# Patient Record
Sex: FEMALE | Race: WHITE | NOT HISPANIC OR LATINO | Employment: UNEMPLOYED | URBAN - METROPOLITAN AREA
[De-identification: names, ages, dates, MRNs, and addresses within clinical notes are randomized per-mention and may not be internally consistent; named-entity substitution may affect disease eponyms.]

---

## 2017-02-01 ENCOUNTER — OFFICE VISIT (OUTPATIENT)
Dept: INTERNAL MEDICINE | Facility: CLINIC | Age: 47
End: 2017-02-01

## 2017-02-01 VITALS
WEIGHT: 293 LBS | RESPIRATION RATE: 18 BRPM | DIASTOLIC BLOOD PRESSURE: 82 MMHG | TEMPERATURE: 97.8 F | BODY MASS INDEX: 51 KG/M2 | SYSTOLIC BLOOD PRESSURE: 118 MMHG | HEART RATE: 80 BPM

## 2017-02-01 DIAGNOSIS — K52.9 GASTROENTERITIS: Primary | ICD-10-CM

## 2017-02-01 PROCEDURE — 99214 OFFICE O/P EST MOD 30 MIN: CPT | Performed by: INTERNAL MEDICINE

## 2017-02-01 RX ORDER — ONDANSETRON 4 MG/1
4 TABLET, FILM COATED ORAL EVERY 8 HOURS PRN
Qty: 20 TABLET | Refills: 0 | Status: SHIPPED | OUTPATIENT
Start: 2017-02-01 | End: 2017-06-28

## 2017-02-01 NOTE — PROGRESS NOTES
Subjective   Loreta Robertson is a 46 y.o. female.   Pt is here with the acute complaint of gastroenteritis.             History of Present Illness   Pt is here with the acute complaint of gastroenteritis.   She states her symptoms of nausea and vomiting started 24 hours ago.   She is now able to hold down liquids and feels like she is improving.           The following portions of the patient's history were reviewed and updated as appropriate: allergies, current medications, past family history, past medical history, past social history, past surgical history and problem list.             Review of Systems   Constitutional: Negative.    HENT: Negative.    Eyes: Negative.    Respiratory: Negative.    Cardiovascular: Negative.    Gastrointestinal:        See HPI.    Endocrine: Negative.    Genitourinary: Negative.    Musculoskeletal: Negative.    Skin: Negative.    Allergic/Immunologic: Negative.    Neurological: Negative.    Hematological: Negative.    Psychiatric/Behavioral: Negative.                 Objective   Physical Exam   Constitutional: She is oriented to person, place, and time. She appears well-developed and well-nourished.   HENT:   Head: Normocephalic and atraumatic.   Eyes: Conjunctivae and EOM are normal. Pupils are equal, round, and reactive to light.   Neck: Normal range of motion. Neck supple.   Cardiovascular: Normal rate, regular rhythm and normal heart sounds.    Pulmonary/Chest: Effort normal and breath sounds normal.   Abdominal: Soft. Bowel sounds are normal. She exhibits no distension. There is no tenderness.   Neurological: She is alert and oriented to person, place, and time. She has normal reflexes.   Skin: Skin is warm and dry.   Psychiatric: She has a normal mood and affect.   Nursing note and vitals reviewed.               Assessment/Plan    Loreta was seen today for vomiting and diarrhea.    Diagnoses and all orders for this visit:    Gastroenteritis        1.) Zofran 4 mg PO Q8 prn  for nausea  2.) Liquid diet and advance as tolerated, avoid foods high in acid content and dairy products for       several days.   3.) Keep scheduled follow up   4.) 15 minutes spent in exam, 10 minutes spent discussing how to dose and possible s/e of new meds as well as how to advance diet.     * Total time spent in discussion and exam 25 minutes.

## 2017-02-27 ENCOUNTER — OFFICE VISIT (OUTPATIENT)
Dept: INTERNAL MEDICINE | Facility: CLINIC | Age: 47
End: 2017-02-27

## 2017-02-27 ENCOUNTER — TELEPHONE (OUTPATIENT)
Dept: INTERNAL MEDICINE | Facility: CLINIC | Age: 47
End: 2017-02-27

## 2017-02-27 VITALS
WEIGHT: 293 LBS | HEART RATE: 88 BPM | RESPIRATION RATE: 18 BRPM | SYSTOLIC BLOOD PRESSURE: 120 MMHG | TEMPERATURE: 98.8 F | OXYGEN SATURATION: 95 % | DIASTOLIC BLOOD PRESSURE: 80 MMHG | BODY MASS INDEX: 51.65 KG/M2

## 2017-02-27 DIAGNOSIS — F31.11 BIPOLAR 1 DISORDER, MANIC, MILD (HCC): Primary | ICD-10-CM

## 2017-02-27 DIAGNOSIS — Z12.31 ENCOUNTER FOR SCREENING MAMMOGRAM FOR BREAST CANCER: ICD-10-CM

## 2017-02-27 DIAGNOSIS — Z72.0 TOBACCO USE: ICD-10-CM

## 2017-02-27 PROBLEM — K59.09 CHRONIC CONSTIPATION: Status: ACTIVE | Noted: 2017-02-27

## 2017-02-27 PROBLEM — K52.9 GASTROENTERITIS: Status: RESOLVED | Noted: 2017-02-01 | Resolved: 2017-02-27

## 2017-02-27 PROCEDURE — 99406 BEHAV CHNG SMOKING 3-10 MIN: CPT | Performed by: PHYSICIAN ASSISTANT

## 2017-02-27 PROCEDURE — 99213 OFFICE O/P EST LOW 20 MIN: CPT | Performed by: PHYSICIAN ASSISTANT

## 2017-02-27 RX ORDER — SELEGILINE 6 MG/24H
1 PATCH TRANSDERMAL DAILY
COMMUNITY
Start: 2017-02-01 | End: 2018-02-26

## 2017-02-27 NOTE — TELEPHONE ENCOUNTER
----- Message from ROBERTO Bejarano sent at 2/27/2017 10:23 AM EST -----  pls get last colonscopy report from [facility] in [place]

## 2017-02-27 NOTE — TELEPHONE ENCOUNTER
[phone]. [facility] for medical records to return my call. Office # given.

## 2017-02-27 NOTE — PROGRESS NOTES
Nevin Robertson is a 46 y.o. female.   Chief Complaint   Patient presents with   • Manic Behavior   • Depression     referral     History of Present Illness   PT here to establish care.      SHe did see Dr Collado on 2/1 for stomach virus.    Sees Dr Murillo at [facility]. He does all her psych med for the last 2 years. She has recently started MAO inhibitor. PT describes herself as a loner.    Smokes 1ppd.  She cannot quit right now but could maybe cut back to 1/2 ppd.    Needs mammogram, hx of monitoring a specific area.  Colonoscopy was done 4years ago.  DOesn't know when f/u required.        The following portions of the patient's history were reviewed and updated as appropriate: allergies, current medications and problem list.    Review of Systems   Constitutional: Positive for unexpected weight change (says since starting depakote.).   HENT: Negative.    Respiratory: Positive for shortness of breath and wheezing.    Cardiovascular: Negative for chest pain.   Gastrointestinal: Positive for constipation. Negative for blood in stool and diarrhea.   Genitourinary: Negative for dysuria.   Musculoskeletal: Negative for back pain and gait problem.   Allergic/Immunologic: Negative for environmental allergies.   Neurological: Negative for headaches.   Psychiatric/Behavioral: Positive for dysphoric mood and sleep disturbance. Negative for suicidal ideas. The patient is nervous/anxious.        Objective   Physical Exam   Constitutional: She appears well-developed and well-nourished.   Obese     HENT:   Head: Normocephalic.   Right Ear: Hearing, tympanic membrane and external ear normal.   Left Ear: Hearing, tympanic membrane and external ear normal.   Nose: Nose normal.   Mouth/Throat: Oropharynx is clear and moist.   Eyes: Conjunctivae and EOM are normal. Pupils are equal, round, and reactive to light. No scleral icterus.   Neck: Normal carotid pulses present. Carotid bruit is not present. No tracheal  deviation present. No thyromegaly present.   Cardiovascular: Normal rate, regular rhythm, normal heart sounds and intact distal pulses.    No murmur heard.  No pitting edema of the LE.   Pulmonary/Chest: Effort normal. No respiratory distress. She has no decreased breath sounds. She has no wheezes. She has no rhonchi. She has no rales. She exhibits no tenderness.   Abdominal: Soft. Bowel sounds are normal. She exhibits no distension and no mass. There is no tenderness.   Musculoskeletal: She exhibits no edema or tenderness.   Lymphadenopathy:     She has no cervical adenopathy.   Neurological: She has normal strength. She displays normal reflexes. No cranial nerve deficit.   Reflex Scores:       Patellar reflexes are 2+ on the right side and 2+ on the left side.  Skin: No rash noted. No erythema.   Psychiatric: She has a normal mood and affect. Her behavior is normal. Judgment and thought content normal.   Vitals reviewed.      Assessment/Plan   Loreta was seen today for manic behavior and depression.    Diagnoses and all orders for this visit:    Bipolar 1 disorder, manic, mild  -     Ambulatory Referral to Psychiatry    Encounter for screening mammogram for breast cancer  -     Mammo Screening Digital Tomosynthesis Bilateral With CAD    Tobacco use    Discussed smoking cess x 3 min.  PT not ready to quit.  Will retrieve colonoscopy report from [facility].  SHows procedure done in 10/1/13 with inadequate clean out.  1 year was recommended.

## 2017-02-27 NOTE — TELEPHONE ENCOUNTER
----- Message from ROBERTO Bejarano sent at 2/27/2017 10:23 AM EST -----  pls get last colonscopy report from [facility] in [place]

## 2017-02-27 NOTE — TELEPHONE ENCOUNTER
----- Message from ROBERTO Bejarano sent at 2/27/2017 10:23 AM EST -----  pls get last colonscopy report from [facility] in [place]

## 2017-02-27 NOTE — PATIENT INSTRUCTIONS
pls consider cutting back to 10 cig per day.   Pls bring in copy of recent blood work.    pls call [facility] ovarian cancer screening program at:    [phone] or [phone]

## 2017-03-27 ENCOUNTER — HOSPITAL ENCOUNTER (OUTPATIENT)
Dept: MAMMOGRAPHY | Facility: HOSPITAL | Age: 47
Discharge: HOME | End: 2017-03-27
Admitting: PHYSICIAN ASSISTANT

## 2017-03-27 PROCEDURE — 77063 BREAST TOMOSYNTHESIS BI: CPT | Performed by: RADIOLOGY

## 2017-03-27 PROCEDURE — G0202 SCR MAMMO BI INCL CAD: HCPCS | Performed by: RADIOLOGY

## 2017-03-27 PROCEDURE — 77063 BREAST TOMOSYNTHESIS BI: CPT

## 2017-03-27 PROCEDURE — G0202 SCR MAMMO BI INCL CAD: HCPCS

## 2017-06-28 ENCOUNTER — HOSPITAL ENCOUNTER (OUTPATIENT)
Dept: GENERAL RADIOLOGY | Facility: HOSPITAL | Age: 47
Discharge: HOME | End: 2017-06-28
Attending: INTERNAL MEDICINE | Admitting: INTERNAL MEDICINE

## 2017-06-28 ENCOUNTER — TELEPHONE (OUTPATIENT)
Dept: INTERNAL MEDICINE | Facility: CLINIC | Age: 47
End: 2017-06-28

## 2017-06-28 ENCOUNTER — OFFICE VISIT (OUTPATIENT)
Dept: INTERNAL MEDICINE | Facility: CLINIC | Age: 47
End: 2017-06-28

## 2017-06-28 VITALS
SYSTOLIC BLOOD PRESSURE: 120 MMHG | BODY MASS INDEX: 51.97 KG/M2 | DIASTOLIC BLOOD PRESSURE: 84 MMHG | TEMPERATURE: 97.3 F | WEIGHT: 293 LBS

## 2017-06-28 DIAGNOSIS — F31.11 BIPOLAR 1 DISORDER, MANIC, MILD (HCC): ICD-10-CM

## 2017-06-28 DIAGNOSIS — R05.9 COUGH: ICD-10-CM

## 2017-06-28 DIAGNOSIS — R79.89 OTHER SPECIFIED ABNORMAL FINDINGS OF BLOOD CHEMISTRY: ICD-10-CM

## 2017-06-28 DIAGNOSIS — R79.9 ABNORMAL FINDING OF BLOOD CHEMISTRY: ICD-10-CM

## 2017-06-28 DIAGNOSIS — K59.09 CHRONIC CONSTIPATION: Primary | ICD-10-CM

## 2017-06-28 LAB
ALBUMIN SERPL-MCNC: 3.8 G/DL (ref 3.2–4.8)
ALBUMIN/GLOB SERPL: 1.4 G/DL (ref 1.5–2.5)
ALP SERPL-CCNC: 70 U/L (ref 25–100)
ALT SERPL-CCNC: 36 U/L (ref 7–40)
AST SERPL-CCNC: 34 U/L (ref 0–33)
BILIRUB SERPL-MCNC: 0.4 MG/DL (ref 0.3–1.2)
BUN SERPL-MCNC: 14 MG/DL (ref 9–23)
BUN/CREAT SERPL: 20 (ref 7–25)
CALCIUM SERPL-MCNC: 9.7 MG/DL (ref 8.7–10.4)
CHLORIDE SERPL-SCNC: 103 MMOL/L (ref 99–109)
CHOLEST SERPL-MCNC: 186 MG/DL (ref 0–200)
CO2 SERPL-SCNC: 33 MMOL/L (ref 20–31)
CREAT SERPL-MCNC: 0.7 MG/DL (ref 0.6–1.3)
ERYTHROCYTE [DISTWIDTH] IN BLOOD BY AUTOMATED COUNT: 14.8 % (ref 11.3–14.5)
EXPIRATION DATE: NORMAL
GLOBULIN SER CALC-MCNC: 2.8 GM/DL
GLUCOSE SERPL-MCNC: 84 MG/DL (ref 70–100)
HBA1C MFR BLD: 5.4 %
HCT VFR BLD AUTO: 43.9 % (ref 34.5–44)
HDLC SERPL-MCNC: 52 MG/DL (ref 40–60)
HGB BLD-MCNC: 13.8 G/DL (ref 11.5–15.5)
LDLC SERPL CALC-MCNC: 111 MG/DL (ref 0–100)
Lab: NORMAL
MCH RBC QN AUTO: 30 PG (ref 27–31)
MCHC RBC AUTO-ENTMCNC: 31.4 G/DL (ref 32–36)
MCV RBC AUTO: 95.4 FL (ref 80–99)
PLATELET # BLD AUTO: 201 10*3/MM3 (ref 150–450)
POTASSIUM SERPL-SCNC: 4.5 MMOL/L (ref 3.5–5.5)
PROT SERPL-MCNC: 6.6 G/DL (ref 5.7–8.2)
RBC # BLD AUTO: 4.6 10*6/MM3 (ref 3.89–5.14)
SODIUM SERPL-SCNC: 141 MMOL/L (ref 132–146)
TRIGL SERPL-MCNC: 116 MG/DL (ref 0–150)
VALPROATE SERPL-MCNC: 87 MCG/ML (ref 50–150)
VLDLC SERPL CALC-MCNC: 23.2 MG/DL
WBC # BLD AUTO: 4.8 10*3/MM3 (ref 3.5–10.8)

## 2017-06-28 PROCEDURE — 99214 OFFICE O/P EST MOD 30 MIN: CPT | Performed by: INTERNAL MEDICINE

## 2017-06-28 PROCEDURE — 36415 COLL VENOUS BLD VENIPUNCTURE: CPT | Performed by: INTERNAL MEDICINE

## 2017-06-28 PROCEDURE — 83036 HEMOGLOBIN GLYCOSYLATED A1C: CPT | Performed by: INTERNAL MEDICINE

## 2017-06-28 PROCEDURE — 71020 HC CHEST PA AND LATERAL: CPT

## 2017-06-28 RX ORDER — PRAZOSIN HYDROCHLORIDE 5 MG/1
5 CAPSULE ORAL NIGHTLY
Refills: 1 | COMMUNITY
Start: 2017-06-02

## 2017-06-28 RX ORDER — QUETIAPINE FUMARATE 300 MG/1
TABLET, FILM COATED ORAL
Refills: 1 | COMMUNITY
Start: 2017-06-02 | End: 2018-04-02 | Stop reason: SDUPTHER

## 2017-06-28 RX ORDER — ALBUTEROL SULFATE 90 UG/1
2 AEROSOL, METERED RESPIRATORY (INHALATION) EVERY 6 HOURS PRN
Qty: 6.7 G | Refills: 5 | Status: SHIPPED | OUTPATIENT
Start: 2017-06-28 | End: 2018-02-26

## 2017-06-28 NOTE — PROGRESS NOTES
"Nevin GIBBS States is a 47 y.o. female.   Pt is here to follow up on chronic constipation and Bipolar Disorder.             History of Present Illness   Pt is here to follow up on chronic constipation and Bipolar Disorder. She states both issues are controlled. However, she does need lab work drawn for her psychiatrist due to the medication she is on.   She states she continues to have a chronic cough. She states sometimes she also will get out of breath she doesn't know wether it is the smoking,allergies or both. She is concerned that she may have \"something wrong with her lungs\" and would like a chest x ray today as well. She has not tried an inhaler for prn use.               The following portions of the patient's history were reviewed and updated as appropriate: allergies, current medications, past family history, past medical history, past social history, past surgical history and problem list.               Review of Systems   Constitutional: Negative.    HENT: Negative.    Eyes: Negative.    Respiratory:        See HPI.    Cardiovascular: Negative.    Gastrointestinal:        See HPI.    Endocrine: Negative.    Genitourinary: Negative.    Musculoskeletal: Negative.    Skin: Negative.    Allergic/Immunologic: Negative.    Neurological: Negative.    Hematological: Negative.    Psychiatric/Behavioral:        See HPI.                 Objective   Physical Exam   Constitutional: She is oriented to person, place, and time. She appears well-developed and well-nourished.   HENT:   Head: Normocephalic and atraumatic.   Right Ear: External ear normal.   Left Ear: External ear normal.   Nose: Nose normal.   Mouth/Throat: Oropharynx is clear and moist.   Eyes: Conjunctivae and EOM are normal. Pupils are equal, round, and reactive to light.   Neck: Normal range of motion. Neck supple. No tracheal deviation present. No thyromegaly present.   Cardiovascular: Normal rate, regular rhythm, normal heart sounds and " intact distal pulses.    Pulmonary/Chest: Effort normal and breath sounds normal.   Abdominal: Soft. Bowel sounds are normal. She exhibits no distension. There is no tenderness.   Neurological: She is alert and oriented to person, place, and time. She has normal reflexes.   Skin: Skin is warm and dry.   Psychiatric: She has a normal mood and affect.   Nursing note and vitals reviewed.               Assessment/Plan    Chronic constipation  -     Comprehensive Metabolic Panel    Bipolar 1 disorder, manic, mild  -     CBC (No Diff)  -     Lipid Panel  -     POC Glycosylated Hemoglobin (Hb A1C)  -     Valproic Acid Level, Total    Cough  -     XR Chest PA & Lateral  -     albuterol (PROVENTIL HFA;VENTOLIN HFA) 108 (90 BASE) MCG/ACT inhaler; Inhale 2 puffs Every 6 (Six) Hours As Needed for Wheezing.    Other specified abnormal findings of blood chemistry   -     Lipid Panel    Abnormal finding of blood chemistry   -     POC Glycosylated Hemoglobin (Hb A1C)        1.) A1C,CBC,CMP,lipid panel and Depakote level   2.) Send in nicotine inhalers.   3.) Ventolin inhaler 1-2 puffs every 6 hours prn   4.) CXR today   5.) Follow up in 3 months   6.) Call in Nicotine inhalers.

## 2017-06-28 NOTE — PROGRESS NOTES
Nevin GIBBS States is a 47 y.o. female.   Chief Complaint   Patient presents with   • Manic Behavior     f/u   • gastroenteritis     f/u     History of Present Illness     {Common H&P Review Areas:52824}    Review of Systems    Objective   Physical Exam    Assessment/Plan   {Assess/PlanSmartLinks:78126}

## 2017-07-18 ENCOUNTER — OFFICE VISIT (OUTPATIENT)
Dept: INTERNAL MEDICINE | Facility: CLINIC | Age: 47
End: 2017-07-18

## 2017-07-18 VITALS
SYSTOLIC BLOOD PRESSURE: 124 MMHG | HEIGHT: 66 IN | DIASTOLIC BLOOD PRESSURE: 84 MMHG | WEIGHT: 293 LBS | OXYGEN SATURATION: 96 % | RESPIRATION RATE: 16 BRPM | BODY MASS INDEX: 47.09 KG/M2 | HEART RATE: 89 BPM

## 2017-07-18 DIAGNOSIS — R60.0 BILATERAL EDEMA OF LOWER EXTREMITY: Primary | ICD-10-CM

## 2017-07-18 DIAGNOSIS — R60.0 BILATERAL EDEMA OF LOWER EXTREMITY: ICD-10-CM

## 2017-07-18 PROCEDURE — 99213 OFFICE O/P EST LOW 20 MIN: CPT | Performed by: INTERNAL MEDICINE

## 2017-07-18 RX ORDER — FUROSEMIDE 20 MG/1
TABLET ORAL
Qty: 90 TABLET | Refills: 0 | OUTPATIENT
Start: 2017-07-18

## 2017-07-18 RX ORDER — FUROSEMIDE 20 MG/1
20 TABLET ORAL DAILY PRN
Qty: 10 TABLET | Refills: 0 | Status: SHIPPED | OUTPATIENT
Start: 2017-07-18 | End: 2018-02-26

## 2018-02-26 ENCOUNTER — OFFICE VISIT (OUTPATIENT)
Dept: INTERNAL MEDICINE | Facility: CLINIC | Age: 48
End: 2018-02-26

## 2018-02-26 VITALS
BODY MASS INDEX: 47.09 KG/M2 | HEIGHT: 66 IN | HEART RATE: 88 BPM | TEMPERATURE: 98.3 F | DIASTOLIC BLOOD PRESSURE: 84 MMHG | RESPIRATION RATE: 22 BRPM | SYSTOLIC BLOOD PRESSURE: 118 MMHG | WEIGHT: 293 LBS

## 2018-02-26 DIAGNOSIS — M25.561 ACUTE PAIN OF RIGHT KNEE: Primary | ICD-10-CM

## 2018-02-26 PROCEDURE — 99214 OFFICE O/P EST MOD 30 MIN: CPT | Performed by: NURSE PRACTITIONER

## 2018-02-26 PROCEDURE — 96372 THER/PROPH/DIAG INJ SC/IM: CPT | Performed by: NURSE PRACTITIONER

## 2018-02-26 RX ORDER — IBUPROFEN 800 MG/1
TABLET ORAL
COMMUNITY
Start: 2018-02-26 | End: 2019-04-18

## 2018-02-26 RX ORDER — KETOROLAC TROMETHAMINE 30 MG/ML
60 INJECTION, SOLUTION INTRAMUSCULAR; INTRAVENOUS ONCE
Status: COMPLETED | OUTPATIENT
Start: 2018-02-26 | End: 2018-02-26

## 2018-02-26 RX ADMIN — KETOROLAC TROMETHAMINE 60 MG: 30 INJECTION, SOLUTION INTRAMUSCULAR; INTRAVENOUS at 14:55

## 2018-02-26 NOTE — PROGRESS NOTES
Nevin Robertson is a 47 y.o. female.     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 Injury denied. Minimal relief with resting/sitting and over the counter ibuprofen. Right knee pain became severe last evening and she went to ER at [facility] in [place]  . Pain today described as throbbing at front of knee 7/0-10 scale. Patient reports x ray suggested mri and orthopedist referral. Patient unable to reapply ace bandage that was applied at ER.    The following portions of the patient's history were reviewed and updated as appropriate: allergies, current medications, past family history, past medical history, past social history, past surgical history and problem list.    Review of Systems   Constitutional: Positive for activity change. Negative for appetite change, chills, diaphoresis, fatigue, fever and unexpected weight change.        Sitting more at work due to knee pain   HENT: Negative.    Eyes: Negative.    Respiratory: Negative.    Cardiovascular: Negative.    Gastrointestinal: Negative.    Endocrine: Negative.    Genitourinary: Negative.    Musculoskeletal: Positive for arthralgias, gait problem, joint swelling and myalgias. Negative for back pain.   Skin: Negative.    Allergic/Immunologic: Negative.    Hematological: Negative.    Psychiatric/Behavioral:        Bipolar, depression and anxiety-sees Dr Murillo at [facility]       Objective   Physical Exam   Constitutional: She is oriented to person, place, and time. She appears well-developed and well-nourished.   Patient morbidly obese   Cardiovascular: Normal rate, regular rhythm, normal heart sounds and intact distal pulses.  Exam reveals no gallop and no friction rub.    Pulmonary/Chest: Effort normal and breath sounds normal. No respiratory distress.   Abdominal: Soft. Bowel sounds are normal. She exhibits no mass. There  is no hepatosplenomegaly. There is no CVA tenderness.   Musculoskeletal: She exhibits edema and tenderness.        Right knee: She exhibits decreased range of motion and swelling. Tenderness found. Medial joint line tenderness noted.   Right knee edematous/tender with majority of edema/tenderness at medial border, decreased ROM with right knee/RLE   Lymphadenopathy:        Right: No inguinal adenopathy present.        Left: No inguinal adenopathy present.   Neurological: She is alert and oriented to person, place, and time.   Skin: Skin is warm and dry. No erythema.   Psychiatric: She has a normal mood and affect.   Nursing note and vitals reviewed.      Assessment/Plan   Loreta was seen today for knee pain.    Diagnoses and all orders for this visit:    Acute pain of right knee  -     ketorolac (TORADOL) injection 60 mg; Inject 60 mg into the shoulder, thigh, or buttocks 1 (One) Time.  -     Ambulatory Referral to Orthopedic Surgery  -     MRI knee right  wo contrast; Future

## 2018-03-01 ENCOUNTER — TELEPHONE (OUTPATIENT)
Dept: INTERNAL MEDICINE | Facility: CLINIC | Age: 48
End: 2018-03-01

## 2018-03-01 ENCOUNTER — HOSPITAL ENCOUNTER (OUTPATIENT)
Dept: MRI IMAGING | Facility: HOSPITAL | Age: 48
Discharge: HOME | End: 2018-03-01
Admitting: NURSE PRACTITIONER

## 2018-03-01 DIAGNOSIS — M25.561 ACUTE PAIN OF RIGHT KNEE: ICD-10-CM

## 2018-03-01 PROCEDURE — 73721 MRI JNT OF LWR EXTRE W/O DYE: CPT

## 2018-03-05 ENCOUNTER — OFFICE VISIT (OUTPATIENT)
Dept: INTERNAL MEDICINE | Facility: CLINIC | Age: 48
End: 2018-03-05

## 2018-03-05 ENCOUNTER — OFFICE VISIT (OUTPATIENT)
Dept: ORTHOPEDIC SURGERY | Facility: CLINIC | Age: 48
End: 2018-03-05

## 2018-03-05 VITALS
DIASTOLIC BLOOD PRESSURE: 81 MMHG | BODY MASS INDEX: 47.09 KG/M2 | WEIGHT: 293 LBS | HEIGHT: 66 IN | SYSTOLIC BLOOD PRESSURE: 137 MMHG | HEART RATE: 166 BPM

## 2018-03-05 VITALS
RESPIRATION RATE: 20 BRPM | WEIGHT: 293 LBS | HEIGHT: 66 IN | TEMPERATURE: 98.3 F | DIASTOLIC BLOOD PRESSURE: 86 MMHG | BODY MASS INDEX: 47.09 KG/M2 | SYSTOLIC BLOOD PRESSURE: 134 MMHG | HEART RATE: 88 BPM

## 2018-03-05 DIAGNOSIS — R73.9 HYPERGLYCEMIA: ICD-10-CM

## 2018-03-05 DIAGNOSIS — Z72.0 TOBACCO USE: ICD-10-CM

## 2018-03-05 DIAGNOSIS — M94.20 CHONDROMALACIA: ICD-10-CM

## 2018-03-05 DIAGNOSIS — Z12.31 SCREENING MAMMOGRAM, ENCOUNTER FOR: ICD-10-CM

## 2018-03-05 DIAGNOSIS — IMO0001 CLASS 3 OBESITY DUE TO EXCESS CALORIES WITHOUT SERIOUS COMORBIDITY WITH BODY MASS INDEX (BMI) OF 50.0 TO 59.9 IN ADULT: ICD-10-CM

## 2018-03-05 DIAGNOSIS — M25.561 RIGHT KNEE PAIN, UNSPECIFIED CHRONICITY: Primary | ICD-10-CM

## 2018-03-05 DIAGNOSIS — Z13.29 SCREENING FOR ENDOCRINE DISORDER: ICD-10-CM

## 2018-03-05 DIAGNOSIS — F31.11 BIPOLAR 1 DISORDER, MANIC, MILD (HCC): ICD-10-CM

## 2018-03-05 DIAGNOSIS — Z12.4 PAPANICOLAOU SMEAR: ICD-10-CM

## 2018-03-05 DIAGNOSIS — Z00.00 INITIAL MEDICARE ANNUAL WELLNESS VISIT: Primary | ICD-10-CM

## 2018-03-05 DIAGNOSIS — Z13.220 SCREENING FOR LIPOID DISORDERS: ICD-10-CM

## 2018-03-05 PROCEDURE — 36415 COLL VENOUS BLD VENIPUNCTURE: CPT | Performed by: NURSE PRACTITIONER

## 2018-03-05 PROCEDURE — 96160 PT-FOCUSED HLTH RISK ASSMT: CPT | Performed by: NURSE PRACTITIONER

## 2018-03-05 PROCEDURE — 20610 DRAIN/INJ JOINT/BURSA W/O US: CPT | Performed by: ORTHOPAEDIC SURGERY

## 2018-03-05 PROCEDURE — 99204 OFFICE O/P NEW MOD 45 MIN: CPT | Performed by: ORTHOPAEDIC SURGERY

## 2018-03-05 PROCEDURE — 99396 PREV VISIT EST AGE 40-64: CPT | Performed by: NURSE PRACTITIONER

## 2018-03-05 PROCEDURE — G0438 PPPS, INITIAL VISIT: HCPCS | Performed by: NURSE PRACTITIONER

## 2018-03-05 RX ORDER — LIDOCAINE HYDROCHLORIDE 10 MG/ML
4 INJECTION, SOLUTION INFILTRATION; PERINEURAL
Status: COMPLETED | OUTPATIENT
Start: 2018-03-05 | End: 2018-03-05

## 2018-03-05 RX ORDER — BETAMETHASONE SODIUM PHOSPHATE AND BETAMETHASONE ACETATE 3; 3 MG/ML; MG/ML
6 INJECTION, SUSPENSION INTRA-ARTICULAR; INTRALESIONAL; INTRAMUSCULAR; SOFT TISSUE
Status: COMPLETED | OUTPATIENT
Start: 2018-03-05 | End: 2018-03-05

## 2018-03-05 RX ADMIN — BETAMETHASONE SODIUM PHOSPHATE AND BETAMETHASONE ACETATE 6 MG: 3; 3 INJECTION, SUSPENSION INTRA-ARTICULAR; INTRALESIONAL; INTRAMUSCULAR; SOFT TISSUE at 10:18

## 2018-03-05 RX ADMIN — LIDOCAINE HYDROCHLORIDE 4 ML: 10 INJECTION, SOLUTION INFILTRATION; PERINEURAL at 10:18

## 2018-03-05 NOTE — PROGRESS NOTES
Procedure   Large Joint Arthrocentesis  Date/Time: 3/5/2018 10:18 AM  Consent given by: patient  Site marked: site marked  Timeout: Immediately prior to procedure a time out was called to verify the correct patient, procedure, equipment, support staff and site/side marked as required   Supporting Documentation  Indications: pain   Procedure Details  Location: knee - R knee  Preparation: Patient was prepped and draped in the usual sterile fashion  Needle size: 22 G  Approach: anterolateral  Medications administered: 4 mL lidocaine 1 %; 6 mg betamethasone acetate-betamethasone sodium phosphate 6 (3-3) MG/ML (4cc 0.25%Bupivacaine 2.5mg/ml [id number] exp [id number])  Patient tolerance: patient tolerated the procedure well with no immediate complications

## 2018-03-05 NOTE — PROGRESS NOTES
[facility] Orthopaedic Surgery Clinic Note    Subjective     Chief Complaint   Patient presents with   • Right Knee - Pain        HPI      Loreta Robertson is a 47 y.o. female.  She has a one-month history of right knee pain and swelling.  Her still walks and standing.  Feels better with rest.  Pain is 8 out of 10.  The pain is aching throbbing and shooting.  She had a recent MRI.  She works standing at [employer]        Past Medical History:   Diagnosis Date   • Anxiety    • Bipolar 1 disorder    • Bipolar 1 disorder    • Chronic bronchitis    • Chronic constipation    • Depression       Past Surgical History:   Procedure Laterality Date   • ABDOMINOPLASTY     • BREAST BIOPSY Right     US BIOPSY   •  SECTION     • CHOLECYSTECTOMY     • HYSTERECTOMY      AGE 33   • LIPOSUCTION ABDOMINAL  1998      Family History   Problem Relation Age of Onset   • Breast cancer Mother 50   • Diabetes Mother    • Cancer Mother    • Diabetes Father    • Hypertension Father    • Heart attack Father    • Breast cancer Maternal Grandmother 40   • Ovarian cancer Maternal Grandmother 40   • Cervical cancer Maternal Grandmother      Social History     Social History   • Marital status:      Spouse name: N/A   • Number of children: N/A   • Years of education: N/A     Occupational History   • Not on file.     Social History Main Topics   • Smoking status: Current Every Day Smoker     Packs/day: 1.00     Years: 25.00     Start date:    • Smokeless tobacco: Never Used   • Alcohol use No   • Drug use: No   • Sexual activity: Defer     Other Topics Concern   • Not on file     Social History Narrative      Current Outpatient Prescriptions on File Prior to Visit   Medication Sig Dispense Refill   • divalproex (DEPAKOTE) 500 MG 24 hr tablet Take 1,000 mg by mouth Daily.     • QUEtiapine (SEROquel) 300 MG tablet TK 1 T PO HS  1   • Brexpiprazole (REXULTI) 4 MG tablet Take  by mouth.     • ibuprofen (ADVIL,MOTRIN) 800 MG tablet     "  • prazosin (MINIPRESS) 2 MG capsule TK 1 C PO HS  1     No current facility-administered medications on file prior to visit.       No Known Allergies     The following portions of the patient's history were reviewed and updated as appropriate: allergies, current medications, past family history, past medical history, past social history, past surgical history and problem list.    Review of Systems   Constitutional: Positive for fatigue.   HENT: Negative.    Eyes: Negative.    Respiratory: Negative.    Cardiovascular: Negative.    Gastrointestinal: Negative.    Endocrine: Negative.    Genitourinary: Negative.    Musculoskeletal: Positive for arthralgias.   Skin: Negative.    Allergic/Immunologic: Negative.    Neurological: Negative.    Hematological: Negative.    Psychiatric/Behavioral: Negative.         Objective      Physical Exam  /81  Pulse (!) 166  Ht 167.6 cm (66\")  Wt (!) 148 kg (326 lb 4.5 oz)  BMI 52.66 kg/m2    Body mass index is 52.66 kg/(m^2).        GENERAL APPEARANCE: awake, alert & oriented x 3, in no acute distress and well developed, well nourished  PSYCH: normal mood and affect  LUNGS:  breathing nonlabored, no wheezing  EYES: sclera anicteric, pupils equal  CARDIOVASCULAR: palpable pulses dorsalis pedis, palpable posterior tibial bilaterally. Capillary refill less than 2 seconds  INTEGUMENTARY: skin intact, no clubbing, cyanosis  NEUROLOGIC:  Normal gait and balance            Ortho Exam  Peripheral Vascular:    Upper Extremity:   Inspection:  Left--no cyanotic nail beds Right--no cyanotic nail beds   Bilateral:  Pink nail beds with brisk capillary refill   Palpation:  Bilateral radial pulse normal    Musculoskeletal:  Global Assessment:  Overall assessment of Lower Extremity Muscle Strength and Tone:    Right quadriceps--5/5  Right hamstrings--5/5  Right tibialis anterior--5/5  Right gastroc soleus--5/5  Right EHL--5/5    Lower Extremity:  Knee/Patella:  No digital clubbing or " cyanosis.    Examination of right knee reveals:  Normal deep tendon reflexes, coordination, strength, tone, sensation.  No known fractures or deformities.    Inspection and Palpation:      Right knee:  Tenderness:  none  Effusion:  none  Crepitus:  none  Pulses:  2+  Ecchymosis:  None  Warmth:  None     ROM:  Right:  Extension:0    Flexion:135  Left:  Extension:0     Flexion:135    Instability:      Right:  Lachman Test:  Negative, Varus stress test negative, Valgus stress test negative   Anterior Drawer Test:  Negative, Posterior Drawer Test:  Negative    Deformities/Malalignments/Discrepancies:    Left:  none  Right:  none    Functional Testing:  Right:  Owen's test:  Negative  Patella grind test: Positive  Q-angle:  Normal  Apprehension Sign:  Negative        Imaging/Studies  Imaging Results (last 7 days)     Procedure Component Value Units Date/Time    XR Knee 4+ View Right [[id number]] Updated:  03/05/18 0941        I reviewed her knee x-ray and MRI which shows patella chondral malacia without a tear  Assessment/Plan      Loreta was seen today for pain.    Diagnoses and all orders for this visit:    Right knee pain, unspecified chronicity  -     XR Knee 4+ View Right    Chondromalacia  -     Ambulatory Referral to Physical Therapy    Other orders  -     Large Joint Arthrocentesis     we will try a patella stabilizing sleeve.  She will physical therapy.  I gave her right knee injection of steroid today.  She tolerated the injection well without complication.    Medical Decision Making  Management Options : over-the-counter medicine, prescription/IM medicine and physical/occupational therapy  Data/Risk: radiology tests and independent visualization of imaging, lab tests, or EMG/NCV    Zackery Landry MD  03/05/18  10:27 AM

## 2018-03-05 NOTE — PROGRESS NOTES
QUICK REFERENCE INFORMATION:  The ABCs of the Annual Wellness Visit    Initial Medicare Wellness Visit    HEALTH RISK ASSESSMENT    1970    Recent Hospitalizations:  No hospitalization(s) within the last year..        Current Medical Providers:  Patient Care Team:  ROSALES Contreras as PCP - General (Nurse Practitioner)  Joel Murillo Jr., MD (Psychiatry)  Joel Murillo Jr., MD (Psychiatry)  Nelson Russell OD (Optometry)        Smoking Status:  History   Smoking Status   • Current Every Day Smoker   • Packs/day: 1.00   • Years: 25.00   • Start date: 1980   Smokeless Tobacco   • Never Used       Alcohol Consumption:  History   Alcohol Use No       Depression Screen:   PHQ-2/PHQ-9 Depression Screening 3/5/2018   Little interest or pleasure in doing things 3   Feeling down, depressed, or hopeless 3   Trouble falling or staying asleep, or sleeping too much 3   Feeling tired or having little energy 3   Poor appetite or overeating 3   Feeling bad about yourself - or that you are a failure or have let yourself or your family down 3   Trouble concentrating on things, such as reading the newspaper or watching television 3   Moving or speaking so slowly that other people could have noticed. Or the opposite - being so fidgety or restless that you have been moving around a lot more than usual 3   Thoughts that you would be better off dead, or of hurting yourself in some way 0   Total Score 24   If you checked off any problems, how difficult have these problems made it for you to do your work, take care of things at home, or get along with other people? Extremely dIfficult       Health Habits and Functional and Cognitive Screening:  Functional & Cognitive Status 3/5/2018   Do you have difficulty preparing food and eating? No   Do you have difficulty bathing yourself, getting dressed or grooming yourself? No   Do you have difficulty using the toilet? No   Do you have difficulty moving around from place to place? No   Do  you have trouble with steps or getting out of a bed or a chair? No   In the past year have you fallen or experienced a near fall? No   Current Diet Unhealthy Diet   Dental Exam Not up to date   Eye Exam Up to date   Exercise (times per week) 0 times per week   Current Exercise Activities Include None   Do you need help using the phone?  No   Are you deaf or do you have serious difficulty hearing?  No   Do you need help with transportation? No   Do you need help shopping? No   Do you need help preparing meals?  No   Do you need help with housework?  No   Do you need help with laundry? No   Do you need help taking your medications? No   Do you need help managing money? No   Have you felt unusual stress, anger or loneliness in the last month? Yes   Who do you live with? Alone   If you need help, do you have trouble finding someone available to you? No   Have you been bothered in the last four weeks by sexual problems? No   Do you have difficulty concentrating, remembering or making decisions? Yes           Does the patient have evidence of cognitive impairment? No    Asiprin use counseling: Does not need ASA (and currently is not on it)      Recent Lab Results:    Visual Acuity:  No exam data present    Age-appropriate Screening Schedule:  Refer to the list below for future screening recommendations based on patient's age, sex and/or medical conditions. Orders for these recommended tests are listed in the plan section. The patient has been provided with a written plan.    Health Maintenance   Topic Date Due   • PNEUMOCOCCAL VACCINE (19-64 MEDIUM RISK) (1 of 1 - PPSV23) 04/28/1987   • TDAP/TD VACCINES (1 - Tdap) 04/28/1987   • PAP SMEAR  02/01/2017   • INFLUENZA VACCINE  Addressed        Subjective   History of Present Illness    Loreta Robertson is a 47 y.o. female who presents for an Annual Wellness Visit. Also here for annual physical.    The following portions of the patient's history were reviewed and updated as  appropriate: allergies, current medications, past family history, past medical history, past social history, past surgical history and problem list.    Outpatient Medications Prior to Visit   Medication Sig Dispense Refill   • Brexpiprazole (REXULTI) 4 MG tablet Take  by mouth.     • divalproex (DEPAKOTE) 500 MG 24 hr tablet Take 1,000 mg by mouth Daily.     • ibuprofen (ADVIL,MOTRIN) 800 MG tablet      • prazosin (MINIPRESS) 2 MG capsule TK 1 C PO HS  1   • QUEtiapine (SEROquel) 300 MG tablet TK 1 T PO HS  1     No facility-administered medications prior to visit.        Patient Active Problem List   Diagnosis   • Chronic constipation   • Tobacco use   • Bipolar 1 disorder, manic, mild       Advance Care Planning:  has NO advance directive - not interested in additional information    Identification of Risk Factors:  Risk factors include: weight , unhealthy diet, cardiovascular risk, inactivity, tobacco use, increased fall risk and depression.    Review of Systems   Constitutional: Negative for chills, fatigue, fever and unexpected weight change.        No weight gain or weight loss.  No night sweats.  No generalized pain.   HENT: Negative for congestion, ear pain, hearing loss, nosebleeds, postnasal drip, rhinorrhea, sinus pressure, sneezing, sore throat, tinnitus and voice change.         Denies snoring.   Eyes: Negative for photophobia, pain, discharge, redness, itching and visual disturbance.   Respiratory: Negative for cough, chest tightness, shortness of breath, wheezing and stridor.         No orthopnea, dyspnea on exertion, or PND.  No chest congestion.   No  hemoptysis.   Cardiovascular: Negative for chest pain, palpitations and leg swelling.        No claudication or syncope.   Gastrointestinal: Negative for abdominal pain, blood in stool, constipation, diarrhea, nausea, rectal pain and vomiting.        No hematemesis.  No heartburn, dysphagia or odynophagia.  No belching or bloating.  No melena.    Endocrine: Negative for cold intolerance, heat intolerance, polydipsia, polyphagia and polyuria.        No hair loss or dry skin.  No generalized weakness.  No hot flashes.   Genitourinary: Negative for difficulty urinating, dyspareunia, dysuria, flank pain, frequency, hematuria, menstrual problem, pelvic pain, urgency, vaginal bleeding and vaginal discharge.        No nocturia, incomplete emptying, or incontinence.   Musculoskeletal: Positive for joint swelling. Negative for arthralgias, back pain, gait problem, myalgias, neck pain and neck stiffness.        Recent onset right knee pain-currently seeing ortho/physical therapy   Skin: Negative for rash.        No new skin lesions or changes in skin lesions. No breast pain or masses.  No nipple discharge or nipple inversion.   Neurological: Negative for dizziness, tremors, syncope, speech difficulty, weakness, light-headedness, numbness and headaches.        No tingling.  No memory loss.  No decreased concentration.   Hematological: Negative for adenopathy. Does not bruise/bleed easily.   Psychiatric/Behavioral: Negative for confusion, sleep disturbance and suicidal ideas. The patient is not nervous/anxious.         Bipolar with depression-sees psychiatrist        Compared to one year ago, the patient feels her physical health is worse.  Compared to one year ago, the patient feels her mental health is the same.    Objective     Physical Exam   Constitutional: She is oriented to person, place, and time. She appears well-developed and well-nourished. She is cooperative. She is easily aroused.  Non-toxic appearance. She does not have a sickly appearance. She does not appear ill. No distress.   obese   HENT:   Head: Normocephalic and atraumatic. Head is without abrasion. Hair is normal.   Right Ear: Hearing, tympanic membrane, external ear and ear canal normal. No foreign bodies. Tympanic membrane is not perforated and not erythematous.   Left Ear: Hearing, tympanic  membrane, external ear and ear canal normal. No foreign bodies. Tympanic membrane is not perforated and not erythematous.   Nose: Nose normal. No mucosal edema, rhinorrhea or septal deviation. No epistaxis.  No foreign bodies.   Mouth/Throat: Oropharynx is clear and moist. No oral lesions. Normal dentition.   Eyes: Conjunctivae and lids are normal. Pupils are equal, round, and reactive to light. Right eye exhibits no discharge. Left eye exhibits no discharge. Right conjunctiva is not injected. Left conjunctiva is not injected. No scleral icterus.   Neck: Normal range of motion and full passive range of motion without pain. Neck supple. No edema and normal range of motion present. No thyroid mass and no thyromegaly present.   Cardiovascular: Normal rate, regular rhythm and normal heart sounds.  Exam reveals no gallop and no friction rub.    No murmur heard.  Pulmonary/Chest: Effort normal and breath sounds normal. No accessory muscle usage. She has no rhonchi. She has no rales. She exhibits no tenderness.   Abdominal: Soft. Bowel sounds are normal. She exhibits no distension. There is no hepatosplenomegaly or hepatomegaly. There is no tenderness. There is no CVA tenderness. Hernia confirmed negative in the right inguinal area and confirmed negative in the left inguinal area.   Genitourinary: Pelvic exam was performed with patient prone. No labial fusion. There is no rash, tenderness, lesion or injury on the right labia. There is no rash, tenderness, lesion or injury on the left labia.   Genitourinary Comments: Pap smear collected and sent for pathology   Musculoskeletal: She exhibits no edema or deformity.        Right knee: She exhibits decreased range of motion and swelling. Tenderness found. Medial joint line tenderness noted.   Lymphadenopathy:     She has no cervical adenopathy.        Right: No inguinal adenopathy present.        Left: No inguinal adenopathy present.   Neurological: She is alert, oriented to  "person, place, and time and easily aroused. She has normal reflexes. No cranial nerve deficit. Coordination normal.   Muscle strength 5/5 and equal throughout.   Skin: Skin is warm, dry and intact. No abrasion and no rash noted. She is not diaphoretic. No cyanosis or erythema. Nails show no clubbing.   Psychiatric: She has a normal mood and affect. Her speech is normal and behavior is normal.   Nursing note and vitals reviewed.      Vitals:    03/05/18 1414   BP: 134/86   BP Location: Left arm   Patient Position: Sitting   Cuff Size: Large Adult   Pulse: 88   Resp: 20   Temp: 98.3 °F (36.8 °C)   TempSrc: Temporal Artery    Weight: (!) 150 kg (331 lb 3.2 oz)   Height: 166.4 cm (65.5\")   PainSc:   8   PainLoc: Knee       Body mass index is 54.28 kg/(m^2).  Discussed the patient's BMI with her. BMI is above normal parameters. Follow-up plan includes:  referral to a nutritionist.      Finger Rub Hearing{Test (right ear):passed  Finger Rub Hearing{Test (left ear):passed      Assessment/Plan   Patient Self-Management and Personalized Health Advice  The patient has been provided with information about: diet, exercise, weight management, prevention of cardiac or vascular disease, tobacco cessation and fall prevention and preventive services including:   · Advance directive, Fall Risk plan of care done.  Patient declines pneumonia or influenza vaccines.  Visit Diagnoses:    ICD-10-CM ICD-9-CM   1. Tobacco use Z72.0 305.1   2. Bipolar 1 disorder, manic, mild F31.11 296.41   3. Class 3 obesity due to excess calories without serious comorbidity with body mass index (BMI) of 50.0 to 59.9 in adult E66.09 278.00    Z68.43 V85.43   4. Screening for endocrine disorder Z13.29 V77.99   5. Screening for lipoid disorders Z13.220 V77.91   6. Screening mammogram, encounter for Z12.31 V76.12   7. Papanicolaou smear Z12.4 V76.2       Orders Placed This Encounter   Procedures   • Mammo Screening Bilateral With CAD     Standing Status:   " Future     Standing Expiration Date:   3/6/2019     Order Specific Question:   Reason for Exam:     Answer:   screen     Order Specific Question:   Patient Pregnant     Answer:   No   • Comprehensive Metabolic Panel   • Lipid Panel   • TSH   • Ambulatory Referral to Nutrition Services     Referral Priority:   Routine     Referral Type:   Health Education     Referral Reason:   Specialty Services Required     Requested Specialty:   Nutrition     Number of Visits Requested:   1   • CBC & Differential     Order Specific Question:   Manual Differential     Answer:   No       Outpatient Encounter Prescriptions as of 3/5/2018   Medication Sig Dispense Refill   • Brexpiprazole (REXULTI) 4 MG tablet Take  by mouth.     • divalproex (DEPAKOTE) 500 MG 24 hr tablet Take 1,000 mg by mouth Daily.     • ibuprofen (ADVIL,MOTRIN) 800 MG tablet      • prazosin (MINIPRESS) 2 MG capsule TK 1 C PO HS  1   • QUEtiapine (SEROquel) 300 MG tablet TK 1 T PO HS  1   • diclofenac (VOLTAREN) 50 MG EC tablet Take 1 tablet by mouth 2 (Two) Times a Day As Needed (right knee pain). 60 tablet 3     Facility-Administered Encounter Medications as of 3/5/2018   Medication Dose Route Frequency Provider Last Rate Last Dose   • [COMPLETED] betamethasone acetate-betamethasone sodium phosphate (CELESTONE SOLUSPAN) injection 6 mg  6 mg  One-Time Injection Zackery Landry MD   6 mg at 03/05/18 1018   • [COMPLETED] lidocaine (XYLOCAINE) 1 % injection 4 mL  4 mL  One-Time Injection Zackery Landry MD   4 mL at 03/05/18 1018       Reviewed use of high risk medication in the elderly: not applicable  Reviewed for potential of harmful drug interactions in the elderly: not applicable    Follow Up:  Return in about 3 months (around 6/5/2018).     An After Visit Summary and PPPS with all of these plans were given to the patient.

## 2018-03-06 LAB
ALBUMIN SERPL-MCNC: 4.3 G/DL (ref 3.2–4.8)
ALBUMIN/GLOB SERPL: 1.4 G/DL (ref 1.5–2.5)
ALP SERPL-CCNC: 89 U/L (ref 25–100)
ALT SERPL-CCNC: 60 U/L (ref 7–40)
AST SERPL-CCNC: 42 U/L (ref 0–33)
BASOPHILS # BLD AUTO: 0.02 10*3/MM3 (ref 0–0.2)
BASOPHILS NFR BLD AUTO: 0.2 % (ref 0–1)
BILIRUB SERPL-MCNC: 0.3 MG/DL (ref 0.3–1.2)
BUN SERPL-MCNC: 15 MG/DL (ref 9–23)
BUN/CREAT SERPL: 21.4 (ref 7–25)
CALCIUM SERPL-MCNC: 9.4 MG/DL (ref 8.7–10.4)
CHLORIDE SERPL-SCNC: 105 MMOL/L (ref 99–109)
CHOLEST SERPL-MCNC: 220 MG/DL (ref 0–200)
CO2 SERPL-SCNC: 26 MMOL/L (ref 20–31)
CREAT SERPL-MCNC: 0.7 MG/DL (ref 0.6–1.3)
EOSINOPHIL # BLD AUTO: 0.01 10*3/MM3 (ref 0–0.3)
EOSINOPHIL NFR BLD AUTO: 0.1 % (ref 0–3)
ERYTHROCYTE [DISTWIDTH] IN BLOOD BY AUTOMATED COUNT: 14.8 % (ref 11.3–14.5)
GFR SERPLBLD CREATININE-BSD FMLA CKD-EPI: 108 ML/MIN/1.73
GFR SERPLBLD CREATININE-BSD FMLA CKD-EPI: 89 ML/MIN/1.73
GLOBULIN SER CALC-MCNC: 3 GM/DL
GLUCOSE SERPL-MCNC: 112 MG/DL (ref 70–100)
HCT VFR BLD AUTO: 44.2 % (ref 34.5–44)
HDLC SERPL-MCNC: 66 MG/DL (ref 40–60)
HGB BLD-MCNC: 14 G/DL (ref 11.5–15.5)
IMM GRANULOCYTES # BLD: 0.04 10*3/MM3 (ref 0–0.03)
IMM GRANULOCYTES NFR BLD: 0.4 % (ref 0–0.6)
LDLC SERPL CALC-MCNC: 133 MG/DL (ref 0–100)
LYMPHOCYTES # BLD AUTO: 1.11 10*3/MM3 (ref 0.6–4.8)
LYMPHOCYTES NFR BLD AUTO: 11.9 % (ref 24–44)
MCH RBC QN AUTO: 30.6 PG (ref 27–31)
MCHC RBC AUTO-ENTMCNC: 31.7 G/DL (ref 32–36)
MCV RBC AUTO: 96.7 FL (ref 80–99)
MONOCYTES # BLD AUTO: 0.2 10*3/MM3 (ref 0–1)
MONOCYTES NFR BLD AUTO: 2.1 % (ref 0–12)
NEUTROPHILS # BLD AUTO: 7.97 10*3/MM3 (ref 1.5–8.3)
NEUTROPHILS NFR BLD AUTO: 85.3 % (ref 41–71)
PLATELET # BLD AUTO: 239 10*3/MM3 (ref 150–450)
POTASSIUM SERPL-SCNC: 4.8 MMOL/L (ref 3.5–5.5)
PROT SERPL-MCNC: 7.3 G/DL (ref 5.7–8.2)
RBC # BLD AUTO: 4.57 10*6/MM3 (ref 3.89–5.14)
SODIUM SERPL-SCNC: 141 MMOL/L (ref 132–146)
TRIGL SERPL-MCNC: 107 MG/DL (ref 0–150)
TSH SERPL DL<=0.005 MIU/L-ACNC: 1.59 MIU/ML (ref 0.35–5.35)
VLDLC SERPL CALC-MCNC: 21.4 MG/DL
WBC # BLD AUTO: 9.35 10*3/MM3 (ref 3.5–10.8)

## 2018-03-08 ENCOUNTER — APPOINTMENT (OUTPATIENT)
Dept: PHYSICAL THERAPY | Facility: HOSPITAL | Age: 48
End: 2018-03-08
Attending: ORTHOPAEDIC SURGERY

## 2018-03-19 ENCOUNTER — APPOINTMENT (OUTPATIENT)
Dept: PHYSICAL THERAPY | Facility: HOSPITAL | Age: 48
End: 2018-03-19
Attending: ORTHOPAEDIC SURGERY

## 2018-03-26 ENCOUNTER — TELEPHONE (OUTPATIENT)
Dept: INTERNAL MEDICINE | Facility: CLINIC | Age: 48
End: 2018-03-26

## 2018-03-26 DIAGNOSIS — B96.89 VAGINITIS DUE TO GARDNERELLA VAGINALIS: Primary | ICD-10-CM

## 2018-03-26 DIAGNOSIS — N76.0 VAGINITIS DUE TO GARDNERELLA VAGINALIS: Primary | ICD-10-CM

## 2018-03-26 RX ORDER — METRONIDAZOLE 500 MG/1
500 TABLET ORAL 2 TIMES DAILY
Qty: 14 TABLET | Refills: 0 | Status: SHIPPED | OUTPATIENT
Start: 2018-03-26 | End: 2018-04-02

## 2018-03-26 NOTE — TELEPHONE ENCOUNTER
Please inform patient that pap smear showed positive for vaginosis and medication has been sent to pharmacy.

## 2018-04-02 ENCOUNTER — OFFICE VISIT (OUTPATIENT)
Dept: ORTHOPEDIC SURGERY | Facility: CLINIC | Age: 48
End: 2018-04-02

## 2018-04-02 VITALS
SYSTOLIC BLOOD PRESSURE: 165 MMHG | DIASTOLIC BLOOD PRESSURE: 80 MMHG | WEIGHT: 293 LBS | HEIGHT: 66 IN | BODY MASS INDEX: 47.09 KG/M2 | HEART RATE: 88 BPM

## 2018-04-02 DIAGNOSIS — E66.01 MORBID OBESITY WITH BODY MASS INDEX OF 50.0-59.9 IN ADULT (HCC): ICD-10-CM

## 2018-04-02 DIAGNOSIS — M94.20 CHONDROMALACIA: Primary | ICD-10-CM

## 2018-04-02 DIAGNOSIS — M17.11 PRIMARY LOCALIZED OSTEOARTHROSIS OF RIGHT LOWER LEG: ICD-10-CM

## 2018-04-02 PROCEDURE — 99213 OFFICE O/P EST LOW 20 MIN: CPT | Performed by: ORTHOPAEDIC SURGERY

## 2018-04-02 RX ORDER — QUETIAPINE FUMARATE 400 MG/1
400 TABLET, FILM COATED ORAL NIGHTLY
Refills: 2 | COMMUNITY
Start: 2018-03-06 | End: 2021-05-07

## 2018-04-02 NOTE — PROGRESS NOTES
[facility]    Subjective     Chief Complaint   Patient presents with   • Right Knee - Follow-up     1 month        HPI      Loreta Robertson is a 47 y.o. female. She is follow-up right knee chondral malacia.  She received 2 weeks of relief with the right knee steroid injection.  Her pain is 8 out of 10.  It is aching throbbing and shooting.  She feels the same.  She is seeing a nutritionist to discuss weight loss and possible bariatric surgery        Past Medical History:   Diagnosis Date   • Anxiety    • Bipolar 1 disorder    • Bipolar 1 disorder    • Chronic bronchitis    • Chronic constipation    • Depression       Past Surgical History:   Procedure Laterality Date   • ABDOMINOPLASTY     • BREAST BIOPSY Right     US BIOPSY   •  SECTION     • CHOLECYSTECTOMY     • HYSTERECTOMY      AGE 33   • LIPOSUCTION ABDOMINAL  1998      Family History   Problem Relation Age of Onset   • Breast cancer Mother 50   • Diabetes Mother    • Cancer Mother    • Diabetes Father    • Hypertension Father    • Heart attack Father    • Obesity Father    • Breast cancer Maternal Grandmother 40   • Ovarian cancer Maternal Grandmother 40   • Cervical cancer Maternal Grandmother      Social History     Social History   • Marital status:      Spouse name: N/A   • Number of children: N/A   • Years of education: N/A     Occupational History   • Not on file.     Social History Main Topics   • Smoking status: Current Every Day Smoker     Packs/day: 1.00     Years: 25.00     Start date:    • Smokeless tobacco: Never Used   • Alcohol use No   • Drug use: No   • Sexual activity: Defer     Other Topics Concern   • Not on file     Social History Narrative   • No narrative on file      Current Outpatient Prescriptions on File Prior to Visit   Medication Sig Dispense Refill   • diclofenac (VOLTAREN) 50 MG EC tablet Take 1 tablet by mouth 2 (Two) Times a Day As Needed (right knee pain). 60 tablet 3   •  "divalproex (DEPAKOTE) 500 MG 24 hr tablet Take 1,000 mg by mouth Daily.     • ibuprofen (ADVIL,MOTRIN) 800 MG tablet      • metroNIDAZOLE (FLAGYL) 500 MG tablet Take 1 tablet by mouth 2 (Two) Times a Day for 7 days. 14 tablet 0   • prazosin (MINIPRESS) 2 MG capsule TK 1 C PO HS  1   • [DISCONTINUED] Brexpiprazole (REXULTI) 4 MG tablet Take  by mouth.     • [DISCONTINUED] QUEtiapine (SEROquel) 300 MG tablet TK 1 T PO HS  1     No current facility-administered medications on file prior to visit.       Allergies   Allergen Reactions   • Morphine And Related GI Intolerance        The following portions of the patient's history were reviewed and updated as appropriate: allergies, current medications, past family history, past medical history, past social history, past surgical history and problem list.    Review of Systems   Constitutional: Negative.    HENT: Negative.    Eyes: Negative.    Respiratory: Negative.    Cardiovascular: Negative.    Gastrointestinal: Negative.    Endocrine: Negative.    Genitourinary: Negative.    Musculoskeletal: Positive for arthralgias.   Skin: Negative.    Allergic/Immunologic: Negative.    Neurological: Negative.    Hematological: Negative.    Psychiatric/Behavioral: Negative.         Objective      Physical Exam  /80   Pulse 88   Ht 166.4 cm (65.51\")   Wt (!) 153 kg (337 lb 8.4 oz)   BMI 55.29 kg/m²     Body mass index is 55.29 kg/m².        GENERAL APPEARANCE: awake, alert & oriented x 3, in no acute distress and well developed, well nourished  PSYCH: normal mood and affect  Peripheral Vascular:    Upper Extremity:   Inspection:  Left--no cyanotic nail beds Right--no cyanotic nail beds   Bilateral:  Pink nail beds with brisk capillary refill   Palpation:  Bilateral radial pulse normal    Musculoskeletal:  Global Assessment:  Overall assessment of Lower Extremity Muscle Strength and Tone:  Right quadriceps--5/5   Right hamstrings--5/5       Right tibialis anterior--5/5  Right " gastroc-soleus--5/5  Right EHL --5/5    Lower Extremity:  Knee/Patella:  No digital clubbing or cyanosis.    Examination of right knee reveals:  Normal deep tendon reflexes, coordination, strength, tone, sensation.  No known fractures or deformities.    Inspection and Palpation:  Right knee:She is morbidly obese  Tenderness:  Over the medial joint line and moderate severity  Effusion:  none  Crepitus:  Positive  Pulses:  2+  Ecchymosis:  None  Warmth:  None     ROM:  Right:  Extension:120    Flexion: 5  Left:  Extension:120     Flexion:5    Instability:    Right:  Lachman Test:  Negative, Varus stress test negative, Valgus stress test negative    Deformities/Malalignments/Discrepancies:    Left:  No deformities   Right:  Genu Varum    Functional Testing:  Owen's test:  Negative  Patella grind test:  Positive  Q-angle:  normal          Assessment/Plan        ICD-10-CM ICD-9-CM   1. Chondromalacia M94.20 733.92   2. Primary localized osteoarthrosis of right lower leg M17.11 715.16   3. Morbid obesity with body mass index of 50.0-59.9 in adult E66.01 278.01    Z68.43 V85.43       She is seeing a nutritionist to work on weight loss.  She will continue anti-inflammatories.  We will try to get Orthovisc injections approved and follow-up for that.    Medical Decision Making  Management Options : over-the-counter medicine and prescription/IM medicine      Zackery Landry MD  04/02/18  10:03 AM

## 2018-04-05 ENCOUNTER — HOSPITAL ENCOUNTER (OUTPATIENT)
Dept: MAMMOGRAPHY | Facility: HOSPITAL | Age: 48
Discharge: HOME | End: 2018-04-05
Admitting: NURSE PRACTITIONER

## 2018-04-05 DIAGNOSIS — Z12.31 SCREENING MAMMOGRAM, ENCOUNTER FOR: ICD-10-CM

## 2018-04-05 PROCEDURE — 77067 SCR MAMMO BI INCL CAD: CPT

## 2018-04-05 PROCEDURE — 77063 BREAST TOMOSYNTHESIS BI: CPT

## 2018-04-05 PROCEDURE — 77063 BREAST TOMOSYNTHESIS BI: CPT | Performed by: RADIOLOGY

## 2018-04-05 PROCEDURE — 77067 SCR MAMMO BI INCL CAD: CPT | Performed by: RADIOLOGY

## 2018-04-11 ENCOUNTER — APPOINTMENT (OUTPATIENT)
Dept: NUTRITION | Facility: HOSPITAL | Age: 48
End: 2018-04-11

## 2018-04-19 ENCOUNTER — APPOINTMENT (OUTPATIENT)
Dept: NUTRITION | Facility: HOSPITAL | Age: 48
End: 2018-04-19

## 2018-05-12 NOTE — PROGRESS NOTES
Subjective   Loreta Robertson is a 47 y.o. female.   Pt presents today with bilateral extremity swelling.         History of Present Illness   Pt presents today with bilateral extremity swelling. She states this has been going on for several weeks intermittently. She denies increasing her sodium intake (but does state she eats a hollis sandwich for lunch daily). She admits to drinking adequate amount of water daily. She denies SOA.     The following portions of the patient's history were reviewed and updated as appropriate: allergies, current medications, past family history, past medical history, past social history, past surgical history and problem list.             Review of Systems   Constitutional: Negative.    HENT: Negative.    Eyes: Negative.    Respiratory: Negative.    Cardiovascular: Negative.    Gastrointestinal: Negative.    Endocrine: Negative.    Genitourinary: Negative.    Musculoskeletal:        See HPI.    Skin: Negative.    Allergic/Immunologic: Negative.    Neurological: Negative.    Hematological: Negative.    Psychiatric/Behavioral: Negative.                Objective   Physical Exam   Constitutional: She is oriented to person, place, and time. She appears well-developed and well-nourished.   HENT:   Head: Normocephalic and atraumatic.   Cardiovascular: Normal rate, regular rhythm and normal heart sounds.    Plus 1 edema to both lower extremities ( not feet) mildly pitting on left lower extremity with a small erythematous area just medially on her left calf.    Pulmonary/Chest: Effort normal and breath sounds normal.   Musculoskeletal:   Homans sign negative.    Neurological: She is alert and oriented to person, place, and time. She has normal reflexes.   Skin: Skin is warm and dry.   Psychiatric: She has a normal mood and affect.   Nursing note and vitals reviewed.               Assessment/Plan      Bilateral edema of lower extremity  -     furosemide (LASIX) 20 MG tablet; Take 1 tablet by  mouth Daily As Needed (lower extremity swelling).        1,) Lasix 20 mg po Daily prn   2.) elevated legs when at rest   3.) Hydrocortisone cream OTC as directed. (gave Eucrisa sample instructed to apply BID prn to area of dermatosis on left leg)   4.) Call me tomorrow and let me know if swelling has improved or not.          12-May-2018

## 2018-06-01 ENCOUNTER — OFFICE VISIT (OUTPATIENT)
Dept: INTERNAL MEDICINE | Facility: CLINIC | Age: 48
End: 2018-06-01

## 2018-06-01 ENCOUNTER — TELEPHONE (OUTPATIENT)
Dept: INTERNAL MEDICINE | Facility: CLINIC | Age: 48
End: 2018-06-01

## 2018-06-01 VITALS
HEIGHT: 66 IN | OXYGEN SATURATION: 98 % | HEART RATE: 86 BPM | BODY MASS INDEX: 47.09 KG/M2 | SYSTOLIC BLOOD PRESSURE: 148 MMHG | WEIGHT: 293 LBS | TEMPERATURE: 96.5 F | RESPIRATION RATE: 18 BRPM | DIASTOLIC BLOOD PRESSURE: 86 MMHG

## 2018-06-01 DIAGNOSIS — Z02.89 PAIN MEDICATION AGREEMENT COMPLETED: ICD-10-CM

## 2018-06-01 DIAGNOSIS — R73.9 HYPERGLYCEMIA: ICD-10-CM

## 2018-06-01 DIAGNOSIS — Z79.899 HIGH RISK MEDICATION USE: ICD-10-CM

## 2018-06-01 DIAGNOSIS — E66.01 MORBID OBESITY WITH BMI OF 50.0-59.9, ADULT (HCC): ICD-10-CM

## 2018-06-01 DIAGNOSIS — M25.561 ACUTE PAIN OF RIGHT KNEE: Primary | ICD-10-CM

## 2018-06-01 DIAGNOSIS — F31.9 BIPOLAR DISEASE, CHRONIC (HCC): ICD-10-CM

## 2018-06-01 DIAGNOSIS — Z23 NEED FOR VACCINATION: ICD-10-CM

## 2018-06-01 DIAGNOSIS — E78.5 HYPERLIPIDEMIA, UNSPECIFIED HYPERLIPIDEMIA TYPE: ICD-10-CM

## 2018-06-01 LAB
EXPIRATION DATE: NORMAL
HBA1C MFR BLD: 5.6 %
Lab: NORMAL

## 2018-06-01 PROCEDURE — 99214 OFFICE O/P EST MOD 30 MIN: CPT | Performed by: NURSE PRACTITIONER

## 2018-06-01 PROCEDURE — 90732 PPSV23 VACC 2 YRS+ SUBQ/IM: CPT | Performed by: NURSE PRACTITIONER

## 2018-06-01 PROCEDURE — 83036 HEMOGLOBIN GLYCOSYLATED A1C: CPT | Performed by: NURSE PRACTITIONER

## 2018-06-01 PROCEDURE — G0009 ADMIN PNEUMOCOCCAL VACCINE: HCPCS | Performed by: NURSE PRACTITIONER

## 2018-06-01 RX ORDER — PRAVASTATIN SODIUM 20 MG
20 TABLET ORAL DAILY
Qty: 30 TABLET | Refills: 3 | Status: SHIPPED | OUTPATIENT
Start: 2018-06-01 | End: 2019-04-19 | Stop reason: DRUGHIGH

## 2018-06-01 RX ORDER — TRAMADOL HYDROCHLORIDE 50 MG/1
50 TABLET ORAL EVERY 6 HOURS PRN
Qty: 90 TABLET | Refills: 0 | Status: SHIPPED | OUTPATIENT
Start: 2018-06-01 | End: 2019-04-18

## 2018-06-01 NOTE — TELEPHONE ENCOUNTER
Called Tramadol Rx into [facility] at [facility] in [place]. He verbalized good understanding, thanked our office and we ended the call.

## 2018-06-07 DIAGNOSIS — Z79.899 HIGH RISK MEDICATION USE: ICD-10-CM

## 2018-06-07 DIAGNOSIS — R82.5 POSITIVE URINE DRUG SCREEN: Primary | ICD-10-CM

## 2018-06-13 ENCOUNTER — HOSPITAL ENCOUNTER (OUTPATIENT)
Dept: MRI IMAGING | Facility: HOSPITAL | Age: 48
Discharge: HOME | End: 2018-06-13
Admitting: NURSE PRACTITIONER

## 2018-06-13 DIAGNOSIS — M25.561 ACUTE PAIN OF RIGHT KNEE: ICD-10-CM

## 2018-06-13 PROCEDURE — 73721 MRI JNT OF LWR EXTRE W/O DYE: CPT

## 2018-08-26 ENCOUNTER — TELEPHONE (OUTPATIENT)
Dept: INTERNAL MEDICINE | Facility: CLINIC | Age: 48
End: 2018-08-26

## 2018-08-27 NOTE — TELEPHONE ENCOUNTER
----- Message from Catina Low sent at 8/23/2018  8:34 AM EDT -----  Concerning bariatric surgery referral, pt never sent back packet of info and did not respond to my voicemail to see if she still was interested in this. How do you want me to proceed with this?

## 2018-10-04 ENCOUNTER — TELEPHONE (OUTPATIENT)
Dept: INTERNAL MEDICINE | Facility: CLINIC | Age: 48
End: 2018-10-04

## 2018-10-04 NOTE — TELEPHONE ENCOUNTER
I will not prescribe pain medication due to abnormal UDS on 6/1/2018 which showed Lyrica and it was not prescribed from me. I have ordered another UDS if she wants to repeat that and it is clear then we can discuss at a future date.

## 2018-10-04 NOTE — TELEPHONE ENCOUNTER
Spoke with patient, verb good understanding. States that she will come into the lab for a UDS but does not know when she would be able to come.

## 2018-10-04 NOTE — TELEPHONE ENCOUNTER
----- Message from Nicole Smalls sent at 10/3/2018  4:20 PM EDT -----  Needs to speak to Helen about her knee.  States she has seen her for this before.  Call her at [phone].

## 2018-10-04 NOTE — TELEPHONE ENCOUNTER
Patient stated that she has finished one round of injections on her knee, and it did not help. She has started another round of injections on her knee and it is not phasing it, patient has talked to her aunt and thinks Lyrica would be the best option for her. Patient requesting Lyrica or other pain medication.

## 2018-12-24 ENCOUNTER — OFFICE VISIT (OUTPATIENT)
Dept: INTERNAL MEDICINE | Facility: CLINIC | Age: 48
End: 2018-12-24

## 2018-12-24 VITALS
DIASTOLIC BLOOD PRESSURE: 78 MMHG | SYSTOLIC BLOOD PRESSURE: 134 MMHG | OXYGEN SATURATION: 94 % | RESPIRATION RATE: 18 BRPM | WEIGHT: 293 LBS | BODY MASS INDEX: 47.09 KG/M2 | TEMPERATURE: 96.8 F | HEART RATE: 90 BPM | HEIGHT: 66 IN

## 2018-12-24 DIAGNOSIS — J20.9 ACUTE BRONCHITIS, UNSPECIFIED ORGANISM: Primary | ICD-10-CM

## 2018-12-24 PROCEDURE — 99213 OFFICE O/P EST LOW 20 MIN: CPT | Performed by: INTERNAL MEDICINE

## 2018-12-24 RX ORDER — AZITHROMYCIN 250 MG/1
TABLET, FILM COATED ORAL
Qty: 6 TABLET | Refills: 0 | Status: SHIPPED | OUTPATIENT
Start: 2018-12-24 | End: 2019-03-20

## 2018-12-24 RX ORDER — GUAIFENESIN 600 MG/1
1200 TABLET, EXTENDED RELEASE ORAL 2 TIMES DAILY PRN
Qty: 30 TABLET | Refills: 0 | Status: SHIPPED | OUTPATIENT
Start: 2018-12-24 | End: 2019-04-18

## 2018-12-24 RX ORDER — ALBUTEROL SULFATE 90 UG/1
2 AEROSOL, METERED RESPIRATORY (INHALATION) EVERY 4 HOURS PRN
Qty: 18 G | Refills: 0 | Status: SHIPPED | OUTPATIENT
Start: 2018-12-24 | End: 2019-03-20

## 2018-12-24 NOTE — PROGRESS NOTES
"OFFICE PROGRESS NOTE    Chief Complaint   Patient presents with   • Cough     x2 days       HPI: 48 y.o. female pt of Helen López ROSALES with hx morbid obesity, bipolar 1 disorder here for eval of:    \"A few days\" of worsening runny nose/nasal congestion, mild ST, cough (occasionally productive of green sputum). Has subjective fevers (no temp taken) and feels tired. Denies HA, sinus pain, ear pain/pressure, abd pain, N/V/D. Multiple sick contacts at work. Has tried Nyquil and Tylenol, only mild relief. Reports use of albuterol inhaler in the past which helped with cough but doesn't have one currently.    Review of Systems   Constitutional: Positive for fever (subjective). Negative for activity change and appetite change.   HENT: Positive for congestion, rhinorrhea, sneezing and sore throat.    Eyes: Negative for discharge and visual disturbance.   Respiratory: Positive for cough. Negative for shortness of breath.    Cardiovascular: Negative for chest pain and palpitations.   Gastrointestinal: Negative for abdominal distention, abdominal pain, blood in stool, constipation, nausea and vomiting.   Endocrine: Negative for cold intolerance and heat intolerance.   Genitourinary: Negative for dysuria.   Musculoskeletal: Negative for neck stiffness.   Skin: Negative for rash.   Allergic/Immunologic: Negative for environmental allergies and food allergies.   Neurological: Negative for headache.   Hematological: Negative for adenopathy.   Psychiatric/Behavioral: Negative for depressed mood.       The following portions of the patient's history were reviewed and updated as appropriate: allergies, current medications, past family history, past medical history, past social history, past surgical history and problem list.      Physical Exam:  Vitals:    12/24/18 0908   BP: 134/78   BP Location: Right arm   Patient Position: Sitting   Cuff Size: Adult   Pulse: 90   Resp: 18   Temp: 96.8 °F (36 °C)   TempSrc: Temporal   SpO2: 94% " "  Weight: (!) 156 kg (344 lb 12.8 oz)   Height: 166.4 cm (65.51\")       Physical Exam   Constitutional: She is oriented to person, place, and time. She appears well-developed and well-nourished. No distress.   Speaks in full sentences. Occasional congested cough during exam.   HENT:   Head: Normocephalic and atraumatic.   Right Ear: External ear normal.   Left Ear: External ear normal.   Nose: Rhinorrhea present.   Mouth/Throat: Posterior oropharyngeal erythema (mild with cobblestoning) present. No oropharyngeal exudate.   Eyes: Right eye exhibits no discharge. Left eye exhibits no discharge. No scleral icterus.   Neck: Normal range of motion. Neck supple. No tracheal deviation present. No thyromegaly present.   Cardiovascular: Normal rate, regular rhythm and normal heart sounds. Exam reveals no gallop and no friction rub.   No murmur heard.  Pulmonary/Chest: Effort normal. No stridor. No respiratory distress. She has wheezes (occasional faint scattered expiratory wheezes primarily in bilateral lower lung fields). She has no rales.   +transmitted upper airway sounds   Lymphadenopathy:     She has no cervical adenopathy.   Neurological: She is alert and oriented to person, place, and time. She exhibits normal muscle tone.   Skin: Skin is warm and dry. Capillary refill takes less than 2 seconds. She is not diaphoretic.   Psychiatric: She has a normal mood and affect.   Vitals reviewed.         Assesment and Plan: 48 y.o. female with PMHx as above, here with:  Acute bronchitis  No focal signs on lung exam or documented fevers/hypoxia to suggest CAP. Rx Azithromycin x 5d as well as albuterol PRN (given occasional faint expiratory wheeze at lung bases) and Mucinex PRN for cough. Drink plenty of fluids. Tylenol or Motrin PRN ok for fever/pain. Call if not improving after 48-72h, needing frequent albuterol, not able to lie flat, blood in sputum or other concerns.       Return for As needed if no improvement or new " symptoms, Next scheduled follow up.    Kathi Gutierrez MD  12/24/2018

## 2019-03-20 ENCOUNTER — OFFICE VISIT (OUTPATIENT)
Dept: INTERNAL MEDICINE | Facility: CLINIC | Age: 49
End: 2019-03-20

## 2019-03-20 VITALS
BODY MASS INDEX: 47.09 KG/M2 | SYSTOLIC BLOOD PRESSURE: 132 MMHG | RESPIRATION RATE: 17 BRPM | HEIGHT: 66 IN | TEMPERATURE: 98.2 F | OXYGEN SATURATION: 94 % | WEIGHT: 293 LBS | HEART RATE: 82 BPM | DIASTOLIC BLOOD PRESSURE: 74 MMHG

## 2019-03-20 DIAGNOSIS — A08.4 VIRAL GASTROENTERITIS: Primary | ICD-10-CM

## 2019-03-20 PROCEDURE — 99213 OFFICE O/P EST LOW 20 MIN: CPT | Performed by: NURSE PRACTITIONER

## 2019-03-20 RX ORDER — ONDANSETRON 8 MG/1
8 TABLET, ORALLY DISINTEGRATING ORAL EVERY 8 HOURS PRN
Qty: 20 TABLET | Refills: 0 | Status: SHIPPED | OUTPATIENT
Start: 2019-03-20 | End: 2019-04-18

## 2019-03-20 NOTE — PROGRESS NOTES
Chief Complaint   Patient presents with   • Vomiting     x3 days, headache, loss of appetite, not able to eat w/o vomiting, no fever, chills        Subjective     History of Present Illness     Drinking sprite with 3d pmh of nausea vomiting and HA.   No fever diarrhea. No aching.  She does have pmh diarrhea after meals.     she has not tried any treatment for her symptoms.       The following portions of the patient's history were reviewed and updated as appropriate: allergies, current medications, past family history, past medical history, past social history, past surgical history and problem list.    Review of Systems   Constitutional: Negative for activity change, appetite change, chills, fatigue and fever.   HENT: Negative for congestion, postnasal drip and sore throat.    Eyes: Negative for visual disturbance.   Respiratory: Negative for cough and shortness of breath.    Cardiovascular: Negative for chest pain, palpitations and leg swelling.   Gastrointestinal: Positive for nausea and vomiting. Negative for abdominal pain, constipation, diarrhea and GERD.   Musculoskeletal: Negative for arthralgias and myalgias.   Skin: Negative for rash.   Allergic/Immunologic: Negative for environmental allergies.   Neurological: Positive for headache. Negative for dizziness.   Psychiatric/Behavioral: Negative for sleep disturbance.   All other systems reviewed and are negative.      Objective   Physical Exam   Constitutional: She is oriented to person, place, and time. She appears well-developed and well-nourished.   HENT:   Head: Normocephalic and atraumatic.   Right Ear: External ear normal.   Left Ear: External ear normal.   Nose: Nose normal.   Mouth/Throat: Oropharynx is clear and moist.   Neck: No thyromegaly present.   Cardiovascular: Normal rate, regular rhythm and intact distal pulses.   Pulmonary/Chest: Effort normal and breath sounds normal.   Abdominal: Soft. Bowel sounds are normal. She exhibits no distension.  There is tenderness.   Tenderness is generalized over the stomach   Musculoskeletal: She exhibits no edema.   Lymphadenopathy:     She has no cervical adenopathy.   Neurological: She is alert and oriented to person, place, and time.   Skin: Capillary refill takes 2 to 3 seconds.   Psychiatric: She has a normal mood and affect. Her behavior is normal.   Nursing note and vitals reviewed.    Assessment/Plan   oLreta was seen today for vomiting.    Diagnoses and all orders for this visit:    Viral gastroenteritis  -     ondansetron ODT (ZOFRAN ODT) 8 MG disintegrating tablet; Take 1 tablet by mouth Every 8 (Eight) Hours As Needed for Nausea or Vomiting.    [place] diet no dairy acidic greasy spicy foods no carbonated drinks plus fluids but drinking sips of liquid frequently if symptoms worsen she will does know or go to the ER      Return if symptoms worsen or fail to improve.  RTC/call  If symptoms worsen  Meds MOA and SE's reviewed and pt v/u

## 2019-04-03 ENCOUNTER — OFFICE VISIT (OUTPATIENT)
Dept: INTERNAL MEDICINE | Facility: CLINIC | Age: 49
End: 2019-04-03

## 2019-04-03 ENCOUNTER — TELEPHONE (OUTPATIENT)
Dept: INTERNAL MEDICINE | Facility: CLINIC | Age: 49
End: 2019-04-03

## 2019-04-03 ENCOUNTER — LAB REQUISITION (OUTPATIENT)
Dept: LAB | Facility: HOSPITAL | Age: 49
End: 2019-04-03

## 2019-04-03 VITALS
HEIGHT: 66 IN | BODY MASS INDEX: 47.09 KG/M2 | SYSTOLIC BLOOD PRESSURE: 132 MMHG | RESPIRATION RATE: 20 BRPM | DIASTOLIC BLOOD PRESSURE: 80 MMHG | HEART RATE: 98 BPM | WEIGHT: 293 LBS | OXYGEN SATURATION: 95 % | TEMPERATURE: 98.7 F

## 2019-04-03 DIAGNOSIS — E87.6 HYPOKALEMIA: ICD-10-CM

## 2019-04-03 DIAGNOSIS — E87.1 HYPONATREMIA: ICD-10-CM

## 2019-04-03 DIAGNOSIS — K92.1: ICD-10-CM

## 2019-04-03 DIAGNOSIS — M79.605 LEG PAIN, POSTERIOR, LEFT: ICD-10-CM

## 2019-04-03 DIAGNOSIS — Z00.00 ROUTINE GENERAL MEDICAL EXAMINATION AT A HEALTH CARE FACILITY: ICD-10-CM

## 2019-04-03 DIAGNOSIS — K52.9 COLITIS: Primary | ICD-10-CM

## 2019-04-03 LAB
ANION GAP SERPL CALCULATED.3IONS-SCNC: 10 MMOL/L (ref 3–11)
BUN BLD-MCNC: 12 MG/DL (ref 9–23)
BUN/CREAT SERPL: 15.2 (ref 7–25)
CALCIUM SPEC-SCNC: 9.1 MG/DL (ref 8.7–10.4)
CHLORIDE SERPL-SCNC: 104 MMOL/L (ref 99–109)
CO2 SERPL-SCNC: 27 MMOL/L (ref 20–31)
CREAT BLD-MCNC: 0.79 MG/DL (ref 0.6–1.3)
GFR SERPL CREATININE-BSD FRML MDRD: 77 ML/MIN/1.73
GLUCOSE BLD-MCNC: 66 MG/DL (ref 70–100)
POTASSIUM BLD-SCNC: 4.5 MMOL/L (ref 3.5–5.5)
SODIUM BLD-SCNC: 141 MMOL/L (ref 132–146)

## 2019-04-03 PROCEDURE — 36415 COLL VENOUS BLD VENIPUNCTURE: CPT | Performed by: PHYSICIAN ASSISTANT

## 2019-04-03 PROCEDURE — 80048 BASIC METABOLIC PNL TOTAL CA: CPT | Performed by: PHYSICIAN ASSISTANT

## 2019-04-03 PROCEDURE — 99213 OFFICE O/P EST LOW 20 MIN: CPT | Performed by: PHYSICIAN ASSISTANT

## 2019-04-03 RX ORDER — POTASSIUM CHLORIDE 750 MG/1
10 TABLET, EXTENDED RELEASE ORAL 2 TIMES DAILY
Refills: 0 | COMMUNITY
Start: 2019-03-29 | End: 2019-04-18

## 2019-04-03 RX ORDER — METRONIDAZOLE 500 MG/1
TABLET ORAL
Refills: 0 | COMMUNITY
Start: 2019-03-29 | End: 2019-04-18

## 2019-04-03 RX ORDER — PROMETHAZINE HYDROCHLORIDE 25 MG/1
TABLET ORAL
Refills: 0 | COMMUNITY
Start: 2019-03-29 | End: 2019-04-18

## 2019-04-03 RX ORDER — LEVOFLOXACIN 750 MG/1
TABLET ORAL
Refills: 0 | COMMUNITY
Start: 2019-03-29 | End: 2019-04-18

## 2019-04-03 NOTE — PROGRESS NOTES
"Chief Complaint   Patient presents with   • Follow-up     ER  Maldonado Paula 03/28/2019, Dx: colitis, hypokalemia, loss of appetite   • Leg Pain     x1 day, Leg leg sorness behind knee, no shortness of breathe       Subjective       History of Present Illness     Loreta Robertson is a 48 y.o. female. She presents for follow up of colitis with lower GI bleed, hypokalemia, and hyponatremia. Pt was seen at [facility] ED on 3/28/2019 for blood in her stool x1 day after several days of constipation. She states that she had a CT abdomen which showed colitis, but no other concerning findings, per pt. Unavailable for review at time of office visit. She brings labs from [facility] for review, with noted hypokalemia at 3.4, and hyponatremia at 121. Pt states she was given Abx for colitis which she has been taking as directed, flagyl and levaquin. She was also given K+ supplements and completed last dose last night. She was advised to increase salt in diet for hyponatremia, and was not admitted to hospital. Since discharge from ED, pt states she is still feeling poorly. She has dec appetite, feels bloated and feels very full after a few bites of food. She is having nausea after eating, present after almost every meal. Taking phenergan which does help some, but makes her very drowsy so she is taking this sparingly. She has noted some dark blood in stool, which is reducing in quantity daily, and no BRBPR. She is having normal BMs about 1/day with no straining, diarrhea, or constipation. Normal urination. She was advised to f/u with PCP for labs and to schedule colonoscopy.     Pt also reports 1 day history of aching at posterior L calf. This began yesterday morning. She describes this as \"the feeling you have after a charley horse,\" with a deep aching pain. She denies any sharp or shooting pain. She denies warmth to touch, redness, or swelling of L leg. No hx DVTs or PE.     The following portions of the patient's history were reviewed and " updated as appropriate: allergies, current medications, past medical history, past social history, past surgical history and problem list.    Allergies   Allergen Reactions   • Morphine And Related GI Intolerance     Social History     Tobacco Use   • Smoking status: Current Every Day Smoker     Packs/day: 1.00     Years: 25.00     Pack years: 25.00     Start date: 1980   • Smokeless tobacco: Never Used   Substance Use Topics   • Alcohol use: No         Current Outpatient Medications:   •  Brexpiprazole (REXULTI) 4 MG tablet, Take  by mouth., Disp: , Rfl:   •  diclofenac (VOLTAREN) 50 MG EC tablet, TAKE 1 TABLET BY MOUTH TWICE DAILY AS NEEDED FOR RIGHT KNEE PAIN, Disp: 60 tablet, Rfl: 0  •  divalproex (DEPAKOTE) 500 MG 24 hr tablet, Take 1,000 mg by mouth Daily., Disp: , Rfl:   •  guaiFENesin (MUCINEX) 600 MG 12 hr tablet, Take 2 tablets by mouth 2 (Two) Times a Day As Needed for Cough or Congestion., Disp: 30 tablet, Rfl: 0  •  ibuprofen (ADVIL,MOTRIN) 800 MG tablet, , Disp: , Rfl:   •  levoFLOXacin (LEVAQUIN) 750 MG tablet, TK 1 T PO Q 24 H FOR 10 DAYS, Disp: , Rfl: 0  •  metroNIDAZOLE (FLAGYL) 500 MG tablet, TK 1 T PO Q 6 H FOR 10 DAYS, Disp: , Rfl: 0  •  ondansetron ODT (ZOFRAN ODT) 8 MG disintegrating tablet, Take 1 tablet by mouth Every 8 (Eight) Hours As Needed for Nausea or Vomiting., Disp: 20 tablet, Rfl: 0  •  potassium chloride (K-DUR,KLOR-CON) 10 MEQ CR tablet, Take 10 mEq by mouth 2 (Two) Times a Day., Disp: , Rfl: 0  •  pravastatin (PRAVACHOL) 20 MG tablet, Take 1 tablet by mouth Daily., Disp: 30 tablet, Rfl: 3  •  prazosin (MINIPRESS) 2 MG capsule, TK 1 C PO HS, Disp: , Rfl: 1  •  promethazine (PHENERGAN) 25 MG tablet, TK 1 T PO Q 6 H PRF NAUSEA AND VOMITING, Disp: , Rfl: 0  •  QUEtiapine (SEROquel) 400 MG tablet, TK 1 T PO HS, Disp: , Rfl: 2  •  traMADol (ULTRAM) 50 MG tablet, Take 1 tablet by mouth Every 6 (Six) Hours As Needed for Moderate Pain ., Disp: 90 tablet, Rfl: 0    Review of Systems    Constitutional: Positive for appetite change (dec) and fatigue. Negative for chills and fever.   HENT: Negative for congestion and sore throat.    Eyes: Negative for pain.   Respiratory: Negative for cough, shortness of breath and wheezing.    Cardiovascular: Negative for chest pain and leg swelling.   Gastrointestinal: Positive for abdominal pain, blood in stool and nausea. Negative for constipation, diarrhea and vomiting.        +bloating   Genitourinary: Negative for dysuria and hematuria.   Musculoskeletal: Positive for myalgias.   Skin: Negative for color change, rash and bruise.   Neurological: Negative for dizziness and headache.       Objective   Vitals:    04/03/19 1308   BP: 132/80   Pulse: 98   Resp: 20   Temp: 98.7 °F (37.1 °C)   SpO2: 95%     Physical Exam   Constitutional: She appears well-developed and well-nourished.   HENT:   Head: Normocephalic and atraumatic.   Right Ear: Tympanic membrane, external ear and ear canal normal.   Left Ear: Tympanic membrane, external ear and ear canal normal.   Mouth/Throat: Oropharynx is clear and moist and mucous membranes are normal.   Eyes: Conjunctivae are normal.   Neck: Normal range of motion. Neck supple.   Cardiovascular: Normal rate, regular rhythm and intact distal pulses.   No murmur heard.  Pulmonary/Chest: Effort normal and breath sounds normal. She has no wheezes. She has no rales.   Abdominal: Soft. There is no hepatosplenomegaly. There is tenderness in the left lower quadrant. There is no CVA tenderness.   Musculoskeletal:        Left lower leg: She exhibits tenderness. She exhibits no bony tenderness, no swelling, no edema and no deformity.   Left leg: no erythema, bruising, edema noted. No edema of lower leg. No warmth noted. +mild TTP to mid-calf with deep palpation only.    Lymphadenopathy:     She has no cervical adenopathy.   Psychiatric: She has a normal mood and affect. Her behavior is normal.         Assessment/Plan   oLreta was seen  today for follow-up and leg pain.    Diagnoses and all orders for this visit:    Colitis  -     Ambulatory Referral For Screening Colonoscopy  -     Basic Metabolic Panel    Hypokalemia  -     Basic Metabolic Panel    Hyponatremia  -     Basic Metabolic Panel    Blood in stool, kwesi  -     Ambulatory Referral For Screening Colonoscopy      After 5/1/19- for colonoscopy. Continue Abx through completion.   We did discuss s/sx of DVT as precaution, and pt advised to RTC immediately or go to ED if she develops warmth, redness, or swelling of L leg.  Further plans after review of labs.            Return in about 2 weeks (around 4/17/2019) for Recheck- hyponatremia, hypokalemia.

## 2019-04-03 NOTE — TELEPHONE ENCOUNTER
Critical access hospital medical records [phone]   [facility]  Medical records for CT of abdomen and pelvis for 03/28/2019 has been requested. Wendy confirmed she will fax results and office notes to our office at [phone]. Good verbal understanding.

## 2019-04-03 NOTE — TELEPHONE ENCOUNTER
----- Message from Loreta Jarrell PA-C sent at 4/3/2019  1:51 PM EDT -----  Please request CT abdomen results from 3/28/2019- [facility] ED. Thanks.

## 2019-04-10 ENCOUNTER — PREP FOR SURGERY (OUTPATIENT)
Dept: OTHER | Facility: HOSPITAL | Age: 49
End: 2019-04-10

## 2019-04-10 DIAGNOSIS — K52.9 COLITIS: Primary | ICD-10-CM

## 2019-04-10 DIAGNOSIS — K92.1 BLOOD IN STOOL: ICD-10-CM

## 2019-04-18 ENCOUNTER — OFFICE VISIT (OUTPATIENT)
Dept: INTERNAL MEDICINE | Facility: CLINIC | Age: 49
End: 2019-04-18

## 2019-04-18 VITALS
BODY MASS INDEX: 47.09 KG/M2 | SYSTOLIC BLOOD PRESSURE: 130 MMHG | HEIGHT: 66 IN | HEART RATE: 86 BPM | WEIGHT: 293 LBS | TEMPERATURE: 98.7 F | RESPIRATION RATE: 18 BRPM | DIASTOLIC BLOOD PRESSURE: 85 MMHG

## 2019-04-18 DIAGNOSIS — M25.561 CHRONIC PAIN OF RIGHT KNEE: ICD-10-CM

## 2019-04-18 DIAGNOSIS — E87.1 HYPONATREMIA: ICD-10-CM

## 2019-04-18 DIAGNOSIS — J02.9 SORE THROAT: ICD-10-CM

## 2019-04-18 DIAGNOSIS — G89.29 CHRONIC PAIN OF RIGHT KNEE: ICD-10-CM

## 2019-04-18 DIAGNOSIS — E78.49 OTHER HYPERLIPIDEMIA: ICD-10-CM

## 2019-04-18 DIAGNOSIS — E66.01 MORBID OBESITY WITH BMI OF 50.0-59.9, ADULT (HCC): Primary | ICD-10-CM

## 2019-04-18 DIAGNOSIS — F31.11 BIPOLAR 1 DISORDER, MANIC, MILD (HCC): ICD-10-CM

## 2019-04-18 DIAGNOSIS — E87.6 HYPOKALEMIA: ICD-10-CM

## 2019-04-18 PROBLEM — J20.9 ACUTE BRONCHITIS: Status: RESOLVED | Noted: 2018-12-24 | Resolved: 2019-04-18

## 2019-04-18 PROBLEM — K59.09 CHRONIC CONSTIPATION: Status: RESOLVED | Noted: 2017-02-27 | Resolved: 2019-04-18

## 2019-04-18 LAB
EXPIRATION DATE: NORMAL
INTERNAL CONTROL: NORMAL
Lab: NORMAL
S PYO AG THROAT QL: NEGATIVE

## 2019-04-18 PROCEDURE — 36415 COLL VENOUS BLD VENIPUNCTURE: CPT | Performed by: NURSE PRACTITIONER

## 2019-04-18 PROCEDURE — 99214 OFFICE O/P EST MOD 30 MIN: CPT | Performed by: NURSE PRACTITIONER

## 2019-04-18 PROCEDURE — 87880 STREP A ASSAY W/OPTIC: CPT | Performed by: NURSE PRACTITIONER

## 2019-04-18 NOTE — PROGRESS NOTES
"Subjective:    Loreta GIBBS States is a 48 y.o. female.     Chief Complaint   Patient presents with   • Follow-up     lab recheck    • Knee Pain     right knee pain started another set of gel shots at [facility]        History of Present Illness     Patient complains of continued chronic right knee pain. She states she is going to [facility] [phone] [address] [place] [address] [address] [place] [phone] for orthovisc injections. She states she had 6 months of relief. Current pain 8/0-10 scale. Worsened when changing positions and walking. Worsened with morbid obesity. Patient states nothing touches (relieves) her pain and she needs \"a pain medicine\". Patient states she can explain why Lyrica was on previous urine drug screen. She states she smokes marijuana occasionally and she read that this can show up as Lyrica on drug screen.    She continues to follow psychiatry for Bipolar disorder and goes to [facility] for counseling. Patient reports disorder associated with severe depression and anxiety with episodes of abdias. Managed with Depakote, Rexulti, Prazosin and Seroquel.    She reports recent colitis with lower GI bleed, hypokalemia, and hyponatremia. Pt was seen at [facility] ED on 3/28/2019 for blood in her stool x1 day after several days of constipation. She states that she had a CT abdomen which showed colitis, but no other concerning findings, per pt. Per records hypokalemia at 3.4, and hyponatremia at 121. Pt states she has completed Levaquin and Flagyl  for colitis. She was also given K+ supplements and completed that also. No further blood in stool. Patient states after she eats- in less than an hour she has loose stool. Loose stool x 1 week. Patient states colonoscopy is scheduled. Labs were rechecked here on 4/3/2019 and sodium and potassium were normal. Patient requests labs be rechecked today.    Patient complains of sore throat x 1 week. No fever. Pain on left side of " throat and neck. Relief with Cepacol lozenges.    Patient complains of morbid obesity for years of duration. Worsened with limited ability to exercise and psychiatric medicines.    Patient states she has atorvastatin for chronic hyperlipidemia, but takes this inconsistently. Worsened by diet, smoking and morbid obesity.    Current Outpatient Medications:   •  Brexpiprazole (REXULTI) 4 MG tablet, Take  by mouth., Disp: , Rfl:   •  divalproex (DEPAKOTE) 500 MG 24 hr tablet, Take 1,000 mg by mouth Daily., Disp: , Rfl:   •  pravastatin (PRAVACHOL) 20 MG tablet, Take 1 tablet by mouth Daily., Disp: 30 tablet, Rfl: 3  •  prazosin (MINIPRESS) 2 MG capsule, TK 1 C PO HS, Disp: , Rfl: 1  •  QUEtiapine (SEROquel) 400 MG tablet, TK 1 T PO HS, Disp: , Rfl: 2     The following portions of the patient's history were reviewed and updated as appropriate: allergies, current medications, past family history, past medical history, past social history, past surgical history and problem list.    Review of Systems   Constitutional: Negative.    HENT: Positive for sore throat. Negative for congestion, dental problem, drooling, ear discharge, ear pain, facial swelling, hearing loss, mouth sores, nosebleeds, postnasal drip, rhinorrhea, sinus pressure, sinus pain, sneezing, tinnitus, trouble swallowing and voice change.    Eyes: Negative.    Respiratory: Negative.    Cardiovascular: Negative for chest pain, palpitations and leg swelling.        Hyperlipidemia   Gastrointestinal: Negative for abdominal distention, abdominal pain, anal bleeding, blood in stool, constipation, diarrhea, nausea, rectal pain and vomiting.        Loose stool. Recent colitis and rectal bleed-not currently   Endocrine: Negative.    Genitourinary: Negative.    Musculoskeletal: Positive for joint swelling.        Chronic right knee pain   Skin: Negative.    Neurological: Negative.    Hematological: Negative.    Psychiatric/Behavioral: Negative for self-injury, sleep  "disturbance and suicidal ideas.        Bipolar disorder       Objective:    /85   Pulse 86   Temp 98.7 °F (37.1 °C)   Resp 18   Ht 166.4 cm (65.51\")   Wt (!) 153 kg (336 lb 12.8 oz)   BMI 55.18 kg/m²     Physical Exam   Constitutional: She is oriented to person, place, and time. She appears well-developed and well-nourished.  Non-toxic appearance. No distress.   HENT:   Head: Normocephalic and atraumatic.   Right Ear: External ear normal.   Left Ear: External ear normal.   Nose: Nose normal.   Mouth/Throat: Uvula is midline, oropharynx is clear and moist and mucous membranes are normal.   Neck: Normal range of motion. Neck supple. Carotid bruit is not present. No thyromegaly present.   Cardiovascular: Normal rate, regular rhythm and normal heart sounds. Exam reveals no gallop and no friction rub.   No murmur heard.  Pulmonary/Chest: Effort normal and breath sounds normal.   Abdominal: Soft. Bowel sounds are normal. She exhibits no distension, no abdominal bruit and no mass. There is no hepatosplenomegaly. There is no tenderness.   Musculoskeletal:        Right knee: She exhibits decreased range of motion and swelling. Tenderness found.   Lymphadenopathy:     She has no cervical adenopathy.   Neurological: She is alert and oriented to person, place, and time.   Skin: Skin is warm, dry and intact. No rash noted.   Psychiatric: She has a normal mood and affect. Her speech is normal.   Nursing note and vitals reviewed.      Assessment/Plan:    Loreta was seen today for follow-up and knee pain.    Diagnoses and all orders for this visit:    Morbid obesity with BMI of 50.0-59.9, adult (CMS/HCC)  Counseling of diet in regards to appropriate amounts of fats, carbs, sodium and well -balanced diet.  Avoid skipping meals, appropriate amounts of h20 intake, sleep, and regular cardio activity 30 mintues 5d weekly.  The pt v/u.  Hypokalemia  -     Basic Metabolic Panel    Hyponatremia  -     Basic Metabolic " Panel    Other hyperlipidemia  -     Lipid Panel  Continue atorvastatin  Sore throat  -     POC Rapid Strep A  Continue Cepacol, Tylenol and Motrin as needed.  Chronic pain of right knee  -     Urine Drug Screen - Urine, Clean Catch; Future  Continue arthritis center follow/management  Bipolar 1 disorder, manic, mild (CMS/HCC)  Continue Rexulti, Depakote, Seroquel, Prazosin and psychiatry follow    Return in about 4 months (around 8/18/2019), or if symptoms worsen or fail to improve, schedule wellness.

## 2019-04-19 ENCOUNTER — TELEPHONE (OUTPATIENT)
Dept: INTERNAL MEDICINE | Facility: CLINIC | Age: 49
End: 2019-04-19

## 2019-04-19 DIAGNOSIS — E78.2 MIXED HYPERLIPIDEMIA: Primary | ICD-10-CM

## 2019-04-19 LAB
BUN SERPL-MCNC: 8 MG/DL (ref 6–20)
BUN/CREAT SERPL: 12.1 (ref 7–25)
CALCIUM SERPL-MCNC: 9.3 MG/DL (ref 8.6–10.5)
CHLORIDE SERPL-SCNC: 96 MMOL/L (ref 98–107)
CHOLEST SERPL-MCNC: 227 MG/DL (ref 0–200)
CO2 SERPL-SCNC: 28.3 MMOL/L (ref 22–29)
CREAT SERPL-MCNC: 0.66 MG/DL (ref 0.57–1)
GLUCOSE SERPL-MCNC: 100 MG/DL (ref 65–99)
HDLC SERPL-MCNC: 42 MG/DL (ref 40–60)
LDLC SERPL CALC-MCNC: 142 MG/DL (ref 0–100)
POTASSIUM SERPL-SCNC: 4 MMOL/L (ref 3.5–5.2)
SODIUM SERPL-SCNC: 136 MMOL/L (ref 136–145)
TRIGL SERPL-MCNC: 215 MG/DL (ref 0–150)
VLDLC SERPL CALC-MCNC: 43 MG/DL (ref 5–40)

## 2019-04-19 RX ORDER — ATORVASTATIN CALCIUM 80 MG/1
80 TABLET, FILM COATED ORAL DAILY
Qty: 30 TABLET | Refills: 6 | Status: SHIPPED | OUTPATIENT
Start: 2019-04-19 | End: 2020-07-23

## 2019-04-19 NOTE — TELEPHONE ENCOUNTER
----- Message from ROSALES Contreras sent at 4/19/2019 10:24 AM EDT -----  Please inform patient glucose elevated at 100 and needs to be monitored with further labs. Total cholesterol, triglycerides and LDL cholesterol elevated. I have increased atorvastatin. I advise to eat low fat, high fiber diet.

## 2019-04-23 PROBLEM — K52.9 COLITIS: Status: ACTIVE | Noted: 2019-04-23

## 2019-04-23 PROBLEM — K92.1 BLOOD IN STOOL: Status: ACTIVE | Noted: 2019-04-23

## 2019-04-25 DIAGNOSIS — Z12.11 SCREENING FOR COLON CANCER: Primary | ICD-10-CM

## 2019-04-25 RX ORDER — TRAMADOL HYDROCHLORIDE 50 MG/1
50 TABLET ORAL 2 TIMES DAILY PRN
Qty: 60 TABLET | Refills: 0 | Status: SHIPPED | OUTPATIENT
Start: 2019-04-25 | End: 2020-03-10

## 2019-05-01 ENCOUNTER — APPOINTMENT (OUTPATIENT)
Dept: PREADMISSION TESTING | Facility: HOSPITAL | Age: 49
End: 2019-05-01

## 2019-05-01 VITALS — WEIGHT: 293 LBS | BODY MASS INDEX: 47.09 KG/M2 | HEIGHT: 66 IN

## 2019-05-01 LAB
ANION GAP SERPL CALCULATED.3IONS-SCNC: 12 MMOL/L
BUN BLD-MCNC: 11 MG/DL (ref 6–20)
BUN/CREAT SERPL: 17.7 (ref 7–25)
CALCIUM SPEC-SCNC: 9.3 MG/DL (ref 8.6–10.5)
CHLORIDE SERPL-SCNC: 100 MMOL/L (ref 98–107)
CO2 SERPL-SCNC: 28 MMOL/L (ref 22–29)
CREAT BLD-MCNC: 0.62 MG/DL (ref 0.57–1)
DEPRECATED RDW RBC AUTO: 54.5 FL (ref 37–54)
ERYTHROCYTE [DISTWIDTH] IN BLOOD BY AUTOMATED COUNT: 14.8 % (ref 12.3–15.4)
GFR SERPL CREATININE-BSD FRML MDRD: 101 ML/MIN/1.73
GLUCOSE BLD-MCNC: 108 MG/DL (ref 65–99)
HCT VFR BLD AUTO: 43.8 % (ref 34–46.6)
HGB BLD-MCNC: 13.8 G/DL (ref 12–15.9)
MCH RBC QN AUTO: 31.7 PG (ref 26.6–33)
MCHC RBC AUTO-ENTMCNC: 31.5 G/DL (ref 31.5–35.7)
MCV RBC AUTO: 100.5 FL (ref 79–97)
PLATELET # BLD AUTO: 150 10*3/MM3 (ref 140–450)
PMV BLD AUTO: 11.9 FL (ref 6–12)
POTASSIUM BLD-SCNC: 4 MMOL/L (ref 3.5–5.2)
RBC # BLD AUTO: 4.36 10*6/MM3 (ref 3.77–5.28)
SODIUM BLD-SCNC: 140 MMOL/L (ref 136–145)
WBC NRBC COR # BLD: 4.79 10*3/MM3 (ref 3.4–10.8)

## 2019-05-01 PROCEDURE — 85027 COMPLETE CBC AUTOMATED: CPT | Performed by: ANESTHESIOLOGY

## 2019-05-01 PROCEDURE — 93005 ELECTROCARDIOGRAM TRACING: CPT

## 2019-05-01 PROCEDURE — 80048 BASIC METABOLIC PNL TOTAL CA: CPT | Performed by: ANESTHESIOLOGY

## 2019-05-01 PROCEDURE — 36415 COLL VENOUS BLD VENIPUNCTURE: CPT

## 2019-05-01 RX ORDER — DIVALPROEX SODIUM 500 MG/1
500 TABLET, DELAYED RELEASE ORAL DAILY
COMMUNITY
End: 2020-07-23

## 2019-05-01 NOTE — DISCHARGE INSTRUCTIONS
The following information and instructions were given:    Do not eat, drink, smoke or chew gum after midnight the night before surgery. This also includes no mints.  Take all routine, prescribed medications including heart and blood pressure medicines with a sip of water unless otherwise instructed by your physician.   Do NOT take diabetic medication unless instructed by your physician.    If you were instructed to drink 20 ounces (or until full) of Gatorade or G2 (if diabetic) the morning of surgery, the Gatorade or G2 must be completed 1 hour before arrival time on the day of surgery .  No RED Gatorade or G2.      DO NOT shave for two days before your procedure.  Do not wear makeup.      DO NOT wear fingernail polish (gel/regular) and/or acrylic/artificial nails on the day of surgery.   If you have had a recent manicure and would rather not remove polish or artificial nails, then the minimum requirement is that the polish/artificial nails must be removed from the middle finger on each hand.      If you are having surgery/procedure on an upper extremity, then fingernail polish/artificial fingernails must be removed for surgery.  NO EXCEPTIONS.      If you are having surgery/procedure on a lower extremity, then toenail polish on both extremities must be removed for surgery.  NO EXCEPTIONS.    Remove all jewelry (advise to go to jeweler if unable to remove).  Jewelry, especially rings, can no longer be taped for surgery.    Leave anything you consider valuable at home.    Leave your suitcase in the car until after your surgery.    Bring the following with you the day of your procedure (when applicable)       -picture ID and insurance cards       -Co-pay/deductible required by insurance       -Medications in the original bottles (not a list) including all over-the-counter medications if not brought to PAT       -Copy of advance directive, living will or power of  documents if not brought to PAT       -CPAP or  BIPAP mask and tubing (do not bring machine)       -Skin prep instruction(s) sheet       -PAT Pass    Education booklet, brochure, handout or binder related to procedure given to patient.    When applicable, an ERAS booklet was given to patient.    Pain Control After Surgery handout given to patient.    Respirex use (handout given to patient) and pneumonia prevention education provided.    Signs and Symptoms of infection discussed.    DVT Prevention education given.  Stressing the importance of ambulation.

## 2019-05-07 ENCOUNTER — HOSPITAL ENCOUNTER (OUTPATIENT)
Facility: HOSPITAL | Age: 49
Setting detail: HOSPITAL OUTPATIENT SURGERY
Discharge: HOME | End: 2019-05-07
Attending: INTERNAL MEDICINE | Admitting: INTERNAL MEDICINE

## 2019-05-07 ENCOUNTER — ANESTHESIA (OUTPATIENT)
Dept: GASTROENTEROLOGY | Facility: HOSPITAL | Age: 49
End: 2019-05-07

## 2019-05-07 ENCOUNTER — ANESTHESIA EVENT (OUTPATIENT)
Dept: GASTROENTEROLOGY | Facility: HOSPITAL | Age: 49
End: 2019-05-07

## 2019-05-07 VITALS
SYSTOLIC BLOOD PRESSURE: 124 MMHG | HEART RATE: 76 BPM | TEMPERATURE: 97.2 F | DIASTOLIC BLOOD PRESSURE: 66 MMHG | RESPIRATION RATE: 18 BRPM | OXYGEN SATURATION: 98 %

## 2019-05-07 DIAGNOSIS — K52.9 COLITIS: ICD-10-CM

## 2019-05-07 DIAGNOSIS — K92.1 BLOOD IN STOOL: ICD-10-CM

## 2019-05-07 LAB
B-HCG UR QL: NEGATIVE
INTERNAL NEGATIVE CONTROL: NEGATIVE
INTERNAL POSITIVE CONTROL: POSITIVE
Lab: NORMAL
POTASSIUM BLD-SCNC: 3.5 MMOL/L (ref 3.5–5.2)

## 2019-05-07 PROCEDURE — S0260 H&P FOR SURGERY: HCPCS | Performed by: INTERNAL MEDICINE

## 2019-05-07 PROCEDURE — 88305 TISSUE EXAM BY PATHOLOGIST: CPT | Performed by: INTERNAL MEDICINE

## 2019-05-07 PROCEDURE — 25010000002 PROPOFOL 10 MG/ML EMULSION: Performed by: NURSE ANESTHETIST, CERTIFIED REGISTERED

## 2019-05-07 PROCEDURE — 84132 ASSAY OF SERUM POTASSIUM: CPT | Performed by: ANESTHESIOLOGY

## 2019-05-07 PROCEDURE — 81025 URINE PREGNANCY TEST: CPT | Performed by: ANESTHESIOLOGY

## 2019-05-07 RX ORDER — LIDOCAINE HYDROCHLORIDE 10 MG/ML
INJECTION, SOLUTION INFILTRATION; PERINEURAL AS NEEDED
Status: DISCONTINUED | OUTPATIENT
Start: 2019-05-07 | End: 2019-05-07 | Stop reason: SURG

## 2019-05-07 RX ORDER — IPRATROPIUM BROMIDE AND ALBUTEROL SULFATE 2.5; .5 MG/3ML; MG/3ML
3 SOLUTION RESPIRATORY (INHALATION) ONCE AS NEEDED
Status: DISCONTINUED | OUTPATIENT
Start: 2019-05-07 | End: 2019-05-07 | Stop reason: HOSPADM

## 2019-05-07 RX ORDER — ONDANSETRON 2 MG/ML
4 INJECTION INTRAMUSCULAR; INTRAVENOUS ONCE AS NEEDED
Status: DISCONTINUED | OUTPATIENT
Start: 2019-05-07 | End: 2019-05-07 | Stop reason: HOSPADM

## 2019-05-07 RX ORDER — PROMETHAZINE HYDROCHLORIDE 25 MG/ML
6.25 INJECTION, SOLUTION INTRAMUSCULAR; INTRAVENOUS ONCE AS NEEDED
Status: DISCONTINUED | OUTPATIENT
Start: 2019-05-07 | End: 2019-05-07 | Stop reason: HOSPADM

## 2019-05-07 RX ORDER — FAMOTIDINE 10 MG/ML
20 INJECTION, SOLUTION INTRAVENOUS ONCE
Status: COMPLETED | OUTPATIENT
Start: 2019-05-07 | End: 2019-05-07

## 2019-05-07 RX ORDER — PROMETHAZINE HYDROCHLORIDE 25 MG/1
25 TABLET ORAL ONCE AS NEEDED
Status: DISCONTINUED | OUTPATIENT
Start: 2019-05-07 | End: 2019-05-07 | Stop reason: HOSPADM

## 2019-05-07 RX ORDER — FAMOTIDINE 20 MG/1
20 TABLET, FILM COATED ORAL ONCE
Status: DISCONTINUED | OUTPATIENT
Start: 2019-05-07 | End: 2019-05-07 | Stop reason: HOSPADM

## 2019-05-07 RX ORDER — PROPOFOL 10 MG/ML
VIAL (ML) INTRAVENOUS AS NEEDED
Status: DISCONTINUED | OUTPATIENT
Start: 2019-05-07 | End: 2019-05-07 | Stop reason: SURG

## 2019-05-07 RX ORDER — HYDRALAZINE HYDROCHLORIDE 20 MG/ML
5 INJECTION INTRAMUSCULAR; INTRAVENOUS
Status: DISCONTINUED | OUTPATIENT
Start: 2019-05-07 | End: 2019-05-07 | Stop reason: HOSPADM

## 2019-05-07 RX ORDER — PROPOFOL 10 MG/ML
VIAL (ML) INTRAVENOUS CONTINUOUS PRN
Status: DISCONTINUED | OUTPATIENT
Start: 2019-05-07 | End: 2019-05-07 | Stop reason: SURG

## 2019-05-07 RX ORDER — LABETALOL HYDROCHLORIDE 5 MG/ML
5 INJECTION, SOLUTION INTRAVENOUS
Status: DISCONTINUED | OUTPATIENT
Start: 2019-05-07 | End: 2019-05-07 | Stop reason: HOSPADM

## 2019-05-07 RX ORDER — PROMETHAZINE HYDROCHLORIDE 25 MG/1
25 SUPPOSITORY RECTAL ONCE AS NEEDED
Status: DISCONTINUED | OUTPATIENT
Start: 2019-05-07 | End: 2019-05-07 | Stop reason: HOSPADM

## 2019-05-07 RX ORDER — SODIUM CHLORIDE 0.9 % (FLUSH) 0.9 %
3-10 SYRINGE (ML) INJECTION AS NEEDED
Status: DISCONTINUED | OUTPATIENT
Start: 2019-05-07 | End: 2019-05-07 | Stop reason: HOSPADM

## 2019-05-07 RX ORDER — LIDOCAINE HYDROCHLORIDE 10 MG/ML
0.5 INJECTION, SOLUTION EPIDURAL; INFILTRATION; INTRACAUDAL; PERINEURAL ONCE AS NEEDED
Status: DISCONTINUED | OUTPATIENT
Start: 2019-05-07 | End: 2019-05-07 | Stop reason: HOSPADM

## 2019-05-07 RX ORDER — SODIUM CHLORIDE 0.9 % (FLUSH) 0.9 %
3 SYRINGE (ML) INJECTION EVERY 12 HOURS SCHEDULED
Status: DISCONTINUED | OUTPATIENT
Start: 2019-05-07 | End: 2019-05-07 | Stop reason: HOSPADM

## 2019-05-07 RX ORDER — SODIUM CHLORIDE, SODIUM LACTATE, POTASSIUM CHLORIDE, CALCIUM CHLORIDE 600; 310; 30; 20 MG/100ML; MG/100ML; MG/100ML; MG/100ML
INJECTION, SOLUTION INTRAVENOUS CONTINUOUS PRN
Status: DISCONTINUED | OUTPATIENT
Start: 2019-05-07 | End: 2019-05-07 | Stop reason: SURG

## 2019-05-07 RX ORDER — SODIUM CHLORIDE, SODIUM LACTATE, POTASSIUM CHLORIDE, CALCIUM CHLORIDE 600; 310; 30; 20 MG/100ML; MG/100ML; MG/100ML; MG/100ML
9 INJECTION, SOLUTION INTRAVENOUS CONTINUOUS
Status: DISCONTINUED | OUTPATIENT
Start: 2019-05-07 | End: 2019-05-07 | Stop reason: HOSPADM

## 2019-05-07 RX ADMIN — PROPOFOL 150 MG: 10 INJECTION, EMULSION INTRAVENOUS at 10:17

## 2019-05-07 RX ADMIN — SODIUM CHLORIDE, POTASSIUM CHLORIDE, SODIUM LACTATE AND CALCIUM CHLORIDE 9 ML/HR: 600; 310; 30; 20 INJECTION, SOLUTION INTRAVENOUS at 09:11

## 2019-05-07 RX ADMIN — PROPOFOL 125 MCG/KG/MIN: 10 INJECTION, EMULSION INTRAVENOUS at 10:19

## 2019-05-07 RX ADMIN — FAMOTIDINE 20 MG: 10 INJECTION, SOLUTION INTRAVENOUS at 09:11

## 2019-05-07 RX ADMIN — LIDOCAINE HYDROCHLORIDE 50 MG: 10 INJECTION, SOLUTION INFILTRATION; PERINEURAL at 10:17

## 2019-05-07 RX ADMIN — SODIUM CHLORIDE, POTASSIUM CHLORIDE, SODIUM LACTATE AND CALCIUM CHLORIDE: 600; 310; 30; 20 INJECTION, SOLUTION INTRAVENOUS at 10:14

## 2019-05-07 NOTE — ANESTHESIA PREPROCEDURE EVALUATION
Anesthesia Evaluation     Patient summary reviewed and Nursing notes reviewed   no history of anesthetic complications:  NPO Solid Status: > 8 hours  NPO Liquid Status: > 2 hours           Airway   Mallampati: I  TM distance: >3 FB  Neck ROM: full  Possible difficult intubation  Dental - normal exam     Pulmonary    (+) a smoker Current Abstained day of surgery, COPD moderate, sleep apnea, decreased breath sounds,   Cardiovascular   Exercise tolerance: poor (<4 METS)    Rhythm: regular  Rate: normal    (+) hyperlipidemia,       Neuro/Psych  (+) psychiatric history Bipolar,     GI/Hepatic/Renal/Endo    (+) morbid obesity, GI bleeding,   (-)  obesity    Musculoskeletal     Abdominal   (+) obese,     Abdomen: soft.   Substance History      OB/GYN          Other   (+) arthritis                     Anesthesia Plan    ASA 3     general     intravenous induction   Anesthetic plan, all risks, benefits, and alternatives have been provided, discussed and informed consent has been obtained with: patient.    Plan discussed with CRNA.

## 2019-05-07 NOTE — ANESTHESIA POSTPROCEDURE EVALUATION
Patient: Loreta Robertson    Procedure Summary     Date:  05/07/19 Room / Location:   [facility]    Anesthesia Start:  1014 Anesthesia Stop:      Procedure:  COLONOSCOPY (N/A ) Diagnosis:       Colitis      Blood in stool      (Colitis [K52.9])      (Blood in stool [K92.1])    Surgeon:  Jonathan Pablo MD Provider:  Josiah Segura MD    Anesthesia Type:  general ASA Status:  3          Anesthesia Type: general  Last vitals  BP   136/69   Temp   97   Pulse   97   Resp   14     SpO2   96%     Post Anesthesia Care and Evaluation    Patient location during evaluation: PACU  Patient participation: complete - patient participated  Level of consciousness: awake and alert  Pain score: 0  Pain management: adequate  Airway patency: patent  Anesthetic complications: No anesthetic complications  PONV Status: none  Cardiovascular status: hemodynamically stable and acceptable  Respiratory status: nonlabored ventilation, acceptable and face mask  Hydration status: acceptable

## 2019-05-07 NOTE — CONSULTS
"[facility]    Referring Provider: Jonathan Pablo MD    Reason for Consultation: screening colonoscopy    Chief complaint screening    History of present illness:  Loreta Robertson is a 49 y.o. female who is in in patient endoscopy for screening colonoscopy. She had a bout of \"colitis\" > 6 weeks ago. No current symptoms. Has struggled with constipation and diarrhea, more constipation.     Allergies:  Morphine and related    Scheduled Meds:    famotidine 20 mg Oral Once   sodium chloride 3 mL Intravenous Q12H        Infusions:    lactated ringers 9 mL/hr Last Rate: 9 mL/hr (05/07/19 0911)       PRN Meds:  lidocaine PF 1%  •  sodium chloride    Home Meds:  Medications Prior to Admission   Medication Sig Dispense Refill Last Dose   • atorvastatin (LIPITOR) 80 MG tablet Take 1 tablet by mouth Daily. 30 tablet 6 5/6/2019 at 0900   • divalproex (DEPAKOTE) 500 MG DR tablet Take 500 mg by mouth Daily.   5/6/2019 at 1100   • prazosin (MINIPRESS) 2 MG capsule TK 1 C PO HS  1 5/5/2019 at Unknown time   • Sod Picosulfate-Mag Ox-Cit Acd 10-3.5-12 MG-GM -GM/160ML solution Take 1 kit by mouth Take As Directed. Follow instructions that were mailed to your home. If you didn't receive these call [phone]. 3 bottle 0 5/7/2019 at 0630   • divalproex (DEPAKOTE) 500 MG 24 hr tablet Take 1,000 mg by mouth Every Night.   5/5/2019 at 1800   • QUEtiapine (SEROquel) 400 MG tablet TK 1 T PO HS  2 5/5/2019 at 1800   • traMADol (ULTRAM) 50 MG tablet Take 1 tablet by mouth 2 (Two) Times a Day As Needed for Moderate Pain . (Patient taking differently: Take 50 mg by mouth 2 (Two) Times a Day As Needed for Moderate Pain  (TO START AFTER COLONOSCOPY).) 60 tablet 0        ROS: Review of Systems  All other systems reviewed and are negative.    PAST MED HX: Pt  has a past medical history of Anxiety, Anxiety, Bipolar 1 disorder (CMS/HCC), Bipolar 1 disorder (CMS/HCC), Chronic bronchitis (CMS/HCC), Chronic constipation, " Depression, Hyperlipidemia, Primary generalized (osteo)arthritis, PTSD (post-traumatic stress disorder), and RLS (restless legs syndrome).  PAST SURG HX: Pt  has a past surgical history that includes  section; Cholecystectomy; Abdominoplasty; Liposuction Abdominal (); Hysterectomy; Breast biopsy (Right); Colonoscopy; Breast biopsy (Right); and Anterior and posterior vaginal repair.  FAM HX: family history includes Breast cancer (age of onset: 40) in her maternal grandmother; Breast cancer (age of onset: 50) in her mother; Cancer in her mother; Cervical cancer in her maternal grandmother; Diabetes in her father and mother; Heart attack in her father; Hypertension in her father; Obesity in her father; Ovarian cancer (age of onset: 40) in her maternal grandmother.  SOC HX: Pt  reports that she has been smoking.  She started smoking about 39 years ago. She has a 39.00 pack-year smoking history. She has never used smokeless tobacco. She reports that she does not drink alcohol or use drugs.    /91 (BP Location: Left arm, Patient Position: Lying)   Pulse 82   Temp 97 °F (36.1 °C) (Temporal)   Resp 16   SpO2 97%     Physical Exam  Wt Readings from Last 3 Encounters:   19 (!) 151 kg (333 lb 8.9 oz)   19 (!) 153 kg (336 lb 12.8 oz)   19 (!) 154 kg (340 lb 9.6 oz)   ,body mass index is unknown because there is no height or weight on file.    General Well developed; well nourished; no acute distress.   obese  ENT Good dentition.  Oral mucosa pink & moist without thrush or lesions.    Neck Neck supple; trachea midline. No thyromegaly  Resp CTA; no rhonchi, rales, or wheezes.  Respiration effort normal  CV RRR; ; no M/R/G. No lower extremity edema  GI Abd soft, NT, ND, normal active bowel sounds.  No HSM.  No abd hernia  Skin No rash; no lesions; no bruises.  Skin turgor normal  Musc No clubbing; no cyanosis.  Gait steady  Psych Oriented to time, place, and person.  Appropriate  affect        Results Review:   I reviewed the patient's new clinical results.    Lab Results   Component Value Date    WBC 4.79 05/01/2019    HGB 13.8 05/01/2019    HCT 43.8 05/01/2019    .5 (H) 05/01/2019     05/01/2019       Lab Results   Component Value Date    GLUCOSE 108 (H) 05/01/2019    BUN 11 05/01/2019    CREATININE 0.62 05/01/2019    EGFRIFNONA 101 05/01/2019    EGFRIFAFRI 115 04/18/2019    BCR 17.7 05/01/2019    CO2 28.0 05/01/2019    CALCIUM 9.3 05/01/2019    PROTENTOTREF 7.3 03/05/2018    ALBUMIN 4.30 03/05/2018    LABIL2 1.4 (L) 03/05/2018    AST 42 (H) 03/05/2018    ALT 60 (H) 03/05/2018       ASSESSMENTS/PLANS  1.) History of colitis  2.) HCM  To screening colonoscopy  The risk of endoscopy was discussed including the risk of anesthesia, bowel perforation, bleeding, missed lesion, infection, and death should a severe complication occur.  The patient determined that the diagnostic benefit outweighed the risk.             I discussed the patients findings and my recommendations with the patient    Jonathan Pablo MD  05/07/19  9:21 AM

## 2019-05-08 LAB
CYTO UR: NORMAL
LAB AP CASE REPORT: NORMAL
LAB AP CLINICAL INFORMATION: NORMAL
PATH REPORT.FINAL DX SPEC: NORMAL
PATH REPORT.GROSS SPEC: NORMAL

## 2019-05-21 ENCOUNTER — OFFICE VISIT (OUTPATIENT)
Dept: INTERNAL MEDICINE | Facility: CLINIC | Age: 49
End: 2019-05-21

## 2019-05-21 DIAGNOSIS — F31.11 BIPOLAR 1 DISORDER, MANIC, MILD (HCC): ICD-10-CM

## 2019-05-21 DIAGNOSIS — G89.29 CHRONIC PAIN OF RIGHT KNEE: ICD-10-CM

## 2019-05-21 DIAGNOSIS — R73.9 HYPERGLYCEMIA: ICD-10-CM

## 2019-05-21 DIAGNOSIS — E78.2 MIXED HYPERLIPIDEMIA: ICD-10-CM

## 2019-05-21 DIAGNOSIS — Z72.0 TOBACCO USE: ICD-10-CM

## 2019-05-21 DIAGNOSIS — M25.561 CHRONIC PAIN OF RIGHT KNEE: ICD-10-CM

## 2019-05-21 DIAGNOSIS — E66.01 MORBID OBESITY WITH BMI OF 50.0-59.9, ADULT (HCC): ICD-10-CM

## 2019-05-21 DIAGNOSIS — Z00.00 MEDICARE ANNUAL WELLNESS VISIT, SUBSEQUENT: Primary | ICD-10-CM

## 2019-05-21 PROBLEM — K92.1 BLOOD IN STOOL: Status: RESOLVED | Noted: 2019-04-23 | Resolved: 2019-05-21

## 2019-05-21 PROBLEM — K52.9 COLITIS: Status: RESOLVED | Noted: 2019-04-23 | Resolved: 2019-05-21

## 2019-05-21 LAB
EXPIRATION DATE: NORMAL
HBA1C MFR BLD: 5.9 %
Lab: NORMAL

## 2019-05-21 PROCEDURE — 96160 PT-FOCUSED HLTH RISK ASSMT: CPT | Performed by: NURSE PRACTITIONER

## 2019-05-21 PROCEDURE — 83036 HEMOGLOBIN GLYCOSYLATED A1C: CPT | Performed by: NURSE PRACTITIONER

## 2019-05-21 PROCEDURE — G0439 PPPS, SUBSEQ VISIT: HCPCS | Performed by: NURSE PRACTITIONER

## 2019-05-21 PROCEDURE — 36415 COLL VENOUS BLD VENIPUNCTURE: CPT | Performed by: NURSE PRACTITIONER

## 2019-05-21 RX ORDER — LURASIDONE HYDROCHLORIDE 40 MG/1
40 TABLET, FILM COATED ORAL DAILY
COMMUNITY
End: 2022-05-06

## 2019-05-21 NOTE — PATIENT INSTRUCTIONS
BMI for Adults  Body mass index (BMI) is a number that is calculated from a person's weight and height. In most adults, the number is used to find how much of an adult's weight is made up of fat. BMI is not as accurate as a direct measure of body fat.  How is BMI calculated?  BMI is calculated by dividing weight in kilograms by height in meters squared. It can also be calculated by dividing weight in pounds by height in inches squared, then multiplying the resulting number by 703. Charts are available to help you find your BMI quickly and easily without doing this calculation.  How is BMI interpreted?  Health care professionals use BMI charts to identify whether an adult is underweight, at a normal weight, or overweight based on the following guidelines:  · Underweight: BMI less than 18.5.  · Normal weight: BMI between 18.5 and 24.9.  · Overweight: BMI between 25 and 29.9.  · Obese: BMI of 30 and above.    BMI is usually interpreted the same for males and females.  Weight includes both fat and muscle, so someone with a muscular build, such as an athlete, may have a BMI that is higher than 24.9. In cases like these, BMI may not accurately depict body fat. To determine if excess body fat is the cause of a BMI of 25 or higher, further assessments may need to be done by a health care provider.  Why is BMI a useful tool?  BMI is used to identify a possible weight problem that may be related to a medical problem or may increase the risk for medical problems. BMI can also be used to promote changes to reach a healthy weight.  This information is not intended to replace advice given to you by your health care provider. Make sure you discuss any questions you have with your health care provider.  Document Released: 08/29/2005 Document Revised: 04/27/2017 Document Reviewed: 05/15/2015  [employer] Interactive Patient Education © 2018 [employer]  Heart-Healthy Eating Plan  Many factors influence your heart health, including  "eating and exercise habits. Heart (coronary) risk increases with abnormal blood fat (lipid) levels. Heart-healthy meal planning includes limiting unhealthy fats, increasing healthy fats, and making other small dietary changes. This includes maintaining a healthy body weight to help keep lipid levels within a normal range.  What is my plan?  Your health care provider recommends that you:  · Get no more than _________% of the total calories in your daily diet from fat.  · Limit your intake of saturated fat to less than _________% of your total calories each day.  · Limit the amount of cholesterol in your diet to less than _________ mg per day.    What types of fat should I choose?  · Choose healthy fats more often. Choose monounsaturated and polyunsaturated fats, such as olive oil and canola oil, flaxseeds, walnuts, almonds, and seeds.  · Eat more omega-3 fats. Good choices include salmon, mackerel, sardines, tuna, flaxseed oil, and ground flaxseeds. Aim to eat fish at least two times each week.  · Limit saturated fats. Saturated fats are primarily found in animal products, such as meats, butter, and cream. Plant sources of saturated fats include palm oil, palm kernel oil, and coconut oil.  · Avoid foods with partially hydrogenated oils in them. These contain trans fats. Examples of foods that contain trans fats are stick margarine, some tub margarines, cookies, crackers, and other baked goods.  What general guidelines do I need to follow?  · Check food labels carefully to identify foods with trans fats or high amounts of saturated fat.  · Fill one half of your plate with vegetables and green salads. Eat 4-5 servings of vegetables per day. A serving of vegetables equals 1 cup of raw leafy vegetables, ½ cup of raw or cooked cut-up vegetables, or ½ cup of vegetable juice.  · Fill one fourth of your plate with whole grains. Look for the word \"whole\" as the first word in the ingredient list.  · Fill one fourth of your " plate with lean protein foods.  · Eat 4-5 servings of fruit per day. A serving of fruit equals one medium whole fruit, ¼ cup of dried fruit, ½ cup of fresh, frozen, or canned fruit, or ½ cup of 100% fruit juice.  · Eat more foods that contain soluble fiber. Examples of foods that contain this type of fiber are apples, broccoli, carrots, beans, peas, and barley. Aim to get 20-30 g of fiber per day.  · Eat more home-cooked food and less restaurant, buffet, and fast food.  · Limit or avoid alcohol.  · Limit foods that are high in starch and sugar.  · Avoid fried foods.  · Cook foods by using methods other than frying. Baking, boiling, grilling, and broiling are all great options. Other fat-reducing suggestions include:  ? Removing the skin from poultry.  ? Removing all visible fats from meats.  ? Skimming the fat off of stews, soups, and gravies before serving them.  ? Steaming vegetables in water or broth.  · Lose weight if you are overweight. Losing just 5-10% of your initial body weight can help your overall health and prevent diseases such as diabetes and heart disease.  · Increase your consumption of nuts, legumes, and seeds to 4-5 servings per week. One serving of dried beans or legumes equals ½ cup after being cooked, one serving of nuts equals 1½ ounces, and one serving of seeds equals ½ ounce or 1 tablespoon.  · You may need to monitor your salt (sodium) intake, especially if you have high blood pressure. Talk with your health care provider or dietitian to get more information about reducing sodium.  What foods can I eat?  Grains    Breads, including Uzbek, white, alf, wheat, raisin, rye, oatmeal, and Italian. Tortillas that are neither fried nor made with lard or trans fat. Low-fat rolls, including hotdog and hamburger buns and English muffins. Biscuits. Muffins. Waffles. Pancakes. Light popcorn. Whole-grain cereals. Flatbread. [place] toast. Pretzels. Breadsticks. Rusks. Low-fat snacks and crackers,  including oyster, saltine, matzo, heide, animal, and rye. Rice and pasta, including brown rice and those that are made with whole wheat.  Vegetables  All vegetables.  Fruits  All fruits, but limit coconut.  Meats and Other Protein Sources  Lean, well-trimmed beef, veal, pork, and lamb. Chicken and turkey without skin. All fish and shellfish. Wild duck, rabbit, pheasant, and venison. Egg whites or low-cholesterol egg substitutes. Dried beans, peas, lentils, and tofu. Seeds and most nuts.  Dairy  Low-fat or nonfat cheeses, including ricotta, string, and mozzarella. Skim or 1% milk that is liquid, powdered, or evaporated. Buttermilk that is made with low-fat milk. Nonfat or low-fat yogurt.  Beverages  Mineral water. Diet carbonated beverages.  Sweets and Desserts  Sherbets and fruit ices. Honey, jam, marmalade, jelly, and syrups. Meringues and gelatins. Pure sugar candy, such as hard candy, jelly beans, gumdrops, mints, marshmallows, and small amounts of dark chocolate. Papi food cake.  Eat all sweets and desserts in moderation.  Fats and Oils  Nonhydrogenated (trans-free) margarines. Vegetable oils, including soybean, sesame, sunflower, olive, peanut, safflower, corn, canola, and cottonseed. Salad dressings or mayonnaise that are made with a vegetable oil. Limit added fats and oils that you use for cooking, baking, salads, and as spreads.  Other  Cocoa powder. Coffee and tea. All seasonings and condiments.  The items listed above may not be a complete list of recommended foods or beverages. Contact your dietitian for more options.  What foods are not recommended?  Grains  Breads that are made with saturated or trans fats, oils, or whole milk. Croissants. Butter rolls. Cheese breads. Sweet rolls. Donuts. Buttered popcorn. Chow mein noodles. High-fat crackers, such as cheese or butter crackers.  Meats and Other Protein Sources  Fatty meats, such as hotdogs, short ribs, sausage, spareribs, hollis, ribeye roast or steak,  and mutton. High-fat deli meats, such as salami and bologna. Caviar. Domestic duck and goose. Organ meats, such as kidney, liver, sweetbreads, brains, gizzard, chitterlings, and heart.  Dairy  Cream, sour cream, cream cheese, and creamed cottage cheese. Whole milk cheeses, including blue (jabier), [place] Mars, Brie, Qasim, American, Havarti, Swiss, cheddar, Camembert, and Monon. Whole or 2% milk that is liquid, evaporated, or condensed. Whole buttermilk. Cream sauce or high-fat cheese sauce. Yogurt that is made from whole milk.  Beverages  Regular sodas and drinks with added sugar.  Sweets and Desserts  Frosting. Pudding. Cookies. Cakes other than elina food cake. Candy that has milk chocolate or white chocolate, hydrogenated fat, butter, coconut, or unknown ingredients. Buttered syrups. Full-fat ice cream or ice cream drinks.  Fats and Oils  Gravy that has suet, meat fat, or shortening. Cocoa butter, hydrogenated oils, palm oil, coconut oil, palm kernel oil. These can often be found in baked products, candy, fried foods, nondairy creamers, and whipped toppings. Solid fats and shortenings, including hollis fat, salt pork, lard, and butter. Nondairy cream substitutes, such as coffee creamers and sour cream substitutes. Salad dressings that are made of unknown oils, cheese, or sour cream.  The items listed above may not be a complete list of foods and beverages to avoid. Contact your dietitian for more information.  This information is not intended to replace advice given to you by your health care provider. Make sure you discuss any questions you have with your health care provider.  Document Released: 09/26/2009 Document Revised: 07/07/2017 Document Reviewed: 06/11/2015  [employer] Interactive Patient Education © 2019 [employer]  Fall Prevention in the Home, Adult  Falls can cause injuries. They can happen to people of all ages. There are many things you can do to make your home safe and to help prevent falls.  Ask for help when making these changes, if needed.  What actions can I take to prevent falls?  General Instructions  · Use good lighting in all rooms. Replace any light bulbs that burn out.  · Turn on the lights when you go into a dark area. Use night-lights.  · Keep items that you use often in easy-to-reach places. Lower the shelves around your home if necessary.  · Set up your furniture so you have a clear path. Avoid moving your furniture around.  · Do not have throw rugs and other things on the floor that can make you trip.  · Avoid walking on wet floors.  · If any of your floors are uneven, fix them.  · Add color or contrast paint or tape to clearly shane and help you see:  ? Any grab bars or handrails.  ? First and last steps of stairways.  ? Where the edge of each step is.  · If you use a stepladder:  ? Make sure that it is fully opened. Do not climb a closed stepladder.  ? Make sure that both sides of the stepladder are locked into place.  ? Ask someone to hold the stepladder for you while you use it.  · If there are any pets around you, be aware of where they are.  What can I do in the bathroom?  · Keep the floor dry. Clean up any water that spills onto the floor as soon as it happens.  · Remove soap buildup in the tub or shower regularly.  · Use non-skid mats or decals on the floor of the tub or shower.  · Attach bath mats securely with double-sided, non-slip rug tape.  · If you need to sit down in the shower, use a plastic, non-slip stool.  · Install grab bars by the toilet and in the tub and shower. Do not use towel bars as grab bars.  What can I do in the bedroom?  · Make sure that you have a light by your bed that is easy to reach.  · Do not use any sheets or blankets that are too big for your bed. They should not hang down onto the floor.  · Have a firm chair that has side arms. You can use this for support while you get dressed.  What can I do in the kitchen?  · Clean up any spills right away.  · If  you need to reach something above you, use a strong step stool that has a grab bar.  · Keep electrical cords out of the way.  · Do not use floor polish or wax that makes floors slippery. If you must use wax, use non-skid floor wax.  What can I do with my stairs?  · Do not leave any items on the stairs.  · Make sure that you have a light switch at the top of the stairs and the bottom of the stairs. If you do not have them, ask someone to add them for you.  · Make sure that there are handrails on both sides of the stairs, and use them. Fix handrails that are broken or loose. Make sure that handrails are as long as the stairways.  · Install non-slip stair treads on all stairs in your home.  · Avoid having throw rugs at the top or bottom of the stairs. If you do have throw rugs, attach them to the floor with carpet tape.  · Choose a carpet that does not hide the edge of the steps on the stairway.  · Check any carpeting to make sure that it is firmly attached to the stairs. Fix any carpet that is loose or worn.  What can I do on the outside of my home?  · Use bright outdoor lighting.  · Regularly fix the edges of walkways and driveways and fix any cracks.  · Remove anything that might make you trip as you walk through a door, such as a raised step or threshold.  · Trim any bushes or trees on the path to your home.  · Regularly check to see if handrails are loose or broken. Make sure that both sides of any steps have handrails.  · Install guardrails along the edges of any raised decks and porches.  · Clear walking paths of anything that might make someone trip, such as tools or rocks.  · Have any leaves, snow, or ice cleared regularly.  · Use sand or salt on walking paths during winter.  · Clean up any spills in your garage right away. This includes grease or oil spills.  What other actions can I take?  · Wear shoes that:  ? Have a low heel. Do not wear high heels.  ? Have rubber bottoms.  ? Are comfortable and fit you  well.  ? Are closed at the toe. Do not wear open-toe sandals.  · Use tools that help you move around (mobility aids) if they are needed. These include:  ? Canes.  ? Walkers.  ? Scooters.  ? Crutches.  · Review your medicines with your doctor. Some medicines can make you feel dizzy. This can increase your chance of falling.  Ask your doctor what other things you can do to help prevent falls.  Where to find more information  · Centers for Disease Control and Prevention, [place]: https://cdc.gov  · National [place] on Aging: https://vv9icsp.cricket.nih.gov  Contact a doctor if:  · You are afraid of falling at home.  · You feel weak, drowsy, or dizzy at home.  · You fall at home.  Summary  · There are many simple things that you can do to make your home safe and to help prevent falls.  · Ways to make your home safe include removing tripping hazards and installing grab bars in the bathroom.  · Ask for help when making these changes in your home.  This information is not intended to replace advice given to you by your health care provider. Make sure you discuss any questions you have with your health care provider.  Document Released: 10/14/2010 Document Revised: 08/02/2018 Document Reviewed: 08/02/2018  [employer] Interactive Patient Education © 2019 [employer]

## 2019-05-21 NOTE — PROGRESS NOTES
QUICK REFERENCE INFORMATION:  The ABCs of the Annual Wellness Visit    Subsequent Medicare Wellness Visit     HEALTH RISK ASSESSMENT    : 1970    Recent Hospitalizations:  No hospitalization(s) within the last year..  ccc    Arhtritis knee and pain for chronic synvisc injections     Current Medical Providers:  Patient Care Team:  Helen López APRN as PCP - General (Nurse Practitioner)  Joel Murillo Jr., MD (Psychiatry)        Smoking Status:  Social History     Tobacco Use   Smoking Status Current Every Day Smoker   • Packs/day: 1.00   • Years: 39.00   • Pack years: 39.00   • Start date:    Smokeless Tobacco Never Used       Alcohol Consumption:  Social History     Substance and Sexual Activity   Alcohol Use No       Depression Screen:   PHQ-2/PHQ-9 Depression Screening 2019   Little interest or pleasure in doing things 1   Feeling down, depressed, or hopeless 1   Trouble falling or staying asleep, or sleeping too much 1   Feeling tired or having little energy 1   Poor appetite or overeating 1   Feeling bad about yourself - or that you are a failure or have let yourself or your family down 1   Trouble concentrating on things, such as reading the newspaper or watching television 1   Moving or speaking so slowly that other people could have noticed. Or the opposite - being so fidgety or restless that you have been moving around a lot more than usual 1   Thoughts that you would be better off dead, or of hurting yourself in some way 1   Total Score 9   If you checked off any problems, how difficult have these problems made it for you to do your work, take care of things at home, or get along with other people? -       Health Habits and Functional and Cognitive Screening:  Functional & Cognitive Status 2019   Do you have difficulty preparing food and eating? Yes   Do you have difficulty bathing yourself, getting dressed or grooming yourself? Yes   Do you have difficulty using the toilet? Yes    Do you have difficulty moving around from place to place? Yes   Do you have trouble with steps or getting out of a bed or a chair? Yes   In the past year have you fallen or experienced a near fall? Yes   Current Diet Limited Junk Food   Dental Exam Up to date   Eye Exam Up to date   Exercise (times per week) 0 times per week   Current Exercise Activities Include None   Do you need help using the phone?  No   Are you deaf or do you have serious difficulty hearing?  No   Do you need help with transportation? Yes   Do you need help shopping? Yes   Do you need help preparing meals?  Yes   Do you need help with housework?  Yes   Do you need help with laundry? Yes   Do you need help taking your medications? Yes   Do you need help managing money? Yes   Do you ever drive or ride in a car without wearing a seat belt? No   Have you felt unusual stress, anger or loneliness in the last month? Yes   Who do you live with? Alone   If you need help, do you have trouble finding someone available to you? No   Have you been bothered in the last four weeks by sexual problems? No   Do you have difficulty concentrating, remembering or making decisions? Yes           Does the patient have evidence of cognitive impairment? No    Asiprin use counseling: Does not need ASA (and currently is not on it)      Recent Lab Results:    Lab Results   Component Value Date     (H) 04/18/2019     Lab Results   Component Value Date    HGBA1C 5.9 05/21/2019     Lab Results   Component Value Date    TRIG 215 (H) 04/18/2019    HDL 42 04/18/2019    VLDL 43 (H) 04/18/2019           Age-appropriate Screening Schedule:  Refer to the list below for future screening recommendations based on patient's age, sex and/or medical conditions. Orders for these recommended tests are listed in the plan section. The patient has been provided with a written plan.    Health Maintenance   Topic Date Due   • TDAP/TD VACCINES (1 - Tdap) 05/21/2019 (Originally 4/28/1987)    • ZOSTER VACCINE (1 of 2) 05/29/2020 (Originally 4/28/2018)   • INFLUENZA VACCINE  08/01/2019   • MAMMOGRAM  04/05/2020   • LIPID PANEL  04/18/2020   • PAP SMEAR  03/05/2021   • COLONOSCOPY  05/07/2029   • PNEUMOCOCCAL VACCINE (19-64 MEDIUM RISK)  Completed        Subjective   History of Present Illness    Loreta Robertson is a 49 y.o. female who presents for an Annual Wellness Visit.    Pt  reports that she has been smoking for years of duration. She started smoking about 39 years ago. She has a 39.00 pack-year smoking history. She has never used smokeless tobacco. She is not interested in cessation at this time.     Patient takes atorvastatin for chronic hyperlipidemia, but takes this inconsistently. Hyperlipidemia worsened by diet, smoking and morbid obesity.    Patient complains of morbid obesity for years of duration. Worsened with limited ability to exercise and psychiatric medicines.    Patient continues to follow psychiatry for Bipolar disorder and goes to [facility] for counseling. Patient reports disorder associated with severe depression and anxiety with episodes of abdias. Managed with Depakote, Latuda, Prazosin and Seroquel.    Patient complains of continued chronic right knee pain (at least since 1/2018). She states she is going to [facility] [phone] [address] [place] [address] [address] [place] [address] for orthovisc injections. She states she has intermittent relief. Current pain 8/0-10 scale. Worsened when changing positions, walking and bending leg backward. Worsened with morbid obesity. Patient states Tramadol does not give very much relief, but she plans on following with arthritis center. Patient states she cannot afford to be off work for any surgeries.    The following portions of the patient's history were reviewed and updated as appropriate: allergies, current medications, past family history, past medical history, past social history, past surgical history and  problem list.    Outpatient Medications Prior to Visit   Medication Sig Dispense Refill   • atorvastatin (LIPITOR) 80 MG tablet Take 1 tablet by mouth Daily. 30 tablet 6   • divalproex (DEPAKOTE) 500 MG 24 hr tablet Take 1,000 mg by mouth Every Night.     • divalproex (DEPAKOTE) 500 MG DR tablet Take 500 mg by mouth Daily.     • lurasidone (LATUDA) 40 MG tablet tablet Take 40 mg by mouth Daily.     • prazosin (MINIPRESS) 2 MG capsule TK 1 C PO HS  1   • QUEtiapine (SEROquel) 400 MG tablet TK 1 T PO HS  2   • traMADol (ULTRAM) 50 MG tablet Take 1 tablet by mouth 2 (Two) Times a Day As Needed for Moderate Pain . (Patient taking differently: Take 50 mg by mouth 2 (Two) Times a Day As Needed for Moderate Pain  (TO START AFTER COLONOSCOPY).) 60 tablet 0   • Sod Picosulfate-Mag Ox-Cit Acd 10-3.5-12 MG-GM -GM/160ML solution Take 1 kit by mouth Take As Directed. Follow instructions that were mailed to your home. If you didn't receive these call [phone]. 2 bottle 0     No facility-administered medications prior to visit.        Patient Active Problem List   Diagnosis   • Tobacco use   • Bipolar 1 disorder, manic, mild (CMS/HCC)   • Morbid obesity with BMI of 50.0-59.9, adult (CMS/HCC)   • Other hyperlipidemia   • Chronic pain of right knee       Advance Care Planning:  Patient does not have an advance directive - information provided to the patient today    Identification of Risk Factors:  Risk factors include: weight , unhealthy diet, cardiovascular risk, inactivity, tobacco use, increased fall risk, chronic pain, inadequate social support, financial stress and polypharmacy.    Review of Systems   Constitutional: Negative for fatigue and unexpected weight change.   HENT: Negative.    Eyes: Negative for visual disturbance.   Respiratory: Negative for cough, shortness of breath and wheezing.    Cardiovascular: Negative for chest pain, palpitations and leg swelling.        No HINTON, orthopnea, or claudication.    Gastrointestinal: Negative for abdominal pain, blood in stool, constipation, diarrhea, nausea and vomiting.        Denies melena.   Endocrine: Negative for polydipsia and polyuria.   Genitourinary: Negative.    Musculoskeletal: Negative for arthralgias and myalgias.        Right knee pain   Skin: Negative.    Neurological: Negative for dizziness, syncope, light-headedness and headaches.        No memory issues.   Psychiatric/Behavioral: Negative for decreased concentration, self-injury and suicidal ideas.       Compared to one year ago, the patient feels her physical health is the same.  Compared to one year ago, the patient feels her mental health is worse-she is seeing psychiatrist. Previous provider retired. She continues to go to [facility].    Objective     Physical Exam   Constitutional: She is oriented to person, place, and time. She appears well-developed and well-nourished. She is cooperative. She is easily aroused.  Non-toxic appearance. She does not have a sickly appearance. She does not appear ill. No distress.   HENT:   Head: Normocephalic and atraumatic. Head is without abrasion. Hair is normal.   Right Ear: Hearing, tympanic membrane, external ear and ear canal normal. No foreign bodies. Tympanic membrane is not perforated and not erythematous.   Left Ear: Hearing, tympanic membrane, external ear and ear canal normal. No foreign bodies. Tympanic membrane is not perforated and not erythematous.   Nose: Nose normal. No mucosal edema, rhinorrhea or septal deviation. No epistaxis.  No foreign bodies.   Mouth/Throat: Uvula is midline, oropharynx is clear and moist and mucous membranes are normal. No oral lesions. Normal dentition.   Eyes: Conjunctivae and lids are normal. Pupils are equal, round, and reactive to light. Right eye exhibits no discharge. Left eye exhibits no discharge. Right conjunctiva is not injected. Left conjunctiva is not injected. No scleral icterus.   Neck: Normal range of motion  "and full passive range of motion without pain. Neck supple. No edema and normal range of motion present. No thyroid mass and no thyromegaly present.   Cardiovascular: Normal rate, regular rhythm and normal heart sounds. Exam reveals no gallop and no friction rub.   No murmur heard.  Pulmonary/Chest: Effort normal and breath sounds normal. No accessory muscle usage. She has no rhonchi. She has no rales. She exhibits no tenderness.   Abdominal: Soft. Bowel sounds are normal. She exhibits no distension. There is no hepatosplenomegaly or hepatomegaly. There is no tenderness. There is no CVA tenderness.   Protuberant, but not distended   Musculoskeletal: She exhibits no edema, tenderness or deformity.        Right knee: She exhibits decreased range of motion and swelling. No tenderness found.   Lymphadenopathy:     She has no cervical adenopathy.   Neurological: She is alert, oriented to person, place, and time and easily aroused. No cranial nerve deficit. Coordination normal.   Skin: Skin is warm, dry and intact. No abrasion and no rash noted. She is not diaphoretic. No cyanosis or erythema. Nails show no clubbing.   Psychiatric: Her affect is blunt.   Nursing note and vitals reviewed.      Vitals:    05/21/19 1124   BP: 120/70   Pulse: 70   Resp: 18   Temp: 98.8 °F (37.1 °C)   Weight: (!) 152 kg (336 lb)   Height: 166.4 cm (65.51\")       Patient's Body mass index is 55.05 kg/m². BMI is above normal parameters. Recommendations include: educational material and exercise counseling.      Assessment/Plan   Patient Self-Management and Personalized Health Advice  The patient has been provided with information about: diet, exercise, weight management, prevention of cardiac or vascular disease, the relationship between weight and GERD, fall prevention and designing advance directives and preventive services including:   · Advance directive, Exercise counseling provided, Fall Risk assessment done.    Visit Diagnoses:    " ICD-10-CM ICD-9-CM   1. Medicare annual wellness visit, subsequent Z00.00 V70.0   2. Mixed hyperlipidemia E78.2 272.2   3. Hyperglycemia R73.9 790.29   4. Bipolar 1 disorder, manic, mild (CMS/[facility]) F31.11 296.41   5. Tobacco use Z72.0 305.1   6. Chronic pain of right knee M25.561 719.46    G89.29 338.29   7. Morbid obesity with BMI of 50.0-59.9, adult (CMS/[facility]) E66.01 278.01    Z68.43 V85.43   2. Continue atorvastatin, lab monitoring and attempt weight loss and smoking cessation  3. Continue lab monitoring of A1C and attempt weight loss  4. Continue psychiatric follow, Depakote, Latuda, prazosin and Seroquel  5. Consider smoking cessation in the future  6. Continue to follow with arthritis center, Orthovisc injections and Tramadol as needed  7. Counseling of diet in regards to appropriate amounts of fats, carbs, sodium and well -balanced diet.  Avoid skipping meals, appropriate amounts of h20 intake, sleep, and regular cardio activity 30 mintues 5d weekly.  The pt v/u.    Orders Placed This Encounter   Procedures   • Lipid Panel   • POC Glycosylated Hemoglobin (Hb A1C)       Outpatient Encounter Medications as of 5/21/2019   Medication Sig Dispense Refill   • atorvastatin (LIPITOR) 80 MG tablet Take 1 tablet by mouth Daily. 30 tablet 6   • divalproex (DEPAKOTE) 500 MG 24 hr tablet Take 1,000 mg by mouth Every Night.     • divalproex (DEPAKOTE) 500 MG DR tablet Take 500 mg by mouth Daily.     • lurasidone (LATUDA) 40 MG tablet tablet Take 40 mg by mouth Daily.     • prazosin (MINIPRESS) 2 MG capsule TK 1 C PO HS  1   • QUEtiapine (SEROquel) 400 MG tablet TK 1 T PO HS  2   • traMADol (ULTRAM) 50 MG tablet Take 1 tablet by mouth 2 (Two) Times a Day As Needed for Moderate Pain . (Patient taking differently: Take 50 mg by mouth 2 (Two) Times a Day As Needed for Moderate Pain  (TO START AFTER COLONOSCOPY).) 60 tablet 0   • [DISCONTINUED] Sod Picosulfate-Mag Ox-Cit Acd 10-3.5-12 MG-GM -GM/160ML solution Take 1 kit by mouth  Take As Directed. Follow instructions that were mailed to your home. If you didn't receive these call [phone]. 2 bottle 0     No facility-administered encounter medications on file as of 5/21/2019.        Reviewed use of high risk medication in the elderly: yes  Reviewed for potential of harmful drug interactions in the elderly: yes    Follow Up:  Return if symptoms worsen or fail to improve, f/u 4 months, for F/U for Subsequent Wellness in one year & a day.     An After Visit Summary and PPPS with all of these plans were given to the patient.

## 2019-05-22 LAB
CHOLEST SERPL-MCNC: 111 MG/DL (ref 0–200)
HDLC SERPL-MCNC: 41 MG/DL (ref 40–60)
LDLC SERPL CALC-MCNC: 42 MG/DL (ref 0–100)
TRIGL SERPL-MCNC: 139 MG/DL (ref 0–150)
VLDLC SERPL CALC-MCNC: 27.8 MG/DL (ref 5–40)

## 2019-05-29 ENCOUNTER — TELEPHONE (OUTPATIENT)
Dept: INTERNAL MEDICINE | Facility: CLINIC | Age: 49
End: 2019-05-29

## 2019-05-29 NOTE — TELEPHONE ENCOUNTER
"Looks like April already spoke to her last week, but please relay info from 5/21 labs per Helen note-- \"Please inform patient A1C slightly increased to 5.9 from 5.6 and cholesterol normal-continue atorvastatin.\"  "

## 2019-05-29 NOTE — TELEPHONE ENCOUNTER
----- Message from Mariaelena Tan sent at 5/29/2019  1:28 PM EDT -----  Contact: PT CALLED.  PATIENT IS CALLING ABOUT HER LAB RESULTS.    PT CALLBACK #: [phone]

## 2019-05-30 VITALS
SYSTOLIC BLOOD PRESSURE: 120 MMHG | HEIGHT: 66 IN | TEMPERATURE: 98.8 F | RESPIRATION RATE: 18 BRPM | WEIGHT: 293 LBS | BODY MASS INDEX: 47.09 KG/M2 | HEART RATE: 70 BPM | DIASTOLIC BLOOD PRESSURE: 70 MMHG

## 2019-08-08 ENCOUNTER — HOSPITAL ENCOUNTER (OUTPATIENT)
Dept: GENERAL RADIOLOGY | Facility: HOSPITAL | Age: 49
Discharge: HOME | End: 2019-08-08
Admitting: NURSE PRACTITIONER

## 2019-08-08 ENCOUNTER — TELEPHONE (OUTPATIENT)
Dept: INTERNAL MEDICINE | Facility: CLINIC | Age: 49
End: 2019-08-08

## 2019-08-08 ENCOUNTER — OFFICE VISIT (OUTPATIENT)
Dept: INTERNAL MEDICINE | Facility: CLINIC | Age: 49
End: 2019-08-08

## 2019-08-08 VITALS
HEART RATE: 74 BPM | SYSTOLIC BLOOD PRESSURE: 126 MMHG | RESPIRATION RATE: 20 BRPM | TEMPERATURE: 97.1 F | WEIGHT: 293 LBS | BODY MASS INDEX: 57.36 KG/M2 | OXYGEN SATURATION: 94 % | DIASTOLIC BLOOD PRESSURE: 80 MMHG

## 2019-08-08 DIAGNOSIS — J40 BRONCHITIS: Primary | ICD-10-CM

## 2019-08-08 DIAGNOSIS — R06.02 SHORTNESS OF BREATH: ICD-10-CM

## 2019-08-08 DIAGNOSIS — R05.9 COUGH: ICD-10-CM

## 2019-08-08 PROCEDURE — 71046 X-RAY EXAM CHEST 2 VIEWS: CPT

## 2019-08-08 PROCEDURE — 99213 OFFICE O/P EST LOW 20 MIN: CPT | Performed by: NURSE PRACTITIONER

## 2019-08-08 RX ORDER — ALBUTEROL SULFATE 90 UG/1
2 AEROSOL, METERED RESPIRATORY (INHALATION) EVERY 4 HOURS PRN
Qty: 1 INHALER | Refills: 1 | Status: SHIPPED | OUTPATIENT
Start: 2019-08-08 | End: 2019-08-08 | Stop reason: CLARIF

## 2019-08-08 RX ORDER — ALBUTEROL SULFATE 90 UG/1
2 AEROSOL, METERED RESPIRATORY (INHALATION) EVERY 4 HOURS PRN
Qty: 1 INHALER | Refills: 2 | Status: SHIPPED | OUTPATIENT
Start: 2019-08-08 | End: 2021-01-26 | Stop reason: SDDI

## 2019-08-08 RX ORDER — BENZONATATE 100 MG/1
100 CAPSULE ORAL 3 TIMES DAILY PRN
Qty: 21 CAPSULE | Refills: 0 | Status: SHIPPED | OUTPATIENT
Start: 2019-08-08 | End: 2020-02-26

## 2019-08-08 RX ORDER — BUPROPION HYDROCHLORIDE 150 MG/1
150 TABLET ORAL EVERY MORNING
Refills: 2 | COMMUNITY
Start: 2019-07-24 | End: 2022-05-06

## 2019-08-08 RX ORDER — AZITHROMYCIN 250 MG/1
TABLET, FILM COATED ORAL
Qty: 6 TABLET | Refills: 0 | Status: SHIPPED | OUTPATIENT
Start: 2019-08-08 | End: 2019-08-13

## 2019-08-08 NOTE — TELEPHONE ENCOUNTER
I started a PA for albuterol and it stated that insurance would cover Ventolin HFA. Can you send in the new prescription for that?

## 2019-08-08 NOTE — PROGRESS NOTES
Subjective:    Loreta Robertson is a 49 y.o. female.     Chief Complaint   Patient presents with   • Shortness of Breath     x3 days   • Nasal Congestion     Coughing up phlem   • Dizziness       History of Present Illness   Patient complains of new issue-cough with associated nasal congestion, shortness of breath, intermittent wheezing and dizziness. She reports she has had cough for weeks and cough is productive with green phlegm. Shortness of air worsened with smoking, exertion and obesity. She states she is using automated carts when shopping. She is smoking 1 PPD and is currently taking Wellbutrin and hopes this will assist with cessation. She states she does not want to try Chantix with existing mental health issues. She denies fever, runny nose, chest pain, ear pain, nausea, vomiting or change with elimination. She is drinking adequate fluids. She denies history of COPD or pneumonia. Reviewed 6/2017 chest x ray does not show abnormality.     Current Outpatient Medications:   •  atorvastatin (LIPITOR) 80 MG tablet, Take 1 tablet by mouth Daily., Disp: 30 tablet, Rfl: 6  •  buPROPion XL (WELLBUTRIN XL) 150 MG 24 hr tablet, TK 1 T PO  QAM, Disp: , Rfl: 2  •  divalproex (DEPAKOTE) 500 MG 24 hr tablet, Take 1,000 mg by mouth Every Night., Disp: , Rfl:   •  divalproex (DEPAKOTE) 500 MG DR tablet, Take 500 mg by mouth Daily., Disp: , Rfl:   •  lurasidone (LATUDA) 40 MG tablet tablet, Take 40 mg by mouth Daily., Disp: , Rfl:   •  prazosin (MINIPRESS) 5 MG capsule, TK 1 C PO HS, Disp: , Rfl: 1  •  QUEtiapine (SEROquel) 400 MG tablet, TK 1 T PO HS, Disp: , Rfl: 2  •  traMADol (ULTRAM) 50 MG tablet, Take 1 tablet by mouth 2 (Two) Times a Day As Needed for Moderate Pain . (Patient taking differently: Take 50 mg by mouth 2 (Two) Times a Day As Needed for Moderate Pain  (TO START AFTER COLONOSCOPY).), Disp: 60 tablet, Rfl: 0  •  albuterol sulfate  (90 Base) MCG/ACT inhaler, Inhale 2 puffs Every 4 (Four) Hours As  Needed for Wheezing., Disp: 1 inhaler, Rfl: 1  •  azithromycin (ZITHROMAX Z-ZENOBIA) 250 MG tablet, Take 2 tablets the first day, then 1 tablet daily for 4 days., Disp: 6 tablet, Rfl: 0  •  benzonatate (TESSALON PERLES) 100 MG capsule, Take 1 capsule by mouth 3 (Three) Times a Day As Needed for Cough., Disp: 21 capsule, Rfl: 0     The following portions of the patient's history were reviewed and updated as appropriate: allergies, current medications, past family history, past medical history, past social history, past surgical history and problem list.    Review of Systems   Constitutional: Negative for chills, fatigue and fever.   HENT: Positive for congestion. Negative for ear pain, postnasal drip, rhinorrhea, sinus pressure, sneezing and sore throat.    Eyes: Negative for pain, discharge, redness and itching.   Respiratory: Positive for cough, shortness of breath and wheezing. Negative for chest tightness.    Cardiovascular: Negative for chest pain.   Gastrointestinal: Negative for abdominal pain, diarrhea, nausea and vomiting.   Musculoskeletal: Negative for arthralgias and myalgias.   Skin: Negative for rash.   Neurological: Positive for dizziness. Negative for headaches.   Hematological: Negative for adenopathy.       Objective:    /80 (BP Location: Right arm, Patient Position: Sitting)   Pulse 74   Temp 97.1 °F (36.2 °C) (Temporal)   Resp 20   Wt (!) 159 kg (350 lb 2 oz)   SpO2 94%   BMI 57.36 kg/m²     Physical Exam   Constitutional: She is oriented to person, place, and time. She appears well-developed and well-nourished. She is active and cooperative.  Non-toxic appearance. She does not have a sickly appearance. She appears ill. No distress.   HENT:   Head: Normocephalic and atraumatic.   Right Ear: Tympanic membrane, external ear and ear canal normal.   Left Ear: Tympanic membrane, external ear and ear canal normal.   Nose: Nose normal.   Mouth/Throat: Uvula is midline, oropharynx is clear and  moist and mucous membranes are normal. No oral lesions.   Eyes: Conjunctivae and lids are normal.   Neck: Normal range of motion. Neck supple.   Cardiovascular: Normal rate and regular rhythm.   No murmur heard.  Pulmonary/Chest: Effort normal. She has no decreased breath sounds. She has no wheezes. She has rhonchi. She has no rales.   Scattered rhonchi throughout   Lymphadenopathy:     She has no cervical adenopathy.   Neurological: She is alert and oriented to person, place, and time.   Skin: Skin is warm, dry and intact. No rash noted.   Psychiatric: She has a normal mood and affect. Her speech is normal.   Nursing note and vitals reviewed.      Assessment/Plan:    Loreta was seen today for shortness of breath, nasal congestion and dizziness.    Diagnoses and all orders for this visit:    Bronchitis  -     azithromycin (ZITHROMAX Z-ZENOBIA) 250 MG tablet; Take 2 tablets the first day, then 1 tablet daily for 4 days.  -     albuterol sulfate  (90 Base) MCG/ACT inhaler; Inhale 2 puffs Every 4 (Four) Hours As Needed for Wheezing.  -     benzonatate (TESSALON PERLES) 100 MG capsule; Take 1 capsule by mouth 3 (Three) Times a Day As Needed for Cough.    Cough  -     XR Chest 2 View    Shortness of breath  -     XR Chest 2 View    Discussed possible pulmonology referral pending CXR results    Return if symptoms worsen or fail to improve.

## 2019-08-09 ENCOUNTER — TELEPHONE (OUTPATIENT)
Dept: INTERNAL MEDICINE | Facility: CLINIC | Age: 49
End: 2019-08-09

## 2019-08-14 LAB
BUN SERPL-MCNC: 12 MG/DL (ref 6–20)
BUN/CREAT SERPL: 15.2 (ref 7–25)
CALCIUM SERPL-MCNC: 9.1 MG/DL (ref 8.6–10.5)
CHLORIDE SERPL-SCNC: 104 MMOL/L (ref 98–107)
CO2 SERPL-SCNC: 27 MMOL/L (ref 22–29)
CREAT SERPL-MCNC: 0.79 MG/DL (ref 0.57–1)
GLUCOSE SERPL-MCNC: 66 MG/DL (ref 65–99)
POTASSIUM SERPL-SCNC: 4.5 MMOL/L (ref 3.5–5.2)
SODIUM SERPL-SCNC: 141 MMOL/L (ref 136–145)

## 2020-01-27 ENCOUNTER — OFFICE VISIT (OUTPATIENT)
Dept: INTERNAL MEDICINE | Facility: CLINIC | Age: 50
End: 2020-01-27

## 2020-01-27 ENCOUNTER — HOSPITAL ENCOUNTER (OUTPATIENT)
Dept: GENERAL RADIOLOGY | Facility: HOSPITAL | Age: 50
Discharge: HOME | End: 2020-01-27
Admitting: PHYSICIAN ASSISTANT

## 2020-01-27 VITALS
HEIGHT: 66 IN | OXYGEN SATURATION: 96 % | DIASTOLIC BLOOD PRESSURE: 70 MMHG | WEIGHT: 293 LBS | RESPIRATION RATE: 18 BRPM | SYSTOLIC BLOOD PRESSURE: 122 MMHG | BODY MASS INDEX: 47.09 KG/M2 | HEART RATE: 89 BPM | TEMPERATURE: 97.9 F

## 2020-01-27 DIAGNOSIS — M25.461 PAIN AND SWELLING OF RIGHT KNEE: ICD-10-CM

## 2020-01-27 DIAGNOSIS — M25.562 ACUTE BILATERAL KNEE PAIN: Primary | ICD-10-CM

## 2020-01-27 DIAGNOSIS — M25.462 PAIN AND SWELLING OF LEFT KNEE: ICD-10-CM

## 2020-01-27 DIAGNOSIS — M17.11 PRIMARY OSTEOARTHRITIS OF RIGHT KNEE: ICD-10-CM

## 2020-01-27 DIAGNOSIS — M25.561 PAIN AND SWELLING OF RIGHT KNEE: ICD-10-CM

## 2020-01-27 DIAGNOSIS — M25.561 ACUTE BILATERAL KNEE PAIN: Primary | ICD-10-CM

## 2020-01-27 DIAGNOSIS — Z91.81 HISTORY OF RECENT FALL: ICD-10-CM

## 2020-01-27 DIAGNOSIS — M25.562 PAIN AND SWELLING OF LEFT KNEE: ICD-10-CM

## 2020-01-27 DIAGNOSIS — M25.562 ACUTE BILATERAL KNEE PAIN: ICD-10-CM

## 2020-01-27 DIAGNOSIS — M25.561 ACUTE BILATERAL KNEE PAIN: ICD-10-CM

## 2020-01-27 PROCEDURE — 73560 X-RAY EXAM OF KNEE 1 OR 2: CPT

## 2020-01-27 PROCEDURE — 99213 OFFICE O/P EST LOW 20 MIN: CPT | Performed by: PHYSICIAN ASSISTANT

## 2020-01-27 RX ORDER — IBUPROFEN 800 MG/1
800 TABLET ORAL EVERY 8 HOURS PRN
Qty: 40 TABLET | Refills: 0 | Status: SHIPPED | OUTPATIENT
Start: 2020-01-27 | End: 2020-07-23

## 2020-01-27 NOTE — PROGRESS NOTES
"Chief Complaint   Patient presents with   • Knee Pain     x2 days, bilateral, worse at night, fell out of the car on Saturday, tripped on her shoes, and landed on knees, and hands       Subjective       History of Present Illness     Loreta Robertson is a 49 y.o. female. She presents with bilateral knee pain after a recent fall. Two days ago 1/25/2020, pt states she fell while getting out of her car. She tripped over the siding and fell on her outstretched hands and both knees. Her hands/ wrists were a little sore and some soreness at anterior shoulders but improving, and no significant pain and no swelling. However, she has had \"excruciating\" pain at both knees since the fall. She has DJD and bones spurs of R knee at baseline with intermittent swelling, and has had injections in R knee in the past. Pt states her R knee has been more swollen than normal, and experiencing anterior knee pain. Her L knee has minimal swelling but has pain at posterior knee joint. She is having difficulty ambulating due to pain. She is walking without assistance, but reports 8-9/10 pain the last two days. She denies numbness or tingling of lower extremities. She has tried ibuprofen 800mg without relief. She has also tried Gabapentin 1.5 tablets (unsure of dose) from a friend without relief.     The following portions of the patient's history were reviewed and updated as appropriate: allergies, current medications, past medical history, past surgical history and problem list.    Allergies   Allergen Reactions   • Morphine And Related GI Intolerance     Social History     Tobacco Use   • Smoking status: Current Every Day Smoker     Packs/day: 1.00     Years: 39.00     Pack years: 39.00     Start date: 1980   • Smokeless tobacco: Never Used   Substance Use Topics   • Alcohol use: No         Current Outpatient Medications:   •  albuterol sulfate  (90 Base) MCG/ACT inhaler, Inhale 2 puffs Every 4 (Four) Hours As Needed for " Wheezing., Disp: 1 inhaler, Rfl: 2  •  atorvastatin (LIPITOR) 80 MG tablet, Take 1 tablet by mouth Daily., Disp: 30 tablet, Rfl: 6  •  benzonatate (TESSALON PERLES) 100 MG capsule, Take 1 capsule by mouth 3 (Three) Times a Day As Needed for Cough., Disp: 21 capsule, Rfl: 0  •  buPROPion XL (WELLBUTRIN XL) 150 MG 24 hr tablet, TK 1 T PO  QAM, Disp: , Rfl: 2  •  divalproex (DEPAKOTE) 500 MG 24 hr tablet, Take 1,000 mg by mouth Every Night., Disp: , Rfl:   •  divalproex (DEPAKOTE) 500 MG DR tablet, Take 500 mg by mouth Daily., Disp: , Rfl:   •  lurasidone (LATUDA) 40 MG tablet tablet, Take 40 mg by mouth Daily., Disp: , Rfl:   •  prazosin (MINIPRESS) 5 MG capsule, TK 1 C PO HS, Disp: , Rfl: 1  •  QUEtiapine (SEROquel) 400 MG tablet, TK 1 T PO HS, Disp: , Rfl: 2  •  traMADol (ULTRAM) 50 MG tablet, Take 1 tablet by mouth 2 (Two) Times a Day As Needed for Moderate Pain . (Patient taking differently: Take 50 mg by mouth 2 (Two) Times a Day As Needed for Moderate Pain  (TO START AFTER COLONOSCOPY).), Disp: 60 tablet, Rfl: 0  •  ibuprofen (ADVIL,MOTRIN) 800 MG tablet, Take 1 tablet by mouth Every 8 (Eight) Hours As Needed for Moderate Pain ., Disp: 40 tablet, Rfl: 0    Review of Systems   Constitutional: Negative for chills and fever.   HENT: Negative for sore throat.    Eyes: Negative for pain.   Respiratory: Negative for cough and shortness of breath.    Cardiovascular: Negative for chest pain.   Gastrointestinal: Negative for abdominal pain, nausea and vomiting.   Musculoskeletal: Positive for arthralgias and joint swelling. Negative for back pain and neck pain.   Skin: Negative for rash and bruise.   Neurological: Negative for dizziness, numbness and headache.       Objective   Vitals:    01/27/20 1535   BP: 122/70   Pulse: 89   Resp: 18   Temp: 97.9 °F (36.6 °C)   SpO2: 96%     Physical Exam   HENT:   Mouth/Throat: Oropharynx is clear and moist.   Cardiovascular: Normal rate and regular rhythm.   Pulmonary/Chest: Effort  normal and breath sounds normal. She has no wheezes. She has no rales.   Musculoskeletal:        Right wrist: Normal. She exhibits normal range of motion, no tenderness, no bony tenderness, no swelling and no deformity.        Left wrist: She exhibits normal range of motion, no tenderness, no bony tenderness, no swelling and no deformity.        Right knee: She exhibits decreased range of motion and swelling. She exhibits no effusion, no erythema, no LCL laxity and no MCL laxity. Tenderness found. Medial joint line and patellar tendon tenderness noted. No lateral joint line, no MCL and no LCL tenderness noted.        Left knee: She exhibits decreased range of motion. She exhibits no swelling, no effusion, no erythema, no LCL laxity and no MCL laxity. Tenderness found.   R knee: +diffuse swelling at anterior knee joint, medial > lateral and minimally just superior to patella.   No effusion, warmth, or bruising noted. +TTP at medial joint line and superior to patella, minimally at patellar tendon.  Negative anterior/ posterior drawer test. No laxity with valgus/ varus testing, although this is limited by pt pain and inability to fully relax knee for testing. Full passive ROM with flexion and extension, + reports pain with flexion.   L knee: no swelling, effusion, warmth, or bruising noted. +TTP posterior knee.  Negative anterior/ posterior drawer test. No laxity with valgus/ varus testing, although this is limited by pt pain and inability to fully relax knee for testing. Full passive ROM with flexion and extension.           Assessment/Plan   Loreta was seen today for knee pain.    Diagnoses and all orders for this visit:    Acute bilateral knee pain  -     XR Knee 1 or 2 View Right; Future  -     XR Knee 1 or 2 View Left; Future  -     ibuprofen (ADVIL,MOTRIN) 800 MG tablet; Take 1 tablet by mouth Every 8 (Eight) Hours As Needed for Moderate Pain .    History of recent fall  -     XR Knee 1 or 2 View Right;  Future  -     XR Knee 1 or 2 View Left; Future  -     ibuprofen (ADVIL,MOTRIN) 800 MG tablet; Take 1 tablet by mouth Every 8 (Eight) Hours As Needed for Moderate Pain .    Primary osteoarthritis of right knee  -     XR Knee 1 or 2 View Right; Future  -     ibuprofen (ADVIL,MOTRIN) 800 MG tablet; Take 1 tablet by mouth Every 8 (Eight) Hours As Needed for Moderate Pain .    Pain and swelling of right knee  -     XR Knee 1 or 2 View Right; Future  -     ibuprofen (ADVIL,MOTRIN) 800 MG tablet; Take 1 tablet by mouth Every 8 (Eight) Hours As Needed for Moderate Pain .    Pain and swelling of left knee  -     XR Knee 1 or 2 View Left; Future  -     ibuprofen (ADVIL,MOTRIN) 800 MG tablet; Take 1 tablet by mouth Every 8 (Eight) Hours As Needed for Moderate Pain .      Advised RICE. Take ibuprofen with food. May take tylenol PRN BID between ibuprofen doses for further relief.   Pt has chronic R knee pain, exacerbated by recent fall in addition to new acute L knee pain. May need MRI R/ L knee, and/ or referral to orthopedics.   Further plans after review of imaging.            Return if symptoms worsen or fail to improve.

## 2020-01-28 ENCOUNTER — TELEPHONE (OUTPATIENT)
Dept: INTERNAL MEDICINE | Facility: CLINIC | Age: 50
End: 2020-01-28

## 2020-01-28 NOTE — TELEPHONE ENCOUNTER
Pt called asking for results of her xrays from yesterday; I advised we didn't have them as of yet

## 2020-01-29 NOTE — TELEPHONE ENCOUNTER
Please call pt- R knee XR shows severe generative changes and bone-on-bone, which is stable compared to MRI in June 2018 per radiology read. No acute findings.    L knee XR with minimal degenerative changes and no acute findings.     At this time, would advise to continue on current plan as her pain and swelling may take 1-2 weeks to resolve. If she is still experiencing pain, would refer back to orthopedics for further investigation.

## 2020-01-29 NOTE — TELEPHONE ENCOUNTER
[phone]  Spoke to pt, advised of clinical results. Pt is in agreement with plan. Good verbal understanding.

## 2020-02-26 ENCOUNTER — OFFICE VISIT (OUTPATIENT)
Dept: INTERNAL MEDICINE | Facility: CLINIC | Age: 50
End: 2020-02-26

## 2020-02-26 VITALS
SYSTOLIC BLOOD PRESSURE: 128 MMHG | BODY MASS INDEX: 47.09 KG/M2 | HEIGHT: 66 IN | OXYGEN SATURATION: 90 % | TEMPERATURE: 97.5 F | DIASTOLIC BLOOD PRESSURE: 62 MMHG | HEART RATE: 84 BPM | RESPIRATION RATE: 18 BRPM | WEIGHT: 293 LBS

## 2020-02-26 DIAGNOSIS — R06.83 SNORING: Primary | ICD-10-CM

## 2020-02-26 DIAGNOSIS — R53.83 FATIGUE, UNSPECIFIED TYPE: ICD-10-CM

## 2020-02-26 DIAGNOSIS — R06.02 SHORTNESS OF BREATH: ICD-10-CM

## 2020-02-26 DIAGNOSIS — E66.01 MORBID OBESITY WITH BMI OF 50.0-59.9, ADULT (HCC): ICD-10-CM

## 2020-02-26 DIAGNOSIS — G25.81 RESTLESS LEGS: ICD-10-CM

## 2020-02-26 DIAGNOSIS — Z72.0 TOBACCO USE: ICD-10-CM

## 2020-02-26 DIAGNOSIS — M25.561 CHRONIC PAIN OF RIGHT KNEE: ICD-10-CM

## 2020-02-26 DIAGNOSIS — G89.29 CHRONIC PAIN OF RIGHT KNEE: ICD-10-CM

## 2020-02-26 PROCEDURE — 99214 OFFICE O/P EST MOD 30 MIN: CPT | Performed by: PHYSICIAN ASSISTANT

## 2020-03-05 ENCOUNTER — OFFICE VISIT (OUTPATIENT)
Dept: INTERNAL MEDICINE | Facility: CLINIC | Age: 50
End: 2020-03-05

## 2020-03-05 ENCOUNTER — APPOINTMENT (OUTPATIENT)
Dept: LAB | Facility: HOSPITAL | Age: 50
End: 2020-03-05

## 2020-03-05 ENCOUNTER — HOSPITAL ENCOUNTER (OUTPATIENT)
Dept: CT IMAGING | Facility: HOSPITAL | Age: 50
Discharge: HOME | End: 2020-03-05
Admitting: PHYSICIAN ASSISTANT

## 2020-03-05 ENCOUNTER — TRANSCRIBE ORDERS (OUTPATIENT)
Dept: LAB | Facility: HOSPITAL | Age: 50
End: 2020-03-05

## 2020-03-05 VITALS
SYSTOLIC BLOOD PRESSURE: 124 MMHG | HEART RATE: 74 BPM | RESPIRATION RATE: 16 BRPM | WEIGHT: 293 LBS | BODY MASS INDEX: 47.09 KG/M2 | TEMPERATURE: 97.6 F | OXYGEN SATURATION: 93 % | HEIGHT: 66 IN | DIASTOLIC BLOOD PRESSURE: 72 MMHG

## 2020-03-05 DIAGNOSIS — F31.9 DEPRESSED BIPOLAR I DISORDER (HCC): ICD-10-CM

## 2020-03-05 DIAGNOSIS — R50.9 FEVER, UNSPECIFIED FEVER CAUSE: ICD-10-CM

## 2020-03-05 DIAGNOSIS — F31.9 DEPRESSED BIPOLAR I DISORDER (HCC): Primary | ICD-10-CM

## 2020-03-05 DIAGNOSIS — Z79.899 ENCOUNTER FOR LONG-TERM (CURRENT) USE OF OTHER MEDICATIONS: ICD-10-CM

## 2020-03-05 DIAGNOSIS — Z51.81 ENCOUNTER FOR THERAPEUTIC DRUG MONITORING: ICD-10-CM

## 2020-03-05 DIAGNOSIS — R06.02 SHORTNESS OF BREATH: ICD-10-CM

## 2020-03-05 DIAGNOSIS — Z72.0 TOBACCO USE: ICD-10-CM

## 2020-03-05 DIAGNOSIS — J06.9 ACUTE URI: Primary | ICD-10-CM

## 2020-03-05 LAB
25(OH)D3 SERPL-MCNC: 5.1 NG/ML (ref 30–100)
ALBUMIN SERPL-MCNC: 4 G/DL (ref 3.5–5.2)
ALBUMIN/GLOB SERPL: 1.3 G/DL
ALP SERPL-CCNC: 85 U/L (ref 39–117)
ALT SERPL W P-5'-P-CCNC: 23 U/L (ref 1–33)
ANION GAP SERPL CALCULATED.3IONS-SCNC: 12.9 MMOL/L (ref 5–15)
AST SERPL-CCNC: 22 U/L (ref 1–32)
BILIRUB SERPL-MCNC: 0.4 MG/DL (ref 0.2–1.2)
BUN BLD-MCNC: 21 MG/DL (ref 6–20)
BUN/CREAT SERPL: 29.2 (ref 7–25)
CALCIUM SPEC-SCNC: 9.4 MG/DL (ref 8.6–10.5)
CHLORIDE SERPL-SCNC: 101 MMOL/L (ref 98–107)
CHOLEST SERPL-MCNC: 200 MG/DL (ref 0–200)
CO2 SERPL-SCNC: 27.1 MMOL/L (ref 22–29)
CREAT BLD-MCNC: 0.72 MG/DL (ref 0.57–1)
DEPRECATED RDW RBC AUTO: 50.1 FL (ref 37–54)
ERYTHROCYTE [DISTWIDTH] IN BLOOD BY AUTOMATED COUNT: 14.2 % (ref 12.3–15.4)
EXPIRATION DATE: NORMAL
FLUAV AG NPH QL: NEGATIVE
FLUBV AG NPH QL: NEGATIVE
FOLATE SERPL-MCNC: 7.31 NG/ML (ref 4.78–24.2)
GFR SERPL CREATININE-BSD FRML MDRD: 85 ML/MIN/1.73
GLOBULIN UR ELPH-MCNC: 3.2 GM/DL
GLUCOSE BLD-MCNC: 117 MG/DL (ref 65–99)
HBA1C MFR BLD: 6.4 % (ref 4.8–5.6)
HCT VFR BLD AUTO: 39.7 % (ref 34–46.6)
HDLC SERPL-MCNC: 47 MG/DL (ref 40–60)
HGB BLD-MCNC: 13.3 G/DL (ref 12–15.9)
INTERNAL CONTROL: NORMAL
LDLC SERPL CALC-MCNC: 128 MG/DL (ref 0–100)
LDLC/HDLC SERPL: 2.72 {RATIO}
Lab: NORMAL
MCH RBC QN AUTO: 32 PG (ref 26.6–33)
MCHC RBC AUTO-ENTMCNC: 33.5 G/DL (ref 31.5–35.7)
MCV RBC AUTO: 95.4 FL (ref 79–97)
PLATELET # BLD AUTO: 201 10*3/MM3 (ref 140–450)
PMV BLD AUTO: 11.5 FL (ref 6–12)
POTASSIUM BLD-SCNC: 4.5 MMOL/L (ref 3.5–5.2)
PROT SERPL-MCNC: 7.2 G/DL (ref 6–8.5)
RBC # BLD AUTO: 4.16 10*6/MM3 (ref 3.77–5.28)
SODIUM BLD-SCNC: 141 MMOL/L (ref 136–145)
TRIGL SERPL-MCNC: 125 MG/DL (ref 0–150)
TSH SERPL DL<=0.05 MIU/L-ACNC: 6.37 UIU/ML (ref 0.27–4.2)
VIT B12 BLD-MCNC: 300 PG/ML (ref 211–946)
VLDLC SERPL-MCNC: 25 MG/DL (ref 5–40)
WBC NRBC COR # BLD: 5.92 10*3/MM3 (ref 3.4–10.8)

## 2020-03-05 PROCEDURE — 71250 CT THORAX DX C-: CPT

## 2020-03-05 PROCEDURE — 82746 ASSAY OF FOLIC ACID SERUM: CPT | Performed by: PSYCHIATRY & NEUROLOGY

## 2020-03-05 PROCEDURE — 87804 INFLUENZA ASSAY W/OPTIC: CPT | Performed by: PHYSICIAN ASSISTANT

## 2020-03-05 PROCEDURE — 83036 HEMOGLOBIN GLYCOSYLATED A1C: CPT | Performed by: PSYCHIATRY & NEUROLOGY

## 2020-03-05 PROCEDURE — 80061 LIPID PANEL: CPT | Performed by: PSYCHIATRY & NEUROLOGY

## 2020-03-05 PROCEDURE — 85027 COMPLETE CBC AUTOMATED: CPT | Performed by: PSYCHIATRY & NEUROLOGY

## 2020-03-05 PROCEDURE — 80053 COMPREHEN METABOLIC PANEL: CPT | Performed by: PSYCHIATRY & NEUROLOGY

## 2020-03-05 PROCEDURE — 82306 VITAMIN D 25 HYDROXY: CPT | Performed by: PSYCHIATRY & NEUROLOGY

## 2020-03-05 PROCEDURE — 99213 OFFICE O/P EST LOW 20 MIN: CPT | Performed by: PHYSICIAN ASSISTANT

## 2020-03-05 PROCEDURE — 82607 VITAMIN B-12: CPT | Performed by: PSYCHIATRY & NEUROLOGY

## 2020-03-05 PROCEDURE — 84443 ASSAY THYROID STIM HORMONE: CPT | Performed by: PSYCHIATRY & NEUROLOGY

## 2020-03-05 NOTE — PROGRESS NOTES
Chief Complaint   Patient presents with   • Fever     x2 days, sore throat, nausea, shaking, 101 last night, chills, coughing, sneezing, headahces       Subjective       History of Present Illness     Loreta Robertson is a 49 y.o. female. She presents with 2 day history of fever, cough, congestion, HA, sneezing, sore throat, fatigue and nausea. Her sore throat has actually resolved today. She continues with a productive cough with yellow mucous, which is worsened from her normal chronic cough secondary to chronic tobacco use. She has chronic SOA at baseline, also not recently worsened. She had a fever of 101.1F on Tuesday night, and low grade fever of  off and on yesterday. She has nausea but no vomiting. Denies abdominal pain or diarrhea. She has been taking tylenol which has improved her fever and sore throat.     The following portions of the patient's history were reviewed and updated as appropriate: allergies, current medications, past medical history and problem list.    Allergies   Allergen Reactions   • Morphine And Related GI Intolerance     Social History     Tobacco Use   • Smoking status: Current Every Day Smoker     Packs/day: 1.00     Years: 39.00     Pack years: 39.00     Start date: 1980   • Smokeless tobacco: Never Used   Substance Use Topics   • Alcohol use: No         Current Outpatient Medications:   •  albuterol sulfate  (90 Base) MCG/ACT inhaler, Inhale 2 puffs Every 4 (Four) Hours As Needed for Wheezing., Disp: 1 inhaler, Rfl: 2  •  atorvastatin (LIPITOR) 80 MG tablet, Take 1 tablet by mouth Daily., Disp: 30 tablet, Rfl: 6  •  buPROPion XL (WELLBUTRIN XL) 150 MG 24 hr tablet, TK 1 T PO  QAM, Disp: , Rfl: 2  •  divalproex (DEPAKOTE) 500 MG 24 hr tablet, Take 1,000 mg by mouth Every Night., Disp: , Rfl:   •  divalproex (DEPAKOTE) 500 MG DR tablet, Take 500 mg by mouth Daily., Disp: , Rfl:   •  ibuprofen (ADVIL,MOTRIN) 800 MG tablet, Take 1 tablet by mouth Every 8 (Eight) Hours As  Needed for Moderate Pain ., Disp: 40 tablet, Rfl: 0  •  lurasidone (LATUDA) 40 MG tablet tablet, Take 40 mg by mouth Daily., Disp: , Rfl:   •  prazosin (MINIPRESS) 5 MG capsule, TK 1 C PO HS, Disp: , Rfl: 1  •  QUEtiapine (SEROquel) 400 MG tablet, TK 1 T PO HS, Disp: , Rfl: 2  •  traMADol (ULTRAM) 50 MG tablet, Take 1 tablet by mouth 2 (Two) Times a Day As Needed for Moderate Pain . (Patient taking differently: Take 50 mg by mouth 2 (Two) Times a Day As Needed for Moderate Pain  (TO START AFTER COLONOSCOPY).), Disp: 60 tablet, Rfl: 0    Review of Systems   Constitutional: Positive for appetite change, fatigue and fever. Negative for chills.   HENT: Positive for congestion, postnasal drip, sneezing and sore throat. Negative for ear pain and trouble swallowing.    Respiratory: Positive for cough and shortness of breath (chronic). Negative for wheezing.    Cardiovascular: Negative for chest pain.   Gastrointestinal: Positive for nausea. Negative for abdominal pain, diarrhea and vomiting.   Genitourinary: Negative for dysuria.   Skin: Negative for rash.   Neurological: Positive for headache. Negative for dizziness and weakness.       Objective   Vitals:    03/05/20 0936   BP: 124/72   Pulse: 74   Resp: 16   Temp: 97.6 °F (36.4 °C)   SpO2: 93%     Physical Exam   Constitutional: She appears well-developed and well-nourished.   HENT:   Head: Normocephalic and atraumatic.   Right Ear: Tympanic membrane, external ear and ear canal normal.   Left Ear: Tympanic membrane, external ear and ear canal normal.   Mouth/Throat: Oropharynx is clear and moist and mucous membranes are normal.   Eyes: Conjunctivae are normal.   Neck: Neck supple.   Cardiovascular: Normal rate and regular rhythm.   No murmur heard.  Pulmonary/Chest: Effort normal and breath sounds normal. She has no wheezes. She has no rales.   Abdominal: Soft. Bowel sounds are normal. She exhibits no mass. There is no tenderness.   Lymphadenopathy:     She has no  cervical adenopathy.   Psychiatric: Her behavior is normal.     Results for orders placed or performed in visit on 03/05/20   POC Influenza A / B   Result Value Ref Range    Rapid Influenza A Ag Negative Negative    Rapid Influenza B Ag Negative Negative    Internal Control Passed Passed    Lot Number [id number]     Expiration Date 6-30-21          Assessment/Plan   Loreta was seen today for fever.    Diagnoses and all orders for this visit:    Acute URI    Fever, unspecified fever cause  -     POC Influenza A / B      Negative flu A/B. Advised to continue tylenol PRN.   Continue supportive care.          Return in about 6 months (around 9/5/2020).

## 2020-04-16 ENCOUNTER — TELEPHONE (OUTPATIENT)
Dept: INTERNAL MEDICINE | Facility: CLINIC | Age: 50
End: 2020-04-16

## 2020-04-16 NOTE — TELEPHONE ENCOUNTER
Loreta States [phone]  Pt states she's unable to keep her oxygen level up. Pt feels that she has the virus. Pt states she has been suffering with insomnia, and fatigue. The fatigue has been going on for a couple of weeks. No fever, SOA, no loss of taste or smell.   Please advise?

## 2020-04-17 ENCOUNTER — NURSE TRIAGE (OUTPATIENT)
Dept: CALL CENTER | Facility: HOSPITAL | Age: 50
End: 2020-04-17

## 2020-04-17 NOTE — TELEPHONE ENCOUNTER
If she has no fever, increased SOA from her personal baseline, or cough, she does not meet guidelines for testing. I would recommend self-quarantine for 14 days from onset of symptoms if she does truly have a concern that she has Covid-19.

## 2020-04-17 NOTE — TELEPHONE ENCOUNTER
[facility] [phone]  Spoke to pt, advised of clinical message. Pt is in agreement with plan. Good verbal understanding.

## 2020-04-17 NOTE — TELEPHONE ENCOUNTER
Pt contacted [facility] to discuss her s/s. Pt doesn't understand why she can't be tested. RN explained protocol for testing.   Explained covid s/s and importance of quarantine. Pt v/u. Pt states she is worried about her low saturation levels. Pt states she doesn't know her normal sat levels.states she does smoke. States she feels more fatigued that normal. Does have headache. Pt with normal speech, not slurred or soa. Pt states she has to go to [facility] for groceries. RN cautioned her against leaving - stressed importance of self-quarantine. Pt states she lives alone, has to take son to work and care for elderly dad, but brother will care for him while she is quarantined, denies anyone can get her groceries and states she can't order for . Pt  refused to stay home. Pt does have mask. RN educated on donning/doffing technique if she HAS to leave. Pt v/u. Discussed importance of handwashing, self-quarantine, worsening s/s, when to call PCP, 24 hr nurse line and when to utilize the C or ER. Pt v/u.     Reason for Disposition  • 1] COVID-19 infection diagnosed or suspected AND [2] mild symptoms (fever, cough) AND [2] no trouble breathing or other complications    Additional Information  • Negative: SEVERE difficulty breathing (e.g., struggling for each breath, speaks in single words)  • Negative: Difficult to awaken or acting confused (e.g., disoriented, slurred speech)  • Negative: Bluish (or gray) lips or face now  • Negative: Shock suspected (e.g., cold/pale/clammy skin, too weak to stand, low BP, rapid pulse)  • Negative: Sounds like a life-threatening emergency to the triager  • Negative: [1] COVID-19 suspected (e.g., cough, fever, shortness of breath) AND [2] public health department recommends testing  • Negative: [1] COVID-19 exposure AND [2] no symptoms  • Negative: COVID-19 and Breastfeeding, questions about  • Negative: SEVERE or constant chest pain (Exception: mild central chest pain, present only when  "coughing)  • Negative: MODERATE difficulty breathing (e.g., speaks in phrases, SOB even at rest, pulse 100-120)  • Negative: Patient sounds very sick or weak to the triager  • Negative: MILD difficulty breathing (e.g., minimal/no SOB at rest, SOB with walking, pulse <100)  • Negative: Chest pain  • Negative: Fever > 103 F (39.4 C)  • Negative: [1] Fever > 101 F (38.3 C) AND [2] age > 60  • Negative: [1] Fever > 100.0 F (37.8 C) AND [2] bedridden (e.g., nursing home patient, CVA, chronic illness, recovering from surgery)  • Negative: HIGH RISK patient (e.g., age > 64 years, diabetes, heart or lung disease, weak immune system)  • Negative: Fever present > 3 days (72 hours)  • Negative: [1] Fever returns after gone for over 24 hours AND [2] symptoms worse or not improved  • Negative: [1] Continuous (nonstop) coughing interferes with work or school AND [2] no improvement using cough treatment per protocol  • Negative: Cough present > 3 weeks    Answer Assessment - Initial Assessment Questions  1. COVID-19 DIAGNOSIS: \"Who made your Coronavirus (COVID-19) diagnosis?\" \"Was it confirmed by a positive lab test?\" If not diagnosed by a HCP, ask \"Are there lots of cases (community spread) where you live?\" (See public health department website, if unsure)    * MAJOR community spread: high number of cases; numbers of cases are increasing; many people hospitalized.    * MINOR community spread: low number of cases; not increasing; few or no people hospitalized      Out in community, no known exposure  2. ONSET: \"When did the COVID-19 symptoms start?\"       yesterday  3. WORST SYMPTOM: \"What is your worst symptom?\" (e.g., cough, fever, shortness of breath, muscle aches)      Low oxygen level at night - states her friend has a pulse ox that she checked her [id number] last night  4. COUGH: \"How bad is the cough?\"        States normal smokers cough  5. FEVER: \"Do you have a fever?\" If so, ask: \"What is your temperature, how was it " "measured, and when did it start?\"      Feels like she has a fever, chills at night. No thermometer  6. RESPIRATORY STATUS: \"Describe your breathing?\" (e.g., shortness of breath, wheezing, unable to speak)       Denies SOA or wheezing    7. BETTER-SAME-WORSE: \"Are you getting better, staying the same or getting worse compared to yesterday?\"  If getting worse, ask, \"In what way?\"      Same as yesterday  8. HIGH RISK DISEASE: \"Do you have any chronic medical problems?\" (e.g., asthma, heart or lung disease, weak immune system, etc.)      No hx of pneumonia  9. PREGNANCY: \"Is there any chance you are pregnant?\" \"When was your last menstrual period?\"      n/a  10. OTHER SYMPTOMS: \"Do you have any other symptoms?\"  (e.g., runny nose, headache, sore throat, loss of smell)        Headache, no loss of smell or taste. No sore throat    Protocols used: CORONAVIRUS (COVID-19) DIAGNOSED OR SUSPECTED-ADULT-AH      "

## 2020-07-22 ENCOUNTER — TELEPHONE (OUTPATIENT)
Dept: INTERNAL MEDICINE | Facility: CLINIC | Age: 50
End: 2020-07-22

## 2020-07-22 NOTE — TELEPHONE ENCOUNTER
PT IS SUPPOSED TO HAVE A COVD TEST NEXT WEEK, HOWEVER SHE HAS A COUGH, SHORTNESS OF BREATH, FEVER, DIAHRREA PAST FEW DAYS    ANNALISE: [phone]

## 2020-07-22 NOTE — TELEPHONE ENCOUNTER
I would recommend she get tested ASAP for Covid, rather than waiting until next week. She can schedule an appt for drive-thru testing, go to [facility], or be seen by video visit and we can place order for [facility] testing.

## 2020-07-23 ENCOUNTER — HOSPITAL ENCOUNTER (INPATIENT)
Facility: HOSPITAL | Age: 50
LOS: 5 days | Discharge: HOME HEALTH | End: 2020-07-28
Attending: EMERGENCY MEDICINE | Admitting: FAMILY MEDICINE

## 2020-07-23 ENCOUNTER — APPOINTMENT (OUTPATIENT)
Dept: CT IMAGING | Facility: HOSPITAL | Age: 50
End: 2020-07-23

## 2020-07-23 DIAGNOSIS — R09.02 HYPOXIA: Primary | ICD-10-CM

## 2020-07-23 DIAGNOSIS — Z72.0 TOBACCO ABUSE: ICD-10-CM

## 2020-07-23 DIAGNOSIS — R06.00 DYSPNEA, UNSPECIFIED TYPE: ICD-10-CM

## 2020-07-23 DIAGNOSIS — F31.11 BIPOLAR 1 DISORDER, MANIC, MILD (HCC): ICD-10-CM

## 2020-07-23 DIAGNOSIS — R06.02 SHORTNESS OF BREATH: ICD-10-CM

## 2020-07-23 DIAGNOSIS — I10 ESSENTIAL HYPERTENSION: ICD-10-CM

## 2020-07-23 DIAGNOSIS — J44.1 COPD EXACERBATION (HCC): ICD-10-CM

## 2020-07-23 PROBLEM — E11.9 TYPE 2 DIABETES MELLITUS: Status: ACTIVE | Noted: 2020-07-23

## 2020-07-23 LAB
ALBUMIN SERPL-MCNC: 3.9 G/DL (ref 3.5–5.2)
ALBUMIN/GLOB SERPL: 1.1 G/DL
ALP SERPL-CCNC: 78 U/L (ref 39–117)
ALT SERPL W P-5'-P-CCNC: 29 U/L (ref 1–33)
ANION GAP SERPL CALCULATED.3IONS-SCNC: 9 MMOL/L (ref 5–15)
AST SERPL-CCNC: 38 U/L (ref 1–32)
BASOPHILS # BLD AUTO: 0.03 10*3/MM3 (ref 0–0.2)
BASOPHILS NFR BLD AUTO: 0.5 % (ref 0–1.5)
BILIRUB SERPL-MCNC: 0.5 MG/DL (ref 0–1.2)
BUN SERPL-MCNC: 10 MG/DL (ref 6–20)
BUN/CREAT SERPL: 14.9 (ref 7–25)
CALCIUM SPEC-SCNC: 9 MG/DL (ref 8.6–10.5)
CHLORIDE SERPL-SCNC: 99 MMOL/L (ref 98–107)
CLUMPED PLATELETS: PRESENT
CO2 SERPL-SCNC: 33 MMOL/L (ref 22–29)
CREAT BLDA-MCNC: 0.8 MG/DL (ref 0.6–1.3)
CREAT SERPL-MCNC: 0.67 MG/DL (ref 0.57–1)
DEPRECATED RDW RBC AUTO: 60 FL (ref 37–54)
EOSINOPHIL # BLD AUTO: 0.24 10*3/MM3 (ref 0–0.4)
EOSINOPHIL NFR BLD AUTO: 3.7 % (ref 0.3–6.2)
ERYTHROCYTE [DISTWIDTH] IN BLOOD BY AUTOMATED COUNT: 16.2 % (ref 12.3–15.4)
FERRITIN SERPL-MCNC: 126.3 NG/ML (ref 13–150)
GFR SERPL CREATININE-BSD FRML MDRD: 92 ML/MIN/1.73
GLOBULIN UR ELPH-MCNC: 3.5 GM/DL
GLUCOSE SERPL-MCNC: 117 MG/DL (ref 65–99)
HCT VFR BLD AUTO: 47.3 % (ref 34–46.6)
HGB BLD-MCNC: 14.9 G/DL (ref 12–15.9)
HOLD SPECIMEN: NORMAL
HOLD SPECIMEN: NORMAL
IMM GRANULOCYTES # BLD AUTO: 0.43 10*3/MM3 (ref 0–0.05)
IMM GRANULOCYTES NFR BLD AUTO: 6.6 % (ref 0–0.5)
LDH SERPL-CCNC: 189 U/L (ref 135–214)
LYMPHOCYTES # BLD AUTO: 1.81 10*3/MM3 (ref 0.7–3.1)
LYMPHOCYTES NFR BLD AUTO: 27.7 % (ref 19.6–45.3)
MACROCYTES BLD QL SMEAR: NORMAL
MCH RBC QN AUTO: 31.6 PG (ref 26.6–33)
MCHC RBC AUTO-ENTMCNC: 31.5 G/DL (ref 31.5–35.7)
MCV RBC AUTO: 100.2 FL (ref 79–97)
MONOCYTES # BLD AUTO: 0.62 10*3/MM3 (ref 0.1–0.9)
MONOCYTES NFR BLD AUTO: 9.5 % (ref 5–12)
NEUTROPHILS NFR BLD AUTO: 3.4 10*3/MM3 (ref 1.7–7)
NEUTROPHILS NFR BLD AUTO: 52 % (ref 42.7–76)
NRBC BLD AUTO-RTO: 0 /100 WBC (ref 0–0.2)
NT-PROBNP SERPL-MCNC: 41.9 PG/ML (ref 0–900)
PLATELET # BLD AUTO: 191 10*3/MM3 (ref 140–450)
PMV BLD AUTO: 10.6 FL (ref 6–12)
POTASSIUM SERPL-SCNC: 3.4 MMOL/L (ref 3.5–5.2)
PROT SERPL-MCNC: 7.4 G/DL (ref 6–8.5)
RBC # BLD AUTO: 4.72 10*6/MM3 (ref 3.77–5.28)
SMALL PLATELETS BLD QL SMEAR: ADEQUATE
SODIUM SERPL-SCNC: 141 MMOL/L (ref 136–145)
TROPONIN T SERPL-MCNC: <0.01 NG/ML (ref 0–0.03)
WBC # BLD AUTO: 6.53 10*3/MM3 (ref 3.4–10.8)
WBC MORPH BLD: NORMAL
WHOLE BLOOD HOLD SPECIMEN: NORMAL
WHOLE BLOOD HOLD SPECIMEN: NORMAL

## 2020-07-23 PROCEDURE — 25010000002 METHYLPREDNISOLONE PER 125 MG: Performed by: EMERGENCY MEDICINE

## 2020-07-23 PROCEDURE — 99223 1ST HOSP IP/OBS HIGH 75: CPT | Performed by: INTERNAL MEDICINE

## 2020-07-23 PROCEDURE — 82728 ASSAY OF FERRITIN: CPT | Performed by: PHYSICIAN ASSISTANT

## 2020-07-23 PROCEDURE — 0 IOPAMIDOL PER 1 ML: Performed by: EMERGENCY MEDICINE

## 2020-07-23 PROCEDURE — 94799 UNLISTED PULMONARY SVC/PX: CPT

## 2020-07-23 PROCEDURE — 82565 ASSAY OF CREATININE: CPT

## 2020-07-23 PROCEDURE — 0202U NFCT DS 22 TRGT SARS-COV-2: CPT | Performed by: INTERNAL MEDICINE

## 2020-07-23 PROCEDURE — 85007 BL SMEAR W/DIFF WBC COUNT: CPT | Performed by: EMERGENCY MEDICINE

## 2020-07-23 PROCEDURE — 84484 ASSAY OF TROPONIN QUANT: CPT | Performed by: EMERGENCY MEDICINE

## 2020-07-23 PROCEDURE — 83615 LACTATE (LD) (LDH) ENZYME: CPT | Performed by: PHYSICIAN ASSISTANT

## 2020-07-23 PROCEDURE — 93005 ELECTROCARDIOGRAM TRACING: CPT | Performed by: EMERGENCY MEDICINE

## 2020-07-23 PROCEDURE — 80053 COMPREHEN METABOLIC PANEL: CPT | Performed by: EMERGENCY MEDICINE

## 2020-07-23 PROCEDURE — 94640 AIRWAY INHALATION TREATMENT: CPT

## 2020-07-23 PROCEDURE — 83880 ASSAY OF NATRIURETIC PEPTIDE: CPT | Performed by: EMERGENCY MEDICINE

## 2020-07-23 PROCEDURE — 71275 CT ANGIOGRAPHY CHEST: CPT

## 2020-07-23 PROCEDURE — 99284 EMERGENCY DEPT VISIT MOD MDM: CPT

## 2020-07-23 PROCEDURE — 85025 COMPLETE CBC W/AUTO DIFF WBC: CPT | Performed by: EMERGENCY MEDICINE

## 2020-07-23 RX ORDER — ALBUTEROL SULFATE 90 UG/1
2 AEROSOL, METERED RESPIRATORY (INHALATION)
Status: DISCONTINUED | OUTPATIENT
Start: 2020-07-23 | End: 2020-07-25

## 2020-07-23 RX ORDER — NICOTINE 21 MG/24HR
1 PATCH, TRANSDERMAL 24 HOURS TRANSDERMAL
Status: DISCONTINUED | OUTPATIENT
Start: 2020-07-23 | End: 2020-07-28 | Stop reason: HOSPADM

## 2020-07-23 RX ORDER — SODIUM CHLORIDE 0.9 % (FLUSH) 0.9 %
10 SYRINGE (ML) INJECTION AS NEEDED
Status: DISCONTINUED | OUTPATIENT
Start: 2020-07-23 | End: 2020-07-28 | Stop reason: HOSPADM

## 2020-07-23 RX ORDER — METHYLPREDNISOLONE SODIUM SUCCINATE 125 MG/2ML
125 INJECTION, POWDER, LYOPHILIZED, FOR SOLUTION INTRAMUSCULAR; INTRAVENOUS ONCE
Status: COMPLETED | OUTPATIENT
Start: 2020-07-23 | End: 2020-07-23

## 2020-07-23 RX ORDER — ALBUTEROL SULFATE 90 UG/1
2 AEROSOL, METERED RESPIRATORY (INHALATION) ONCE
Status: COMPLETED | OUTPATIENT
Start: 2020-07-23 | End: 2020-07-23

## 2020-07-23 RX ADMIN — ALBUTEROL SULFATE 2 PUFF: 108 AEROSOL, METERED RESPIRATORY (INHALATION) at 16:50

## 2020-07-23 RX ADMIN — ALBUTEROL SULFATE 2 PUFF: 108 AEROSOL, METERED RESPIRATORY (INHALATION) at 21:36

## 2020-07-23 RX ADMIN — NICOTINE 1 PATCH: 21 PATCH TRANSDERMAL at 20:02

## 2020-07-23 RX ADMIN — IOPAMIDOL 99 ML: 755 INJECTION, SOLUTION INTRAVENOUS at 18:01

## 2020-07-23 RX ADMIN — METHYLPREDNISOLONE SODIUM SUCCINATE 125 MG: 125 INJECTION, POWDER, FOR SOLUTION INTRAMUSCULAR; INTRAVENOUS at 20:02

## 2020-07-23 NOTE — TELEPHONE ENCOUNTER
Loreta Robertson [phone]  Spoke to pt, advised of clinical message. Pt is in agreement with plan. Pt states she will head [facility] the the [facility] for testing. Good verbal understanding.

## 2020-07-24 ENCOUNTER — APPOINTMENT (OUTPATIENT)
Dept: CARDIOLOGY | Facility: HOSPITAL | Age: 50
End: 2020-07-24

## 2020-07-24 PROBLEM — J44.1 COPD EXACERBATION: Status: ACTIVE | Noted: 2020-07-24

## 2020-07-24 LAB
ALBUMIN SERPL-MCNC: 3.5 G/DL (ref 3.5–5.2)
ALBUMIN/GLOB SERPL: 1.1 G/DL
ALP SERPL-CCNC: 72 U/L (ref 39–117)
ALT SERPL W P-5'-P-CCNC: 29 U/L (ref 1–33)
ANION GAP SERPL CALCULATED.3IONS-SCNC: 7 MMOL/L (ref 5–15)
ASCENDING AORTA: 3 CM
AST SERPL-CCNC: 23 U/L (ref 1–32)
B PARAPERT DNA SPEC QL NAA+PROBE: NOT DETECTED
B PERT DNA SPEC QL NAA+PROBE: NOT DETECTED
BASOPHILS # BLD MANUAL: 0 10*3/MM3 (ref 0–0.2)
BASOPHILS NFR BLD AUTO: 0 % (ref 0–1.5)
BH CV ECHO MEAS - AO MAX PG (FULL): 1.1 MMHG
BH CV ECHO MEAS - AO MAX PG: 5 MMHG
BH CV ECHO MEAS - AO MEAN PG (FULL): 1 MMHG
BH CV ECHO MEAS - AO MEAN PG: 3 MMHG
BH CV ECHO MEAS - AO ROOT AREA (BSA CORRECTED): 1.3
BH CV ECHO MEAS - AO ROOT AREA: 8.6 CM^2
BH CV ECHO MEAS - AO ROOT DIAM: 3.3 CM
BH CV ECHO MEAS - AO V2 MAX: 111 CM/SEC
BH CV ECHO MEAS - AO V2 MEAN: 75.2 CM/SEC
BH CV ECHO MEAS - AO V2 VTI: 26.7 CM
BH CV ECHO MEAS - ASC AORTA: 3 CM
BH CV ECHO MEAS - AVA(I,A): 2.7 CM^2
BH CV ECHO MEAS - AVA(I,D): 2.7 CM^2
BH CV ECHO MEAS - AVA(V,A): 3.1 CM^2
BH CV ECHO MEAS - AVA(V,D): 3.1 CM^2
BH CV ECHO MEAS - BSA(HAYCOCK): 2.9 M^2
BH CV ECHO MEAS - BSA: 2.6 M^2
BH CV ECHO MEAS - BZI_BMI: 59.4 KILOGRAMS/M^2
BH CV ECHO MEAS - BZI_METRIC_HEIGHT: 167.6 CM
BH CV ECHO MEAS - BZI_METRIC_WEIGHT: 166.9 KG
BH CV ECHO MEAS - EDV(CUBED): 117.6 ML
BH CV ECHO MEAS - EDV(MOD-SP2): 82 ML
BH CV ECHO MEAS - EDV(MOD-SP4): 108 ML
BH CV ECHO MEAS - EDV(TEICH): 112.8 ML
BH CV ECHO MEAS - EF(CUBED): 60.3 %
BH CV ECHO MEAS - EF(MOD-BP): 52.4 %
BH CV ECHO MEAS - EF(MOD-SP2): 46.6 %
BH CV ECHO MEAS - EF(MOD-SP4): 56.2 %
BH CV ECHO MEAS - EF(TEICH): 51.8 %
BH CV ECHO MEAS - ESV(CUBED): 46.7 ML
BH CV ECHO MEAS - ESV(MOD-SP2): 43.8 ML
BH CV ECHO MEAS - ESV(MOD-SP4): 47.3 ML
BH CV ECHO MEAS - ESV(TEICH): 54.4 ML
BH CV ECHO MEAS - FS: 26.5 %
BH CV ECHO MEAS - IVS/LVPW: 1
BH CV ECHO MEAS - IVSD: 0.7 CM
BH CV ECHO MEAS - LA DIMENSION: 2.5 CM
BH CV ECHO MEAS - LA/AO: 0.76
BH CV ECHO MEAS - LAD MAJOR: 4.3 CM
BH CV ECHO MEAS - LAT PEAK E' VEL: 6.7 CM/SEC
BH CV ECHO MEAS - LATERAL E/E' RATIO: 13.1
BH CV ECHO MEAS - LV DIASTOLIC VOL/BSA (35-75): 41.7 ML/M^2
BH CV ECHO MEAS - LV IVRT: 0.35 SEC
BH CV ECHO MEAS - LV MASS(C)D: 110.8 GRAMS
BH CV ECHO MEAS - LV MASS(C)DI: 42.7 GRAMS/M^2
BH CV ECHO MEAS - LV MAX PG: 3.9 MMHG
BH CV ECHO MEAS - LV MEAN PG: 2 MMHG
BH CV ECHO MEAS - LV SYSTOLIC VOL/BSA (12-30): 18.2 ML/M^2
BH CV ECHO MEAS - LV V1 MAX: 98.3 CM/SEC
BH CV ECHO MEAS - LV V1 MEAN: 65.5 CM/SEC
BH CV ECHO MEAS - LV V1 VTI: 21 CM
BH CV ECHO MEAS - LVIDD: 4.9 CM
BH CV ECHO MEAS - LVIDS: 3.6 CM
BH CV ECHO MEAS - LVLD AP2: 8 CM
BH CV ECHO MEAS - LVLD AP4: 7.4 CM
BH CV ECHO MEAS - LVLS AP2: 7 CM
BH CV ECHO MEAS - LVLS AP4: 6.5 CM
BH CV ECHO MEAS - LVOT AREA (M): 3.5 CM^2
BH CV ECHO MEAS - LVOT AREA: 3.5 CM^2
BH CV ECHO MEAS - LVOT DIAM: 2.1 CM
BH CV ECHO MEAS - LVPWD: 0.7 CM
BH CV ECHO MEAS - MED PEAK E' VEL: 8.9 CM/SEC
BH CV ECHO MEAS - MEDIAL E/E' RATIO: 9.9
BH CV ECHO MEAS - MV A MAX VEL: 81 CM/SEC
BH CV ECHO MEAS - MV DEC SLOPE: 374 CM/SEC^2
BH CV ECHO MEAS - MV DEC TIME: 0.25 SEC
BH CV ECHO MEAS - MV E MAX VEL: 88.3 CM/SEC
BH CV ECHO MEAS - MV E/A: 1.1
BH CV ECHO MEAS - MV P1/2T MAX VEL: 97.2 CM/SEC
BH CV ECHO MEAS - MV P1/2T: 76.1 MSEC
BH CV ECHO MEAS - MVA P1/2T LCG: 2.3 CM^2
BH CV ECHO MEAS - MVA(P1/2T): 2.9 CM^2
BH CV ECHO MEAS - PA ACC TIME: 0.22 SEC
BH CV ECHO MEAS - PA MAX PG: 4.1 MMHG
BH CV ECHO MEAS - PA PR(ACCEL): -20.7 MMHG
BH CV ECHO MEAS - PA V2 MAX: 101.7 CM/SEC
BH CV ECHO MEAS - SI(AO): 88.1 ML/M^2
BH CV ECHO MEAS - SI(CUBED): 27.4 ML/M^2
BH CV ECHO MEAS - SI(LVOT): 28.1 ML/M^2
BH CV ECHO MEAS - SI(MOD-SP2): 14.7 ML/M^2
BH CV ECHO MEAS - SI(MOD-SP4): 23.4 ML/M^2
BH CV ECHO MEAS - SI(TEICH): 22.5 ML/M^2
BH CV ECHO MEAS - SV(AO): 228.4 ML
BH CV ECHO MEAS - SV(CUBED): 71 ML
BH CV ECHO MEAS - SV(LVOT): 72.7 ML
BH CV ECHO MEAS - SV(MOD-SP2): 38.2 ML
BH CV ECHO MEAS - SV(MOD-SP4): 60.7 ML
BH CV ECHO MEAS - SV(TEICH): 58.4 ML
BH CV ECHO MEAS - TAPSE (>1.6): 2.03 CM2
BH CV ECHO MEASUREMENTS AVERAGE E/E' RATIO: 11.32
BH CV VAS BP LEFT ARM: NORMAL MMHG
BH CV XLRA - RV BASE: 3.7 CM
BH CV XLRA - RV LENGTH: 7.5 CM
BH CV XLRA - RV MID: 2.7 CM
BH CV XLRA - TDI S': 10.7 CM/SEC
BILIRUB SERPL-MCNC: 0.4 MG/DL (ref 0–1.2)
BUN SERPL-MCNC: 9 MG/DL (ref 6–20)
BUN/CREAT SERPL: 13.2 (ref 7–25)
C PNEUM DNA NPH QL NAA+NON-PROBE: NOT DETECTED
CALCIUM SPEC-SCNC: 9.3 MG/DL (ref 8.6–10.5)
CHLORIDE SERPL-SCNC: 101 MMOL/L (ref 98–107)
CK SERPL-CCNC: 55 U/L (ref 20–180)
CO2 SERPL-SCNC: 32 MMOL/L (ref 22–29)
CREAT SERPL-MCNC: 0.68 MG/DL (ref 0.57–1)
CRP SERPL-MCNC: 2.06 MG/DL (ref 0–0.5)
D DIMER PPP FEU-MCNC: 0.81 MCGFEU/ML (ref 0–0.56)
DEPRECATED RDW RBC AUTO: 59.3 FL (ref 37–54)
EOSINOPHIL # BLD MANUAL: 0 10*3/MM3 (ref 0–0.4)
EOSINOPHIL NFR BLD MANUAL: 0 % (ref 0.3–6.2)
ERYTHROCYTE [DISTWIDTH] IN BLOOD BY AUTOMATED COUNT: 15.9 % (ref 12.3–15.4)
FLUAV H1 2009 PAND RNA NPH QL NAA+PROBE: NOT DETECTED
FLUAV H1 HA GENE NPH QL NAA+PROBE: NOT DETECTED
FLUAV H3 RNA NPH QL NAA+PROBE: NOT DETECTED
FLUAV SUBTYP SPEC NAA+PROBE: NOT DETECTED
FLUBV RNA ISLT QL NAA+PROBE: NOT DETECTED
GFR SERPL CREATININE-BSD FRML MDRD: 91 ML/MIN/1.73
GLOBULIN UR ELPH-MCNC: 3.3 GM/DL
GLUCOSE BLDC GLUCOMTR-MCNC: 167 MG/DL (ref 70–130)
GLUCOSE BLDC GLUCOMTR-MCNC: 188 MG/DL (ref 70–130)
GLUCOSE BLDC GLUCOMTR-MCNC: 194 MG/DL (ref 70–130)
GLUCOSE BLDC GLUCOMTR-MCNC: 202 MG/DL (ref 70–130)
GLUCOSE SERPL-MCNC: 201 MG/DL (ref 65–99)
HADV DNA SPEC NAA+PROBE: NOT DETECTED
HCOV 229E RNA SPEC QL NAA+PROBE: NOT DETECTED
HCOV HKU1 RNA SPEC QL NAA+PROBE: NOT DETECTED
HCOV NL63 RNA SPEC QL NAA+PROBE: NOT DETECTED
HCOV OC43 RNA SPEC QL NAA+PROBE: NOT DETECTED
HCT VFR BLD AUTO: 46.7 % (ref 34–46.6)
HGB BLD-MCNC: 14.4 G/DL (ref 12–15.9)
HMPV RNA NPH QL NAA+NON-PROBE: NOT DETECTED
HPIV1 RNA SPEC QL NAA+PROBE: NOT DETECTED
HPIV2 RNA SPEC QL NAA+PROBE: NOT DETECTED
HPIV3 RNA NPH QL NAA+PROBE: NOT DETECTED
HPIV4 P GENE NPH QL NAA+PROBE: NOT DETECTED
LEFT ATRIUM VOLUME INDEX: 12.3 ML/M2
LYMPHOCYTES # BLD MANUAL: 1.41 10*3/MM3 (ref 0.7–3.1)
LYMPHOCYTES NFR BLD MANUAL: 20 % (ref 19.6–45.3)
LYMPHOCYTES NFR BLD MANUAL: 3 % (ref 5–12)
M PNEUMO IGG SER IA-ACNC: NOT DETECTED
MAGNESIUM SERPL-MCNC: 2.2 MG/DL (ref 1.6–2.6)
MCH RBC QN AUTO: 31.2 PG (ref 26.6–33)
MCHC RBC AUTO-ENTMCNC: 30.8 G/DL (ref 31.5–35.7)
MCV RBC AUTO: 101.3 FL (ref 79–97)
METAMYELOCYTES NFR BLD MANUAL: 4 % (ref 0–0)
MONOCYTES # BLD AUTO: 0.21 10*3/MM3 (ref 0.1–0.9)
MYELOCYTES NFR BLD MANUAL: 4 % (ref 0–0)
NEUTROPHILS # BLD AUTO: 4.52 10*3/MM3 (ref 1.7–7)
NEUTROPHILS NFR BLD MANUAL: 60 % (ref 42.7–76)
NEUTS BAND NFR BLD MANUAL: 4 % (ref 0–5)
PHOSPHATE SERPL-MCNC: 2.9 MG/DL (ref 2.5–4.5)
PLAT MORPH BLD: NORMAL
PLATELET # BLD AUTO: 197 10*3/MM3 (ref 140–450)
PMV BLD AUTO: 10.9 FL (ref 6–12)
POTASSIUM SERPL-SCNC: 3.6 MMOL/L (ref 3.5–5.2)
POTASSIUM SERPL-SCNC: 4.2 MMOL/L (ref 3.5–5.2)
PROT SERPL-MCNC: 6.8 G/DL (ref 6–8.5)
RBC # BLD AUTO: 4.61 10*6/MM3 (ref 3.77–5.28)
RBC MORPH BLD: NORMAL
RHINOVIRUS RNA SPEC NAA+PROBE: NOT DETECTED
RSV RNA NPH QL NAA+NON-PROBE: NOT DETECTED
SARS-COV-2 RNA NPH QL NAA+NON-PROBE: NOT DETECTED
SODIUM SERPL-SCNC: 140 MMOL/L (ref 136–145)
VARIANT LYMPHS NFR BLD MANUAL: 5 % (ref 0–5)
WBC # BLD AUTO: 7.07 10*3/MM3 (ref 3.4–10.8)
WBC MORPH BLD: NORMAL

## 2020-07-24 PROCEDURE — 25010000002 METHYLPREDNISOLONE PER 125 MG: Performed by: PHYSICIAN ASSISTANT

## 2020-07-24 PROCEDURE — 85007 BL SMEAR W/DIFF WBC COUNT: CPT | Performed by: PHYSICIAN ASSISTANT

## 2020-07-24 PROCEDURE — 84100 ASSAY OF PHOSPHORUS: CPT | Performed by: PHYSICIAN ASSISTANT

## 2020-07-24 PROCEDURE — 25010000002 ENOXAPARIN PER 10 MG: Performed by: FAMILY MEDICINE

## 2020-07-24 PROCEDURE — 83735 ASSAY OF MAGNESIUM: CPT | Performed by: PHYSICIAN ASSISTANT

## 2020-07-24 PROCEDURE — 82550 ASSAY OF CK (CPK): CPT | Performed by: PHYSICIAN ASSISTANT

## 2020-07-24 PROCEDURE — 85379 FIBRIN DEGRADATION QUANT: CPT | Performed by: PHYSICIAN ASSISTANT

## 2020-07-24 PROCEDURE — 80053 COMPREHEN METABOLIC PANEL: CPT | Performed by: PHYSICIAN ASSISTANT

## 2020-07-24 PROCEDURE — 99232 SBSQ HOSP IP/OBS MODERATE 35: CPT | Performed by: FAMILY MEDICINE

## 2020-07-24 PROCEDURE — 94640 AIRWAY INHALATION TREATMENT: CPT

## 2020-07-24 PROCEDURE — 85025 COMPLETE CBC W/AUTO DIFF WBC: CPT | Performed by: PHYSICIAN ASSISTANT

## 2020-07-24 PROCEDURE — 94799 UNLISTED PULMONARY SVC/PX: CPT

## 2020-07-24 PROCEDURE — 84132 ASSAY OF SERUM POTASSIUM: CPT | Performed by: FAMILY MEDICINE

## 2020-07-24 PROCEDURE — 25010000002 ENOXAPARIN PER 10 MG: Performed by: PHYSICIAN ASSISTANT

## 2020-07-24 PROCEDURE — 82962 GLUCOSE BLOOD TEST: CPT

## 2020-07-24 PROCEDURE — 63710000001 INSULIN LISPRO (HUMAN) PER 5 UNITS: Performed by: PHYSICIAN ASSISTANT

## 2020-07-24 PROCEDURE — 93306 TTE W/DOPPLER COMPLETE: CPT

## 2020-07-24 PROCEDURE — 93306 TTE W/DOPPLER COMPLETE: CPT | Performed by: INTERNAL MEDICINE

## 2020-07-24 PROCEDURE — 86140 C-REACTIVE PROTEIN: CPT | Performed by: PHYSICIAN ASSISTANT

## 2020-07-24 RX ORDER — POTASSIUM CHLORIDE 7.45 MG/ML
10 INJECTION INTRAVENOUS
Status: DISCONTINUED | OUTPATIENT
Start: 2020-07-24 | End: 2020-07-28 | Stop reason: HOSPADM

## 2020-07-24 RX ORDER — DIVALPROEX SODIUM 500 MG/1
500 TABLET, EXTENDED RELEASE ORAL 3 TIMES DAILY
Status: DISCONTINUED | OUTPATIENT
Start: 2020-07-24 | End: 2020-07-24

## 2020-07-24 RX ORDER — NICOTINE POLACRILEX 4 MG
15 LOZENGE BUCCAL
Status: DISCONTINUED | OUTPATIENT
Start: 2020-07-24 | End: 2020-07-28 | Stop reason: HOSPADM

## 2020-07-24 RX ORDER — BISACODYL 5 MG/1
5 TABLET, DELAYED RELEASE ORAL DAILY PRN
Status: DISCONTINUED | OUTPATIENT
Start: 2020-07-24 | End: 2020-07-28 | Stop reason: HOSPADM

## 2020-07-24 RX ORDER — TERAZOSIN 5 MG/1
5 CAPSULE ORAL NIGHTLY
Status: DISCONTINUED | OUTPATIENT
Start: 2020-07-24 | End: 2020-07-28 | Stop reason: HOSPADM

## 2020-07-24 RX ORDER — LURASIDONE HYDROCHLORIDE 20 MG/1
40 TABLET, FILM COATED ORAL NIGHTLY
Status: DISCONTINUED | OUTPATIENT
Start: 2020-07-24 | End: 2020-07-28 | Stop reason: HOSPADM

## 2020-07-24 RX ORDER — DIVALPROEX SODIUM 500 MG/1
1000 TABLET, EXTENDED RELEASE ORAL NIGHTLY
Status: DISCONTINUED | OUTPATIENT
Start: 2020-07-24 | End: 2020-07-28 | Stop reason: HOSPADM

## 2020-07-24 RX ORDER — QUETIAPINE FUMARATE 100 MG/1
400 TABLET, FILM COATED ORAL NIGHTLY
Status: DISCONTINUED | OUTPATIENT
Start: 2020-07-24 | End: 2020-07-28 | Stop reason: HOSPADM

## 2020-07-24 RX ORDER — DOXYCYCLINE 100 MG/1
100 CAPSULE ORAL EVERY 12 HOURS SCHEDULED
Status: DISCONTINUED | OUTPATIENT
Start: 2020-07-24 | End: 2020-07-28 | Stop reason: HOSPADM

## 2020-07-24 RX ORDER — SODIUM CHLORIDE 0.9 % (FLUSH) 0.9 %
10 SYRINGE (ML) INJECTION AS NEEDED
Status: DISCONTINUED | OUTPATIENT
Start: 2020-07-24 | End: 2020-07-28 | Stop reason: HOSPADM

## 2020-07-24 RX ORDER — DIVALPROEX SODIUM 500 MG/1
1000 TABLET, EXTENDED RELEASE ORAL NIGHTLY
COMMUNITY
End: 2021-09-16

## 2020-07-24 RX ORDER — DEXTROSE MONOHYDRATE 25 G/50ML
25 INJECTION, SOLUTION INTRAVENOUS
Status: DISCONTINUED | OUTPATIENT
Start: 2020-07-24 | End: 2020-07-28 | Stop reason: HOSPADM

## 2020-07-24 RX ORDER — POTASSIUM CHLORIDE 750 MG/1
40 CAPSULE, EXTENDED RELEASE ORAL AS NEEDED
Status: DISCONTINUED | OUTPATIENT
Start: 2020-07-24 | End: 2020-07-28 | Stop reason: HOSPADM

## 2020-07-24 RX ORDER — POTASSIUM CHLORIDE 1.5 G/1.77G
40 POWDER, FOR SOLUTION ORAL AS NEEDED
Status: DISCONTINUED | OUTPATIENT
Start: 2020-07-24 | End: 2020-07-28 | Stop reason: HOSPADM

## 2020-07-24 RX ORDER — DIVALPROEX SODIUM 500 MG/1
500 TABLET, EXTENDED RELEASE ORAL DAILY
Status: DISCONTINUED | OUTPATIENT
Start: 2020-07-24 | End: 2020-07-28 | Stop reason: HOSPADM

## 2020-07-24 RX ORDER — ACETAMINOPHEN 650 MG/1
650 SUPPOSITORY RECTAL EVERY 4 HOURS PRN
Status: DISCONTINUED | OUTPATIENT
Start: 2020-07-24 | End: 2020-07-28 | Stop reason: HOSPADM

## 2020-07-24 RX ORDER — METHYLPREDNISOLONE SODIUM SUCCINATE 125 MG/2ML
62.5 INJECTION, POWDER, LYOPHILIZED, FOR SOLUTION INTRAMUSCULAR; INTRAVENOUS 2 TIMES DAILY
Status: COMPLETED | OUTPATIENT
Start: 2020-07-24 | End: 2020-07-25

## 2020-07-24 RX ORDER — ONDANSETRON 2 MG/ML
4 INJECTION INTRAMUSCULAR; INTRAVENOUS EVERY 6 HOURS PRN
Status: DISCONTINUED | OUTPATIENT
Start: 2020-07-24 | End: 2020-07-28 | Stop reason: HOSPADM

## 2020-07-24 RX ORDER — ACETAMINOPHEN 160 MG/5ML
650 SOLUTION ORAL EVERY 4 HOURS PRN
Status: DISCONTINUED | OUTPATIENT
Start: 2020-07-24 | End: 2020-07-28 | Stop reason: HOSPADM

## 2020-07-24 RX ORDER — SODIUM CHLORIDE 0.9 % (FLUSH) 0.9 %
10 SYRINGE (ML) INJECTION EVERY 12 HOURS SCHEDULED
Status: DISCONTINUED | OUTPATIENT
Start: 2020-07-24 | End: 2020-07-28 | Stop reason: HOSPADM

## 2020-07-24 RX ORDER — LURASIDONE HYDROCHLORIDE 20 MG/1
40 TABLET, FILM COATED ORAL DAILY
Status: DISCONTINUED | OUTPATIENT
Start: 2020-07-24 | End: 2020-07-24

## 2020-07-24 RX ORDER — BUPROPION HYDROCHLORIDE 150 MG/1
150 TABLET ORAL DAILY
Status: DISCONTINUED | OUTPATIENT
Start: 2020-07-24 | End: 2020-07-28 | Stop reason: HOSPADM

## 2020-07-24 RX ORDER — IPRATROPIUM BROMIDE AND ALBUTEROL SULFATE 2.5; .5 MG/3ML; MG/3ML
3 SOLUTION RESPIRATORY (INHALATION)
Status: DISCONTINUED | OUTPATIENT
Start: 2020-07-24 | End: 2020-07-28 | Stop reason: HOSPADM

## 2020-07-24 RX ORDER — ACETAMINOPHEN 325 MG/1
650 TABLET ORAL EVERY 4 HOURS PRN
Status: DISCONTINUED | OUTPATIENT
Start: 2020-07-24 | End: 2020-07-28 | Stop reason: HOSPADM

## 2020-07-24 RX ORDER — BENZONATATE 100 MG/1
200 CAPSULE ORAL 3 TIMES DAILY PRN
Status: DISCONTINUED | OUTPATIENT
Start: 2020-07-24 | End: 2020-07-28 | Stop reason: HOSPADM

## 2020-07-24 RX ADMIN — ALBUTEROL SULFATE 2 PUFF: 108 AEROSOL, METERED RESPIRATORY (INHALATION) at 20:25

## 2020-07-24 RX ADMIN — INSULIN LISPRO 2 UNITS: 100 INJECTION, SOLUTION INTRAVENOUS; SUBCUTANEOUS at 14:07

## 2020-07-24 RX ADMIN — QUETIAPINE FUMARATE 400 MG: 100 TABLET ORAL at 01:42

## 2020-07-24 RX ADMIN — ALBUTEROL SULFATE 2 PUFF: 108 AEROSOL, METERED RESPIRATORY (INHALATION) at 06:31

## 2020-07-24 RX ADMIN — SODIUM CHLORIDE, PRESERVATIVE FREE 10 ML: 5 INJECTION INTRAVENOUS at 19:59

## 2020-07-24 RX ADMIN — METHYLPREDNISOLONE SODIUM SUCCINATE 62.5 MG: 125 INJECTION, POWDER, FOR SOLUTION INTRAMUSCULAR; INTRAVENOUS at 19:58

## 2020-07-24 RX ADMIN — LURASIDONE HYDROCHLORIDE 40 MG: 20 TABLET, FILM COATED ORAL at 19:59

## 2020-07-24 RX ADMIN — LURASIDONE HYDROCHLORIDE 40 MG: 20 TABLET, FILM COATED ORAL at 01:42

## 2020-07-24 RX ADMIN — DIVALPROEX SODIUM 500 MG: 500 TABLET, EXTENDED RELEASE ORAL at 10:26

## 2020-07-24 RX ADMIN — SODIUM CHLORIDE, PRESERVATIVE FREE 10 ML: 5 INJECTION INTRAVENOUS at 10:25

## 2020-07-24 RX ADMIN — INSULIN LISPRO 3 UNITS: 100 INJECTION, SOLUTION INTRAVENOUS; SUBCUTANEOUS at 17:30

## 2020-07-24 RX ADMIN — POTASSIUM CHLORIDE 40 MEQ: 10 CAPSULE, COATED, EXTENDED RELEASE ORAL at 14:08

## 2020-07-24 RX ADMIN — DOXYCYCLINE 100 MG: 100 CAPSULE ORAL at 10:25

## 2020-07-24 RX ADMIN — QUETIAPINE FUMARATE 400 MG: 100 TABLET ORAL at 19:57

## 2020-07-24 RX ADMIN — POTASSIUM CHLORIDE 40 MEQ: 10 CAPSULE, COATED, EXTENDED RELEASE ORAL at 17:30

## 2020-07-24 RX ADMIN — ENOXAPARIN SODIUM 40 MG: 40 INJECTION SUBCUTANEOUS at 19:59

## 2020-07-24 RX ADMIN — DIVALPROEX SODIUM 1000 MG: 500 TABLET, EXTENDED RELEASE ORAL at 19:58

## 2020-07-24 RX ADMIN — TERAZOSIN HYDROCHLORIDE 5 MG: 5 CAPSULE ORAL at 01:42

## 2020-07-24 RX ADMIN — TERAZOSIN HYDROCHLORIDE 5 MG: 5 CAPSULE ORAL at 20:00

## 2020-07-24 RX ADMIN — DIVALPROEX SODIUM 1000 MG: 500 TABLET, EXTENDED RELEASE ORAL at 01:42

## 2020-07-24 RX ADMIN — BUPROPION HYDROCHLORIDE 150 MG: 150 TABLET, FILM COATED, EXTENDED RELEASE ORAL at 10:25

## 2020-07-24 RX ADMIN — ALBUTEROL SULFATE 2 PUFF: 108 AEROSOL, METERED RESPIRATORY (INHALATION) at 16:33

## 2020-07-24 RX ADMIN — SODIUM CHLORIDE, PRESERVATIVE FREE 10 ML: 5 INJECTION INTRAVENOUS at 01:45

## 2020-07-24 RX ADMIN — NICOTINE 1 PATCH: 21 PATCH TRANSDERMAL at 10:26

## 2020-07-24 RX ADMIN — ACETAMINOPHEN 650 MG: 325 TABLET, FILM COATED ORAL at 01:42

## 2020-07-24 RX ADMIN — ENOXAPARIN SODIUM 40 MG: 40 INJECTION SUBCUTANEOUS at 05:34

## 2020-07-24 RX ADMIN — DOXYCYCLINE 100 MG: 100 CAPSULE ORAL at 19:58

## 2020-07-24 RX ADMIN — ACETAMINOPHEN 650 MG: 325 TABLET, FILM COATED ORAL at 22:01

## 2020-07-24 RX ADMIN — INSULIN LISPRO 2 UNITS: 100 INJECTION, SOLUTION INTRAVENOUS; SUBCUTANEOUS at 10:22

## 2020-07-24 RX ADMIN — ALBUTEROL SULFATE 2 PUFF: 108 AEROSOL, METERED RESPIRATORY (INHALATION) at 11:38

## 2020-07-24 RX ADMIN — METHYLPREDNISOLONE SODIUM SUCCINATE 62.5 MG: 125 INJECTION, POWDER, FOR SOLUTION INTRAMUSCULAR; INTRAVENOUS at 10:23

## 2020-07-25 LAB
ANION GAP SERPL CALCULATED.3IONS-SCNC: 8 MMOL/L (ref 5–15)
BASOPHILS # BLD AUTO: 0.05 10*3/MM3 (ref 0–0.2)
BASOPHILS NFR BLD AUTO: 0.5 % (ref 0–1.5)
BUN SERPL-MCNC: 15 MG/DL (ref 6–20)
BUN/CREAT SERPL: 22.4 (ref 7–25)
CALCIUM SPEC-SCNC: 9.2 MG/DL (ref 8.6–10.5)
CHLORIDE SERPL-SCNC: 101 MMOL/L (ref 98–107)
CK SERPL-CCNC: 51 U/L (ref 20–180)
CO2 SERPL-SCNC: 32 MMOL/L (ref 22–29)
CREAT SERPL-MCNC: 0.67 MG/DL (ref 0.57–1)
CRP SERPL-MCNC: 0.94 MG/DL (ref 0–0.5)
DEPRECATED RDW RBC AUTO: 59.9 FL (ref 37–54)
EOSINOPHIL # BLD AUTO: 0 10*3/MM3 (ref 0–0.4)
EOSINOPHIL NFR BLD AUTO: 0 % (ref 0.3–6.2)
ERYTHROCYTE [DISTWIDTH] IN BLOOD BY AUTOMATED COUNT: 16.3 % (ref 12.3–15.4)
GFR SERPL CREATININE-BSD FRML MDRD: 92 ML/MIN/1.73
GLUCOSE BLDC GLUCOMTR-MCNC: 151 MG/DL (ref 70–130)
GLUCOSE BLDC GLUCOMTR-MCNC: 188 MG/DL (ref 70–130)
GLUCOSE BLDC GLUCOMTR-MCNC: 194 MG/DL (ref 70–130)
GLUCOSE BLDC GLUCOMTR-MCNC: 271 MG/DL (ref 70–130)
GLUCOSE SERPL-MCNC: 188 MG/DL (ref 65–99)
HCT VFR BLD AUTO: 46.7 % (ref 34–46.6)
HGB BLD-MCNC: 14.6 G/DL (ref 12–15.9)
IMM GRANULOCYTES # BLD AUTO: 0.42 10*3/MM3 (ref 0–0.05)
IMM GRANULOCYTES NFR BLD AUTO: 3.8 % (ref 0–0.5)
LYMPHOCYTES # BLD AUTO: 1.8 10*3/MM3 (ref 0.7–3.1)
LYMPHOCYTES NFR BLD AUTO: 16.4 % (ref 19.6–45.3)
MCH RBC QN AUTO: 31.5 PG (ref 26.6–33)
MCHC RBC AUTO-ENTMCNC: 31.3 G/DL (ref 31.5–35.7)
MCV RBC AUTO: 100.6 FL (ref 79–97)
MONOCYTES # BLD AUTO: 0.74 10*3/MM3 (ref 0.1–0.9)
MONOCYTES NFR BLD AUTO: 6.7 % (ref 5–12)
NEUTROPHILS NFR BLD AUTO: 7.97 10*3/MM3 (ref 1.7–7)
NEUTROPHILS NFR BLD AUTO: 72.6 % (ref 42.7–76)
NRBC BLD AUTO-RTO: 0 /100 WBC (ref 0–0.2)
PLATELET # BLD AUTO: 232 10*3/MM3 (ref 140–450)
PMV BLD AUTO: 11.2 FL (ref 6–12)
POTASSIUM SERPL-SCNC: 4.5 MMOL/L (ref 3.5–5.2)
RBC # BLD AUTO: 4.64 10*6/MM3 (ref 3.77–5.28)
SODIUM SERPL-SCNC: 141 MMOL/L (ref 136–145)
WBC # BLD AUTO: 10.98 10*3/MM3 (ref 3.4–10.8)

## 2020-07-25 PROCEDURE — 25010000002 METHYLPREDNISOLONE PER 125 MG: Performed by: PHYSICIAN ASSISTANT

## 2020-07-25 PROCEDURE — 63710000001 INSULIN LISPRO (HUMAN) PER 5 UNITS: Performed by: PHYSICIAN ASSISTANT

## 2020-07-25 PROCEDURE — 80048 BASIC METABOLIC PNL TOTAL CA: CPT | Performed by: FAMILY MEDICINE

## 2020-07-25 PROCEDURE — 94799 UNLISTED PULMONARY SVC/PX: CPT

## 2020-07-25 PROCEDURE — 25010000002 ENOXAPARIN PER 10 MG: Performed by: FAMILY MEDICINE

## 2020-07-25 PROCEDURE — 86140 C-REACTIVE PROTEIN: CPT | Performed by: PHYSICIAN ASSISTANT

## 2020-07-25 PROCEDURE — 99232 SBSQ HOSP IP/OBS MODERATE 35: CPT | Performed by: FAMILY MEDICINE

## 2020-07-25 PROCEDURE — 82550 ASSAY OF CK (CPK): CPT | Performed by: PHYSICIAN ASSISTANT

## 2020-07-25 PROCEDURE — 85025 COMPLETE CBC W/AUTO DIFF WBC: CPT | Performed by: FAMILY MEDICINE

## 2020-07-25 PROCEDURE — 82962 GLUCOSE BLOOD TEST: CPT

## 2020-07-25 RX ORDER — IBUPROFEN 600 MG/1
600 TABLET ORAL EVERY 6 HOURS PRN
Status: DISCONTINUED | OUTPATIENT
Start: 2020-07-25 | End: 2020-07-28 | Stop reason: HOSPADM

## 2020-07-25 RX ADMIN — TERAZOSIN HYDROCHLORIDE 5 MG: 5 CAPSULE ORAL at 20:35

## 2020-07-25 RX ADMIN — IPRATROPIUM BROMIDE AND ALBUTEROL SULFATE 3 ML: 2.5; .5 SOLUTION RESPIRATORY (INHALATION) at 16:16

## 2020-07-25 RX ADMIN — INSULIN LISPRO 4 UNITS: 100 INJECTION, SOLUTION INTRAVENOUS; SUBCUTANEOUS at 18:11

## 2020-07-25 RX ADMIN — DIVALPROEX SODIUM 1000 MG: 500 TABLET, EXTENDED RELEASE ORAL at 20:34

## 2020-07-25 RX ADMIN — DOXYCYCLINE 100 MG: 100 CAPSULE ORAL at 20:34

## 2020-07-25 RX ADMIN — ENOXAPARIN SODIUM 40 MG: 40 INJECTION SUBCUTANEOUS at 09:05

## 2020-07-25 RX ADMIN — IPRATROPIUM BROMIDE AND ALBUTEROL SULFATE 3 ML: 2.5; .5 SOLUTION RESPIRATORY (INHALATION) at 20:11

## 2020-07-25 RX ADMIN — INSULIN LISPRO 2 UNITS: 100 INJECTION, SOLUTION INTRAVENOUS; SUBCUTANEOUS at 12:57

## 2020-07-25 RX ADMIN — IBUPROFEN 600 MG: 600 TABLET, FILM COATED ORAL at 22:20

## 2020-07-25 RX ADMIN — LURASIDONE HYDROCHLORIDE 40 MG: 20 TABLET, FILM COATED ORAL at 20:34

## 2020-07-25 RX ADMIN — DIVALPROEX SODIUM 500 MG: 500 TABLET, EXTENDED RELEASE ORAL at 09:10

## 2020-07-25 RX ADMIN — INSULIN LISPRO 2 UNITS: 100 INJECTION, SOLUTION INTRAVENOUS; SUBCUTANEOUS at 09:05

## 2020-07-25 RX ADMIN — ENOXAPARIN SODIUM 40 MG: 40 INJECTION SUBCUTANEOUS at 20:35

## 2020-07-25 RX ADMIN — IBUPROFEN 600 MG: 600 TABLET, FILM COATED ORAL at 06:28

## 2020-07-25 RX ADMIN — DOXYCYCLINE 100 MG: 100 CAPSULE ORAL at 09:10

## 2020-07-25 RX ADMIN — METHYLPREDNISOLONE SODIUM SUCCINATE 62.5 MG: 125 INJECTION, POWDER, FOR SOLUTION INTRAMUSCULAR; INTRAVENOUS at 09:10

## 2020-07-25 RX ADMIN — ALBUTEROL SULFATE 2 PUFF: 108 AEROSOL, METERED RESPIRATORY (INHALATION) at 08:53

## 2020-07-25 RX ADMIN — METHYLPREDNISOLONE SODIUM SUCCINATE 62.5 MG: 125 INJECTION, POWDER, FOR SOLUTION INTRAMUSCULAR; INTRAVENOUS at 20:34

## 2020-07-25 RX ADMIN — BUPROPION HYDROCHLORIDE 150 MG: 150 TABLET, FILM COATED, EXTENDED RELEASE ORAL at 09:09

## 2020-07-25 RX ADMIN — ALBUTEROL SULFATE 2 PUFF: 108 AEROSOL, METERED RESPIRATORY (INHALATION) at 12:21

## 2020-07-25 RX ADMIN — SODIUM CHLORIDE, PRESERVATIVE FREE 10 ML: 5 INJECTION INTRAVENOUS at 09:08

## 2020-07-25 RX ADMIN — SODIUM CHLORIDE, PRESERVATIVE FREE 10 ML: 5 INJECTION INTRAVENOUS at 20:35

## 2020-07-25 RX ADMIN — NICOTINE 1 PATCH: 21 PATCH TRANSDERMAL at 09:07

## 2020-07-25 RX ADMIN — QUETIAPINE FUMARATE 400 MG: 100 TABLET ORAL at 20:34

## 2020-07-26 LAB
ANION GAP SERPL CALCULATED.3IONS-SCNC: 9 MMOL/L (ref 5–15)
BASOPHILS # BLD AUTO: 0.03 10*3/MM3 (ref 0–0.2)
BASOPHILS NFR BLD AUTO: 0.3 % (ref 0–1.5)
BUN SERPL-MCNC: 25 MG/DL (ref 6–20)
BUN/CREAT SERPL: 39.1 (ref 7–25)
CALCIUM SPEC-SCNC: 9.2 MG/DL (ref 8.6–10.5)
CHLORIDE SERPL-SCNC: 100 MMOL/L (ref 98–107)
CO2 SERPL-SCNC: 30 MMOL/L (ref 22–29)
CREAT SERPL-MCNC: 0.64 MG/DL (ref 0.57–1)
DEPRECATED RDW RBC AUTO: 60.5 FL (ref 37–54)
EOSINOPHIL # BLD AUTO: 0 10*3/MM3 (ref 0–0.4)
EOSINOPHIL NFR BLD AUTO: 0 % (ref 0.3–6.2)
ERYTHROCYTE [DISTWIDTH] IN BLOOD BY AUTOMATED COUNT: 16.5 % (ref 12.3–15.4)
GFR SERPL CREATININE-BSD FRML MDRD: 97 ML/MIN/1.73
GLUCOSE BLDC GLUCOMTR-MCNC: 130 MG/DL (ref 70–130)
GLUCOSE BLDC GLUCOMTR-MCNC: 158 MG/DL (ref 70–130)
GLUCOSE BLDC GLUCOMTR-MCNC: 191 MG/DL (ref 70–130)
GLUCOSE BLDC GLUCOMTR-MCNC: 196 MG/DL (ref 70–130)
GLUCOSE SERPL-MCNC: 197 MG/DL (ref 65–99)
HBA1C MFR BLD: 6.2 % (ref 4.8–5.6)
HCT VFR BLD AUTO: 44.7 % (ref 34–46.6)
HGB BLD-MCNC: 14 G/DL (ref 12–15.9)
IMM GRANULOCYTES # BLD AUTO: 0.43 10*3/MM3 (ref 0–0.05)
IMM GRANULOCYTES NFR BLD AUTO: 4.3 % (ref 0–0.5)
LYMPHOCYTES # BLD AUTO: 1.77 10*3/MM3 (ref 0.7–3.1)
LYMPHOCYTES NFR BLD AUTO: 17.6 % (ref 19.6–45.3)
MCH RBC QN AUTO: 31.2 PG (ref 26.6–33)
MCHC RBC AUTO-ENTMCNC: 31.3 G/DL (ref 31.5–35.7)
MCV RBC AUTO: 99.6 FL (ref 79–97)
MONOCYTES # BLD AUTO: 0.49 10*3/MM3 (ref 0.1–0.9)
MONOCYTES NFR BLD AUTO: 4.9 % (ref 5–12)
NEUTROPHILS NFR BLD AUTO: 7.35 10*3/MM3 (ref 1.7–7)
NEUTROPHILS NFR BLD AUTO: 72.9 % (ref 42.7–76)
NRBC BLD AUTO-RTO: 0 /100 WBC (ref 0–0.2)
PLATELET # BLD AUTO: 211 10*3/MM3 (ref 140–450)
PMV BLD AUTO: 10.8 FL (ref 6–12)
POTASSIUM SERPL-SCNC: 4.8 MMOL/L (ref 3.5–5.2)
RBC # BLD AUTO: 4.49 10*6/MM3 (ref 3.77–5.28)
SODIUM SERPL-SCNC: 139 MMOL/L (ref 136–145)
WBC # BLD AUTO: 10.07 10*3/MM3 (ref 3.4–10.8)

## 2020-07-26 PROCEDURE — 82962 GLUCOSE BLOOD TEST: CPT

## 2020-07-26 PROCEDURE — 63710000001 INSULIN LISPRO (HUMAN) PER 5 UNITS: Performed by: FAMILY MEDICINE

## 2020-07-26 PROCEDURE — 99232 SBSQ HOSP IP/OBS MODERATE 35: CPT | Performed by: FAMILY MEDICINE

## 2020-07-26 PROCEDURE — 63710000001 PREDNISONE PER 1 MG: Performed by: FAMILY MEDICINE

## 2020-07-26 PROCEDURE — 85025 COMPLETE CBC W/AUTO DIFF WBC: CPT | Performed by: FAMILY MEDICINE

## 2020-07-26 PROCEDURE — 63710000001 INSULIN LISPRO (HUMAN) PER 5 UNITS: Performed by: PHYSICIAN ASSISTANT

## 2020-07-26 PROCEDURE — 80048 BASIC METABOLIC PNL TOTAL CA: CPT | Performed by: FAMILY MEDICINE

## 2020-07-26 PROCEDURE — 25010000002 ENOXAPARIN PER 10 MG: Performed by: FAMILY MEDICINE

## 2020-07-26 PROCEDURE — 94799 UNLISTED PULMONARY SVC/PX: CPT

## 2020-07-26 PROCEDURE — 83036 HEMOGLOBIN GLYCOSYLATED A1C: CPT | Performed by: FAMILY MEDICINE

## 2020-07-26 RX ORDER — PREDNISONE 20 MG/1
40 TABLET ORAL
Status: DISCONTINUED | OUTPATIENT
Start: 2020-07-26 | End: 2020-07-28 | Stop reason: HOSPADM

## 2020-07-26 RX ADMIN — DOXYCYCLINE 100 MG: 100 CAPSULE ORAL at 08:43

## 2020-07-26 RX ADMIN — DIVALPROEX SODIUM 500 MG: 500 TABLET, EXTENDED RELEASE ORAL at 08:42

## 2020-07-26 RX ADMIN — PREDNISONE 40 MG: 20 TABLET ORAL at 12:11

## 2020-07-26 RX ADMIN — IPRATROPIUM BROMIDE AND ALBUTEROL SULFATE 3 ML: 2.5; .5 SOLUTION RESPIRATORY (INHALATION) at 07:47

## 2020-07-26 RX ADMIN — TERAZOSIN HYDROCHLORIDE 5 MG: 5 CAPSULE ORAL at 20:05

## 2020-07-26 RX ADMIN — DOXYCYCLINE 100 MG: 100 CAPSULE ORAL at 20:05

## 2020-07-26 RX ADMIN — IPRATROPIUM BROMIDE AND ALBUTEROL SULFATE 3 ML: 2.5; .5 SOLUTION RESPIRATORY (INHALATION) at 16:31

## 2020-07-26 RX ADMIN — LURASIDONE HYDROCHLORIDE 40 MG: 20 TABLET, FILM COATED ORAL at 20:05

## 2020-07-26 RX ADMIN — ENOXAPARIN SODIUM 40 MG: 40 INJECTION SUBCUTANEOUS at 08:42

## 2020-07-26 RX ADMIN — SODIUM CHLORIDE, PRESERVATIVE FREE 10 ML: 5 INJECTION INTRAVENOUS at 20:06

## 2020-07-26 RX ADMIN — DIVALPROEX SODIUM 1000 MG: 500 TABLET, EXTENDED RELEASE ORAL at 20:06

## 2020-07-26 RX ADMIN — NICOTINE 1 PATCH: 21 PATCH TRANSDERMAL at 08:42

## 2020-07-26 RX ADMIN — ENOXAPARIN SODIUM 40 MG: 40 INJECTION SUBCUTANEOUS at 20:05

## 2020-07-26 RX ADMIN — QUETIAPINE FUMARATE 400 MG: 100 TABLET ORAL at 20:05

## 2020-07-26 RX ADMIN — IPRATROPIUM BROMIDE AND ALBUTEROL SULFATE 3 ML: 2.5; .5 SOLUTION RESPIRATORY (INHALATION) at 19:31

## 2020-07-26 RX ADMIN — BUPROPION HYDROCHLORIDE 150 MG: 150 TABLET, FILM COATED, EXTENDED RELEASE ORAL at 08:42

## 2020-07-26 RX ADMIN — INSULIN LISPRO 2 UNITS: 100 INJECTION, SOLUTION INTRAVENOUS; SUBCUTANEOUS at 12:11

## 2020-07-26 RX ADMIN — INSULIN LISPRO 2 UNITS: 100 INJECTION, SOLUTION INTRAVENOUS; SUBCUTANEOUS at 08:43

## 2020-07-27 LAB
GLUCOSE BLDC GLUCOMTR-MCNC: 130 MG/DL (ref 70–130)
GLUCOSE BLDC GLUCOMTR-MCNC: 166 MG/DL (ref 70–130)
GLUCOSE BLDC GLUCOMTR-MCNC: 174 MG/DL (ref 70–130)
GLUCOSE BLDC GLUCOMTR-MCNC: 206 MG/DL (ref 70–130)

## 2020-07-27 PROCEDURE — 82962 GLUCOSE BLOOD TEST: CPT

## 2020-07-27 PROCEDURE — 94664 DEMO&/EVAL PT USE INHALER: CPT

## 2020-07-27 PROCEDURE — 63710000001 PREDNISONE PER 1 MG: Performed by: FAMILY MEDICINE

## 2020-07-27 PROCEDURE — 63710000001 INSULIN LISPRO (HUMAN) PER 5 UNITS: Performed by: FAMILY MEDICINE

## 2020-07-27 PROCEDURE — 94799 UNLISTED PULMONARY SVC/PX: CPT

## 2020-07-27 PROCEDURE — 99232 SBSQ HOSP IP/OBS MODERATE 35: CPT | Performed by: FAMILY MEDICINE

## 2020-07-27 PROCEDURE — 25010000002 ENOXAPARIN PER 10 MG: Performed by: FAMILY MEDICINE

## 2020-07-27 RX ADMIN — DIVALPROEX SODIUM 500 MG: 500 TABLET, EXTENDED RELEASE ORAL at 08:47

## 2020-07-27 RX ADMIN — INSULIN LISPRO 4 UNITS: 100 INJECTION, SOLUTION INTRAVENOUS; SUBCUTANEOUS at 08:48

## 2020-07-27 RX ADMIN — DIVALPROEX SODIUM 1000 MG: 500 TABLET, EXTENDED RELEASE ORAL at 21:14

## 2020-07-27 RX ADMIN — DOXYCYCLINE 100 MG: 100 CAPSULE ORAL at 21:14

## 2020-07-27 RX ADMIN — QUETIAPINE FUMARATE 400 MG: 100 TABLET ORAL at 21:14

## 2020-07-27 RX ADMIN — IPRATROPIUM BROMIDE AND ALBUTEROL SULFATE 3 ML: 2.5; .5 SOLUTION RESPIRATORY (INHALATION) at 13:11

## 2020-07-27 RX ADMIN — LURASIDONE HYDROCHLORIDE 40 MG: 20 TABLET, FILM COATED ORAL at 21:14

## 2020-07-27 RX ADMIN — DOXYCYCLINE 100 MG: 100 CAPSULE ORAL at 08:47

## 2020-07-27 RX ADMIN — ENOXAPARIN SODIUM 40 MG: 40 INJECTION SUBCUTANEOUS at 08:47

## 2020-07-27 RX ADMIN — BUPROPION HYDROCHLORIDE 150 MG: 150 TABLET, FILM COATED, EXTENDED RELEASE ORAL at 08:47

## 2020-07-27 RX ADMIN — SODIUM CHLORIDE, PRESERVATIVE FREE 10 ML: 5 INJECTION INTRAVENOUS at 21:14

## 2020-07-27 RX ADMIN — SODIUM CHLORIDE, PRESERVATIVE FREE 10 ML: 5 INJECTION INTRAVENOUS at 08:48

## 2020-07-27 RX ADMIN — IPRATROPIUM BROMIDE AND ALBUTEROL SULFATE 3 ML: 2.5; .5 SOLUTION RESPIRATORY (INHALATION) at 07:21

## 2020-07-27 RX ADMIN — NICOTINE 1 PATCH: 21 PATCH TRANSDERMAL at 08:47

## 2020-07-27 RX ADMIN — TERAZOSIN HYDROCHLORIDE 5 MG: 5 CAPSULE ORAL at 21:14

## 2020-07-27 RX ADMIN — INSULIN LISPRO 2 UNITS: 100 INJECTION, SOLUTION INTRAVENOUS; SUBCUTANEOUS at 16:55

## 2020-07-27 RX ADMIN — PREDNISONE 40 MG: 20 TABLET ORAL at 08:47

## 2020-07-27 RX ADMIN — IPRATROPIUM BROMIDE AND ALBUTEROL SULFATE 3 ML: 2.5; .5 SOLUTION RESPIRATORY (INHALATION) at 20:34

## 2020-07-27 RX ADMIN — IPRATROPIUM BROMIDE AND ALBUTEROL SULFATE 3 ML: 2.5; .5 SOLUTION RESPIRATORY (INHALATION) at 15:37

## 2020-07-27 RX ADMIN — ENOXAPARIN SODIUM 40 MG: 40 INJECTION SUBCUTANEOUS at 21:14

## 2020-07-28 ENCOUNTER — READMISSION MANAGEMENT (OUTPATIENT)
Dept: CALL CENTER | Facility: HOSPITAL | Age: 50
End: 2020-07-28

## 2020-07-28 ENCOUNTER — TELEPHONE (OUTPATIENT)
Dept: INTERNAL MEDICINE | Facility: CLINIC | Age: 50
End: 2020-07-28

## 2020-07-28 VITALS
WEIGHT: 293 LBS | HEART RATE: 73 BPM | HEIGHT: 66 IN | DIASTOLIC BLOOD PRESSURE: 68 MMHG | OXYGEN SATURATION: 92 % | TEMPERATURE: 97.7 F | RESPIRATION RATE: 16 BRPM | SYSTOLIC BLOOD PRESSURE: 126 MMHG | BODY MASS INDEX: 47.09 KG/M2

## 2020-07-28 DIAGNOSIS — F17.210 CIGARETTE NICOTINE DEPENDENCE WITHOUT COMPLICATION: Primary | ICD-10-CM

## 2020-07-28 PROBLEM — R09.02 HYPOXIA: Status: RESOLVED | Noted: 2020-07-23 | Resolved: 2020-07-28

## 2020-07-28 PROBLEM — J44.1 COPD EXACERBATION: Status: RESOLVED | Noted: 2020-07-24 | Resolved: 2020-07-28

## 2020-07-28 LAB
GLUCOSE BLDC GLUCOMTR-MCNC: 106 MG/DL (ref 70–130)
GLUCOSE BLDC GLUCOMTR-MCNC: 132 MG/DL (ref 70–130)

## 2020-07-28 PROCEDURE — 99239 HOSP IP/OBS DSCHRG MGMT >30: CPT | Performed by: NURSE PRACTITIONER

## 2020-07-28 PROCEDURE — 94799 UNLISTED PULMONARY SVC/PX: CPT

## 2020-07-28 PROCEDURE — 82962 GLUCOSE BLOOD TEST: CPT

## 2020-07-28 PROCEDURE — 25010000002 ENOXAPARIN PER 10 MG: Performed by: FAMILY MEDICINE

## 2020-07-28 PROCEDURE — 97161 PT EVAL LOW COMPLEX 20 MIN: CPT | Performed by: PHYSICAL THERAPIST

## 2020-07-28 PROCEDURE — 63710000001 PREDNISONE PER 1 MG: Performed by: FAMILY MEDICINE

## 2020-07-28 RX ORDER — IPRATROPIUM BROMIDE AND ALBUTEROL SULFATE 2.5; .5 MG/3ML; MG/3ML
3 SOLUTION RESPIRATORY (INHALATION)
Qty: 360 ML | Refills: 0 | Status: SHIPPED | OUTPATIENT
Start: 2020-07-28 | End: 2021-01-26 | Stop reason: SDDI

## 2020-07-28 RX ORDER — PREDNISONE 20 MG/1
40 TABLET ORAL
Qty: 7 TABLET | Refills: 0 | Status: SHIPPED | OUTPATIENT
Start: 2020-07-29 | End: 2020-07-31

## 2020-07-28 RX ORDER — DOXYCYCLINE 100 MG/1
100 CAPSULE ORAL EVERY 12 HOURS SCHEDULED
Qty: 5 CAPSULE | Refills: 0 | Status: SHIPPED | OUTPATIENT
Start: 2020-07-28 | End: 2020-07-31

## 2020-07-28 RX ORDER — NICOTINE 21 MG/24HR
1 PATCH, TRANSDERMAL 24 HOURS TRANSDERMAL
Qty: 30 PATCH | Refills: 0 | Status: SHIPPED | OUTPATIENT
Start: 2020-07-29 | End: 2020-07-29 | Stop reason: SDUPTHER

## 2020-07-28 RX ORDER — ACETAMINOPHEN 325 MG/1
650 TABLET ORAL EVERY 4 HOURS PRN
Start: 2020-07-28 | End: 2021-01-26 | Stop reason: SDDI

## 2020-07-28 RX ORDER — BENZONATATE 200 MG/1
200 CAPSULE ORAL 3 TIMES DAILY PRN
Qty: 30 CAPSULE | Refills: 0 | Status: SHIPPED | OUTPATIENT
Start: 2020-07-28 | End: 2020-08-13

## 2020-07-28 RX ADMIN — IPRATROPIUM BROMIDE AND ALBUTEROL SULFATE 3 ML: 2.5; .5 SOLUTION RESPIRATORY (INHALATION) at 09:05

## 2020-07-28 RX ADMIN — DOXYCYCLINE 100 MG: 100 CAPSULE ORAL at 08:58

## 2020-07-28 RX ADMIN — ENOXAPARIN SODIUM 40 MG: 40 INJECTION SUBCUTANEOUS at 08:58

## 2020-07-28 RX ADMIN — SODIUM CHLORIDE, PRESERVATIVE FREE 10 ML: 5 INJECTION INTRAVENOUS at 08:58

## 2020-07-28 RX ADMIN — PREDNISONE 40 MG: 20 TABLET ORAL at 08:58

## 2020-07-28 RX ADMIN — BUPROPION HYDROCHLORIDE 150 MG: 150 TABLET, FILM COATED, EXTENDED RELEASE ORAL at 08:58

## 2020-07-28 RX ADMIN — NICOTINE 1 PATCH: 21 PATCH TRANSDERMAL at 08:57

## 2020-07-28 RX ADMIN — DIVALPROEX SODIUM 500 MG: 500 TABLET, EXTENDED RELEASE ORAL at 09:02

## 2020-07-28 NOTE — TELEPHONE ENCOUNTER
PT CALLED STATED THAT SHE JUST GOT OF THE HOSPITAL TODAY AND WAS GIVEN RX BUT INSURANCE NOT COVERING RX AND IS NEEDING PA FOR RX  BENZONATATE 200MG AND  NICOTINE TRANS. PATCH 21MG.    PLEASE ADVISE.  CALL BACK:[phone]     [id number] - [place], [place] - [address] AT [address] & [address]

## 2020-07-29 ENCOUNTER — TRANSITIONAL CARE MANAGEMENT TELEPHONE ENCOUNTER (OUTPATIENT)
Dept: CALL CENTER | Facility: HOSPITAL | Age: 50
End: 2020-07-29

## 2020-07-29 ENCOUNTER — NURSE TRIAGE (OUTPATIENT)
Dept: CALL CENTER | Facility: HOSPITAL | Age: 50
End: 2020-07-29

## 2020-07-29 ENCOUNTER — PRIOR AUTHORIZATION (OUTPATIENT)
Dept: INTERNAL MEDICINE | Facility: CLINIC | Age: 50
End: 2020-07-29

## 2020-07-29 RX ORDER — NICOTINE 21 MG/24HR
1 PATCH, TRANSDERMAL 24 HOURS TRANSDERMAL
Qty: 30 PATCH | Refills: 1 | Status: SHIPPED | OUTPATIENT
Start: 2020-07-29 | End: 2021-01-26 | Stop reason: SDDI

## 2020-07-29 NOTE — TELEPHONE ENCOUNTER
Loreta States (Key: [id number])   DrugEQ Nicotine 21MG/24HR 24 hr patches     Denied today   PLEASE READ. Medicare has excluded over-the-counter (OTC) drug products from Part D coverage. For more information, see your Evidence of Coverage booklet under- What types of drugs are not covered by the plan.      Please advise?

## 2020-07-29 NOTE — TELEPHONE ENCOUNTER
Loreta \[facility]\"" [phone]  Advised of clinical message. Pt states how is she going to get in touch with the provider from the hospital,provided  pt with provider's Name Yokasta Pitts, advised to contact the ED department to submit PA from the prescribing physician, and they will have to submit the PA for the Benzoate. Pt would like PA to resubmit the prescription. Advised since PA-PAULETTE did not prescribe this medication she will not be able to submit prior-auth. Will send message to PCP to confirm. Pt states she was able to  the Doxycycline, and Prednisone.   Please advise?

## 2020-07-29 NOTE — OUTREACH NOTE
Prep Survey      Responses   [facility] facility patient discharged from?  [facility]   Is LACE score < 7 ?  No   Eligibility  [facility]   Date of Admission  07/23/20   Date of Discharge  07/28/20   Discharge Disposition  Home or Self Care   Discharge diagnosis  HypoxiaCOPD exacerbation    COVID-19 Test Status  Negative   Does the patient have one of the following disease processes/diagnoses(primary or secondary)?  COPD/Pneumonia   Does the patient have Home health ordered?  Yes   What is the Home health agency?   [facility]   Is there a DME ordered?  No   Prep survey completed?  Yes          Angely Trujillo RN

## 2020-07-29 NOTE — OUTREACH NOTE
Call Center TCM Note      Responses   [facility], [place] patient discharged from?  [place]   COVID-19 Test Status  Negative   Does the patient have one of the following disease processes/diagnoses(primary or secondary)?  COPD/Pneumonia   Was the primary reason for admission:  COPD exacerbation   TCM attempt successful?  Yes   Call start time  1150   Call end time  1205   Discharge diagnosis  Hypoxia,COPD exacerbation    Is patient permission given to speak with other caregiver?  No   Meds reviewed with patient/caregiver?  Yes   Is the patient having any side effects they believe may be caused by any medication additions or changes?  No   Does the patient have all medications ordered at discharge?  No   What is keeping the patient from filling the prescriptions?  Pre-authorization in progress [Patient states was unable to get nicotine patches and benzonatate. ]   Nursing Interventions  Nurse provided patient education [Patient states that she has already contacted provider for assistance with preauth to get meds approved. ]   Is the patient taking all medications as directed (includes completed medication regime)?  -- [Taking all prescribed meds as ordered except the 2 she was unable to get yet. (nicotine patches and benzonatate.).]   Does the patient have a primary care provider?   Yes   Does the patient have an appointment with their PCP or pulmonologist within 7 days of discharge?  Greater than 7 days   Comments regarding PCP  PCP ROBERTO Aguilar. Wednesday Aug 5, 2020 8:30 AM   Nursing Interventions  Verified appointment date/time/provider   Has the patient kept scheduled appointments due by today?  N/A   What is the Home health agency?   [facility]   Has home health visited the patient within 72 hours of discharge?  Call prior to 72 hours   What DME was ordered?  Home O2, nebulizer and pulse oximeter.    Has all DME been delivered?  Yes   Pulse Ox monitoring  Intermittent   Pulse Ox device source  Patient   O2 Sat  comments  Patient reports home O2 91% on 1L.    O2 Sat: education provided  Sat levels, Monitoring frequency, When to seek care   O2 Sat education comments  Advised patient to use nebulizer as prescribed (has not used yet this am) and do deep breathing exercises.    Psychosocial issues?  No   Is the patient able to teach back COPD zones?  No   Nursing interventions  Education provided on various zones   Patient reports what zone on this call?  Yellow Zone   Green Zone  Reports doing well, Breathing without shortness of breath, Usual activity and exercise level   Yellow Zone  Increased shortness of air, Unable to complete daily activities, Using quick relief inhaler/nebulizer more often, Increased or thicker phlegm/mucus   Yellow interventions  Call provider immediatly if symptoms do not improve, Get plenty of rest, Do not smoke, Use other meds such as steroids or antibiotics as ordered, Use oxygen as ordered, Use quick relief inhaler as ordered, Continue to use daily medications   TCM call completed?  Yes          Diana Wynn RN    7/29/2020, 12:05

## 2020-07-29 NOTE — OUTREACH NOTE
Call Center TCM Note      Responses   [facility] [place] [facility]?  [place]   COVID-19 Test Status  Negative   Does the patient have one of the following disease processes/diagnoses(primary or secondary)?  COPD/Pneumonia   Was the primary reason for admission:  COPD exacerbation   TCM attempt successful?  No   Unsuccessful attempts  Attempt 1          Diana Wynn RN    7/29/2020, 10:23

## 2020-07-29 NOTE — TELEPHONE ENCOUNTER
We don't do PA on meds we did not order. I can resubmit nicotine patches, otherwise she will need to get in touch with prescribing provider.

## 2020-07-29 NOTE — TELEPHONE ENCOUNTER
"    Reason for Disposition  • [1] Caller requesting a NON-URGENT new prescription or refill AND [2] triager unable to refill per unit policy    Additional Information  • Negative: Drug overdose and triager unable to answer question  • Negative: Caller requesting information unrelated to medicine  • Negative: Caller requesting a prescription for Strep throat and has a positive culture result  • Negative: Rash while taking a medication or within 3 days of stopping it  • Negative: Immunization reaction suspected  • Negative: [1] Asthma and [2] having symptoms of asthma (cough, wheezing, etc.)  • Negative: [1] Influenza symptoms AND [2] anti-viral med prescription request, such as Tamiflu  • Negative: [1] Symptom of illness (e.g., headache, abdominal pain, earache, vomiting) AND [2] more than mild  • Negative: MORE THAN A DOUBLE DOSE of a prescription or over-the-counter (OTC) drug  • Negative: [1] DOUBLE DOSE (an extra dose or lesser amount) of over-the-counter (OTC) drug AND [2] any symptoms (e.g., dizziness, nausea, pain, sleepiness)  • Negative: [1] DOUBLE DOSE (an extra dose or lesser amount) of prescription drug AND [2] any symptoms (e.g., dizziness, nausea, pain, sleepiness)  • Negative: Took another person's prescription drug  • Negative: [1] DOUBLE DOSE (an extra dose or lesser amount) of prescription drug AND [2] NO symptoms (Exception: a double dose of antibiotics)  • Negative: Diabetes drug error or overdose (e.g., took wrong type of insulin or took extra dose)  • Negative: [1] Request for URGENT new prescription or refill of \"essential\" medication (i.e., likelihood of harm to patient if not taken) AND [2] triager unable to fill per unit policy  • Negative: [1] Prescription not at pharmacy AND [2] was prescribed by PCP recently  • Negative: [1] Pharmacy calling with prescription questions AND [2] triager unable to answer question  • Negative: [1] Caller has URGENT medication question about med that PCP or " "specialist prescribed AND [2] triager unable to answer question  • Negative: [1] Caller has NON-URGENT medication question about med that PCP prescribed AND [2] triager unable to answer question  • Negative: [1] Caller has medication question about med not prescribed by PCP AND [2] triager unable to answer question (e.g., compatibility with other med, storage)    Answer Assessment - Initial Assessment Questions  1.   NAME of MEDICATION: \"What medicine are you calling about?\"  Nicotine patches and Tessalon  2.   QUESTION: \"What is your question?\"  Needing pre authorization. She spoke with her PCP office and since they did not order would not pre authorize.  3.   PRESCRIBING HCP: \"Who prescribed it?\" Reason: if prescribed by specialist, call should be referred to that group.      *No Answer*  4. SYMPTOMS: \"Do you have any symptoms?\"      *No Answer*  5. SEVERITY: If symptoms are present, ask \"Are they mild, moderate or severe?\"      *No Answer*  6.  PREGNANCY:  \"Is there any chance that you are pregnant?\" \"When was your last menstrual period?\"      Explained to patient a note would be sent to case management for assistance wiith issue. Explained case  managment has 24 hours to respond to issue.    Protocols used: MEDICATION QUESTION CALL-ADULT-      "

## 2020-07-30 NOTE — TELEPHONE ENCOUNTER
Loreta Robertson [phone]  Spoke to pt, advised of clinical message. Pt is in agreement with plan. Pt states she was contacted by the insurance via phone, and was advised the medication was denied. Pt states she's submitted an appeal with the insurance and they will fax the determination to our office. Pt is in agreement with plan, and confirmed she has a follow up visit next week on 08/05/2020. Good verbal understanding.

## 2020-07-30 NOTE — TELEPHONE ENCOUNTER
Please call pt and let her know nicotine patches denied. She may want to contact her insurance to see if there is any additional request or paperwork we can fill out for her, as we have done everything we can on our end to try to get this approved by insurance.

## 2020-07-31 NOTE — OUTREACH NOTE
Case Management Call Center Follow-up      Responses   [facility] Call Center Tracking Reason?  Prior Auth   Has the Call Center Case Management Follow-up issue been resolved?  Yes   Follow-up Comments  According to chart PCP did complete prior auth and request was denied. Pt was made aware by PCP.           MOON Carrillo    7/31/2020, 07:07

## 2020-08-05 ENCOUNTER — READMISSION MANAGEMENT (OUTPATIENT)
Dept: CALL CENTER | Facility: HOSPITAL | Age: 50
End: 2020-08-05

## 2020-08-05 ENCOUNTER — TELEPHONE (OUTPATIENT)
Dept: INTERNAL MEDICINE | Facility: CLINIC | Age: 50
End: 2020-08-05

## 2020-08-05 NOTE — TELEPHONE ENCOUNTER
PATIENT CALLED AND STATED THAT SHE WAS RUNNING A LOW-GRADE FEVER WHICH IS WHY SHE MISSED HER APPOINTMENT.  SHE NEEDS TO RESCHEDULE.    PLEASE CONTACT PATIENT TO ADVISE.  CALLBACK:  [phone]

## 2020-08-05 NOTE — OUTREACH NOTE
COPD/PN Week 2 Survey      Responses   [facility] patient discharged from?  [place]   COVID-19 Test Status  Negative   Does the patient have one of the following disease processes/diagnoses(primary or secondary)?  COPD/Pneumonia   Was the primary reason for admission:  COPD exacerbation   Week 2 attempt successful?  Yes   Call start time  1611   Call end time  1623   Meds reviewed with patient/caregiver?  Yes   Is the patient having any side effects they believe may be caused by any medication additions or changes?  No   Does the patient have all medications ordered at discharge?  Yes   Is the patient taking all medications as directed (includes completed medication regime)?  Yes   Does the patient have a primary care provider?   Yes   Has the patient kept scheduled appointments due by today?  N/A   What is the Home health agency?   [facility]   Has home health visited the patient within 72 hours of discharge?  Yes   Pulse Ox monitoring  Intermittent   Pulse Ox device source  Patient   O2 Sat comments  90-93%   Psychosocial issues?  No   Did the patient receive a copy of their discharge instructions?  Yes   Nursing interventions  Reviewed instructions with patient   What is the patient's perception of their health status since discharge?  Improving   Nursing Interventions  Nurse provided patient education   Is the patient/caregiver able to teach back the hierarchy of who to call/visit for symptoms/problems? PCP, Specialist, Home health nurse, Urgent Care, ED, 911  Yes   Additional teach back comments  pt states having severe pain and cramping in lower extremities in last few days, partially relieved with narc pain meds, ankles still cramping up, has been using heating pad with good results, advised pt to call MD today to discuss as well as making sure taking in adequate fluids, pt agreed with plan will call now   Is the patient able to teach back COPD zones?  Yes   Nursing interventions  Education provided on  various zones   Patient reports what zone on this call?  Green Zone   Green Zone  Reports doing well, Breathing without shortness of breath, Usual activity and exercise level, Usual amount of phlegm/mucus without difficulty coughing up, Sleeping well, Appetite is good [has cramping pain in lower extremities, unrelated to breathing]   Green Zone interventions:  Take daily medications, Use oxygen as prescribed, Avoid indoor/outdoor triggers   Week 2 call completed?  Yes          Opal Moody RN

## 2020-08-05 NOTE — TELEPHONE ENCOUNTER
OK, thanks.     Jadyn- please reach out and see if she needs anything else from us at this time. She is already scheduled for next week.

## 2020-08-06 NOTE — TELEPHONE ENCOUNTER
Loreta Roebrtson [phone]  Spoke to pt, advised of clinical message listed below. Pt states she feeling a little better today. Pt states she doesn't need anything from us. Confirmed her OV for 08/13/2020. Advised if anything changed before her appointment next week, please don't hesitate to give us a call back. Good verbal understanding. Closing call.   BRITNEY

## 2020-08-13 ENCOUNTER — OFFICE VISIT (OUTPATIENT)
Dept: INTERNAL MEDICINE | Facility: CLINIC | Age: 50
End: 2020-08-13

## 2020-08-13 VITALS
TEMPERATURE: 98.9 F | WEIGHT: 293 LBS | DIASTOLIC BLOOD PRESSURE: 74 MMHG | RESPIRATION RATE: 20 BRPM | HEART RATE: 113 BPM | SYSTOLIC BLOOD PRESSURE: 128 MMHG | OXYGEN SATURATION: 95 % | BODY MASS INDEX: 55.91 KG/M2

## 2020-08-13 DIAGNOSIS — R06.02 SHORTNESS OF BREATH: ICD-10-CM

## 2020-08-13 DIAGNOSIS — R73.02 IMPAIRED GLUCOSE TOLERANCE: ICD-10-CM

## 2020-08-13 DIAGNOSIS — I10 ESSENTIAL HYPERTENSION: ICD-10-CM

## 2020-08-13 DIAGNOSIS — E87.6 HYPOKALEMIA: ICD-10-CM

## 2020-08-13 DIAGNOSIS — Z72.0 TOBACCO USE: ICD-10-CM

## 2020-08-13 DIAGNOSIS — Z09 HOSPITAL DISCHARGE FOLLOW-UP: Primary | ICD-10-CM

## 2020-08-13 PROBLEM — E11.9 TYPE 2 DIABETES MELLITUS: Status: RESOLVED | Noted: 2020-07-23 | Resolved: 2020-08-13

## 2020-08-13 PROCEDURE — 99213 OFFICE O/P EST LOW 20 MIN: CPT | Performed by: PHYSICIAN ASSISTANT

## 2020-08-13 PROCEDURE — 36415 COLL VENOUS BLD VENIPUNCTURE: CPT | Performed by: PHYSICIAN ASSISTANT

## 2020-08-14 ENCOUNTER — TELEPHONE (OUTPATIENT)
Dept: INTERNAL MEDICINE | Facility: CLINIC | Age: 50
End: 2020-08-14

## 2020-08-14 LAB
BUN SERPL-MCNC: 8 MG/DL (ref 6–20)
BUN/CREAT SERPL: 11 (ref 7–25)
CALCIUM SERPL-MCNC: 9.3 MG/DL (ref 8.6–10.5)
CHLORIDE SERPL-SCNC: 99 MMOL/L (ref 98–107)
CO2 SERPL-SCNC: 31.1 MMOL/L (ref 22–29)
CREAT SERPL-MCNC: 0.73 MG/DL (ref 0.57–1)
GLUCOSE SERPL-MCNC: 198 MG/DL (ref 65–99)
POTASSIUM SERPL-SCNC: 4.1 MMOL/L (ref 3.5–5.2)
SODIUM SERPL-SCNC: 141 MMOL/L (ref 136–145)

## 2020-08-14 NOTE — TELEPHONE ENCOUNTER
MAY FROM [facility] STATES THAT PT DID NOT SLEEP WITH OXYGEN LAST NIGHT AND HER SATURATION WAS IN THE 80'S AND 90'S.    PT'S NAIL BEDS WAS BLUE.  PT IS FEELING OK. VITALS ARE GOOD CONCERNED ABOUT THE LOW SATURATION.    PLEASE ADVISE  MAY  [phone]

## 2020-08-14 NOTE — TELEPHONE ENCOUNTER
Loreta Robertson [phone]  Spoke to pt, advised of clinical message. Pt is in agreement with plan. Pt states her O2 has gotten back up to 90% and 91%. Pt states she just had sores around her ears last night, so she wanted to see how she did without the oxygen, and by the time home health nurse got there her o2 stats were at 71%. Good verbal understanding.   BRITNEY.

## 2020-08-14 NOTE — TELEPHONE ENCOUNTER
Noted. Please advise that she needs to sleep with O2 on regardless. She is scheduled with pulmonology + PFTs early next week, and they will address this further to see if she requires additional intervention.

## 2020-08-26 ENCOUNTER — TELEPHONE (OUTPATIENT)
Dept: INTERNAL MEDICINE | Facility: CLINIC | Age: 50
End: 2020-08-26

## 2020-08-26 DIAGNOSIS — R11.2 NAUSEA AND VOMITING, INTRACTABILITY OF VOMITING NOT SPECIFIED, UNSPECIFIED VOMITING TYPE: Primary | ICD-10-CM

## 2020-08-26 NOTE — TELEPHONE ENCOUNTER
PT CALLED STATING THAT SHE IS HAVING VOMITING AND DIARRHEA AT LEAST ONCE A WEEK FOR 3 WEEKS. PT STATED THAT THE DIARRHEA IS NOTHING BUT WATER.     PT CONTACT [phone]     PLEASE ADVISE

## 2020-08-27 NOTE — TELEPHONE ENCOUNTER
Loreta Robertson [phone]  Spoke to pt, she confirmed she has been vomiting bile,  x3 episodes  daily, for the past x4 days. Pt's symptoms are worse at night.The diarrhea is watery, no blood, no mucus. Abdominal pain, pain level at 6, worse at bedtime. Pt hasn't taken anything OTC. Advised I will send this information to [facility] to confirm, once a determination has been made, I will contact the pt to advise. Pt is in agreement with plan. Good verbal understanding.   Please advise?

## 2020-08-27 NOTE — TELEPHONE ENCOUNTER
Please call pt- how many episodes of vomiting/ how often? Has she tried anything for vomiting or diarrhea? Any mucous in stools/ dark stools/ blood in stool? Will advise once we get more details.

## 2020-08-28 RX ORDER — PROMETHAZINE HYDROCHLORIDE 25 MG/1
25 TABLET ORAL EVERY 8 HOURS PRN
Qty: 20 TABLET | Refills: 0 | Status: SHIPPED | OUTPATIENT
Start: 2020-08-28 | End: 2020-09-14 | Stop reason: SDUPTHER

## 2020-08-28 NOTE — TELEPHONE ENCOUNTER
I have sent in phenergan for her vomiting. For diarrhea, may try imodium for a few days. If diarrhea persists, would advise she be seen in office for evaluation and possible stool studies, as she did recently finish an Abx and we would want to rule out CDiff.

## 2020-08-31 ENCOUNTER — OFFICE VISIT (OUTPATIENT)
Dept: INTERNAL MEDICINE | Facility: CLINIC | Age: 50
End: 2020-08-31

## 2020-08-31 VITALS
HEART RATE: 90 BPM | DIASTOLIC BLOOD PRESSURE: 82 MMHG | SYSTOLIC BLOOD PRESSURE: 132 MMHG | RESPIRATION RATE: 20 BRPM | BODY MASS INDEX: 55.91 KG/M2 | OXYGEN SATURATION: 94 % | TEMPERATURE: 98.2 F | HEIGHT: 66 IN

## 2020-08-31 DIAGNOSIS — R19.7 DIARRHEA OF PRESUMED INFECTIOUS ORIGIN: Primary | ICD-10-CM

## 2020-08-31 PROCEDURE — 99213 OFFICE O/P EST LOW 20 MIN: CPT | Performed by: PHYSICIAN ASSISTANT

## 2020-08-31 RX ORDER — HYDROCODONE BITARTRATE AND ACETAMINOPHEN 5; 325 MG/1; MG/1
TABLET ORAL
COMMUNITY
Start: 2020-07-09 | End: 2021-01-26 | Stop reason: SDDI

## 2020-09-02 ENCOUNTER — LAB (OUTPATIENT)
Dept: INTERNAL MEDICINE | Facility: CLINIC | Age: 50
End: 2020-09-02

## 2020-09-02 ENCOUNTER — LAB (OUTPATIENT)
Dept: PULMONOLOGY | Facility: CLINIC | Age: 50
End: 2020-09-02

## 2020-09-02 DIAGNOSIS — R19.7 DIARRHEA OF PRESUMED INFECTIOUS ORIGIN: ICD-10-CM

## 2020-09-02 DIAGNOSIS — Z01.812 BLOOD TESTS PRIOR TO TREATMENT OR PROCEDURE: Primary | ICD-10-CM

## 2020-09-02 PROCEDURE — 99000 SPECIMEN HANDLING OFFICE-LAB: CPT | Performed by: INTERNAL MEDICINE

## 2020-09-02 PROCEDURE — U0004 COV-19 TEST NON-CDC HGH THRU: HCPCS | Performed by: INTERNAL MEDICINE

## 2020-09-03 LAB
C DIFF TOX GENS STL QL NAA+PROBE: NEGATIVE
REF LAB TEST METHOD: NORMAL
SARS-COV-2 RNA RESP QL NAA+PROBE: NORMAL

## 2020-09-08 LAB
ADV 40+41 DNA STL QL NAA+NON-PROBE: NOT DETECTED
ASTRO TYP 1-8 RNA STL QL NAA+NON-PROBE: NOT DETECTED
C CAYETANENSIS DNA STL QL NAA+NON-PROBE: NOT DETECTED
C COLI+JEJ+UPSA DNA STL QL NAA+NON-PROBE: NOT DETECTED
C DIF TOX TCDA+TCDB STL QL NAA+NON-PROBE: NOT DETECTED
CRYPTOSP DNA STL QL NAA+NON-PROBE: NOT DETECTED
E COLI O157 DNA STL QL NAA+NON-PROBE: NORMAL
E HISTOLYT DNA STL QL NAA+NON-PROBE: NOT DETECTED
EAEC PAA PLAS AGGR+AATA ST NAA+NON-PRB: NOT DETECTED
EC STX1+STX2 GENES STL QL NAA+NON-PROBE: NOT DETECTED
EPEC EAE GENE STL QL NAA+NON-PROBE: NOT DETECTED
ETEC LTA+ST1A+ST1B TOX ST NAA+NON-PROBE: NOT DETECTED
G LAMBLIA DNA STL QL NAA+NON-PROBE: NOT DETECTED
Lab: NORMAL
NOROVIRUS GI+II RNA STL QL NAA+NON-PROBE: NOT DETECTED
O+P STL CONC: NORMAL
O+P STL MICRO: NORMAL
P SHIGELLOIDES DNA STL QL NAA+NON-PROBE: NOT DETECTED
RVA RNA STL QL NAA+NON-PROBE: NOT DETECTED
S ENT+BONG DNA STL QL NAA+NON-PROBE: NOT DETECTED
SAPO I+II+IV+V RNA STL QL NAA+NON-PROBE: NOT DETECTED
SHIGELLA SP+EIEC IPAH ST NAA+NON-PROBE: NOT DETECTED
V CHOL+PARA+VUL DNA STL QL NAA+NON-PROBE: NOT DETECTED
V CHOLERAE DNA STL QL NAA+NON-PROBE: NOT DETECTED
Y ENTEROCOL DNA STL QL NAA+NON-PROBE: NOT DETECTED

## 2020-09-10 ENCOUNTER — TELEPHONE (OUTPATIENT)
Dept: INTERNAL MEDICINE | Facility: CLINIC | Age: 50
End: 2020-09-10

## 2020-09-10 DIAGNOSIS — R19.7 DIARRHEA, UNSPECIFIED TYPE: Primary | ICD-10-CM

## 2020-09-10 NOTE — TELEPHONE ENCOUNTER
Loreta Robertson [phone]  Spoke to pt, advised of clinical message. Pt states she's tried Imodium in the past w/o any relief. Pt states she hasn't seen a gastroenterologist, and she will be willing to F/U with speciality if PCP if indicates so. Advised I will send message to PCP to confirm.     Please advise regarding referral to GI?

## 2020-09-10 NOTE — TELEPHONE ENCOUNTER
Please call pt- her stool samples were negative. Since we are not working with an infectious process, she may try imodium PRN for flares in diarrhea. Only take when needed so her body has time to readjust. No other tx indicated at this time.

## 2020-09-11 NOTE — TELEPHONE ENCOUNTER
LVM informing the patient that her referral has been placed, office number was provided in case of any questions

## 2020-09-14 DIAGNOSIS — R11.2 NAUSEA AND VOMITING, INTRACTABILITY OF VOMITING NOT SPECIFIED, UNSPECIFIED VOMITING TYPE: ICD-10-CM

## 2020-09-14 RX ORDER — PROMETHAZINE HYDROCHLORIDE 25 MG/1
25 TABLET ORAL EVERY 8 HOURS PRN
Qty: 20 TABLET | Refills: 0 | Status: SHIPPED | OUTPATIENT
Start: 2020-09-14 | End: 2020-11-05 | Stop reason: SDUPTHER

## 2020-09-23 DIAGNOSIS — Z01.812 BLOOD TESTS PRIOR TO TREATMENT OR PROCEDURE: Primary | ICD-10-CM

## 2020-09-25 ENCOUNTER — LAB (OUTPATIENT)
Dept: PULMONOLOGY | Facility: CLINIC | Age: 50
End: 2020-09-25

## 2020-09-25 DIAGNOSIS — Z01.812 BLOOD TESTS PRIOR TO TREATMENT OR PROCEDURE: ICD-10-CM

## 2020-09-25 PROCEDURE — U0004 COV-19 TEST NON-CDC HGH THRU: HCPCS | Performed by: INTERNAL MEDICINE

## 2020-09-25 PROCEDURE — 99000 SPECIMEN HANDLING OFFICE-LAB: CPT

## 2020-09-26 LAB — SARS-COV-2 RNA NOSE QL NAA+PROBE: NOT DETECTED

## 2020-10-09 ENCOUNTER — TELEPHONE (OUTPATIENT)
Dept: INTERNAL MEDICINE | Facility: CLINIC | Age: 50
End: 2020-10-09

## 2020-11-05 ENCOUNTER — TELEPHONE (OUTPATIENT)
Dept: INTERNAL MEDICINE | Facility: CLINIC | Age: 50
End: 2020-11-05

## 2020-11-05 DIAGNOSIS — R11.2 NAUSEA AND VOMITING, INTRACTABILITY OF VOMITING NOT SPECIFIED, UNSPECIFIED VOMITING TYPE: ICD-10-CM

## 2020-11-05 RX ORDER — PROMETHAZINE HYDROCHLORIDE 25 MG/1
25 TABLET ORAL EVERY 8 HOURS PRN
Qty: 20 TABLET | Refills: 0 | Status: SHIPPED | OUTPATIENT
Start: 2020-11-05 | End: 2021-01-26 | Stop reason: SDDI

## 2020-11-05 NOTE — TELEPHONE ENCOUNTER
PATIENT CALLED AND STATED SHE'S BEEN HAVING A BOUT OF IBS.  SHE SAID SHE HAS BEEN THROWING UP.  WOULD LIKE TO REQUEST A PRESCRIPTION FOR PHENERGAN IF POSSIBLE.    [id number] - [place], [place] - [address] AT [address] & [address] - [phone] [facility] [phone] FX     PLEASE CONTACT  PATIENT TO ADVISE.    CALLBACK:  [phone]

## 2021-01-07 ENCOUNTER — TELEPHONE (OUTPATIENT)
Dept: INTERNAL MEDICINE | Facility: CLINIC | Age: 51
End: 2021-01-07

## 2021-01-07 NOTE — TELEPHONE ENCOUNTER
[facility] [place] [phone]  Spoke to pt, advised PCP is out of the office this week and next week, advised the first available appointment with Brenda on Monday 01/11/2020 @ 9:00 am. Pt's been scheduled to F/U for her right shoulder pain, no chest pain no SOA.  Good verbal understanding.

## 2021-01-07 NOTE — TELEPHONE ENCOUNTER
Hub staff attempted to follow warm transfer process and was unsuccessful     Caller: Loreta Robertson    Relationship to patient: Self    Best call back number: [phone]     Patient is needing: PATIENT CALLED STATING THAT SHE HAS RIGHT SHOULDER PAIN.  PATIENT WANTED TO SEE IF SHE COULD COME IN TO BE SEEN EARLIER THAN February.

## 2021-01-26 ENCOUNTER — OFFICE VISIT (OUTPATIENT)
Dept: INTERNAL MEDICINE | Facility: CLINIC | Age: 51
End: 2021-01-26

## 2021-01-26 ENCOUNTER — HOSPITAL ENCOUNTER (OUTPATIENT)
Dept: GENERAL RADIOLOGY | Facility: HOSPITAL | Age: 51
Discharge: HOME | End: 2021-01-26
Admitting: INTERNAL MEDICINE

## 2021-01-26 VITALS
SYSTOLIC BLOOD PRESSURE: 128 MMHG | HEART RATE: 81 BPM | DIASTOLIC BLOOD PRESSURE: 80 MMHG | HEIGHT: 66 IN | BODY MASS INDEX: 47.09 KG/M2 | OXYGEN SATURATION: 93 % | TEMPERATURE: 96.9 F | WEIGHT: 293 LBS | RESPIRATION RATE: 18 BRPM

## 2021-01-26 DIAGNOSIS — J44.9 CHRONIC OBSTRUCTIVE PULMONARY DISEASE, UNSPECIFIED COPD TYPE (HCC): ICD-10-CM

## 2021-01-26 DIAGNOSIS — G89.29 CHRONIC RIGHT SHOULDER PAIN: ICD-10-CM

## 2021-01-26 DIAGNOSIS — G89.29 CHRONIC RIGHT SHOULDER PAIN: Primary | ICD-10-CM

## 2021-01-26 DIAGNOSIS — M25.511 CHRONIC RIGHT SHOULDER PAIN: ICD-10-CM

## 2021-01-26 DIAGNOSIS — M25.511 CHRONIC RIGHT SHOULDER PAIN: Primary | ICD-10-CM

## 2021-01-26 PROBLEM — R06.02 SHORTNESS OF BREATH: Status: RESOLVED | Noted: 2020-02-26 | Resolved: 2021-01-26

## 2021-01-26 PROBLEM — M25.561 CHRONIC PAIN OF RIGHT KNEE: Status: RESOLVED | Noted: 2019-04-18 | Resolved: 2021-01-26

## 2021-01-26 PROCEDURE — 73030 X-RAY EXAM OF SHOULDER: CPT

## 2021-01-26 PROCEDURE — 99213 OFFICE O/P EST LOW 20 MIN: CPT | Performed by: INTERNAL MEDICINE

## 2021-01-26 RX ORDER — ALBUTEROL SULFATE 90 UG/1
2 AEROSOL, METERED RESPIRATORY (INHALATION) EVERY 4 HOURS PRN
Qty: 18 G | Refills: 0 | Status: SHIPPED | OUTPATIENT
Start: 2021-01-26 | End: 2022-05-06

## 2021-01-26 RX ORDER — MELOXICAM 7.5 MG/1
TABLET ORAL
Qty: 30 TABLET | Refills: 0 | Status: SHIPPED | OUTPATIENT
Start: 2021-01-26 | End: 2021-02-23 | Stop reason: SDUPTHER

## 2021-01-26 NOTE — ASSESSMENT & PLAN NOTE
I suspect rotator cuff disease plus/minus OA.  Given limited range of motion, difficult exam.  Start with x-ray.  Also recommend PT.  Pending the above, consider orthopedics and/or MRI.  For pain control she can take up to 2 extra strength Tylenol 3 times daily as needed.  We will also add meloxicam 7.5-15 mg daily as needed.  Do not take this with any other OTC NSAIDs.  Take with food/fluid to avoid GI upset.  Recommend heat/ice as able.

## 2021-01-26 NOTE — PROGRESS NOTES
OFFICE PROGRESS NOTE    Chief Complaint   Patient presents with   • Shoulder Pain     x2 months right shoulder pain that goes to elbow        HPI: 50 y.o. female patient of Loreta Jarrell PA-C with history of hypertension, hyperlipidemia, bipolar disorder, tobacco use here for:    2 months of atraumatic right shoulder pain radiating distally into the elbow. Can't abduct shoulder. Has tried and failed Tylenol and Ibuprofen.  No prior treatment for this and has never occurred before.    O2 90% on RA today, but improved on repeat.  Does not wear oxygen but has done so in the past.  1 PPD smoker. No new cough or SOB.  No fevers or chills.  No new sputum production.    Review of Systems   Constitutional: Negative for activity change, appetite change and fever.   HENT: Negative for congestion, sneezing and sore throat.    Eyes: Negative for discharge and visual disturbance.   Respiratory: Positive for cough (chronic) and shortness of breath (chronic).    Cardiovascular: Negative for chest pain and palpitations.   Gastrointestinal: Negative for abdominal distention, abdominal pain, blood in stool, constipation, nausea and vomiting.   Endocrine: Negative for cold intolerance and heat intolerance.   Genitourinary: Negative for dysuria.   Musculoskeletal: Positive for arthralgias. Negative for neck stiffness.   Skin: Negative for rash.   Allergic/Immunologic: Negative for environmental allergies and food allergies.   Neurological: Negative for headache.   Hematological: Negative for adenopathy.   Psychiatric/Behavioral: Negative for depressed mood.       The following portions of the patient's history were reviewed and updated as appropriate: allergies, current medications, past family history, past medical history, past social history, past surgical history and problem list.      Physical Exam:  Vitals:    01/26/21 1357 01/26/21 1414   BP: 128/80    BP Location: Right arm    Patient Position: Sitting    Cuff Size: Adult   "  Pulse: 81    Resp: 18    Temp: 96.9 °F (36.1 °C)    TempSrc: Temporal    SpO2: 90% 93%   Weight: (!) 146 kg (322 lb)    Height: 167.6 cm (65.98\")        Physical Exam  Vitals signs reviewed.   Constitutional:       General: She is not in acute distress.     Appearance: She is obese. She is not ill-appearing, toxic-appearing or diaphoretic.   HENT:      Head: Normocephalic and atraumatic.      Right Ear: External ear normal.      Left Ear: External ear normal.      Nose: Nose normal.   Eyes:      General:         Right eye: No discharge.         Left eye: No discharge.      Conjunctiva/sclera: Conjunctivae normal.   Neck:      Musculoskeletal: Normal range of motion and neck supple. No neck rigidity or muscular tenderness.   Cardiovascular:      Rate and Rhythm: Normal rate and regular rhythm.      Heart sounds: Normal heart sounds. No murmur. No friction rub. No gallop.    Pulmonary:      Effort: Pulmonary effort is normal. No respiratory distress.      Breath sounds: No stridor. Wheezing (A few scattered expiratory wheezes, improves with coughing/deep breathing.) present. No rhonchi or rales.   Abdominal:      General: Bowel sounds are normal. There is no distension.      Palpations: Abdomen is soft. There is no mass.      Tenderness: There is no abdominal tenderness. There is no guarding or rebound.   Musculoskeletal:      Right shoulder: She exhibits decreased range of motion, tenderness, bony tenderness and decreased strength (Secondary to pain.). She exhibits no effusion.      Left shoulder: Normal.      Comments: Limited right shoulder range of motion secondary to pain.  Cannot abduct arm past 90 degrees.  Significant bony tenderness over right shoulder but no clear deformity.  Difficulty participating in empty can test on the right secondary to pain.   Lymphadenopathy:      Cervical: No cervical adenopathy.   Skin:     General: Skin is warm and dry.      Capillary Refill: Capillary refill takes less than 2 " seconds.   Neurological:      General: No focal deficit present.      Mental Status: She is alert and oriented to person, place, and time.   Psychiatric:         Mood and Affect: Mood normal.            Assesment and Plan: 50 y.o. female here for:  Diagnoses and all orders for this visit:    1. Chronic right shoulder pain (Primary)  Assessment & Plan:  I suspect rotator cuff disease plus/minus OA.  Given limited range of motion, difficult exam.  Start with x-ray.  Also recommend PT.  Pending the above, consider orthopedics and/or MRI.  For pain control she can take up to 2 extra strength Tylenol 3 times daily as needed.  We will also add meloxicam 7.5-15 mg daily as needed.  Do not take this with any other OTC NSAIDs.  Take with food/fluid to avoid GI upset.  Recommend heat/ice as able.    Orders:  -     XR Shoulder 2+ View Right; Future  -     Ambulatory Referral to Physical Therapy Evaluate and treat; Full weight bearing    2. Chronic obstructive pulmonary disease, unspecified COPD type (CMS/HCC)  Assessment & Plan:  She likely has underlying COPD given extensive tobacco history.  She is not currently on inhaler therapy.  Given wheezing, recommend starting albuterol as needed but if needing on a frequent basis, worsening shortness of breath, new cough with sputum production or other concerns needs to be reevaluated.  Also overdue for follow-up with PCP and may need to consider updating PFTs.        Other orders  -     albuterol sulfate  (90 Base) MCG/ACT inhaler; Inhale 2 puffs Every 4 (Four) Hours As Needed for Wheezing or Shortness of Air.  Dispense: 18 g; Refill: 0  -     meloxicam (Mobic) 7.5 MG tablet; Take 1-2 tabs PO daily PRN pain  Dispense: 30 tablet; Refill: 0      Return for 4-6 weeks wellness visit 30 min w/ PCP Loreta Jarrell, As needed if no improvement or new symptoms.    Kathi Gutierrez MD  1/26/2021

## 2021-01-26 NOTE — ASSESSMENT & PLAN NOTE
She likely has underlying COPD given extensive tobacco history.  She is not currently on inhaler therapy.  Given wheezing, recommend starting albuterol as needed but if needing on a frequent basis, worsening shortness of breath, new cough with sputum production or other concerns needs to be reevaluated.  Also overdue for follow-up with PCP and may need to consider updating PFTs.

## 2021-01-27 ENCOUNTER — TELEPHONE (OUTPATIENT)
Dept: INTERNAL MEDICINE | Facility: CLINIC | Age: 51
End: 2021-01-27

## 2021-01-27 NOTE — TELEPHONE ENCOUNTER
Caller: Loreta Robertson    Relationship: Self    Best call back number: [phone]     Caller requesting test results: SELF    What test was performed: XRAYS    When was the test performed: 1/26/21    Where was the test performed: [facility]    Additional notes: N/A

## 2021-02-23 RX ORDER — MELOXICAM 7.5 MG/1
TABLET ORAL
Qty: 30 TABLET | Refills: 1 | Status: SHIPPED | OUTPATIENT
Start: 2021-02-23 | End: 2021-03-31

## 2021-02-23 NOTE — TELEPHONE ENCOUNTER
Caller: Loreta Robertson    Relationship: Self    Best call back number: [phone]    Medication needed:   Requested Prescriptions     Pending Prescriptions Disp Refills   • meloxicam (Mobic) 7.5 MG tablet 30 tablet 0     Sig: Take 1-2 tabs PO daily PRN pain       When do you need the refill by: ASAP    What details did the patient provide when requesting the medication: PATIENT IS OUT    Does the patient have less than a 3 day supply:  [x] Yes  [] No    What is the patient's preferred pharmacy: [id number] - [place] [address] AT [address] [facility] [address] & [address] [phone] [facility] [phone] FX

## 2021-03-31 ENCOUNTER — OFFICE VISIT (OUTPATIENT)
Dept: INTERNAL MEDICINE | Facility: CLINIC | Age: 51
End: 2021-03-31

## 2021-03-31 VITALS
BODY MASS INDEX: 50.87 KG/M2 | RESPIRATION RATE: 22 BRPM | TEMPERATURE: 98.7 F | DIASTOLIC BLOOD PRESSURE: 86 MMHG | SYSTOLIC BLOOD PRESSURE: 140 MMHG | WEIGHT: 293 LBS

## 2021-03-31 DIAGNOSIS — R35.0 URINARY FREQUENCY: Primary | ICD-10-CM

## 2021-03-31 DIAGNOSIS — M54.42 CHRONIC LEFT-SIDED LOW BACK PAIN WITH LEFT-SIDED SCIATICA: ICD-10-CM

## 2021-03-31 DIAGNOSIS — G89.29 CHRONIC LEFT-SIDED LOW BACK PAIN WITH LEFT-SIDED SCIATICA: ICD-10-CM

## 2021-03-31 DIAGNOSIS — R11.2 NON-INTRACTABLE VOMITING WITH NAUSEA, UNSPECIFIED VOMITING TYPE: ICD-10-CM

## 2021-03-31 LAB
BILIRUB BLD-MCNC: NEGATIVE MG/DL
CLARITY, POC: CLEAR
COLOR UR: ABNORMAL
GLUCOSE UR STRIP-MCNC: NEGATIVE MG/DL
KETONES UR QL: ABNORMAL
LEUKOCYTE EST, POC: NEGATIVE
NITRITE UR-MCNC: NEGATIVE MG/ML
PH UR: 5 [PH] (ref 5–8)
PROT UR STRIP-MCNC: ABNORMAL MG/DL
RBC # UR STRIP: NEGATIVE /UL
SP GR UR: 1.02 (ref 1–1.03)
UROBILINOGEN UR QL: ABNORMAL

## 2021-03-31 PROCEDURE — 99214 OFFICE O/P EST MOD 30 MIN: CPT | Performed by: PHYSICIAN ASSISTANT

## 2021-03-31 PROCEDURE — 81003 URINALYSIS AUTO W/O SCOPE: CPT | Performed by: PHYSICIAN ASSISTANT

## 2021-03-31 RX ORDER — PROMETHAZINE HYDROCHLORIDE 25 MG/1
25 TABLET ORAL EVERY 8 HOURS PRN
Qty: 20 TABLET | Refills: 0 | Status: SHIPPED | OUTPATIENT
Start: 2021-03-31 | End: 2021-05-07 | Stop reason: SDUPTHER

## 2021-03-31 RX ORDER — TIZANIDINE 4 MG/1
4 TABLET ORAL 2 TIMES DAILY PRN
Qty: 60 TABLET | Refills: 0 | Status: SHIPPED | OUTPATIENT
Start: 2021-03-31 | End: 2021-05-04

## 2021-04-06 ENCOUNTER — TELEPHONE (OUTPATIENT)
Dept: INTERNAL MEDICINE | Facility: CLINIC | Age: 51
End: 2021-04-06

## 2021-04-06 DIAGNOSIS — R82.90 ABNORMAL URINALYSIS: ICD-10-CM

## 2021-04-06 DIAGNOSIS — Z11.59 ENCOUNTER FOR HEPATITIS C SCREENING TEST FOR LOW RISK PATIENT: ICD-10-CM

## 2021-04-06 DIAGNOSIS — R79.89 ELEVATED LFTS: ICD-10-CM

## 2021-04-06 DIAGNOSIS — R73.02 IMPAIRED GLUCOSE TOLERANCE: Primary | ICD-10-CM

## 2021-04-06 NOTE — TELEPHONE ENCOUNTER
Caller: Loreta Robertson    Relationship: Self    Best call back number: [phone]    What test was performed: URINALYSIS    When was the test performed: LAST WEEK     Where was the test performed: IN OFFICE     Additional notes: PLEASE CALL PATIENT TO LET HER KNOW IF THE RESULTS OF HER TEST ARE IN YET

## 2021-04-06 NOTE — TELEPHONE ENCOUNTER
Yes, this did show protein and a by-product of liver process. I would like to check her liver and kidney function and repeat UA just to make sure we follow through with this fully, although this very well may resolve on its own. I have ordered these labs, and she may come in at her convenience for labs only.

## 2021-04-07 NOTE — TELEPHONE ENCOUNTER
[facility] [phone]  Spoke to pt, advised of clinical message. Pt's in agreement with plan to come into the office for a follow up on labs. Good verbal understanding.

## 2021-04-09 ENCOUNTER — LAB (OUTPATIENT)
Dept: INTERNAL MEDICINE | Facility: CLINIC | Age: 51
End: 2021-04-09

## 2021-04-09 DIAGNOSIS — R79.89 ELEVATED LFTS: ICD-10-CM

## 2021-04-09 DIAGNOSIS — R82.90 ABNORMAL URINALYSIS: ICD-10-CM

## 2021-04-09 DIAGNOSIS — R73.02 IMPAIRED GLUCOSE TOLERANCE: ICD-10-CM

## 2021-04-09 DIAGNOSIS — Z11.59 ENCOUNTER FOR HEPATITIS C SCREENING TEST FOR LOW RISK PATIENT: ICD-10-CM

## 2021-04-09 LAB
A/C: NORMAL
EXPIRATION DATE: NORMAL
Lab: NORMAL
POC CREATININE URINE: 100
POC MICROALBUMIN URINE: 30

## 2021-04-09 PROCEDURE — 36415 COLL VENOUS BLD VENIPUNCTURE: CPT | Performed by: PHYSICIAN ASSISTANT

## 2021-04-09 PROCEDURE — 82044 UR ALBUMIN SEMIQUANTITATIVE: CPT | Performed by: PHYSICIAN ASSISTANT

## 2021-04-10 LAB
ALBUMIN SERPL-MCNC: 3.8 G/DL (ref 3.5–5.2)
ALBUMIN/GLOB SERPL: 1.4 G/DL
ALP SERPL-CCNC: 79 U/L (ref 39–117)
ALT SERPL-CCNC: 20 U/L (ref 1–33)
APPEARANCE UR: CLEAR
AST SERPL-CCNC: 21 U/L (ref 1–32)
BACTERIA #/AREA URNS HPF: NORMAL /[HPF]
BILIRUB SERPL-MCNC: 0.4 MG/DL (ref 0–1.2)
BILIRUB UR QL STRIP: NEGATIVE
BUN SERPL-MCNC: 19 MG/DL (ref 6–20)
BUN/CREAT SERPL: 23.8 (ref 7–25)
CALCIUM SERPL-MCNC: 9.4 MG/DL (ref 8.6–10.5)
CHLORIDE SERPL-SCNC: 105 MMOL/L (ref 98–107)
CO2 SERPL-SCNC: 28 MMOL/L (ref 22–29)
COLOR UR: YELLOW
CREAT SERPL-MCNC: 0.8 MG/DL (ref 0.57–1)
EPI CELLS #/AREA URNS HPF: NORMAL /HPF (ref 0–10)
GLOBULIN SER CALC-MCNC: 2.7 GM/DL
GLUCOSE SERPL-MCNC: 91 MG/DL (ref 65–99)
GLUCOSE UR QL: NEGATIVE
HBA1C MFR BLD: 5.4 % (ref 4.8–5.6)
HCV AB S/CO SERPL IA: <0.1 S/CO RATIO (ref 0–0.9)
HGB UR QL STRIP: NEGATIVE
KETONES UR QL STRIP: ABNORMAL
LEUKOCYTE ESTERASE UR QL STRIP: NEGATIVE
MICRO URNS: ABNORMAL
MICRO URNS: ABNORMAL
MUCOUS THREADS URNS QL MICRO: PRESENT
NITRITE UR QL STRIP: NEGATIVE
PH UR STRIP: 5.5 [PH] (ref 5–7.5)
POTASSIUM SERPL-SCNC: 4.3 MMOL/L (ref 3.5–5.2)
PROT SERPL-MCNC: 6.5 G/DL (ref 6–8.5)
PROT UR QL STRIP: NEGATIVE
RBC #/AREA URNS HPF: NORMAL /HPF (ref 0–2)
SODIUM SERPL-SCNC: 143 MMOL/L (ref 136–145)
SP GR UR: 1.02 (ref 1–1.03)
URINALYSIS REFLEX: ABNORMAL
UROBILINOGEN UR STRIP-MCNC: 1 MG/DL (ref 0.2–1)
WBC #/AREA URNS HPF: NORMAL /HPF (ref 0–5)

## 2021-04-13 ENCOUNTER — TELEPHONE (OUTPATIENT)
Dept: INTERNAL MEDICINE | Facility: CLINIC | Age: 51
End: 2021-04-13

## 2021-04-14 NOTE — TELEPHONE ENCOUNTER
Loreta  [phone]  Spoke to pt, advised of clinical lab results. Pt's in agreement with plan. No further questions or concerns,good verbal understanding.

## 2021-04-14 NOTE — TELEPHONE ENCOUNTER
Please call pt and let her know the previous findings in her urine have since resolved. Her liver and kidney function is also completely normal, which is good news. A1c has also improved. All labs stable and no further workup required at this time.

## 2021-05-03 DIAGNOSIS — M54.42 CHRONIC LEFT-SIDED LOW BACK PAIN WITH LEFT-SIDED SCIATICA: ICD-10-CM

## 2021-05-03 DIAGNOSIS — G89.29 CHRONIC LEFT-SIDED LOW BACK PAIN WITH LEFT-SIDED SCIATICA: ICD-10-CM

## 2021-05-03 NOTE — TELEPHONE ENCOUNTER
Last Office Visit: 03/31/2021  Next Office Visit: none scheduled     Labs completed in past 6 months? yes  Labs completed in past year? yes    Last Refill Date: 03/31/2021  Quantity: 60   Refills: 0     Pharmacy: [id number] - [place], [place] - [address] AT [facility] [address] & [facility] - [phone]  - [phone] [address] [address] [facility] [address]   Phone:  [phone]  Fax:  [phone]    Please review pended refill request for any changes needed on refills or quantities. Thank you!

## 2021-05-04 RX ORDER — TIZANIDINE 4 MG/1
TABLET ORAL
Qty: 60 TABLET | Refills: 2 | Status: SHIPPED | OUTPATIENT
Start: 2021-05-04 | End: 2021-07-20 | Stop reason: SDUPTHER

## 2021-05-05 ENCOUNTER — TELEPHONE (OUTPATIENT)
Dept: INTERNAL MEDICINE | Facility: CLINIC | Age: 51
End: 2021-05-05

## 2021-05-05 NOTE — TELEPHONE ENCOUNTER
Spoke to pt, advised of clinical message. Pt states she hasn't heard anything from the psychiatrist, they will be out of the office until 05/10/2021. Pt requested to do a telephone visit with PCP to discuss medication. Pt has been scheduled for Friday May 7, 2021 @ 3:00 pm. Good verbal understanding.

## 2021-05-05 NOTE — TELEPHONE ENCOUNTER
Patient called and stated that she thinks her medications that she is on is causing tremmors. Patient stated that she has had them in the past but they are worse now.  She called to see what she should do.    Please advise?

## 2021-05-05 NOTE — TELEPHONE ENCOUNTER
I would recommend taking 1/2 tablet until she can schedule with either myself or her psychiatrist. We could at least see if her sx improve at lower dose, as I wouldn't want her to come off this completely. There could also be a number of other reasons for worsening tremors, but this is a reasonable first step.

## 2021-05-05 NOTE — TELEPHONE ENCOUNTER
Loreta Robertson [phone]  Spoke to pt, to confirm which medication. Pt states it's in regards to her Latuda, which is prescribed by her psychiatrist. Confirmed if pt has contact her psychiatrist regarding this medication, pt states she hasn't heard anything back from them. Pt states she's always had tremors, but they have been worsening now within the last month. Pt states the tremors are bad when she's driving, her hands start to shake. Pt's also experiencing weakness in her hands, which makes it hard to  anything. Advised I will send message to PCP, as soon as she confirms, I will return her call. Good verbal understanding.

## 2021-05-07 ENCOUNTER — OFFICE VISIT (OUTPATIENT)
Dept: INTERNAL MEDICINE | Facility: CLINIC | Age: 51
End: 2021-05-07

## 2021-05-07 DIAGNOSIS — R25.1 TREMOR OF BOTH HANDS: ICD-10-CM

## 2021-05-07 DIAGNOSIS — F31.11 BIPOLAR 1 DISORDER, MANIC, MILD (HCC): Primary | ICD-10-CM

## 2021-05-07 DIAGNOSIS — R11.2 NON-INTRACTABLE VOMITING WITH NAUSEA, UNSPECIFIED VOMITING TYPE: ICD-10-CM

## 2021-05-07 PROCEDURE — 99442 PR PHYS/QHP TELEPHONE EVALUATION 11-20 MIN: CPT | Performed by: PHYSICIAN ASSISTANT

## 2021-05-07 RX ORDER — PROMETHAZINE HYDROCHLORIDE 25 MG/1
25 TABLET ORAL EVERY 8 HOURS PRN
Qty: 20 TABLET | Refills: 0 | Status: SHIPPED | OUTPATIENT
Start: 2021-05-07 | End: 2021-07-13 | Stop reason: SDUPTHER

## 2021-05-07 RX ORDER — QUETIAPINE FUMARATE 300 MG/1
300 TABLET, FILM COATED ORAL NIGHTLY
COMMUNITY
End: 2021-09-16

## 2021-07-09 ENCOUNTER — OFFICE VISIT (OUTPATIENT)
Dept: INTERNAL MEDICINE | Facility: CLINIC | Age: 51
End: 2021-07-09

## 2021-07-09 VITALS
BODY MASS INDEX: 52.97 KG/M2 | SYSTOLIC BLOOD PRESSURE: 122 MMHG | HEART RATE: 80 BPM | DIASTOLIC BLOOD PRESSURE: 84 MMHG | WEIGHT: 293 LBS | OXYGEN SATURATION: 93 % | TEMPERATURE: 97.7 F

## 2021-07-09 DIAGNOSIS — M25.562 PAIN IN BOTH KNEES, UNSPECIFIED CHRONICITY: Primary | ICD-10-CM

## 2021-07-09 DIAGNOSIS — M54.50 CHRONIC LEFT-SIDED LOW BACK PAIN WITHOUT SCIATICA: ICD-10-CM

## 2021-07-09 DIAGNOSIS — M25.561 PAIN IN BOTH KNEES, UNSPECIFIED CHRONICITY: Primary | ICD-10-CM

## 2021-07-09 DIAGNOSIS — M25.511 ACUTE PAIN OF RIGHT SHOULDER: ICD-10-CM

## 2021-07-09 DIAGNOSIS — G89.29 CHRONIC LEFT-SIDED LOW BACK PAIN WITHOUT SCIATICA: ICD-10-CM

## 2021-07-09 DIAGNOSIS — R11.0 NAUSEA: ICD-10-CM

## 2021-07-09 PROCEDURE — 99214 OFFICE O/P EST MOD 30 MIN: CPT | Performed by: NURSE PRACTITIONER

## 2021-07-09 RX ORDER — PROMETHAZINE HYDROCHLORIDE 25 MG/1
25 TABLET ORAL EVERY 8 HOURS PRN
Qty: 20 TABLET | Refills: 0 | Status: CANCELLED | OUTPATIENT
Start: 2021-07-09

## 2021-07-09 RX ORDER — OMEPRAZOLE 20 MG/1
20 CAPSULE, DELAYED RELEASE ORAL DAILY
Qty: 30 CAPSULE | Refills: 2 | Status: SHIPPED | OUTPATIENT
Start: 2021-07-09 | End: 2021-09-16

## 2021-07-09 RX ORDER — METHYLPREDNISOLONE 4 MG/1
TABLET ORAL
Qty: 1 EACH | Refills: 0 | Status: SHIPPED | OUTPATIENT
Start: 2021-07-09 | End: 2021-09-16

## 2021-07-09 RX ORDER — QUETIAPINE FUMARATE 100 MG/1
TABLET, FILM COATED ORAL
COMMUNITY
Start: 2021-06-30 | End: 2021-09-16

## 2021-07-09 NOTE — PROGRESS NOTES
Chief Complaint  Back Pain, Knee Pain (both), and Shoulder Pain (Rt)    Subjective          Loreta Platt States presents to [facility]  History of Present Illness    Patient comes to clinic today with complaints of knee pain shoulder pain back pain.  The patient is new to me in a patient of Mario Jarrell.          I reviewed the patient's chart I reviewed her x-rays as well as her MRI noted below.  Patient also has a positive drug screen for Lyrica I do not see she is had in the past- she states she has not ever taken this med.       1. B knee pain  She has had at least 13 shots in knees did not help. No new trauma wants to see Dr Connie caldwell at  for possible surgery. Knee pain R>L 10 of 1-10      2. R shoulder  No injury to R shoulder 6 months ago no treatment the pain goes into her elbow.   Pain is a 1-10 a 7. Report does not take tramadol zanaflex motrin advil or tylenol. Medrol does not help.    3.back pain-  She feels low left sided back pain to L buttocks - took zanaflex - did not help.  Feels this is from knees.     4. Nausea for 2 months and would like phenergan.  BM qod.  Has not taken PPI or pepcid for N. Back pain is a 7 of 0-10      R knee MRI-  1. No significant interval change.  2. Moderate suprapatellar effusion.  3. Tricompartmental arthrosis.   6/13/2018 1:22        1/20 R knee-Stable tricompartmental degenerative change most severe in the medial compartment as compared to MRI 6/13/2018. No definite joint effusion or fracture.   1/20 l knee-   1/29/2020 2:32 PM      Reques Phenergan for??      Review of systems no headache dizziness passing out chest pain shortness of breath swelling abdominal to symptoms no change in bowel bladder habits no new lumps or bumps or rashes positive for joint pain as noted above    Objective   Vital Signs:   /84 (BP Location: Right arm, Patient Position: Sitting, Cuff Size: Adult)   Pulse 80   Temp 97.7 °F (36.5  °C) (Temporal)   Wt (!) 149 kg (328 lb)   SpO2 93%   BMI 52.97 kg/m²     Physical Exam  Vitals and nursing note reviewed.   Constitutional:       Appearance: She is well-developed.   HENT:      Head: Normocephalic and atraumatic.      Right Ear: External ear normal.      Left Ear: External ear normal.      Nose: Nose normal.   Neck:      Thyroid: No thyromegaly.   Cardiovascular:      Rate and Rhythm: Normal rate and regular rhythm.   Pulmonary:      Effort: Pulmonary effort is normal.      Breath sounds: Normal breath sounds.   Abdominal:      General: Bowel sounds are normal. There is no distension.      Palpations: Abdomen is soft.      Tenderness: There is no abdominal tenderness.   Musculoskeletal:      Comments: Neg vagus varus neg anterior posterior drawer sign.  No swelling B knees.    R should - difficulty lifting RUE above shoulder level.  No erythema warmth ttp of R AC joint.    Unable to do heel toe walk of straight leg.  Low back without erythema edema or point tender.  dtr2+ and muscle strength 5/5 in all groups.    Lymphadenopathy:      Cervical: No cervical adenopathy.   Skin:     Capillary Refill: Capillary refill takes 2 to 3 seconds.   Neurological:      Mental Status: She is alert and oriented to person, place, and time.   Psychiatric:         Behavior: Behavior normal.        Result Review :                 Assessment and Plan    Diagnoses and all orders for this visit:    1. Pain in both knees, unspecified chronicity (Primary)  -     Ambulatory Referral to Orthopedic Surgery    2. Nausea  -     omeprazole (PrilOSEC) 20 MG capsule; Take 1 capsule by mouth Daily.  Dispense: 30 capsule; Refill: 2  -     methylPREDNISolone (MEDROL) 4 MG dose pack; Take as directed on package instructions.  Dispense: 1 each; Refill: 0    3. Chronic left-sided low back pain without sciatica  -     Ambulatory Referral to Pain Management  -     Ambulatory Referral to Physical Therapy Evaluate and treat  -      methylPREDNISolone (MEDROL) 4 MG dose pack; Take as directed on package instructions.  Dispense: 1 each; Refill: 0  -     XR Spine Lumbar Complete 4+VW; Future    4. Acute pain of right shoulder  -     Ambulatory Referral to Physical Therapy Evaluate and treat  -     methylPREDNISolone (MEDROL) 4 MG dose pack; Take as directed on package instructions.  Dispense: 1 each; Refill: 0  -     XR Shoulder 2+ View Right; Future    Other orders  -     Cancel: promethazine (PHENERGAN) 25 MG tablet; Take 1 tablet by mouth Every 8 (Eight) Hours As Needed for Nausea or Vomiting.  Dispense: 20 tablet; Refill: 0      Start PPI today. Message to pts provider regarding use of phenergan.  Strongly encouraged weight loss   Follow Up   Return if symptoms worsen or fail to improve.  Patient was given instructions and counseling regarding her condition or for health maintenance advice. Please see specific information pulled into the AVS if appropriate.     RTC/call  If symptoms worsen  Meds MOA and SE's reviewed and pt v/u

## 2021-07-13 DIAGNOSIS — R11.2 NON-INTRACTABLE VOMITING WITH NAUSEA, UNSPECIFIED VOMITING TYPE: ICD-10-CM

## 2021-07-13 RX ORDER — PROMETHAZINE HYDROCHLORIDE 25 MG/1
25 TABLET ORAL EVERY 8 HOURS PRN
Qty: 20 TABLET | Refills: 0 | Status: SHIPPED | OUTPATIENT
Start: 2021-07-13 | End: 2021-08-31

## 2021-07-20 ENCOUNTER — HOSPITAL ENCOUNTER (OUTPATIENT)
Dept: GENERAL RADIOLOGY | Facility: HOSPITAL | Age: 51
Discharge: HOME | End: 2021-07-20
Admitting: NURSE PRACTITIONER

## 2021-07-20 DIAGNOSIS — G89.29 CHRONIC LEFT-SIDED LOW BACK PAIN WITH LEFT-SIDED SCIATICA: ICD-10-CM

## 2021-07-20 DIAGNOSIS — M25.562 PAIN IN BOTH KNEES, UNSPECIFIED CHRONICITY: ICD-10-CM

## 2021-07-20 DIAGNOSIS — M25.561 PAIN IN BOTH KNEES, UNSPECIFIED CHRONICITY: ICD-10-CM

## 2021-07-20 DIAGNOSIS — M25.561 PAIN IN BOTH KNEES, UNSPECIFIED CHRONICITY: Primary | ICD-10-CM

## 2021-07-20 DIAGNOSIS — M54.42 CHRONIC LEFT-SIDED LOW BACK PAIN WITH LEFT-SIDED SCIATICA: ICD-10-CM

## 2021-07-20 DIAGNOSIS — M54.50 CHRONIC LEFT-SIDED LOW BACK PAIN WITHOUT SCIATICA: ICD-10-CM

## 2021-07-20 DIAGNOSIS — G89.29 CHRONIC LEFT-SIDED LOW BACK PAIN WITHOUT SCIATICA: ICD-10-CM

## 2021-07-20 DIAGNOSIS — M25.562 PAIN IN BOTH KNEES, UNSPECIFIED CHRONICITY: Primary | ICD-10-CM

## 2021-07-20 DIAGNOSIS — M25.511 ACUTE PAIN OF RIGHT SHOULDER: ICD-10-CM

## 2021-07-20 PROCEDURE — 73560 X-RAY EXAM OF KNEE 1 OR 2: CPT

## 2021-07-20 PROCEDURE — 72110 X-RAY EXAM L-2 SPINE 4/>VWS: CPT

## 2021-07-20 PROCEDURE — 73030 X-RAY EXAM OF SHOULDER: CPT

## 2021-07-21 RX ORDER — TIZANIDINE 4 MG/1
4 TABLET ORAL 2 TIMES DAILY PRN
Qty: 60 TABLET | Refills: 2 | Status: SHIPPED | OUTPATIENT
Start: 2021-07-21 | End: 2021-08-30

## 2021-07-21 NOTE — TELEPHONE ENCOUNTER
LOV 05/07/2021  NOV Not scheduled yet      [facility] [place]    HUB please inform patient    Patient is due for a follow up.  Please advise patient needs to schedule and keep appointment to continue to receive refills in the future.  If  is unable to keep  appointment we will not be able to provider further refills.  Once appointment has been scheduled please update message with date and time so we can process the request.  We will forward the message to the provider to review the refill request.

## 2021-07-22 ENCOUNTER — TELEPHONE (OUTPATIENT)
Dept: INTERNAL MEDICINE | Facility: CLINIC | Age: 51
End: 2021-07-22

## 2021-07-22 NOTE — TELEPHONE ENCOUNTER
----- Message from ROSALES Clarke sent at 7/22/2021 12:38 PM EDT -----  Please let the patient know her bilateral knee x-ray results-she has mild to moderate changes and arthritis noted in the right knee with mild changes of arthritis noted in the left knee.  Please have her continue current plan as discussed in office.

## 2021-07-22 NOTE — TELEPHONE ENCOUNTER
PATIENT CALLED TO RETURN THE CALL FOR XAVIER I RELAYED THR HUB TO READ MESSAGE PATIENT DID NOT HAVE QUESTIONS ABOUT THE RESULTS FOR HER KNEES SHE STATES THAT SHE IS GOING TO SEE A SURGEON FOR  HER KNEES ON 08/10/2021. THE PATIENT WOULD LIKE TO KNOW IF THE OFFICE HAS RECEIVED HER XRAYS ON HER RIGHT SHOULDER AND BACK PLEASE CALL PATIENT TO DISCUSS      CALL [phone]

## 2021-07-22 NOTE — TELEPHONE ENCOUNTER
Left voice mail message requesting Ms. Kan to  for message from ROSALES Pathak. Office number given.    Hub please relay message     Please let the patient know her bilateral knee x-ray results-she has mild to moderate changes and arthritis noted in the right knee with mild changes of arthritis noted in the left knee.  Please have her continue current plan as discussed in office.

## 2021-07-23 NOTE — TELEPHONE ENCOUNTER
Mailed a letter in regards to her right.  Please feel free to review the information in the letter with the patient.

## 2021-07-28 ENCOUNTER — TELEPHONE (OUTPATIENT)
Dept: INTERNAL MEDICINE | Facility: CLINIC | Age: 51
End: 2021-07-28

## 2021-07-28 NOTE — TELEPHONE ENCOUNTER
2nd attempt to reach Ms. Kan. Left voice mail message for CB for message from ROSALES Pathak. Office number given.    Hub okay to relay message    Please let the patient know her bilateral knee x-ray results-she has mild to moderate changes and arthritis noted in the right knee with mild changes of arthritis noted in the left knee.  Please have her continue current plan as discussed in office.

## 2021-08-28 DIAGNOSIS — M54.42 CHRONIC LEFT-SIDED LOW BACK PAIN WITH LEFT-SIDED SCIATICA: ICD-10-CM

## 2021-08-28 DIAGNOSIS — G89.29 CHRONIC LEFT-SIDED LOW BACK PAIN WITH LEFT-SIDED SCIATICA: ICD-10-CM

## 2021-08-30 DIAGNOSIS — R11.2 NON-INTRACTABLE VOMITING WITH NAUSEA, UNSPECIFIED VOMITING TYPE: ICD-10-CM

## 2021-08-30 RX ORDER — TIZANIDINE 4 MG/1
TABLET ORAL
Qty: 60 TABLET | Refills: 2 | Status: SHIPPED | OUTPATIENT
Start: 2021-08-30 | End: 2021-11-29

## 2021-08-31 RX ORDER — PROMETHAZINE HYDROCHLORIDE 25 MG/1
TABLET ORAL
Qty: 20 TABLET | Refills: 0 | Status: SHIPPED | OUTPATIENT
Start: 2021-08-31 | End: 2022-02-01 | Stop reason: SDUPTHER

## 2021-09-15 ENCOUNTER — TELEPHONE (OUTPATIENT)
Dept: INTERNAL MEDICINE | Facility: CLINIC | Age: 51
End: 2021-09-15

## 2021-09-15 NOTE — TELEPHONE ENCOUNTER
Called pt 9/15/2021 @ 1:20pm. Pt states that the pain has been going on for a few days and today is the worse its got. On a scale of 1-10 she rates the pain a 10. There is some burning, slight bleeding, constant urination, foul smell, and cloudy. Loreta also states that after using the restroom it feels like she still have  To urinate.

## 2021-09-15 NOTE — TELEPHONE ENCOUNTER
Caller: Loreta Robertson    Relationship: Self    Best call back number: [phone]    What medication are you requesting: ANTIBIOTIC     What are your current symptoms: PAIN WHILE URINATING     How long have you been experiencing symptoms:     Have you had these symptoms before:    [x] Yes  [] No    Have you been treated for these symptoms before:   [x] Yes  [] No    If a prescription is needed, what is your preferred pharmacy and phone number: [facility] #[id number] [place] [address] AT [facility] [address] & [address] [facility] [phone] [facility] [phone]      Additional notes:

## 2021-09-16 ENCOUNTER — OFFICE VISIT (OUTPATIENT)
Dept: INTERNAL MEDICINE | Facility: CLINIC | Age: 51
End: 2021-09-16

## 2021-09-16 VITALS
TEMPERATURE: 96.9 F | WEIGHT: 293 LBS | HEIGHT: 66 IN | DIASTOLIC BLOOD PRESSURE: 76 MMHG | HEART RATE: 93 BPM | OXYGEN SATURATION: 95 % | RESPIRATION RATE: 20 BRPM | BODY MASS INDEX: 47.09 KG/M2 | SYSTOLIC BLOOD PRESSURE: 140 MMHG

## 2021-09-16 DIAGNOSIS — N30.01 ACUTE CYSTITIS WITH HEMATURIA: Primary | ICD-10-CM

## 2021-09-16 DIAGNOSIS — Z72.0 TOBACCO USE: ICD-10-CM

## 2021-09-16 PROCEDURE — 99213 OFFICE O/P EST LOW 20 MIN: CPT | Performed by: NURSE PRACTITIONER

## 2021-09-16 RX ORDER — PHENAZOPYRIDINE HYDROCHLORIDE 200 MG/1
200 TABLET, FILM COATED ORAL 3 TIMES DAILY PRN
Qty: 6 TABLET | Refills: 0 | Status: SHIPPED | OUTPATIENT
Start: 2021-09-16 | End: 2021-10-01 | Stop reason: SDUPTHER

## 2021-09-16 RX ORDER — NITROFURANTOIN 25; 75 MG/1; MG/1
100 CAPSULE ORAL 2 TIMES DAILY
Qty: 14 CAPSULE | Refills: 0 | Status: SHIPPED | OUTPATIENT
Start: 2021-09-16 | End: 2021-09-23

## 2021-09-16 NOTE — TELEPHONE ENCOUNTER
She needs to be seen in office per availability, or at [facility] so we can have UA/ UCx performed.

## 2021-09-16 NOTE — PROGRESS NOTES
.     Patient Name: Loreta Robertson  : 1970   MRN: [id number]     Chief Complaint:    Chief Complaint   Patient presents with   • Blood in Urine       History of Present Illness:  SUBJECTIVE: Loreta Robertson is a 51 y.o. female who complains of urinary frequency, urgency and dysuria with hematuria x 1 day without flank pain, fever, chills, or abnormal vaginal discharge or bleeding. Has not had a recent UTI or taken any antibiotics recently. She has been drinking lots of fluids. States she was only able to urinate a scant amount at appointment.     Subjective     Review of System: Review of Systems   Constitutional: Negative for chills, fatigue and fever.   Respiratory: Negative for cough and shortness of breath.    Gastrointestinal: Positive for abdominal pain and diarrhea. Negative for nausea and vomiting.        Bladder tender   Endocrine: Negative for polyuria.   Genitourinary: Positive for difficulty urinating, dysuria, frequency, hematuria and urgency. Negative for decreased urine volume and flank pain.   Neurological: Negative for weakness.        Medications:     Current Outpatient Medications:   •  buPROPion XL (WELLBUTRIN XL) 150 MG 24 hr tablet, Take 150 mg by mouth Every Morning., Disp: , Rfl: 2  •  divalproex (DEPAKOTE) 500 MG 24 hr tablet, Take 1,000 mg by mouth Daily. Patient takes 1 tab qam and 2tabs qhs, Disp: , Rfl:   •  lurasidone (LATUDA) 40 MG tablet tablet, Take 40 mg by mouth Daily., Disp: , Rfl:   •  prazosin (MINIPRESS) 5 MG capsule, Take 5 mg by mouth Every Night., Disp: , Rfl: 1  •  promethazine (PHENERGAN) 25 MG tablet, TAKE 1 TABLET BY MOUTH EVERY 8 HOURS AS NEEDED FOR NAUSEA OR VOMITING, Disp: 20 tablet, Rfl: 0  •  tiZANidine (ZANAFLEX) 4 MG tablet, TAKE 1 TABLET BY MOUTH TWICE DAILY AS NEEDED FOR MUSCLE SPASMS, Disp: 60 tablet, Rfl: 2  •  albuterol sulfate  (90 Base) MCG/ACT inhaler, Inhale 2 puffs Every 4 (Four) Hours As Needed for Wheezing or Shortness of Air.,  "Disp: 18 g, Rfl: 0  •  nitrofurantoin, macrocrystal-monohydrate, (Macrobid) 100 MG capsule, Take 1 capsule by mouth 2 (Two) Times a Day for 7 days., Disp: 14 capsule, Rfl: 0  •  phenazopyridine (Pyridium) 200 MG tablet, Take 1 tablet by mouth 3 (Three) Times a Day As Needed for Bladder Spasms., Disp: 6 tablet, Rfl: 0    Allergies:   Allergies   Allergen Reactions   • Morphine And Related GI Intolerance       Objective     Physical Exam:   Vital Signs:   Vitals:    09/16/21 1412   BP: 140/76   BP Location: Right arm   Patient Position: Sitting   Cuff Size: Adult   Pulse: 93   Resp: 20   Temp: 96.9 °F (36.1 °C)   TempSrc: Infrared   SpO2: 95%   Weight: (!) 142 kg (312 lb)   Height: 167.6 cm (66\")   PainSc:   8     Body mass index is 50.36 kg/m².     Physical Exam  Constitutional:       Comments: Appears in pain   HENT:      Head: Normocephalic.   Cardiovascular:      Rate and Rhythm: Normal rate and regular rhythm.      Heart sounds: Normal heart sounds. No murmur heard.     Pulmonary:      Breath sounds: Normal breath sounds.   Abdominal:      General: Bowel sounds are normal.      Tenderness: There is no abdominal tenderness. There is no right CVA tenderness or left CVA tenderness.   Neurological:      Mental Status: She is oriented to person, place, and time.   Psychiatric:         Mood and Affect: Mood normal.         Assessment / Plan      Assessment/Plan:   Diagnoses and all orders for this visit:    1. Acute cystitis with hematuria (Primary)    2. Tobacco use; not interested in quitting    Other orders  -     nitrofurantoin, macrocrystal-monohydrate, (Macrobid) 100 MG capsule; Take 1 capsule by mouth 2 (Two) Times a Day for 7 days.  Dispense: 14 capsule; Refill: 0  -     phenazopyridine (Pyridium) 200 MG tablet; Take 1 tablet by mouth 3 (Three) Times a Day As Needed for Bladder Spasms.  Dispense: 6 tablet; Refill: 0             PLAN: Treatment per orders - also push fluids, may use Pyridium OTC prn. Call or " return to clinic prn if these symptoms worsen or fail to improve as anticipated.   She will need to return for culture if no improvement as she was unable to provide one today.       Follow Up:   Return in about 4 weeks (around 10/14/2021), or if symptoms worsen or fail to improve, for Annual and PAP.    ROSALES White  [facility]

## 2021-09-28 ENCOUNTER — TELEPHONE (OUTPATIENT)
Dept: INTERNAL MEDICINE | Facility: CLINIC | Age: 51
End: 2021-09-28

## 2021-09-28 DIAGNOSIS — R30.0 DYSURIA: Primary | ICD-10-CM

## 2021-10-01 RX ORDER — PHENAZOPYRIDINE HYDROCHLORIDE 200 MG/1
200 TABLET, FILM COATED ORAL 3 TIMES DAILY PRN
Qty: 6 TABLET | Refills: 0 | Status: SHIPPED | OUTPATIENT
Start: 2021-10-01 | End: 2021-11-04

## 2021-10-05 ENCOUNTER — LAB (OUTPATIENT)
Dept: INTERNAL MEDICINE | Facility: CLINIC | Age: 51
End: 2021-10-05

## 2021-10-08 ENCOUNTER — TELEPHONE (OUTPATIENT)
Dept: INTERNAL MEDICINE | Facility: CLINIC | Age: 51
End: 2021-10-08

## 2021-10-08 NOTE — TELEPHONE ENCOUNTER
Spoke to pt, advised of pending U/A and culture order. Pt states she's waiting on her vehicle, she should have it by next week. Pt will be in the office next week to leave her UA. Good verbal understanding.

## 2021-10-13 ENCOUNTER — LAB (OUTPATIENT)
Dept: INTERNAL MEDICINE | Facility: CLINIC | Age: 51
End: 2021-10-13

## 2021-10-13 ENCOUNTER — LAB (OUTPATIENT)
Dept: LAB | Facility: HOSPITAL | Age: 51
End: 2021-10-13

## 2021-10-13 DIAGNOSIS — R30.0 DYSURIA: Primary | ICD-10-CM

## 2021-10-13 DIAGNOSIS — R30.0 DYSURIA: ICD-10-CM

## 2021-10-13 LAB
BACTERIA UR QL AUTO: ABNORMAL /HPF
BILIRUB UR QL STRIP: NEGATIVE
CLARITY UR: CLEAR
COLOR UR: YELLOW
GLUCOSE UR STRIP-MCNC: NEGATIVE MG/DL
HGB UR QL STRIP.AUTO: NEGATIVE
HYALINE CASTS UR QL AUTO: ABNORMAL /LPF
KETONES UR QL STRIP: ABNORMAL
LEUKOCYTE ESTERASE UR QL STRIP.AUTO: ABNORMAL
NITRITE UR QL STRIP: NEGATIVE
PH UR STRIP.AUTO: 5.5 [PH] (ref 5–8)
PROT UR QL STRIP: NEGATIVE
RBC # UR: ABNORMAL /HPF
REF LAB TEST METHOD: ABNORMAL
SP GR UR STRIP: 1.03 (ref 1–1.03)
SQUAMOUS #/AREA URNS HPF: ABNORMAL /HPF
UROBILINOGEN UR QL STRIP: ABNORMAL
WBC UR QL AUTO: ABNORMAL /HPF

## 2021-10-13 PROCEDURE — 81001 URINALYSIS AUTO W/SCOPE: CPT

## 2021-10-13 PROCEDURE — 87086 URINE CULTURE/COLONY COUNT: CPT

## 2021-10-15 LAB — BACTERIA SPEC AEROBE CULT: NORMAL

## 2021-11-04 ENCOUNTER — OFFICE VISIT (OUTPATIENT)
Dept: INTERNAL MEDICINE | Facility: CLINIC | Age: 51
End: 2021-11-04

## 2021-11-04 ENCOUNTER — LAB (OUTPATIENT)
Dept: LAB | Facility: HOSPITAL | Age: 51
End: 2021-11-04

## 2021-11-04 VITALS
DIASTOLIC BLOOD PRESSURE: 90 MMHG | HEIGHT: 66 IN | OXYGEN SATURATION: 99 % | HEART RATE: 65 BPM | RESPIRATION RATE: 24 BRPM | TEMPERATURE: 97.3 F | WEIGHT: 293 LBS | BODY MASS INDEX: 47.09 KG/M2 | SYSTOLIC BLOOD PRESSURE: 144 MMHG

## 2021-11-04 DIAGNOSIS — Z13.220 ENCOUNTER FOR LIPID SCREENING FOR CARDIOVASCULAR DISEASE: ICD-10-CM

## 2021-11-04 DIAGNOSIS — R03.0 ELEVATED BLOOD PRESSURE READING: ICD-10-CM

## 2021-11-04 DIAGNOSIS — R25.1 TREMOR: ICD-10-CM

## 2021-11-04 DIAGNOSIS — Z13.6 ENCOUNTER FOR LIPID SCREENING FOR CARDIOVASCULAR DISEASE: ICD-10-CM

## 2021-11-04 DIAGNOSIS — R53.83 FATIGUE, UNSPECIFIED TYPE: Primary | ICD-10-CM

## 2021-11-04 DIAGNOSIS — E55.9 VITAMIN D DEFICIENCY: ICD-10-CM

## 2021-11-04 DIAGNOSIS — R73.03 PREDIABETES: ICD-10-CM

## 2021-11-04 LAB
EXPIRATION DATE: NORMAL
HBA1C MFR BLD: 5.4 %
Lab: NORMAL

## 2021-11-04 PROCEDURE — 99214 OFFICE O/P EST MOD 30 MIN: CPT | Performed by: NURSE PRACTITIONER

## 2021-11-04 PROCEDURE — 3044F HG A1C LEVEL LT 7.0%: CPT | Performed by: NURSE PRACTITIONER

## 2021-11-04 PROCEDURE — 83036 HEMOGLOBIN GLYCOSYLATED A1C: CPT | Performed by: NURSE PRACTITIONER

## 2021-11-04 NOTE — PROGRESS NOTES
Patient Name: Loreta Robertson  : 1970   MRN: [id number]     Chief Complaint:    Chief Complaint   Patient presents with   • Tremors     in hands and forarms       History of Present Illness: Loreta Robertson is a 51 y.o. female presents to clinic for f/u tremors in both hands and arms. She has had the symptoms for almost a year and they seem to be worsening. Patient states she sits on her hands to make them stop. Denies any trouble ambulating, oral movements, or leg movements.   Patient 's psychiatrist mentioned Tardive Dyskinesia; she is on latuda. Has been on latuda for 2 years. Associated symptoms are drooling out the side of her mouth.     Subjective     Review of System: Review of Systems   Constitutional: Negative for fatigue and fever.   HENT: Negative for congestion and rhinorrhea.    Respiratory: Negative for shortness of breath and wheezing.    Cardiovascular: Negative for chest pain and palpitations.   Gastrointestinal: Negative for abdominal pain, diarrhea, nausea and vomiting.   Genitourinary: Negative for dysuria.   Musculoskeletal: Negative for myalgias.   Skin: Negative for rash.   Neurological: Positive for tremors. Negative for dizziness, weakness, numbness and headaches.   Hematological: Negative for adenopathy.   Psychiatric/Behavioral: Positive for dysphoric mood.        Medications:     Current Outpatient Medications:   •  buPROPion XL (WELLBUTRIN XL) 150 MG 24 hr tablet, Take 150 mg by mouth Every Morning., Disp: , Rfl: 2  •  divalproex (DEPAKOTE) 500 MG 24 hr tablet, Take 1,000 mg by mouth Daily. Patient takes 1 tab qam and 2tabs qhs, Disp: , Rfl:   •  lurasidone (LATUDA) 40 MG tablet tablet, Take 40 mg by mouth Daily., Disp: , Rfl:   •  prazosin (MINIPRESS) 5 MG capsule, Take 5 mg by mouth Every Night., Disp: , Rfl: 1  •  promethazine (PHENERGAN) 25 MG tablet, TAKE 1 TABLET BY MOUTH EVERY 8 HOURS AS NEEDED FOR NAUSEA OR VOMITING, Disp: 20 tablet, Rfl: 0  •  tiZANidine  "(ZANAFLEX) 4 MG tablet, TAKE 1 TABLET BY MOUTH TWICE DAILY AS NEEDED FOR MUSCLE SPASMS, Disp: 60 tablet, Rfl: 2  •  albuterol sulfate  (90 Base) MCG/ACT inhaler, Inhale 2 puffs Every 4 (Four) Hours As Needed for Wheezing or Shortness of Air., Disp: 18 g, Rfl: 0    Allergies:   Allergies   Allergen Reactions   • Morphine And Related GI Intolerance       Objective     Physical Exam:   Vital Signs:   Vitals:    11/04/21 1121   BP: 144/90   BP Location: Right arm   Patient Position: Sitting   Cuff Size: Large Adult   Pulse: 65   Resp: 24   Temp: 97.3 °F (36.3 °C)   TempSrc: Infrared   SpO2: 99%   Weight: (!) 146 kg (321 lb 6.4 oz)   Height: 167.6 cm (66\")   PainSc: 0-No pain           Physical Exam  Constitutional:       General: She is not in acute distress.     Appearance: She is not ill-appearing.   HENT:      Head: Normocephalic.   Cardiovascular:      Rate and Rhythm: Normal rate and regular rhythm.      Heart sounds: Normal heart sounds. No murmur heard.      Pulmonary:      Breath sounds: Normal breath sounds.   Abdominal:      General: Bowel sounds are normal.      Tenderness: There is no abdominal tenderness.   Musculoskeletal:         General: No swelling or tenderness. Normal range of motion.   Lymphadenopathy:      Cervical: No cervical adenopathy.   Skin:     General: Skin is warm and dry.   Neurological:      General: No focal deficit present.      Mental Status: She is oriented to person, place, and time.      Cranial Nerves: Cranial nerves are intact.      Sensory: Sensation is intact.      Motor: Tremor (both arms/hands) present.      Coordination: Coordination is intact.      Gait: Gait is intact.      Deep Tendon Reflexes:      Reflex Scores:       Patellar reflexes are 2+ on the right side and 2+ on the left side.  Psychiatric:         Mood and Affect: Mood normal.         Assessment / Plan      Assessment/Plan:   Diagnoses and all orders for this visit:    1. Fatigue, unspecified type " (Primary)  -     Comprehensive Metabolic Panel; Future  -     Vitamin B12; Future  -     CBC & Differential; Future  -     TSH; Future  -     T4, Free; Future  -     Folate; Future  -     Folate  -     T4, Free  -     TSH  -     CBC & Differential  -     Vitamin B12  -     Comprehensive Metabolic Panel    2. Vitamin D deficiency  -     Vitamin D 25 Hydroxy; Future  -     Vitamin D 25 Hydroxy    3. Prediabetes  -     POC Glycosylated Hemoglobin (Hb A1C)    4. Encounter for lipid screening for cardiovascular disease  -     Lipid panel; Future  -     Cancel: Lipid panel; Future  -     Lipid panel    5. Elevated blood pressure reading  Your blood pressure was elevated today. Monitor at home and record. Bring to next appointment. If BP is >140/90 then f/u sooner.      6. Tremor  Appears to be essential tremor; will check labs and consider a medication trial; if no improvement referral to neurology for evaluation.              Follow Up:   Return in about 4 weeks (around 12/2/2021) for Annual.    ROSALES White  [facility]

## 2021-11-05 DIAGNOSIS — E55.9 VITAMIN D DEFICIENCY: Primary | ICD-10-CM

## 2021-11-05 LAB
25(OH)D3+25(OH)D2 SERPL-MCNC: 6.3 NG/ML (ref 30–100)
ALBUMIN SERPL-MCNC: 4 G/DL (ref 3.5–5.2)
ALBUMIN/GLOB SERPL: 1.6 G/DL
ALP SERPL-CCNC: 72 U/L (ref 39–117)
ALT SERPL-CCNC: 9 U/L (ref 1–33)
AST SERPL-CCNC: 10 U/L (ref 1–32)
BASOPHILS # BLD AUTO: 0.02 10*3/MM3 (ref 0–0.2)
BASOPHILS NFR BLD AUTO: 0.3 % (ref 0–1.5)
BILIRUB SERPL-MCNC: 0.3 MG/DL (ref 0–1.2)
BUN SERPL-MCNC: 12 MG/DL (ref 6–20)
BUN/CREAT SERPL: 15.6 (ref 7–25)
CALCIUM SERPL-MCNC: 9.4 MG/DL (ref 8.6–10.5)
CHLORIDE SERPL-SCNC: 105 MMOL/L (ref 98–107)
CHOLEST SERPL-MCNC: 191 MG/DL (ref 0–200)
CO2 SERPL-SCNC: 30 MMOL/L (ref 22–29)
CREAT SERPL-MCNC: 0.77 MG/DL (ref 0.57–1)
EOSINOPHIL # BLD AUTO: 0.07 10*3/MM3 (ref 0–0.4)
EOSINOPHIL NFR BLD AUTO: 1.2 % (ref 0.3–6.2)
ERYTHROCYTE [DISTWIDTH] IN BLOOD BY AUTOMATED COUNT: 14.1 % (ref 12.3–15.4)
FOLATE SERPL-MCNC: 5.87 NG/ML (ref 4.78–24.2)
GLOBULIN SER CALC-MCNC: 2.5 GM/DL
GLUCOSE SERPL-MCNC: 83 MG/DL (ref 65–99)
HCT VFR BLD AUTO: 43 % (ref 34–46.6)
HDLC SERPL-MCNC: 52 MG/DL (ref 40–60)
HGB BLD-MCNC: 14.2 G/DL (ref 12–15.9)
IMM GRANULOCYTES # BLD AUTO: 0.01 10*3/MM3 (ref 0–0.05)
IMM GRANULOCYTES NFR BLD AUTO: 0.2 % (ref 0–0.5)
LDLC SERPL CALC-MCNC: 122 MG/DL (ref 0–100)
LYMPHOCYTES # BLD AUTO: 2.03 10*3/MM3 (ref 0.7–3.1)
LYMPHOCYTES NFR BLD AUTO: 34.1 % (ref 19.6–45.3)
MCH RBC QN AUTO: 32.6 PG (ref 26.6–33)
MCHC RBC AUTO-ENTMCNC: 33 G/DL (ref 31.5–35.7)
MCV RBC AUTO: 98.9 FL (ref 79–97)
MONOCYTES # BLD AUTO: 0.43 10*3/MM3 (ref 0.1–0.9)
MONOCYTES NFR BLD AUTO: 7.2 % (ref 5–12)
NEUTROPHILS # BLD AUTO: 3.39 10*3/MM3 (ref 1.7–7)
NEUTROPHILS NFR BLD AUTO: 57 % (ref 42.7–76)
NRBC BLD AUTO-RTO: 0 /100 WBC (ref 0–0.2)
PLATELET # BLD AUTO: 198 10*3/MM3 (ref 140–450)
POTASSIUM SERPL-SCNC: 4.8 MMOL/L (ref 3.5–5.2)
PROT SERPL-MCNC: 6.5 G/DL (ref 6–8.5)
RBC # BLD AUTO: 4.35 10*6/MM3 (ref 3.77–5.28)
SODIUM SERPL-SCNC: 143 MMOL/L (ref 136–145)
T4 FREE SERPL-MCNC: 1.02 NG/DL (ref 0.93–1.7)
TRIGL SERPL-MCNC: 94 MG/DL (ref 0–150)
TSH SERPL DL<=0.005 MIU/L-ACNC: 3.67 UIU/ML (ref 0.27–4.2)
VIT B12 SERPL-MCNC: 292 PG/ML (ref 211–946)
VLDLC SERPL CALC-MCNC: 17 MG/DL (ref 5–40)
WBC # BLD AUTO: 5.95 10*3/MM3 (ref 3.4–10.8)

## 2021-11-05 RX ORDER — ERGOCALCIFEROL 1.25 MG/1
50000 CAPSULE ORAL
Qty: 5 CAPSULE | Refills: 2 | Status: SHIPPED | OUTPATIENT
Start: 2021-11-05 | End: 2022-07-22

## 2021-11-29 DIAGNOSIS — G89.29 CHRONIC LEFT-SIDED LOW BACK PAIN WITH LEFT-SIDED SCIATICA: ICD-10-CM

## 2021-11-29 DIAGNOSIS — M54.42 CHRONIC LEFT-SIDED LOW BACK PAIN WITH LEFT-SIDED SCIATICA: ICD-10-CM

## 2021-11-29 RX ORDER — TIZANIDINE 4 MG/1
TABLET ORAL
Qty: 60 TABLET | Refills: 2 | Status: SHIPPED | OUTPATIENT
Start: 2021-11-29 | End: 2022-03-02

## 2021-12-13 ENCOUNTER — TELEPHONE (OUTPATIENT)
Dept: INTERNAL MEDICINE | Facility: CLINIC | Age: 51
End: 2021-12-13

## 2021-12-13 NOTE — TELEPHONE ENCOUNTER
Patient states she got Vit D Rx for $4 out of pocket so she has been taking it Qweek x 6 weeks. She states her tremors have gotten worse

## 2021-12-13 NOTE — TELEPHONE ENCOUNTER
Please call patient and let her know; her insurance will not cover vitamin D. She can take vitamin d 2000 u daily otc.

## 2021-12-16 NOTE — TELEPHONE ENCOUNTER
Patient would like to see Neurology. She wants to see neurology on [facility]   Dr. Yogesh Geiger.

## 2021-12-17 DIAGNOSIS — R25.1 TREMOR: Primary | ICD-10-CM

## 2022-02-01 ENCOUNTER — LAB (OUTPATIENT)
Dept: LAB | Facility: HOSPITAL | Age: 52
End: 2022-02-01

## 2022-02-01 ENCOUNTER — OFFICE VISIT (OUTPATIENT)
Dept: INTERNAL MEDICINE | Facility: CLINIC | Age: 52
End: 2022-02-01

## 2022-02-01 VITALS
TEMPERATURE: 96.9 F | SYSTOLIC BLOOD PRESSURE: 130 MMHG | RESPIRATION RATE: 22 BRPM | HEIGHT: 66 IN | BODY MASS INDEX: 47.09 KG/M2 | DIASTOLIC BLOOD PRESSURE: 84 MMHG | OXYGEN SATURATION: 97 % | WEIGHT: 293 LBS | HEART RATE: 87 BPM

## 2022-02-01 DIAGNOSIS — R11.2 NON-INTRACTABLE VOMITING WITH NAUSEA, UNSPECIFIED VOMITING TYPE: ICD-10-CM

## 2022-02-01 DIAGNOSIS — R05.9 COUGH: ICD-10-CM

## 2022-02-01 DIAGNOSIS — J44.1 COPD WITH EXACERBATION: Primary | ICD-10-CM

## 2022-02-01 LAB
EXPIRATION DATE: NORMAL
FLUAV AG NPH QL: NEGATIVE
FLUBV AG NPH QL: NEGATIVE
INTERNAL CONTROL: NORMAL
Lab: NORMAL

## 2022-02-01 PROCEDURE — 87804 INFLUENZA ASSAY W/OPTIC: CPT | Performed by: NURSE PRACTITIONER

## 2022-02-01 PROCEDURE — 99214 OFFICE O/P EST MOD 30 MIN: CPT | Performed by: NURSE PRACTITIONER

## 2022-02-01 PROCEDURE — U0004 COV-19 TEST NON-CDC HGH THRU: HCPCS | Performed by: NURSE PRACTITIONER

## 2022-02-01 RX ORDER — METHYLPREDNISOLONE 4 MG/1
TABLET ORAL
Qty: 21 TABLET | Refills: 0 | Status: SHIPPED | OUTPATIENT
Start: 2022-02-01 | End: 2022-05-06

## 2022-02-01 RX ORDER — DOXYCYCLINE HYCLATE 100 MG/1
100 CAPSULE ORAL 2 TIMES DAILY
Qty: 20 CAPSULE | Refills: 0 | Status: SHIPPED | OUTPATIENT
Start: 2022-02-01 | End: 2022-05-06

## 2022-02-01 RX ORDER — PROMETHAZINE HYDROCHLORIDE 25 MG/1
25 TABLET ORAL EVERY 8 HOURS PRN
Qty: 20 TABLET | Refills: 0 | Status: SHIPPED | OUTPATIENT
Start: 2022-02-01 | End: 2022-03-01

## 2022-02-01 NOTE — PROGRESS NOTES
Patient Name: Loreta Robertson  : 1970   MRN: [id number]     Chief Complaint:   Cough  Past Medical History:   Diagnosis Date   • Anxiety    • Anxiety    • Bipolar 1 disorder (HCC)    • Bipolar 1 disorder (HCC)    • Chronic bronchitis (HCC)    • Chronic constipation    • Depression    • Hyperlipidemia    • Primary generalized (osteo)arthritis    • PTSD (post-traumatic stress disorder)    • RLS (restless legs syndrome)        Patient presents with chills, fatigue, fever, nasal drainage, sinus pain, cough, slight shortness of breath nausea, bodyaches and headaches for 3-4 days. She had an exposure to covid 2 weeks ago. Alleviating factors are phenergan and ibuprofen.She has had covid vaccine X 2. Taste is off.      Subjective     Review of System: Review of Systems   Constitutional: Positive for chills, fatigue and fever. Negative for diaphoresis.   HENT: Positive for rhinorrhea, sinus pressure and sinus pain. Negative for congestion, ear pain, mouth sores, postnasal drip, sneezing and sore throat.    Eyes: Negative for visual disturbance.   Respiratory: Positive for cough and shortness of breath. Negative for chest tightness.    Cardiovascular: Negative for chest pain.   Gastrointestinal: Positive for nausea. Negative for abdominal pain, diarrhea and vomiting.   Musculoskeletal: Positive for myalgias. Negative for arthralgias.   Skin: Negative for color change.   Neurological: Positive for headaches. Negative for dizziness and weakness.   Hematological: Negative for adenopathy.   Psychiatric/Behavioral: Negative for dysphoric mood. The patient is not nervous/anxious.         Medications:     Current Outpatient Medications:   •  albuterol sulfate  (90 Base) MCG/ACT inhaler, Inhale 2 puffs Every 4 (Four) Hours As Needed for Wheezing or Shortness of Air., Disp: 18 g, Rfl: 0  •  buPROPion XL (WELLBUTRIN XL) 150 MG 24 hr tablet, Take 150 mg by mouth Every Morning., Disp: , Rfl: 2  •  divalproex  "(DEPAKOTE) 500 MG 24 hr tablet, Take 1,000 mg by mouth Daily. Patient takes 1 tab qam and 2tabs qhs, Disp: , Rfl:   •  lurasidone (LATUDA) 40 MG tablet tablet, Take 40 mg by mouth Daily., Disp: , Rfl:   •  prazosin (MINIPRESS) 5 MG capsule, Take 5 mg by mouth Every Night., Disp: , Rfl: 1  •  promethazine (PHENERGAN) 25 MG tablet, Take 1 tablet by mouth Every 8 (Eight) Hours As Needed for Nausea or Vomiting., Disp: 20 tablet, Rfl: 0  •  tiZANidine (ZANAFLEX) 4 MG tablet, TAKE 1 TABLET BY MOUTH TWICE DAILY AS NEEDED FOR MUSCLE SPASMS, Disp: 60 tablet, Rfl: 2  •  vitamin D (ERGOCALCIFEROL) 1.25 MG (14934 UT) capsule capsule, Take 1 capsule by mouth Every 7 (Seven) Days., Disp: 5 capsule, Rfl: 2  •  doxycycline (VIBRAMYCIN) 100 MG capsule, Take 1 capsule by mouth 2 (Two) Times a Day., Disp: 20 capsule, Rfl: 0  •  methylPREDNISolone (MEDROL) 4 MG dose pack, Take as directed on package instructions., Disp: 21 tablet, Rfl: 0    Allergies:   Allergies   Allergen Reactions   • Morphine And Related GI Intolerance       Objective     Physical Exam:   Vital Signs:   Vitals:    02/01/22 1019   BP: 130/84   Pulse: 87   Resp: 22   Temp: 96.9 °F (36.1 °C)   TempSrc: Infrared   SpO2: 97%   Weight: (!) 140 kg (307 lb 12.8 oz)   Height: 167.6 cm (66\")           Physical Exam  Constitutional:       Appearance: She is ill-appearing.   HENT:      Right Ear: Tympanic membrane normal.      Left Ear: Tympanic membrane normal.      Nose: Congestion and rhinorrhea present.      Mouth/Throat:      Pharynx: No posterior oropharyngeal erythema.   Cardiovascular:      Rate and Rhythm: Normal rate and regular rhythm.      Pulses: Normal pulses.      Heart sounds: No murmur heard.      Pulmonary:      Effort: No respiratory distress.      Breath sounds: No stridor. Wheezing present. No rhonchi or rales.   Abdominal:      General: Bowel sounds are normal.      Tenderness: There is no abdominal tenderness.   Lymphadenopathy:      Cervical: No " cervical adenopathy.   Skin:     General: Skin is warm and dry.         Assessment / Plan      Assessment/Plan:   Diagnoses and all orders for this visit:    1. COPD with exacerbation (HCC) (Primary)  -     doxycycline (VIBRAMYCIN) 100 MG capsule; Take 1 capsule by mouth 2 (Two) Times a Day.  Dispense: 20 capsule; Refill: 0  -     methylPREDNISolone (MEDROL) 4 MG dose pack; Take as directed on package instructions.  Dispense: 21 tablet; Refill: 0    2. Non-intractable vomiting with nausea, unspecified vomiting type  -     promethazine (PHENERGAN) 25 MG tablet; Take 1 tablet by mouth Every 8 (Eight) Hours As Needed for Nausea or Vomiting.  Dispense: 20 tablet; Refill: 0    3. Cough  -     POC Influenza A / B  -     COVID-19 PCR, [facility], NP SWAB IN [facility] TRANSPORT MEDIA/ORAL SWISH 24-30 HR TAT - Swab, Nasopharynx; Future  -     COVID-19 PCR, [facility], NP SWAB IN [facility] TRANSPORT MEDIA/ORAL SWISH 24-30 HR TAT - Swab, Nasopharynx       Take with probiotics/yogurt several times daily.       1. Influenza was negative; covid pending.   2. Patient to quarantine until results are back; if positive quarantine 10 days from the onset of symptoms and patient must be symptom free of fever for 24 hours without the use of tylenol or ibuprofen.   3. ER for emergencies or  shortness of breath  4. Treat symptoms with over the counter medications; call or return to clinic if getting worse. Take medications as prescribed.   5. Continue supportive care and hydration.       Follow Up:   Prn if worsening or no improvement.   ROSALES White  [facility]

## 2022-02-02 ENCOUNTER — TELEPHONE (OUTPATIENT)
Dept: INTERNAL MEDICINE | Facility: CLINIC | Age: 52
End: 2022-02-02

## 2022-02-02 LAB — SARS-COV-2 RNA NOSE QL NAA+PROBE: NOT DETECTED

## 2022-02-02 NOTE — TELEPHONE ENCOUNTER
Caller: Loreta Robertson    Relationship: Self    Best call back number: [phone]        What test was performed: COVID-19 TEST    When was the test performed: 02/01/2022    Where was the test performed: IN OFFICE     Additional notes: PLEASE CALL PATIENT TO LET HER KNOW IF THE TEST RESULTS ARE IN YET

## 2022-03-01 DIAGNOSIS — R11.2 NON-INTRACTABLE VOMITING WITH NAUSEA, UNSPECIFIED VOMITING TYPE: ICD-10-CM

## 2022-03-01 RX ORDER — PROMETHAZINE HYDROCHLORIDE 25 MG/1
TABLET ORAL
Qty: 20 TABLET | Refills: 0 | Status: SHIPPED | OUTPATIENT
Start: 2022-03-01 | End: 2022-07-22

## 2022-03-02 DIAGNOSIS — G89.29 CHRONIC LEFT-SIDED LOW BACK PAIN WITH LEFT-SIDED SCIATICA: ICD-10-CM

## 2022-03-02 DIAGNOSIS — M54.42 CHRONIC LEFT-SIDED LOW BACK PAIN WITH LEFT-SIDED SCIATICA: ICD-10-CM

## 2022-03-02 RX ORDER — TIZANIDINE 4 MG/1
TABLET ORAL
Qty: 60 TABLET | Refills: 0 | Status: SHIPPED | OUTPATIENT
Start: 2022-03-02 | End: 2022-06-01 | Stop reason: SDUPTHER

## 2022-04-16 NOTE — TELEPHONE ENCOUNTER
----- Message from Andra Gibbons sent at 8/9/2019  1:23 PM EDT -----  Contact: self  Loreta States calling for her x-ray results. She can be reached at [phone]  
Loreta was notified that her Chest X-Ray was normal. Verb understanding given.   
Please inform her chest x ray was normal.  
What do you want me to tell the patient about her test results?  
03-Apr-2022 19:27

## 2022-04-19 ENCOUNTER — TELEPHONE (OUTPATIENT)
Dept: INTERNAL MEDICINE | Facility: CLINIC | Age: 52
End: 2022-04-19

## 2022-04-19 DIAGNOSIS — R40.0 DAYTIME SOMNOLENCE: Primary | ICD-10-CM

## 2022-04-19 NOTE — TELEPHONE ENCOUNTER
Caller: Loreta Robertson    Relationship: Self    Best call back number: [phone]    What is the medical concern/diagnosis: SLEEP APNEA     What specialty or service is being requested: SLEEP MEDICINE     What is the office location: [facility]     Any additional details: PATIENT'S DENTIST ASKED HER TO BE SEEN BY SLEEP MEDICINE

## 2022-04-20 NOTE — TELEPHONE ENCOUNTER
Please call patient and ask about her current symptoms. You can copy this list and just fill out each one if needed. She may still need to be seen in office before they will accept the referral, but I'll try putting it in and see if they will accept it without an office visit.     Any daytime sleepiness/ somnolence?   Do you wake up feeling tired?  Do you snore?  Do you have trouble falling asleep and/ or staying asleep?   Do you ever wake up gasping for air?   Has a partner or family member ever noted that you have an irregular breathing pattern at night, or stop breathing?

## 2022-04-21 NOTE — TELEPHONE ENCOUNTER
Spoke with patient and ask about her current symptoms. You can copy this list and just fill out each one if needed. She may still need to be seen in office before they will accept the referral, but I'll try putting it in and see if they will accept it without an office visit.      Any daytime sleepiness/ somnolence? Yes     Do you wake up feeling tired? Yes     Do you snore? Unsure she lives alone and does not know.     Do you have trouble falling asleep and/ or staying asleep?  Yes     Do you ever wake up gasping for air? No   Has a partner or family member ever noted that you have an irregular breathing pattern at night, or stop breathing?     She lives alone and does not know.

## 2022-05-06 ENCOUNTER — OFFICE VISIT (OUTPATIENT)
Dept: INTERNAL MEDICINE | Facility: CLINIC | Age: 52
End: 2022-05-06

## 2022-05-06 ENCOUNTER — TELEPHONE (OUTPATIENT)
Dept: INTERNAL MEDICINE | Facility: CLINIC | Age: 52
End: 2022-05-06

## 2022-05-06 ENCOUNTER — HOSPITAL ENCOUNTER (OUTPATIENT)
Dept: GENERAL RADIOLOGY | Facility: HOSPITAL | Age: 52
Discharge: HOME | End: 2022-05-06
Admitting: PHYSICIAN ASSISTANT

## 2022-05-06 VITALS
RESPIRATION RATE: 18 BRPM | HEART RATE: 74 BPM | SYSTOLIC BLOOD PRESSURE: 134 MMHG | OXYGEN SATURATION: 92 % | TEMPERATURE: 96.8 F | DIASTOLIC BLOOD PRESSURE: 82 MMHG | WEIGHT: 293 LBS | BODY MASS INDEX: 49.13 KG/M2

## 2022-05-06 DIAGNOSIS — R05.3 PERSISTENT COUGH: ICD-10-CM

## 2022-05-06 DIAGNOSIS — G25.2 COARSE TREMORS: ICD-10-CM

## 2022-05-06 DIAGNOSIS — J44.9 CHRONIC OBSTRUCTIVE PULMONARY DISEASE, UNSPECIFIED COPD TYPE: ICD-10-CM

## 2022-05-06 DIAGNOSIS — J44.9 CHRONIC OBSTRUCTIVE PULMONARY DISEASE, UNSPECIFIED COPD TYPE: Primary | ICD-10-CM

## 2022-05-06 DIAGNOSIS — Z01.811 ENCOUNTER FOR PRE-OPERATIVE RESPIRATORY CLEARANCE: Primary | ICD-10-CM

## 2022-05-06 PROCEDURE — 99214 OFFICE O/P EST MOD 30 MIN: CPT | Performed by: PHYSICIAN ASSISTANT

## 2022-05-06 PROCEDURE — 71046 X-RAY EXAM CHEST 2 VIEWS: CPT

## 2022-05-06 RX ORDER — BUDESONIDE AND FORMOTEROL FUMARATE DIHYDRATE 160; 4.5 UG/1; UG/1
2 AEROSOL RESPIRATORY (INHALATION)
Qty: 10.2 G | Refills: 2 | Status: SHIPPED | OUTPATIENT
Start: 2022-05-06 | End: 2022-08-01

## 2022-05-06 RX ORDER — ALBUTEROL SULFATE 90 UG/1
2 AEROSOL, METERED RESPIRATORY (INHALATION) EVERY 4 HOURS PRN
Qty: 18 G | Refills: 2 | Status: SHIPPED | OUTPATIENT
Start: 2022-05-06

## 2022-05-06 RX ORDER — PREDNISONE 20 MG/1
20 TABLET ORAL DAILY
Qty: 10 TABLET | Refills: 0 | Status: SHIPPED | OUTPATIENT
Start: 2022-05-06 | End: 2022-07-22

## 2022-05-06 RX ORDER — DIVALPROEX SODIUM 500 MG/1
500 TABLET, EXTENDED RELEASE ORAL NIGHTLY
COMMUNITY
Start: 2022-03-29

## 2022-05-06 RX ORDER — PRIMIDONE 50 MG/1
50 TABLET ORAL 2 TIMES DAILY
COMMUNITY
Start: 2022-04-19

## 2022-05-06 RX ORDER — LURASIDONE HYDROCHLORIDE 80 MG/1
80 TABLET, FILM COATED ORAL NIGHTLY
COMMUNITY
Start: 2022-04-28 | End: 2022-07-22

## 2022-05-06 NOTE — TELEPHONE ENCOUNTER
Patient has PFT ordered-please place COVID test and please send message back to me so Im aware it was entered

## 2022-05-06 NOTE — PROGRESS NOTES
"Chief Complaint   Patient presents with   • Cough     Ongoing for 6 months       Subjective       History of Present Illness     Loreta Robertson is a 52 y.o. female. Loreta Robertson is a 52-year-old female who presents today for coughing.     The patient reports an intense cough which has been occurring for 7 months and it \"blocks her airways\". She states it does not last \"just a few seconds\" and experiences a \"coughing fit\" for minutes. The patient states she is producing a small amount of thick, yellow phlegm and it occurs a lot more when she is smoking. She reports dyspnea while she is coughing and does not experience it after she is sitting. The patient denies wheezing. She reports dyspnea while she is walking and states it occurs when she is walking short distances as well. The patient denies fever, chills, and other related symptoms. She is not taking an inhaler now and states she was supposed to use one, but she never did. The patient states she is open to using an inhaler now.    The patient reports smoking 2 packs a day. She states she does not consume enough water throughout the day.     The patient is scheduled to see the pulmonologist on 08/2022. She has seen neurologists in regard to her hand tremors. The neurologist she saw on 05/03/2022 scheduled her for a DaTscan on 05/18/2022. She states the neurologist Dr. Leonard is primarily considering drug-induced parkinsonism.     The following portions of the patient's history were reviewed and updated as appropriate: allergies, current medications, past medical history, past social history and problem list.    Allergies   Allergen Reactions   • Morphine And Related GI Intolerance     Social History     Tobacco Use   • Smoking status: Current Every Day Smoker     Packs/day: 1.00     Years: 39.00     Pack years: 39.00     Start date: 1980   • Smokeless tobacco: Never Used   Substance Use Topics   • Alcohol use: No         Current Outpatient Medications: "   •  divalproex (DEPAKOTE ER) 500 MG 24 hr tablet, Take 500 mg by mouth Every Night., Disp: , Rfl:   •  Latuda 80 MG tablet tablet, Take 80 mg by mouth Every Night., Disp: , Rfl:   •  prazosin (MINIPRESS) 5 MG capsule, Take 5 mg by mouth Every Night., Disp: , Rfl: 1  •  primidone (MYSOLINE) 50 MG tablet, Take 50 mg by mouth Every Night., Disp: , Rfl:   •  promethazine (PHENERGAN) 25 MG tablet, TAKE 1 TABLET BY MOUTH EVERY 8 HOURS AS NEEDED FOR NAUSEA OR VOMITING, Disp: 20 tablet, Rfl: 0  •  tiZANidine (ZANAFLEX) 4 MG tablet, TAKE 1 TABLET BY MOUTH TWICE DAILY AS NEEDED FOR MUSCLE SPASMS, Disp: 60 tablet, Rfl: 0  •  albuterol sulfate  (90 Base) MCG/ACT inhaler, Inhale 2 puffs Every 4 (Four) Hours As Needed for Wheezing., Disp: 18 g, Rfl: 2  •  budesonide-formoterol (Symbicort) 160-4.5 MCG/ACT inhaler, Inhale 2 puffs 2 (Two) Times a Day., Disp: 10.2 g, Rfl: 2  •  predniSONE (DELTASONE) 20 MG tablet, Take 1 tablet by mouth Daily., Disp: 10 tablet, Rfl: 0  •  vitamin D (ERGOCALCIFEROL) 1.25 MG (42517 UT) capsule capsule, Take 1 capsule by mouth Every 7 (Seven) Days., Disp: 5 capsule, Rfl: 2    Review of Systems   Constitutional: Negative for chills and fever.   HENT: Negative for congestion, ear pain, sore throat and trouble swallowing.    Eyes: Negative for pain.   Respiratory: Positive for cough. Negative for shortness of breath and wheezing.         +HINTON   Cardiovascular: Negative for chest pain and palpitations.   Gastrointestinal: Negative for abdominal pain, diarrhea, nausea and vomiting.   Genitourinary: Negative for dysuria.   Skin: Negative for rash.   Allergic/Immunologic: Negative for immunocompromised state.   Neurological: Positive for tremors. Negative for dizziness, syncope, weakness and headache.   Psychiatric/Behavioral: The patient is not nervous/anxious.        Objective   Vitals:    05/06/22 1054   BP: 134/82   Pulse: 74   Resp: 18   Temp: 96.8 °F (36 °C)   SpO2: 92%     Body mass index is  49.13 kg/m².    Physical Exam  Constitutional:       Appearance: Normal appearance. She is well-developed.   HENT:      Head: Normocephalic and atraumatic.      Right Ear: Tympanic membrane, ear canal and external ear normal.      Left Ear: Tympanic membrane, ear canal and external ear normal.      Nose: Nose normal.      Mouth/Throat:      Mouth: Mucous membranes are moist.      Pharynx: Oropharynx is clear.   Eyes:      Conjunctiva/sclera: Conjunctivae normal.   Neck:      Thyroid: No thyromegaly.   Cardiovascular:      Rate and Rhythm: Normal rate and regular rhythm.      Heart sounds: No murmur heard.  Pulmonary:      Effort: Pulmonary effort is normal.      Breath sounds: Examination of the right-upper field reveals wheezing. Examination of the left-upper field reveals wheezing. Examination of the right-middle field reveals wheezing. Examination of the right-lower field reveals wheezing. Examination of the left-lower field reveals wheezing. Wheezing present. No rales.      Comments: +diffuse expiratory wheezing in all lung fields  Musculoskeletal:      Cervical back: Neck supple.   Lymphadenopathy:      Cervical: No cervical adenopathy.   Skin:     Findings: No rash.   Psychiatric:         Behavior: Behavior normal.               Assessment/Plan   Diagnoses and all orders for this visit:    1. Chronic obstructive pulmonary disease, unspecified COPD type (HCC) (Primary)  -     predniSONE (DELTASONE) 20 MG tablet; Take 1 tablet by mouth Daily.  Dispense: 10 tablet; Refill: 0  -     XR Chest PA & Lateral; Future  -     Full Pulmonary Function Test With Bronchodilator; Future  -     budesonide-formoterol (Symbicort) 160-4.5 MCG/ACT inhaler; Inhale 2 puffs 2 (Two) Times a Day.  Dispense: 10.2 g; Refill: 2  -     albuterol sulfate  (90 Base) MCG/ACT inhaler; Inhale 2 puffs Every 4 (Four) Hours As Needed for Wheezing.  Dispense: 18 g; Refill: 2    2. Coarse tremors  - Continue follow up with neurology.     3.  Persistent cough  -     predniSONE (DELTASONE) 20 MG tablet; Take 1 tablet by mouth Daily.  Dispense: 10 tablet; Refill: 0  -     XR Chest PA & Lateral; Future  -     Full Pulmonary Function Test With Bronchodilator; Future             Return in about 6 weeks (around 6/17/2022) for Follow up.    Transcribed from ambient dictation for Loreta Jarrell PA-C by Radha Best.  05/06/22   13:58 EDT    Patient verbalized consent to the visit recording.

## 2022-05-10 ENCOUNTER — LAB (OUTPATIENT)
Dept: PREADMISSION TESTING | Facility: HOSPITAL | Age: 52
End: 2022-05-10

## 2022-05-10 DIAGNOSIS — Z01.811 ENCOUNTER FOR PRE-OPERATIVE RESPIRATORY CLEARANCE: ICD-10-CM

## 2022-05-10 LAB — SARS-COV-2 RNA PNL SPEC NAA+PROBE: NOT DETECTED

## 2022-05-10 PROCEDURE — C9803 HOPD COVID-19 SPEC COLLECT: HCPCS

## 2022-05-10 PROCEDURE — U0004 COV-19 TEST NON-CDC HGH THRU: HCPCS

## 2022-05-13 ENCOUNTER — HOSPITAL ENCOUNTER (OUTPATIENT)
Dept: PULMONOLOGY | Facility: HOSPITAL | Age: 52
Discharge: HOME | End: 2022-05-13
Admitting: PHYSICIAN ASSISTANT

## 2022-05-13 ENCOUNTER — TELEPHONE (OUTPATIENT)
Dept: INTERNAL MEDICINE | Facility: CLINIC | Age: 52
End: 2022-05-13

## 2022-05-13 VITALS — RESPIRATION RATE: 18 BRPM | HEART RATE: 84 BPM

## 2022-05-13 DIAGNOSIS — R05.3 PERSISTENT COUGH: ICD-10-CM

## 2022-05-13 DIAGNOSIS — J44.9 CHRONIC OBSTRUCTIVE PULMONARY DISEASE, UNSPECIFIED COPD TYPE: ICD-10-CM

## 2022-05-13 PROCEDURE — 94727 GAS DIL/WSHOT DETER LNG VOL: CPT

## 2022-05-13 PROCEDURE — 94060 EVALUATION OF WHEEZING: CPT | Performed by: INTERNAL MEDICINE

## 2022-05-13 PROCEDURE — 94729 DIFFUSING CAPACITY: CPT

## 2022-05-13 PROCEDURE — 94727 GAS DIL/WSHOT DETER LNG VOL: CPT | Performed by: INTERNAL MEDICINE

## 2022-05-13 PROCEDURE — 94060 EVALUATION OF WHEEZING: CPT

## 2022-05-13 PROCEDURE — 94640 AIRWAY INHALATION TREATMENT: CPT

## 2022-05-13 PROCEDURE — 94729 DIFFUSING CAPACITY: CPT | Performed by: INTERNAL MEDICINE

## 2022-05-13 RX ORDER — ALBUTEROL SULFATE 2.5 MG/3ML
2.5 SOLUTION RESPIRATORY (INHALATION) ONCE
Status: COMPLETED | OUTPATIENT
Start: 2022-05-13 | End: 2022-05-13

## 2022-05-13 RX ADMIN — ALBUTEROL SULFATE 2.5 MG: 2.5 SOLUTION RESPIRATORY (INHALATION) at 13:40

## 2022-05-13 NOTE — TELEPHONE ENCOUNTER
Please let patient know her CXR was normal with no acute findings, and at least no overt evidence of progressive COPD changes.

## 2022-06-01 ENCOUNTER — TELEPHONE (OUTPATIENT)
Dept: INTERNAL MEDICINE | Facility: CLINIC | Age: 52
End: 2022-06-01

## 2022-06-01 DIAGNOSIS — M54.42 CHRONIC LEFT-SIDED LOW BACK PAIN WITH LEFT-SIDED SCIATICA: ICD-10-CM

## 2022-06-01 DIAGNOSIS — G89.29 CHRONIC LEFT-SIDED LOW BACK PAIN WITH LEFT-SIDED SCIATICA: ICD-10-CM

## 2022-06-01 RX ORDER — TIZANIDINE 4 MG/1
4 TABLET ORAL 2 TIMES DAILY PRN
Qty: 60 TABLET | Refills: 0 | Status: SHIPPED | OUTPATIENT
Start: 2022-06-01 | End: 2022-07-22 | Stop reason: SDUPTHER

## 2022-06-04 ENCOUNTER — TELEPHONE (OUTPATIENT)
Dept: INTERNAL MEDICINE | Facility: CLINIC | Age: 52
End: 2022-06-04

## 2022-06-04 NOTE — TELEPHONE ENCOUNTER
Please let pt know her PFTs showed a little restriction but no obstruction or other concerning findings. For now, would continue on her current inhalers. She may also want to discuss with Dr. Marquez when she sees him in August, or we can refer her back to Dr. Nettles whom she has seen in the past in pulm.

## 2022-06-06 NOTE — TELEPHONE ENCOUNTER
Pt notified of clinical message from PCP. Pt verbalized good understanding, stated she will wait to see Dr. Marquez in august. Advised if changes notify PCP.

## 2022-06-22 ENCOUNTER — TELEMEDICINE (OUTPATIENT)
Dept: INTERNAL MEDICINE | Facility: CLINIC | Age: 52
End: 2022-06-22

## 2022-06-22 DIAGNOSIS — J44.9 CHRONIC OBSTRUCTIVE PULMONARY DISEASE, UNSPECIFIED COPD TYPE: Primary | ICD-10-CM

## 2022-06-22 DIAGNOSIS — G20 PARKINSONISM, UNSPECIFIED PARKINSONISM TYPE: ICD-10-CM

## 2022-06-22 PROCEDURE — 99213 OFFICE O/P EST LOW 20 MIN: CPT | Performed by: PHYSICIAN ASSISTANT

## 2022-07-22 ENCOUNTER — LAB (OUTPATIENT)
Dept: LAB | Facility: HOSPITAL | Age: 52
End: 2022-07-22

## 2022-07-22 ENCOUNTER — OFFICE VISIT (OUTPATIENT)
Dept: INTERNAL MEDICINE | Facility: CLINIC | Age: 52
End: 2022-07-22

## 2022-07-22 VITALS
RESPIRATION RATE: 22 BRPM | WEIGHT: 293 LBS | HEIGHT: 66 IN | OXYGEN SATURATION: 92 % | SYSTOLIC BLOOD PRESSURE: 120 MMHG | HEART RATE: 81 BPM | TEMPERATURE: 97.2 F | BODY MASS INDEX: 47.09 KG/M2 | DIASTOLIC BLOOD PRESSURE: 60 MMHG

## 2022-07-22 DIAGNOSIS — R25.2 MUSCLE CRAMPS: ICD-10-CM

## 2022-07-22 DIAGNOSIS — E55.9 VITAMIN D DEFICIENCY: ICD-10-CM

## 2022-07-22 DIAGNOSIS — G89.29 CHRONIC LEFT-SIDED LOW BACK PAIN WITH LEFT-SIDED SCIATICA: ICD-10-CM

## 2022-07-22 DIAGNOSIS — M54.42 CHRONIC LEFT-SIDED LOW BACK PAIN WITH LEFT-SIDED SCIATICA: ICD-10-CM

## 2022-07-22 DIAGNOSIS — F31.11 BIPOLAR 1 DISORDER, MANIC, MILD: Primary | ICD-10-CM

## 2022-07-22 DIAGNOSIS — M25.561 PAIN IN BOTH KNEES, UNSPECIFIED CHRONICITY: ICD-10-CM

## 2022-07-22 DIAGNOSIS — M25.562 PAIN IN BOTH KNEES, UNSPECIFIED CHRONICITY: ICD-10-CM

## 2022-07-22 PROCEDURE — 99214 OFFICE O/P EST MOD 30 MIN: CPT | Performed by: NURSE PRACTITIONER

## 2022-07-22 RX ORDER — QUETIAPINE FUMARATE 200 MG/1
200 TABLET, FILM COATED ORAL
COMMUNITY
Start: 2022-07-06

## 2022-07-22 RX ORDER — TIZANIDINE 4 MG/1
4 TABLET ORAL EVERY 8 HOURS PRN
Qty: 60 TABLET | Refills: 0 | Status: SHIPPED | OUTPATIENT
Start: 2022-07-22 | End: 2022-08-08

## 2022-07-22 NOTE — PROGRESS NOTES
Patient Name: Loreta Robertson  : 1970   MRN: [id number]     Chief Complaint:    Chief Complaint   Patient presents with   • COPD     New patient    • Back Pain       History of Present Illness: Loreta Robertson is a 52 y.o. female presents to clinic for follow-up.     Tremors  She has seen a neurologist for CT and scheduled DaTscan . Dr. Leonard is primarily considering drug-induced parkinsonism from anti-psychotics.    2022 3:15 PM EDT    Exam/Procedure: NM BRAIN SCAN W SPECT/CT ordered by EBONI LEONARD, [id number]    CLINICAL INDICATION:   Parkinsonian symptomatology. PARKINSON'S DISEASE.      TECHNIQUE:   At 3-4 hours following a 3-minute intravenous infusion of 5.9 mCi of I-123 ioflupane (DaTscan), SPECT and CT images of the brain were acquired and processed using Siemens SEElogixvo 16 SPECT/CT hybrid technology. Three-dimensional attenuation-corrected SPECT, CT and fused SPECT/CT tomographic images in coronal, sagittal, and transverse planes were created and reviewed interactively to optimize sensitivity, specificity, and anatomic localization. CT: low-dose, non-breath-hold, without intravenous contrast; TOTAL DLP (Dose Length Product): 800.96 mGy*cm. Semi-quantitative analysis with reference to an age-matched database was performed to support visual qualitative interpretation.    COMPARISON/CORRELATION:  No comparison. No correlative imaging.    FINDINGS:   Asymmetrical localization.    Left Basal Ganglia:  Caudate: Normal localization.   Putamen: Normal localization.      Right Basal Ganglia:   Caudate: Mildly reduced localization.   Putamen: Moderately reduced localization.     Semi-quantitative Analysis: Maximum deviation in Striatal Binding Ratio (SBR) is demonstrated by a Z-score of -0.34 (abnormal is  2.0 below age-expected mean) and involves left caudate.    Additional CT: None.      Bipolar  She takes depakote and seroquel. Her psychiatrists has discontinued her Latuda 3 weeks ago.      COPD  She uses a rescue inhaler usage rarely. times a day.  She uses symbicort daily. She denies wheezing; has chronic cough and shortness of breath. She is  a smoker.     Low back pain and knee pain.   Pain in back worsens with bending. Knees ache. She has gone back to work and is working 4 hours five days a week.  She has been working on her weight . She has lost 57 pounds. She walks more and does not use her scooter. Returning to work has increased pain. She has muscle spasms.She takes a muscle relaxer at night and helps.Sometimes she needs one during the day. She takes two at night to help her sleep. She states it doesn't make her sleepy. She works as cash register.    Vitamin d deficiency was severe. She completed replacements      Subjective     Review of System: Review of Systems   Constitutional: Negative for fatigue and fever.   HENT: Negative for congestion.    Respiratory: Positive for wheezing. Negative for cough.    Musculoskeletal: Positive for arthralgias, back pain and myalgias.        Muscle cramps   Skin: Negative for rash.   Neurological: Negative for headaches.   Hematological: Negative for adenopathy.   Psychiatric/Behavioral: Negative for dysphoric mood.        Medications:     Current Outpatient Medications:   •  albuterol sulfate  (90 Base) MCG/ACT inhaler, Inhale 2 puffs Every 4 (Four) Hours As Needed for Wheezing., Disp: 18 g, Rfl: 2  •  budesonide-formoterol (Symbicort) 160-4.5 MCG/ACT inhaler, Inhale 2 puffs 2 (Two) Times a Day., Disp: 10.2 g, Rfl: 2  •  divalproex (DEPAKOTE ER) 500 MG 24 hr tablet, Take 500 mg by mouth Every Night., Disp: , Rfl:   •  prazosin (MINIPRESS) 5 MG capsule, Take 5 mg by mouth Every Night., Disp: , Rfl: 1  •  primidone (MYSOLINE) 50 MG tablet, Take 50 mg by mouth 2 (Two) Times a Day., Disp: , Rfl:   •  QUEtiapine (SEROquel) 200 MG tablet, Take 200 mg by mouth every night at bedtime., Disp: , Rfl:   •  tiZANidine (ZANAFLEX) 4 MG tablet, Take 1 tablet  "by mouth Every 8 (Eight) Hours As Needed for Muscle Spasms., Disp: 60 tablet, Rfl: 0  •  diclofenac (VOLTAREN) 50 MG EC tablet, Take 1 tablet by mouth 2 (Two) Times a Day., Disp: 30 tablet, Rfl: 0  •  vitamin D (ERGOCALCIFEROL) 1.25 MG (95542 UT) capsule capsule, Take 1 capsule by mouth Every 7 (Seven) Days., Disp: 5 capsule, Rfl: 2    Allergies:   Allergies   Allergen Reactions   • Morphine And Related GI Intolerance       Objective     Physical Exam:   Vital Signs:   Vitals:    07/22/22 1510 07/22/22 1551   BP: Comment: unable to get BP due to tightness of cuff and tremmors 120/60   Pulse: 81    Resp: 22    Temp: 97.2 °F (36.2 °C)    TempSrc: Infrared    SpO2: 92%    Weight: (!) 136 kg (300 lb 6.4 oz)    Height: 167.6 cm (66\")    PainSc:   8      Body mass index is 48.49 kg/m². Class 3 Severe Obesity (BMI >=40). Obesity-related health conditions include the following: back and knee pain. Obesity is improving with lifestyle modifications. BMI is is above average; BMI management plan is completed. We discussed portion control and increasing exercise.      Physical Exam  Constitutional:       General: She is not in acute distress.     Appearance: She is not ill-appearing.   HENT:      Head: Normocephalic.   Cardiovascular:      Rate and Rhythm: Normal rate and regular rhythm.      Heart sounds: Normal heart sounds. No murmur heard.  Pulmonary:      Breath sounds: Normal breath sounds.   Abdominal:      General: Bowel sounds are normal.      Tenderness: There is no abdominal tenderness.   Lymphadenopathy:      Cervical: No cervical adenopathy.   Neurological:      Comments: tremor   Psychiatric:         Mood and Affect: Mood normal.         Assessment / Plan      Assessment/Plan:   Diagnoses and all orders for this visit:    1. Bipolar 1 disorder, manic, mild (HCC) (Primary)  -     Valproic Acid Level, Total; Future  -     Valproic Acid Level, Total    2. Chronic left-sided low back pain with left-sided sciatica  -    "  tiZANidine (ZANAFLEX) 4 MG tablet; Take 1 tablet by mouth Every 8 (Eight) Hours As Needed for Muscle Spasms.  Dispense: 60 tablet; Refill: 0  -     diclofenac (VOLTAREN) 50 MG EC tablet; Take 1 tablet by mouth 2 (Two) Times a Day.  Dispense: 30 tablet; Refill: 0    3. Pain in both knees, unspecified chronicity  -     diclofenac (VOLTAREN) 50 MG EC tablet; Take 1 tablet by mouth 2 (Two) Times a Day.  Dispense: 30 tablet; Refill: 0    4. Muscle cramps  -     Comprehensive metabolic panel; Future  -     Magnesium; Future  -     TSH; Future  -     Comprehensive metabolic panel  -     Magnesium  -     TSH    5. Vitamin D deficiency  -     Vitamin D 25 hydroxy; Future  -     Vitamin D 25 hydroxy  -     vitamin D (ERGOCALCIFEROL) 1.25 MG (01488 UT) capsule capsule; Take 1 capsule by mouth Every 7 (Seven) Days.  Dispense: 5 capsule; Refill: 2     Explained and discussed patient's condition and plan of care.  Discussed when to follow-up.  Discussed possible red flags and how to follow-up with those.  Viewed patient's medications and discussed common side effects. Patient to continue current medications as advised.  Be compliant with medications. Patient to let me know if they have any worsening, no improvement, does not tolerate medication, or any future concerns about treatment. ER for emergencies.  Patient verbalized an understanding and agreement with plan of care.          Follow Up:   Return in about 3 months (around 10/22/2022) for Annual.    ROSALES White  [facility]

## 2022-07-24 LAB
25(OH)D3+25(OH)D2 SERPL-MCNC: 9.7 NG/ML (ref 30–100)
ALBUMIN SERPL-MCNC: 3.7 G/DL (ref 3.8–4.9)
ALBUMIN/GLOB SERPL: 1.3 {RATIO} (ref 1.2–2.2)
ALP SERPL-CCNC: 78 IU/L (ref 44–121)
ALT SERPL-CCNC: 10 IU/L (ref 0–32)
AST SERPL-CCNC: 18 IU/L (ref 0–40)
BILIRUB SERPL-MCNC: 0.4 MG/DL (ref 0–1.2)
BUN SERPL-MCNC: 10 MG/DL (ref 6–24)
BUN/CREAT SERPL: 14 (ref 9–23)
CALCIUM SERPL-MCNC: 9.3 MG/DL (ref 8.7–10.2)
CHLORIDE SERPL-SCNC: 102 MMOL/L (ref 96–106)
CO2 SERPL-SCNC: 25 MMOL/L (ref 20–29)
CREAT SERPL-MCNC: 0.72 MG/DL (ref 0.57–1)
EGFRCR SERPLBLD CKD-EPI 2021: 99 ML/MIN/1.73
GLOBULIN SER CALC-MCNC: 2.8 G/DL (ref 1.5–4.5)
GLUCOSE SERPL-MCNC: 87 MG/DL (ref 65–99)
MAGNESIUM SERPL-MCNC: 2.4 MG/DL (ref 1.6–2.3)
POTASSIUM SERPL-SCNC: 4.4 MMOL/L (ref 3.5–5.2)
PROT SERPL-MCNC: 6.5 G/DL (ref 6–8.5)
SODIUM SERPL-SCNC: 140 MMOL/L (ref 134–144)
TSH SERPL DL<=0.005 MIU/L-ACNC: 3.7 UIU/ML (ref 0.45–4.5)
VALPROATE SERPL-MCNC: 117 UG/ML (ref 50–100)

## 2022-07-24 RX ORDER — ERGOCALCIFEROL 1.25 MG/1
50000 CAPSULE ORAL
Qty: 5 CAPSULE | Refills: 2 | Status: SHIPPED | OUTPATIENT
Start: 2022-07-24 | End: 2023-02-19 | Stop reason: SDUPTHER

## 2022-07-26 ENCOUNTER — TELEPHONE (OUTPATIENT)
Dept: INTERNAL MEDICINE | Facility: CLINIC | Age: 52
End: 2022-07-26

## 2022-07-28 ENCOUNTER — OFFICE VISIT (OUTPATIENT)
Dept: INTERNAL MEDICINE | Facility: CLINIC | Age: 52
End: 2022-07-28

## 2022-07-28 VITALS
WEIGHT: 293 LBS | HEIGHT: 66 IN | TEMPERATURE: 96.8 F | DIASTOLIC BLOOD PRESSURE: 66 MMHG | RESPIRATION RATE: 22 BRPM | OXYGEN SATURATION: 95 % | HEART RATE: 74 BPM | BODY MASS INDEX: 47.09 KG/M2 | SYSTOLIC BLOOD PRESSURE: 132 MMHG

## 2022-07-28 DIAGNOSIS — R30.0 DYSURIA: Primary | ICD-10-CM

## 2022-07-28 DIAGNOSIS — R53.83 FATIGUE, UNSPECIFIED TYPE: ICD-10-CM

## 2022-07-28 LAB
BILIRUB BLD-MCNC: NEGATIVE MG/DL
CLARITY, POC: CLEAR
EXPIRATION DATE: ABNORMAL
GLUCOSE UR STRIP-MCNC: NEGATIVE MG/DL
KETONES UR QL: ABNORMAL
LEUKOCYTE EST, POC: NEGATIVE
Lab: ABNORMAL
NITRITE UR-MCNC: NEGATIVE MG/ML
PH UR: 5 [PH] (ref 5–8)
PROT UR STRIP-MCNC: NEGATIVE MG/DL
RBC # UR STRIP: NEGATIVE /UL
SP GR UR: 1.01 (ref 1–1.03)
UROBILINOGEN UR QL: ABNORMAL

## 2022-07-28 PROCEDURE — 99214 OFFICE O/P EST MOD 30 MIN: CPT | Performed by: NURSE PRACTITIONER

## 2022-07-28 PROCEDURE — 81003 URINALYSIS AUTO W/O SCOPE: CPT | Performed by: NURSE PRACTITIONER

## 2022-07-28 RX ORDER — SULFAMETHOXAZOLE AND TRIMETHOPRIM 800; 160 MG/1; MG/1
1 TABLET ORAL 2 TIMES DAILY
Qty: 20 TABLET | Refills: 0 | Status: SHIPPED | OUTPATIENT
Start: 2022-07-28 | End: 2022-08-08

## 2022-07-28 NOTE — PROGRESS NOTES
"Patient Name: Loreta Robertson  : 1970   MRN: [id number]     Chief Complaint:    Chief Complaint   Patient presents with   • Abdominal Pain     Intense lower left abdominal pain. Went to [facility] 4 days ago. All tests negative        History of Present Illness: Loreta Robertson is a 52 y.o. female presents to clinic for follow-up from ER visit on 22 due to left lower quadrant pain. She states the pain is a \"cramp\". It radiates to the back. Associated symptoms are nausea (intermittent). Last colonoscopy was 2019. No rectal bleeding or diarrhea. The pain has not improved. She rates is \"8\" . Alleviating factors are gas and going to the bathroom. Aggravating factors is urinating. She was given pain medication and zofran.  I will request ER records.    Subjective     Review of System: Review of Systems   Constitutional: Negative for fatigue and fever.   HENT: Negative for congestion and rhinorrhea.    Respiratory: Negative for shortness of breath and wheezing.    Cardiovascular: Negative for chest pain and palpitations.   Gastrointestinal: Positive for abdominal pain and constipation. Negative for diarrhea, nausea and vomiting.   Genitourinary: Negative for dysuria (makes abdomen cramp with urination. ) and flank pain.   Musculoskeletal: Positive for back pain. Negative for myalgias.   Skin: Negative for rash.   Neurological: Negative for headaches.   Hematological: Negative for adenopathy.   Psychiatric/Behavioral: Negative for dysphoric mood.        Medications:     Current Outpatient Medications:   •  albuterol sulfate  (90 Base) MCG/ACT inhaler, Inhale 2 puffs Every 4 (Four) Hours As Needed for Wheezing., Disp: 18 g, Rfl: 2  •  budesonide-formoterol (Symbicort) 160-4.5 MCG/ACT inhaler, Inhale 2 puffs 2 (Two) Times a Day., Disp: 10.2 g, Rfl: 2  •  diclofenac (VOLTAREN) 50 MG EC tablet, Take 1 tablet by mouth 2 (Two) Times a Day., Disp: 30 tablet, Rfl: 0  •  divalproex (DEPAKOTE " "ER) 500 MG 24 hr tablet, Take 500 mg by mouth Every Night., Disp: , Rfl:   •  prazosin (MINIPRESS) 5 MG capsule, Take 5 mg by mouth Every Night., Disp: , Rfl: 1  •  primidone (MYSOLINE) 50 MG tablet, Take 50 mg by mouth 2 (Two) Times a Day., Disp: , Rfl:   •  QUEtiapine (SEROquel) 200 MG tablet, Take 200 mg by mouth every night at bedtime., Disp: , Rfl:   •  sulfamethoxazole-trimethoprim (BACTRIM DS,SEPTRA DS) 800-160 MG per tablet, Take 1 tablet by mouth 2 (Two) Times a Day., Disp: 20 tablet, Rfl: 0  •  tiZANidine (ZANAFLEX) 4 MG tablet, Take 1 tablet by mouth Every 8 (Eight) Hours As Needed for Muscle Spasms., Disp: 60 tablet, Rfl: 0  •  vitamin D (ERGOCALCIFEROL) 1.25 MG (05514 UT) capsule capsule, Take 1 capsule by mouth Every 7 (Seven) Days., Disp: 5 capsule, Rfl: 2    Allergies:   Allergies   Allergen Reactions   • Morphine And Related GI Intolerance       Objective     Physical Exam:   Vital Signs:   Vitals:    07/28/22 1328   BP: 132/66   BP Location: Right arm   Patient Position: Sitting   Cuff Size: Large Adult   Pulse: 74   Resp: 22   Temp: 96.8 °F (36 °C)   TempSrc: Infrared   SpO2: 95%   Weight: (!) 137 kg (302 lb 6.4 oz)   Height: 167.6 cm (66\")   PainSc:   8     Body mass index is 48.81 kg/m². Class 3 Severe Obesity (BMI >=40). Obesity-related health conditions include the following: osteoarthritis. Obesity is improving with lifestyle modifications. BMI is is above average; BMI management plan is completed. We discussed continue weight loss methods. .      Physical Exam  Constitutional:       General: She is not in acute distress.     Appearance: She is not ill-appearing.   HENT:      Head: Normocephalic.   Cardiovascular:      Rate and Rhythm: Normal rate and regular rhythm.      Heart sounds: Normal heart sounds. No murmur heard.  Pulmonary:      Breath sounds: Normal breath sounds.   Abdominal:      General: Bowel sounds are normal.      Tenderness: There is no abdominal tenderness. There is no " right CVA tenderness, left CVA tenderness, guarding or rebound.   Lymphadenopathy:      Cervical: No cervical adenopathy.   Skin:     General: Skin is warm and dry.   Neurological:      General: No focal deficit present.      Mental Status: She is oriented to person, place, and time.   Psychiatric:         Mood and Affect: Mood normal.         Assessment / Plan      Assessment/Plan:   Diagnoses and all orders for this visit:    1. Dysuria (Primary)  -     sulfamethoxazole-trimethoprim (BACTRIM DS,SEPTRA DS) 800-160 MG per tablet; Take 1 tablet by mouth 2 (Two) Times a Day.  Dispense: 20 tablet; Refill: 0  -     CBC w AUTO Differential  -     CBC w AUTO Differential  -     POC Urinalysis Dipstick, Automated  Bactrim DS 1 tab BID x 10 days    2. Fatigue, unspecified type  -    -     CBC w AUTO Differential    Explained and discussed patient's condition and plan of care.  Discussed when to follow-up.  Discussed possible red flags and how to follow-up with those.  Viewed patient's medications and discussed common side effects. Patient to continue current medications as advised.  Be compliant with medications. Patient to let me know if they have any worsening, no improvement, does not tolerate medication, or any future concerns about treatment. ER for emergencies.  Patient verbalized an understanding and agreement with plan of care.          Follow Up:   I will discuss follow-up pending lab and ER visit reviewed.  Patient to follow-up if worsening.  ROSALES White  [facility]

## 2022-07-29 LAB
ALBUMIN SERPL-MCNC: 3.9 G/DL (ref 3.8–4.9)
ALBUMIN/GLOB SERPL: 1.3 {RATIO} (ref 1.2–2.2)
ALP SERPL-CCNC: 229 IU/L (ref 44–121)
ALT SERPL-CCNC: 24 IU/L (ref 0–32)
AST SERPL-CCNC: 28 IU/L (ref 0–40)
BASOPHILS # BLD AUTO: 0 X10E3/UL (ref 0–0.2)
BASOPHILS NFR BLD AUTO: 0 %
BILIRUB SERPL-MCNC: 0.6 MG/DL (ref 0–1.2)
BUN SERPL-MCNC: 15 MG/DL (ref 6–24)
BUN/CREAT SERPL: 29 (ref 9–23)
CALCIUM SERPL-MCNC: 9.6 MG/DL (ref 8.7–10.2)
CHLORIDE SERPL-SCNC: 106 MMOL/L (ref 96–106)
CO2 SERPL-SCNC: 23 MMOL/L (ref 20–29)
CREAT SERPL-MCNC: 0.52 MG/DL (ref 0.57–1)
EGFRCR SERPLBLD CKD-EPI 2021: 110 ML/MIN/1.73
EOSINOPHIL # BLD AUTO: 0.1 X10E3/UL (ref 0–0.4)
EOSINOPHIL NFR BLD AUTO: 2 %
ERYTHROCYTE [DISTWIDTH] IN BLOOD BY AUTOMATED COUNT: 13.1 % (ref 11.7–15.4)
GLOBULIN SER CALC-MCNC: 3.1 G/DL (ref 1.5–4.5)
GLUCOSE SERPL-MCNC: 96 MG/DL (ref 65–99)
HCT VFR BLD AUTO: 43.9 % (ref 34–46.6)
HGB BLD-MCNC: 14.5 G/DL (ref 11.1–15.9)
IMM GRANULOCYTES # BLD AUTO: 0.1 X10E3/UL (ref 0–0.1)
IMM GRANULOCYTES NFR BLD AUTO: 2 %
LYMPHOCYTES # BLD AUTO: 2 X10E3/UL (ref 0.7–3.1)
LYMPHOCYTES NFR BLD AUTO: 43 %
MCH RBC QN AUTO: 32.2 PG (ref 26.6–33)
MCHC RBC AUTO-ENTMCNC: 33 G/DL (ref 31.5–35.7)
MCV RBC AUTO: 97 FL (ref 79–97)
MONOCYTES # BLD AUTO: 0.4 X10E3/UL (ref 0.1–0.9)
MONOCYTES NFR BLD AUTO: 10 %
NEUTROPHILS # BLD AUTO: 1.9 X10E3/UL (ref 1.4–7)
NEUTROPHILS NFR BLD AUTO: 43 %
PLATELET # BLD AUTO: 142 X10E3/UL (ref 150–450)
POTASSIUM SERPL-SCNC: 3.6 MMOL/L (ref 3.5–5.2)
PROT SERPL-MCNC: 7 G/DL (ref 6–8.5)
RBC # BLD AUTO: 4.51 X10E6/UL (ref 3.77–5.28)
SODIUM SERPL-SCNC: 141 MMOL/L (ref 134–144)
WBC # BLD AUTO: 4.5 X10E3/UL (ref 3.4–10.8)

## 2022-07-30 DIAGNOSIS — J44.9 CHRONIC OBSTRUCTIVE PULMONARY DISEASE, UNSPECIFIED COPD TYPE: ICD-10-CM

## 2022-08-01 RX ORDER — BUDESONIDE AND FORMOTEROL FUMARATE DIHYDRATE 160; 4.5 UG/1; UG/1
AEROSOL RESPIRATORY (INHALATION)
Qty: 10.2 G | Refills: 2 | Status: SHIPPED | OUTPATIENT
Start: 2022-08-01 | End: 2022-11-10 | Stop reason: SDUPTHER

## 2022-08-03 DIAGNOSIS — M25.562 PAIN IN BOTH KNEES, UNSPECIFIED CHRONICITY: ICD-10-CM

## 2022-08-03 DIAGNOSIS — M25.561 PAIN IN BOTH KNEES, UNSPECIFIED CHRONICITY: ICD-10-CM

## 2022-08-03 DIAGNOSIS — M54.42 CHRONIC LEFT-SIDED LOW BACK PAIN WITH LEFT-SIDED SCIATICA: ICD-10-CM

## 2022-08-03 DIAGNOSIS — G89.29 CHRONIC LEFT-SIDED LOW BACK PAIN WITH LEFT-SIDED SCIATICA: ICD-10-CM

## 2022-08-08 ENCOUNTER — HOSPITAL ENCOUNTER (OUTPATIENT)
Dept: GENERAL RADIOLOGY | Facility: HOSPITAL | Age: 52
Discharge: HOME | End: 2022-08-08

## 2022-08-08 ENCOUNTER — OFFICE VISIT (OUTPATIENT)
Dept: INTERNAL MEDICINE | Facility: CLINIC | Age: 52
End: 2022-08-08

## 2022-08-08 ENCOUNTER — TELEPHONE (OUTPATIENT)
Dept: INTERNAL MEDICINE | Facility: CLINIC | Age: 52
End: 2022-08-08

## 2022-08-08 ENCOUNTER — LAB (OUTPATIENT)
Dept: LAB | Facility: HOSPITAL | Age: 52
End: 2022-08-08

## 2022-08-08 VITALS
RESPIRATION RATE: 20 BRPM | TEMPERATURE: 97.7 F | BODY MASS INDEX: 47.09 KG/M2 | SYSTOLIC BLOOD PRESSURE: 132 MMHG | HEART RATE: 78 BPM | DIASTOLIC BLOOD PRESSURE: 70 MMHG | OXYGEN SATURATION: 94 % | WEIGHT: 293 LBS | HEIGHT: 66 IN

## 2022-08-08 DIAGNOSIS — R74.8 ELEVATED ALKALINE PHOSPHATASE LEVEL: ICD-10-CM

## 2022-08-08 DIAGNOSIS — M25.551 ACUTE RIGHT HIP PAIN: ICD-10-CM

## 2022-08-08 DIAGNOSIS — M25.551 ACUTE RIGHT HIP PAIN: Primary | ICD-10-CM

## 2022-08-08 DIAGNOSIS — M54.42 CHRONIC LEFT-SIDED LOW BACK PAIN WITH LEFT-SIDED SCIATICA: ICD-10-CM

## 2022-08-08 DIAGNOSIS — G89.29 CHRONIC LEFT-SIDED LOW BACK PAIN WITH LEFT-SIDED SCIATICA: ICD-10-CM

## 2022-08-08 PROCEDURE — 99213 OFFICE O/P EST LOW 20 MIN: CPT | Performed by: PHYSICIAN ASSISTANT

## 2022-08-08 PROCEDURE — 73502 X-RAY EXAM HIP UNI 2-3 VIEWS: CPT

## 2022-08-08 RX ORDER — TIZANIDINE 4 MG/1
TABLET ORAL
Qty: 60 TABLET | Refills: 0 | Status: SHIPPED | OUTPATIENT
Start: 2022-08-08 | End: 2022-08-25

## 2022-08-08 RX ORDER — PREDNISONE 20 MG/1
20 TABLET ORAL 2 TIMES DAILY
Qty: 10 TABLET | Refills: 0 | Status: SHIPPED | OUTPATIENT
Start: 2022-08-08 | End: 2022-08-13

## 2022-08-08 RX ORDER — IBUPROFEN 600 MG/1
600 TABLET ORAL EVERY 8 HOURS PRN
Qty: 30 TABLET | Refills: 0 | Status: SHIPPED | OUTPATIENT
Start: 2022-08-08 | End: 2022-10-03 | Stop reason: SDUPTHER

## 2022-08-08 NOTE — TELEPHONE ENCOUNTER
"As we discussed in office, we often use a \"steroid burst\" for acute joint pain to help with inflammation (BID x5 days only). This often helps with large joint acute pain.   "

## 2022-08-08 NOTE — TELEPHONE ENCOUNTER
Rx Refill Note  Requested Prescriptions     Pending Prescriptions Disp Refills   • tiZANidine (ZANAFLEX) 4 MG tablet [Pharmacy Med Name: TIZANIDINE 4MG TABLETS] 60 tablet 0     Sig: TAKE 1 TABLET BY MOUTH EVERY 8 HOURS AS NEEDED FOR MUSCLE SPASMS      Last office visit with prescribing clinician: 7/28/2022      Next office visit with prescribing clinician: Visit date not found            Kerrie Fierro MA  08/08/22, 10:12 EDT

## 2022-08-08 NOTE — PROGRESS NOTES
Chief Complaint   Patient presents with   • Hip Pain     Right hip pain x 1 week. Pain getting worse. Pain worse when she stands from a sitting position       Subjective       History of Present Illness     Loreta Robertson is a 52 y.o. female. She presents with hip pain.     Right hip pain  The patient complains of right hip pain that began approximately 1 week ago. She states that when she went to get up, her leg wanted to give out. She notes that she had a sharp pain in her hip that would last for approximately 30 seconds. She states that the pain is worse when she is in a sitting position. She localizes the pain to the posterior aspect of the hip. She denies any injury, falls, or other trauma. She denies any back pain that is associated with the hip pain. She states that she has been taking Aleve, but it does not provide relief for her pain. She states that she has tried a heating pad, but still has severe pain when she gets up.  She states that she has tried Mobic in the past, but it did not help. She states that she is taking Zanaflex 3 to 4 times a day for her knees, but it does not help. She denies any bowel or bladder incontinence. She denies any recent x-rays of the hip. She states that she would like to see Dr. Arnol Brito at , if she must be referred to an orthopedist. .    Labs  The patient states that she has a history of elevated platelets and high liver enzymes.    The following portions of the patient's history were reviewed and updated as appropriate: allergies, current medications, past medical history, past social history and problem list.    Allergies   Allergen Reactions   • Morphine And Related GI Intolerance     Social History     Tobacco Use   • Smoking status: Current Every Day Smoker     Packs/day: 1.00     Years: 39.00     Pack years: 39.00     Start date: 1980   • Smokeless tobacco: Never Used   Substance Use Topics   • Alcohol use: No         Current Outpatient Medications:   •   albuterol sulfate  (90 Base) MCG/ACT inhaler, Inhale 2 puffs Every 4 (Four) Hours As Needed for Wheezing., Disp: 18 g, Rfl: 2  •  divalproex (DEPAKOTE ER) 500 MG 24 hr tablet, Take 500 mg by mouth Every Night., Disp: , Rfl:   •  prazosin (MINIPRESS) 5 MG capsule, Take 5 mg by mouth Every Night., Disp: , Rfl: 1  •  primidone (MYSOLINE) 50 MG tablet, Take 50 mg by mouth 2 (Two) Times a Day., Disp: , Rfl:   •  QUEtiapine (SEROquel) 200 MG tablet, Take 200 mg by mouth every night at bedtime., Disp: , Rfl:   •  Symbicort 160-4.5 MCG/ACT inhaler, INHALE 2 PUFFS BY MOUTH TWICE DAILY, Disp: 10.2 g, Rfl: 2  •  tiZANidine (ZANAFLEX) 4 MG tablet, TAKE 1 TABLET BY MOUTH EVERY 8 HOURS AS NEEDED FOR MUSCLE SPASMS, Disp: 60 tablet, Rfl: 0  •  vitamin D (ERGOCALCIFEROL) 1.25 MG (34544 UT) capsule capsule, Take 1 capsule by mouth Every 7 (Seven) Days., Disp: 5 capsule, Rfl: 2  •  diclofenac (VOLTAREN) 50 MG EC tablet, TAKE 1 TABLET BY MOUTH TWICE DAILY, Disp: 30 tablet, Rfl: 0  •  ibuprofen (ADVIL,MOTRIN) 600 MG tablet, Take 1 tablet by mouth Every 8 (Eight) Hours As Needed for Mild Pain  or Moderate Pain ., Disp: 30 tablet, Rfl: 0  •  predniSONE (DELTASONE) 20 MG tablet, Take 1 tablet by mouth 2 (Two) Times a Day for 5 days., Disp: 10 tablet, Rfl: 0    Review of Systems   Constitutional: Negative for chills and fever.   HENT: Negative for sore throat.    Respiratory: Negative for shortness of breath.    Cardiovascular: Negative for chest pain.   Gastrointestinal: Negative for abdominal pain, nausea and vomiting.   Genitourinary: Negative for dysuria and hematuria.   Musculoskeletal: Positive for arthralgias.   Neurological: Positive for weakness. Negative for dizziness and headache.   Psychiatric/Behavioral: Negative for depressed mood.       Objective   Vitals:    08/08/22 1144   BP: 132/70   Pulse: 78   Resp: 20   Temp: 97.7 °F (36.5 °C)   SpO2: 94%     Body mass index is 48.26 kg/m².    Physical Exam  Constitutional:        Appearance: Normal appearance. She is well-developed.   HENT:      Head: Normocephalic and atraumatic.      Right Ear: External ear normal.      Left Ear: External ear normal.      Nose: Nose normal.   Eyes:      Conjunctiva/sclera: Conjunctivae normal.   Neck:      Thyroid: No thyromegaly.   Cardiovascular:      Rate and Rhythm: Normal rate and regular rhythm.      Heart sounds: No murmur heard.  Pulmonary:      Effort: Pulmonary effort is normal.      Breath sounds: Normal breath sounds. No wheezing or rales.   Musculoskeletal:      Right hip: Tenderness and bony tenderness present. Decreased range of motion.   Psychiatric:         Behavior: Behavior normal.               Assessment & Plan   Diagnoses and all orders for this visit:    1. Acute right hip pain (Primary)  -     XR Hip With or Without Pelvis 2 - 3 View Right; Future  -     ibuprofen (ADVIL,MOTRIN) 600 MG tablet; Take 1 tablet by mouth Every 8 (Eight) Hours As Needed for Mild Pain  or Moderate Pain .  Dispense: 30 tablet; Refill: 0  -     predniSONE (DELTASONE) 20 MG tablet; Take 1 tablet by mouth 2 (Two) Times a Day for 5 days.  Dispense: 10 tablet; Refill: 0    2. Elevated alkaline phosphatase level  -     Comprehensive Metabolic Panel; Future      1. Right hip pain  - We will obtain an x-ray of the right hip today.  - We will start the patient on a steroid burst twice a day for 5 days.  - We will send in a prescription for ibuprofen.  - Consider PT vs orthopedic referral (see HPI, Dr. Brito) after review of XR.              Return if symptoms worsen or fail to improve.       Transcribed from ambient dictation for Loreta Jarrell PA-C by Daysi Faustin.  08/08/22   14:49 EDT    Patient verbalized consent to the visit recording.  I have personally performed the services described in this document as transcribed by the above individual, and it is both accurate and complete.  Loreta Jarrell PA-C  8/11/2022  15:32 EDT

## 2022-08-08 NOTE — TELEPHONE ENCOUNTER
Patient called us and asked why she was put on Prednisone? Patient was seen in clinic today for hip pain. Patient requested a call back from the clinical staff.    Please advise?

## 2022-08-09 LAB
ALBUMIN SERPL-MCNC: 3.9 G/DL (ref 3.5–5.2)
ALBUMIN/GLOB SERPL: 1.9 G/DL
ALP SERPL-CCNC: 70 U/L (ref 39–117)
ALT SERPL-CCNC: 13 U/L (ref 1–33)
AST SERPL-CCNC: 17 U/L (ref 1–32)
BILIRUB SERPL-MCNC: 0.5 MG/DL (ref 0–1.2)
BUN SERPL-MCNC: 10 MG/DL (ref 6–20)
BUN/CREAT SERPL: 14.1 (ref 7–25)
CALCIUM SERPL-MCNC: 9.5 MG/DL (ref 8.6–10.5)
CHLORIDE SERPL-SCNC: 104 MMOL/L (ref 98–107)
CO2 SERPL-SCNC: 27 MMOL/L (ref 22–29)
CREAT SERPL-MCNC: 0.71 MG/DL (ref 0.57–1)
EGFRCR SERPLBLD CKD-EPI 2021: 101.2 ML/MIN/1.73
GLOBULIN SER CALC-MCNC: 2.1 GM/DL
GLUCOSE SERPL-MCNC: 86 MG/DL (ref 65–99)
POTASSIUM SERPL-SCNC: 3.9 MMOL/L (ref 3.5–5.2)
PROT SERPL-MCNC: 6 G/DL (ref 6–8.5)
SODIUM SERPL-SCNC: 141 MMOL/L (ref 136–145)

## 2022-08-18 ENCOUNTER — TELEPHONE (OUTPATIENT)
Dept: INTERNAL MEDICINE | Facility: CLINIC | Age: 52
End: 2022-08-18

## 2022-08-18 DIAGNOSIS — M25.551 ACUTE RIGHT HIP PAIN: Primary | ICD-10-CM

## 2022-08-18 NOTE — TELEPHONE ENCOUNTER
Please let patient know her LFTs have returned to baseline, continue to monitor at routine visits.   XR hip did not show any changes, fracture, or arthritis. At this time, would recommend PT if she is open to this. She does not need to see an orthopedic doctor unless sx fail to improve with PT. Please let me know if she would like to proceed with PT so I may place referral.

## 2022-08-20 DIAGNOSIS — M25.561 PAIN IN BOTH KNEES, UNSPECIFIED CHRONICITY: ICD-10-CM

## 2022-08-20 DIAGNOSIS — M25.562 PAIN IN BOTH KNEES, UNSPECIFIED CHRONICITY: ICD-10-CM

## 2022-08-20 DIAGNOSIS — M54.42 CHRONIC LEFT-SIDED LOW BACK PAIN WITH LEFT-SIDED SCIATICA: ICD-10-CM

## 2022-08-20 DIAGNOSIS — G89.29 CHRONIC LEFT-SIDED LOW BACK PAIN WITH LEFT-SIDED SCIATICA: ICD-10-CM

## 2022-08-22 NOTE — TELEPHONE ENCOUNTER
LOV for chronic condition 7/28/2022  NOV N/S    Pt requested call back around 2:30 pm when she get off work to schedule appointment.

## 2022-08-25 DIAGNOSIS — G89.29 CHRONIC LEFT-SIDED LOW BACK PAIN WITH LEFT-SIDED SCIATICA: ICD-10-CM

## 2022-08-25 DIAGNOSIS — M54.42 CHRONIC LEFT-SIDED LOW BACK PAIN WITH LEFT-SIDED SCIATICA: ICD-10-CM

## 2022-08-25 RX ORDER — TIZANIDINE 4 MG/1
TABLET ORAL
Qty: 60 TABLET | Refills: 0 | Status: SHIPPED | OUTPATIENT
Start: 2022-08-25 | End: 2022-09-26

## 2022-09-22 ENCOUNTER — OFFICE VISIT (OUTPATIENT)
Dept: INTERNAL MEDICINE | Facility: CLINIC | Age: 52
End: 2022-09-22

## 2022-09-22 VITALS
OXYGEN SATURATION: 97 % | TEMPERATURE: 98.4 F | RESPIRATION RATE: 20 BRPM | BODY MASS INDEX: 47.02 KG/M2 | HEART RATE: 73 BPM | DIASTOLIC BLOOD PRESSURE: 68 MMHG | WEIGHT: 292.6 LBS | HEIGHT: 66 IN | SYSTOLIC BLOOD PRESSURE: 122 MMHG

## 2022-09-22 DIAGNOSIS — R47.1 DYSARTHRIA: ICD-10-CM

## 2022-09-22 DIAGNOSIS — R44.3 HALLUCINATIONS: ICD-10-CM

## 2022-09-22 DIAGNOSIS — R09.02 HYPOXIA: ICD-10-CM

## 2022-09-22 DIAGNOSIS — R79.89 ELEVATED TSH: Primary | ICD-10-CM

## 2022-09-22 DIAGNOSIS — R29.818 TRANSIENT NEUROLOGICAL SYMPTOMS: ICD-10-CM

## 2022-09-22 DIAGNOSIS — R25.1 TREMOR: ICD-10-CM

## 2022-09-22 DIAGNOSIS — Z91.89 AT HIGH RISK FOR CARDIOVASCULAR DISEASE: ICD-10-CM

## 2022-09-22 PROCEDURE — 99213 OFFICE O/P EST LOW 20 MIN: CPT | Performed by: NURSE PRACTITIONER

## 2022-09-22 RX ORDER — ATORVASTATIN CALCIUM 40 MG/1
TABLET, FILM COATED ORAL
COMMUNITY
Start: 2022-09-09

## 2022-09-22 NOTE — PROGRESS NOTES
Patient Name: Loreta Robertson  : 1970   MRN: [id number]     Chief Complaint:    Chief Complaint   Patient presents with   • Speech Problem     Follow up from  admission       History of Present Illness: Loreta Robertson is a 52 y.o. female presents to clinic after ER visit and [facility] admission.  Patient presented to  [facility] for slurred speech, numbness of right side, drooling and headache.  They recommended  admission/MRI at [facility]. She declined and went to  the next day.      *copied from hospital discharge   The patient presented with initial deficits of right arm and leg weakness and dysarthria giving an admission NIHSS of 3. CTH showed no acute abnormalities, no bleed. CTA showed no significant stenosis or occlusion. MRH showed no evidence of acute infarct. Echo showed LVEF 60-70%, no mass, no thrombus, no vegetations, normal left atrial size. Patient was diagnosed with transient neurologic symptoms (dysarthria and weakness) as her symptoms lasted approximately 72 hours before resolution and there were findings on MRI head suggestive of infarction. TPA was not administered due to patient being outside of the window. Thrombectomy was not performed as there was no LVO. This event is not believed to be TIA or stroke as patient had symptoms lasting approximately 72 hours, resolved spontaneously, and had no evidence of infarction on MRI. Additionally, patient tolerated her home medications well while inpatient reducing likelihood of medication side effect as a cause for her symptoms. Patient was evaluated with bedside swallow prior to oral intake.    Patient does not require secondary stroke prophylaxis with antiplatelets as she has no acute infarct, and TIA is not the cause for her symptoms. Because her ASCVD score was 8.1% risk for event in next 10 years, patient was continued on high intensity statin on discharge with atorvastatin 40mg daily. DVT prophylaxis was started on the day of  arrival.    On day of discharge, patient's NIHSS =1. MOCA was not performed as patient did not have stroke, and PHQ9 was not performed as patient did not have stroke. Patient was evaluated by PT/OT team and is felt to be safe for discharge home with recommendations for outpatient physical therapy. Patient evaluated by SLP who recommend outpatient speech therapy for chronic memory deficits and math to assist with higher level ADLs. Stroke education on signs and symptoms, risk factors including HTN, tobacco use, 911, importance of follow up and medications is provided before discharge.    * end of copied note    Tremors  Patient's tremors seem to be improving since Latuda was stopped.  She is taking Seroquel since June 2022 she is followed by [facility] neurology   and Dr. 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  [facility] Brain Scan w SPECT/CT (05/18/2022 14:09)       Elevated TSH    TSH 4.31     Hypoxia Patient had hypoxia during hospitalization and was placed on oxygen.  They recommended sleep study.  Patient declines stating she sleeps and rests fine.    Right arm and leg weakness/ dysarthria  No evidence of stroke or TIA.  Her symptoms have resolved.  She is back at work.   Patient decline SLP as her speech has improved.    Echocardiogram Bi-v Optimization (09/09/2022 09:50)  MRI Brain Without Contrast (09/09/2022 06:40)  CT Angiogram Neck (09/08/2022 02:02)  CT Head Without Contrast (09/08/2022 02:02)  CT Angiogram Head (09/08/2022 02:02)      Increase cardiovascular risk   Patient was started on atorvastatin 40 mg.  She is tolerating this well.      She reports hallucinations for  6 months. They are random and intermittent. They are scary. Sometimes it is figure of a man; other time is david tree. Sometimes she  will kick them to make them go away. She calls a friend and has them come over.  When she turns on a light they go away.  She does not have any auditory hallucinations or any command hallucinations.    Subjective     Review of System: Review of Systems   Constitutional: Negative for fatigue and fever.   HENT: Negative for congestion and rhinorrhea.    Respiratory: Negative for shortness of breath and wheezing.    Cardiovascular: Negative for chest pain and palpitations.   Gastrointestinal: Negative for abdominal pain, diarrhea, nausea and vomiting.   Genitourinary: Negative for dysuria.   Musculoskeletal: Negative for myalgias.   Skin: Negative for rash.   Neurological: Positive for tremors. Negative for headaches.   Hematological: Negative for adenopathy.   Psychiatric/Behavioral: Positive for hallucinations. Negative for dysphoric mood. The patient is not nervous/anxious.         Medications:     Current Outpatient Medications:   •  albuterol sulfate  (90 Base) MCG/ACT inhaler, Inhale 2 puffs Every 4 (Four) Hours As Needed for Wheezing., Disp: 18 g, Rfl: 2  •  atorvastatin (LIPITOR) 40 MG tablet, , Disp: , Rfl:   •  diclofenac (VOLTAREN) 50 MG EC tablet, TAKE 1 TABLET BY MOUTH TWICE DAILY, Disp: 30 tablet, Rfl: 0  •  divalproex (DEPAKOTE ER) 500 MG 24 hr tablet, Take 500 mg by mouth Every Night., Disp: , Rfl:   •  ibuprofen (ADVIL,MOTRIN) 600 MG tablet, Take 1 tablet by mouth Every 8 (Eight) Hours As Needed for Mild Pain  or Moderate Pain ., Disp: 30 tablet, Rfl: 0  •  prazosin (MINIPRESS) 5 MG capsule, Take 5 mg by mouth Every Night., Disp: , Rfl: 1  •  primidone (MYSOLINE) 50 MG tablet, Take 50 mg by mouth 2 (Two) Times a Day., Disp: , Rfl:   •  QUEtiapine (SEROquel) 200 MG tablet, Take 200 mg by mouth every night at bedtime., Disp: , Rfl:   •  Symbicort 160-4.5 MCG/ACT inhaler, INHALE 2 PUFFS BY MOUTH TWICE DAILY, Disp: 10.2 g, Rfl: 2  •  tiZANidine (ZANAFLEX) 4 MG tablet, TAKE 1 TABLET BY MOUTH EVERY 8  "HOURS AS NEEDED FOR MUSCLE SPASMS, Disp: 60 tablet, Rfl: 0  •  vitamin D (ERGOCALCIFEROL) 1.25 MG (52385 UT) capsule capsule, Take 1 capsule by mouth Every 7 (Seven) Days., Disp: 5 capsule, Rfl: 2    Allergies:   Allergies   Allergen Reactions   • Morphine And Related GI Intolerance       Objective     Physical Exam:   Vital Signs:   Vitals:    09/22/22 1525   BP: 122/68   BP Location: Right arm   Patient Position: Sitting   Cuff Size: Adult   Pulse: 73   Resp: 20   Temp: 98.4 °F (36.9 °C)   TempSrc: Infrared   SpO2: 97%   Weight: 133 kg (292 lb 9.6 oz)   Height: 167.6 cm (66\")   PainSc: 0-No pain     Body mass index is 47.23 kg/m². Class 3 Severe Obesity (BMI >=40). Obesity-related health conditions include the following: dyslipidemias. Obesity is newly identified. BMI is is above average; BMI management plan is completed. We discussed Information provided on AVS. .      Physical Exam  Constitutional:       General: She is not in acute distress.     Appearance: She is not ill-appearing.   HENT:      Head: Normocephalic.   Cardiovascular:      Rate and Rhythm: Normal rate and regular rhythm.      Heart sounds: Normal heart sounds. No murmur heard.  Pulmonary:      Breath sounds: Normal breath sounds.   Neurological:      General: No focal deficit present.      Mental Status: She is oriented to person, place, and time.   Psychiatric:         Mood and Affect: Mood normal.         Assessment / Plan      Assessment/Plan:   Diagnoses and all orders for this visit:    1. Elevated TSH (Primary)    2. Hypoxia    3. Dysarthria    4. Transient neurological symptoms    5. Tremor    6. Hallucinations    7. At high risk for cardiovascular disease         1.  Elevated TSH  I will repeat at next visit.    2.  Hypoxia  Discussed sleep apnea and related conditions.  I encouraged her to consider a sleep study.  Patient declines at this time.    3 Dysarthria  Patient's speech is back to normal.  She does not want to do speech " therapy.    4. Transient neurological symptoms  Symptoms have improved.    5.  Tremor  Continue primidone and follow-up with neurology    6.  Hallucinations  Patient to discuss with psychiatry or let me know if worsening.    7.  At high risk for cardiovascular disease  Patient to continue atorvastatin 40 mg daily.    Explained and discussed patient's condition and plan of care.  Discussed when to follow-up.  Discussed possible red flags and how to follow-up with those.   Patient to continue current medications as advised.  Be compliant with medications. Patient to let me know if they have any worsening, no improvement, does not tolerate medication, or any future concerns about treatment. ER for emergencies.  Patient verbalized an understanding and agreement with plan of care.        Follow Up:   Return in about 3 months (around 12/22/2022) for Recheck.    ROSALES White  [facility]

## 2022-09-26 DIAGNOSIS — M54.42 CHRONIC LEFT-SIDED LOW BACK PAIN WITH LEFT-SIDED SCIATICA: ICD-10-CM

## 2022-09-26 DIAGNOSIS — G89.29 CHRONIC LEFT-SIDED LOW BACK PAIN WITH LEFT-SIDED SCIATICA: ICD-10-CM

## 2022-09-26 RX ORDER — TIZANIDINE 4 MG/1
TABLET ORAL
Qty: 60 TABLET | Refills: 0 | Status: SHIPPED | OUTPATIENT
Start: 2022-09-26 | End: 2022-12-15

## 2022-10-03 ENCOUNTER — TELEMEDICINE (OUTPATIENT)
Dept: INTERNAL MEDICINE | Facility: CLINIC | Age: 52
End: 2022-10-03

## 2022-10-03 DIAGNOSIS — J01.10 ACUTE FRONTAL SINUSITIS, RECURRENCE NOT SPECIFIED: Primary | ICD-10-CM

## 2022-10-03 PROCEDURE — 99213 OFFICE O/P EST LOW 20 MIN: CPT | Performed by: NURSE PRACTITIONER

## 2022-10-03 RX ORDER — IBUPROFEN 600 MG/1
600 TABLET ORAL EVERY 8 HOURS PRN
Qty: 30 TABLET | Refills: 0 | Status: SHIPPED | OUTPATIENT
Start: 2022-10-03

## 2022-10-03 RX ORDER — AMOXICILLIN 875 MG/1
875 TABLET, COATED ORAL 2 TIMES DAILY
Qty: 20 TABLET | Refills: 0 | Status: SHIPPED | OUTPATIENT
Start: 2022-10-03 | End: 2022-11-15

## 2022-10-03 RX ORDER — METHYLPREDNISOLONE 4 MG/1
TABLET ORAL
Qty: 21 TABLET | Refills: 0 | Status: SHIPPED | OUTPATIENT
Start: 2022-10-03 | End: 2022-11-15

## 2022-10-03 NOTE — PROGRESS NOTES
Patient Name: Loreta Robertson  : 1970   MRN: [id number]     Chief Complaint:  Sinus pain and pressure    History of Present Illness: Loreta Robertson is a 52 y.o. female presents for telehealth.    You have chosen to receive care through a telehealth visit. Patient consents to video/audio connection for your medical care today.   Patient is at home; provider is at [facility].     She presents with severe right sinus pressure and pain for 3 days. Location right forehead and under eyes. Associated symptoms are runny nose, cough, right ear pain, fatigue,  headache, nausea, and diarrhea. Denies fever, wheezing, dizziness, lightheadedness, chest pain, palpitations or shortness of breath. The patient is drinking fluids well; last urinated less than 6  hours ago. Patient has a decreased appetite. The patient denies any sick contacts. Patient has not taken a  Covid test. No loss of taste or smell. Alleviating factors are none. She has tried allergy and sinus medication.  Aggravating factors are bending head forward.  She has been unable to work due to pain.  She has been using her albuterol inhaler.    Subjective     Review of System: Review of Systems   Constitutional: Positive for fatigue. Negative for chills and fever.   HENT: Positive for congestion, rhinorrhea and sinus pain. Negative for ear pain, mouth sores, postnasal drip, sinus pressure, sneezing and sore throat.    Respiratory: Positive for cough. Negative for shortness of breath and wheezing.    Cardiovascular: Negative for chest pain and palpitations.   Gastrointestinal: Positive for diarrhea and nausea. Negative for abdominal pain and vomiting.   Neurological: Positive for headaches. Negative for dizziness and weakness.        Medications:     Current Outpatient Medications:   •  ibuprofen (ADVIL,MOTRIN) 600 MG tablet, Take 1 tablet by mouth Every 8 (Eight) Hours As Needed for Mild Pain or Moderate  Pain., Disp: 30 tablet, Rfl: 0  •  albuterol sulfate  (90 Base) MCG/ACT inhaler, Inhale 2 puffs Every 4 (Four) Hours As Needed for Wheezing., Disp: 18 g, Rfl: 2  •  amoxicillin (AMOXIL) 875 MG tablet, Take 1 tablet by mouth 2 (Two) Times a Day., Disp: 20 tablet, Rfl: 0  •  atorvastatin (LIPITOR) 40 MG tablet, , Disp: , Rfl:   •  divalproex (DEPAKOTE ER) 500 MG 24 hr tablet, Take 500 mg by mouth Every Night., Disp: , Rfl:   •  methylPREDNISolone (MEDROL) 4 MG dose pack, Take as directed on package instructions., Disp: 21 tablet, Rfl: 0  •  prazosin (MINIPRESS) 5 MG capsule, Take 5 mg by mouth Every Night., Disp: , Rfl: 1  •  primidone (MYSOLINE) 50 MG tablet, Take 50 mg by mouth 2 (Two) Times a Day., Disp: , Rfl:   •  QUEtiapine (SEROquel) 200 MG tablet, Take 200 mg by mouth every night at bedtime., Disp: , Rfl:   •  Symbicort 160-4.5 MCG/ACT inhaler, INHALE 2 PUFFS BY MOUTH TWICE DAILY, Disp: 10.2 g, Rfl: 2  •  tiZANidine (ZANAFLEX) 4 MG tablet, TAKE 1 TABLET BY MOUTH EVERY 8 HOURS AS NEEDED FOR MUSCLE SPASMS, Disp: 60 tablet, Rfl: 0  •  vitamin D (ERGOCALCIFEROL) 1.25 MG (81309 UT) capsule capsule, Take 1 capsule by mouth Every 7 (Seven) Days., Disp: 5 capsule, Rfl: 2    Allergies:   Allergies   Allergen Reactions   • Morphine And Related GI Intolerance       Objective     Physical Exam:   Vital Signs: There were no vitals filed for this visit.  There is no height or weight on file to calculate BMI.     Physical Exam  Constitutional:       General: She is not in acute distress.     Appearance: She is not ill-appearing.   Pulmonary:      Effort: Pulmonary effort is normal.      Comments: She is talking in full sentences.   Psychiatric:         Mood and Affect: Mood normal.         Assessment / Plan      Assessment/Plan:   Diagnoses and all orders for this visit:    1. Acute frontal sinusitis, recurrence not specified (Primary)  -     ibuprofen (ADVIL,MOTRIN) 600 MG tablet; Take 1 tablet by mouth Every 8  (Eight) Hours As Needed for Mild Pain or Moderate Pain.  Dispense: 30 tablet; Refill: 0  -     methylPREDNISolone (MEDROL) 4 MG dose pack; Take as directed on package instructions.  Dispense: 21 tablet; Refill: 0  -     amoxicillin (AMOXIL) 875 MG tablet; Take 1 tablet by mouth 2 (Two) Times a Day.  Dispense: 20 tablet; Refill: 0         I discussed symptoms could be viral and antibiotics may not improve symptoms.  I offered patient COVID testing and she declined at this time.  She will let me know if she changes her mind or does not improve in a couple days.  Continue supportive care and hydration. Take medications that were prescribed.  Take a probiotic with her antibiotics.  Drink fluids frequently especially if she has fever or diarrhea. Patient to monitor urine output, monitor for signs of dehydration such as increased lethargy, dizziness, dry mucous membranes and decreased urine output.  Monitor fever and continue tylenol or ibuprofen.  ER for emergencies, dyspnea, unable to keep fluids down, unable to keep fever less than 102.0 with tylenol or ibuprofen, palpitations or chest pain.  Discussed common side effects and risks with medication regimen.  Patient to let me know if she has any trouble with her medications.  If no improvement or worsening, patient should be seen in clinic as assessment is limited due to video visit.  ER if she has severe symptoms.    21-minute visit.    Follow Up:   Return if symptoms worsen or fail to improve.    ROSALES White  [facility]

## 2022-11-10 DIAGNOSIS — J44.9 CHRONIC OBSTRUCTIVE PULMONARY DISEASE, UNSPECIFIED COPD TYPE: ICD-10-CM

## 2022-11-10 RX ORDER — BUDESONIDE AND FORMOTEROL FUMARATE DIHYDRATE 160; 4.5 UG/1; UG/1
2 AEROSOL RESPIRATORY (INHALATION) 2 TIMES DAILY
Qty: 10.2 G | Refills: 0 | Status: SHIPPED | OUTPATIENT
Start: 2022-11-10 | End: 2022-12-12

## 2022-11-18 ENCOUNTER — TELEPHONE (OUTPATIENT)
Dept: INTERNAL MEDICINE | Facility: CLINIC | Age: 52
End: 2022-11-18

## 2022-11-18 DIAGNOSIS — N30.00 ACUTE CYSTITIS WITHOUT HEMATURIA: Primary | ICD-10-CM

## 2022-11-18 RX ORDER — NITROFURANTOIN 25; 75 MG/1; MG/1
100 CAPSULE ORAL 2 TIMES DAILY
Qty: 14 CAPSULE | Refills: 0 | Status: SHIPPED | OUTPATIENT
Start: 2022-11-18 | End: 2023-02-15

## 2022-11-18 NOTE — TELEPHONE ENCOUNTER
Caller: Jonathan Robertsonssbronson Platt    Relationship: Self    Best call back number: [phone]    What medication are you requesting: ANTIBIOTIC AND  THE PATIENT THINK SHE HAVE A  UTI . SO SOMETHING FOR THOSE SYMPTOMS AS WELL     What are your current symptoms: PAIN WITH URINATION , UNCONTROLLABLE DIARRHEA,     How long have you been experiencing symptoms: 11/18/22    Have you had these symptoms before:    [x] Yes  [] No    Have you been treated for these symptoms before:   [x] Yes  [] No    If a prescription is needed, what is your preferred pharmacy and phone number:  [id number] - [place], [place] - [address] [facility] AT [facility] OF [facility] [address] [facility] [phone] [facility] [phone] FX    Additional notes:PATIENT WOULD LIKE THESE SYMPTOMS ADDRESSED ASAP, CAN'T STOP DIARRHEA PATIENT REQUESTING SOMETHING FOR THE RELIEF.     PATIENT THINKS THE MEDICATION FOR THE FLU CAUSED THIS PROBLEM    IF APPOINTMENT IS NEEDED PLEASE CALL PATIENT WITH THE APPOINTMENT .     CONTACT THE PATIENT WITH THE RESULTS OF HER REQUEST

## 2022-11-18 NOTE — TELEPHONE ENCOUNTER
Diarrhea is a common side effect of Tamiflu.  I would discontinue if severe.  She can try over-the-counter Imodium.  Increase fluids.  Monitor for dehydration.  I will send in an antibiotic for her UTI.

## 2022-12-09 ENCOUNTER — TELEMEDICINE (OUTPATIENT)
Dept: INTERNAL MEDICINE | Facility: CLINIC | Age: 52
End: 2022-12-09

## 2022-12-09 DIAGNOSIS — M25.551 RIGHT HIP PAIN: Primary | ICD-10-CM

## 2022-12-09 PROCEDURE — 99214 OFFICE O/P EST MOD 30 MIN: CPT | Performed by: NURSE PRACTITIONER

## 2022-12-09 NOTE — PROGRESS NOTES
Patient Name: Loreta Robertson  : 1970   MRN: [id number]     Chief Complaint:  Right groin and inner leg pain    History of Present Illness: Loreta Robertson is a 52 y.o. female presents for telehealth.   Location: Right inner leg and groin pain  Severity: intense  Duration: 6 months   Timing:   Intermittent  Progression: waxing and waning  Quality: Sharp  Aggravating factors: activity  Alleviating factors: tried tizanidine, aleve, motrin; with some relief.  She has tried gabapentin from a friend and it helped.    Recently her hip pain has worsened.During Thanksgiving after patient was traveling several hours and had difficulty getting out of the car upon arrival.  She also had difficulty walking requiring assistance.  The pain was most intense upon initiation of walking.  XR hip with and without pelvis  IMPRESSION:  No acute osseous abnormality or significant degenerative change.    You have chosen to receive care through a telehealth visit. Patient consents to video/audio connection for your medical care today.     Patient is home and provider is at [facility] [place] [address].   Subjective     Review of System: Review of Systems   Constitutional: Positive for activity change.   Musculoskeletal: Positive for arthralgias, gait problem and myalgias.        Medications:     Current Outpatient Medications:   •  albuterol sulfate  (90 Base) MCG/ACT inhaler, Inhale 2 puffs Every 4 (Four) Hours As Needed for Wheezing., Disp: 18 g, Rfl: 2  •  atorvastatin (LIPITOR) 40 MG tablet, , Disp: , Rfl:   •  azithromycin (ZITHROMAX) 250 MG tablet, Z-Ruy as directed, Disp: 6 tablet, Rfl: 0  •  budesonide-formoterol (Symbicort) 160-4.5 MCG/ACT inhaler, Inhale 2 puffs 2 (Two) Times a Day., Disp: 10.2 g, Rfl: 0  •  buPROPion XL (WELLBUTRIN XL) 150 MG 24 hr tablet, Take 150 mg by mouth Every Morning., Disp: , Rfl:   •  divalproex (DEPAKOTE ER) 500 MG  24 hr tablet, Take 500 mg by mouth Every Night., Disp: , Rfl:   •  hydrOXYzine (ATARAX) 10 MG tablet, Take 10 mg by mouth At Night As Needed., Disp: , Rfl:   •  ibuprofen (ADVIL,MOTRIN) 600 MG tablet, Take 1 tablet by mouth Every 8 (Eight) Hours As Needed for Mild Pain or Moderate Pain., Disp: 30 tablet, Rfl: 0  •  nitrofurantoin, macrocrystal-monohydrate, (Macrobid) 100 MG capsule, Take 1 capsule by mouth 2 (Two) Times a Day., Disp: 14 capsule, Rfl: 0  •  prazosin (MINIPRESS) 5 MG capsule, Take 5 mg by mouth Every Night., Disp: , Rfl: 1  •  predniSONE (DELTASONE) 10 MG tablet, 6-day taper:6,5,4,3,2,1, Disp: 21 tablet, Rfl: 0  •  primidone (MYSOLINE) 50 MG tablet, Take 50 mg by mouth 2 (Two) Times a Day., Disp: , Rfl:   •  promethazine-dextromethorphan (PROMETHAZINE-DM) 6.25-15 MG/5ML syrup, 1 to 2 teaspoons 3 times daily as needed cough congestion., Disp: 120 mL, Rfl: 0  •  QUEtiapine (SEROquel) 200 MG tablet, Take 200 mg by mouth every night at bedtime., Disp: , Rfl:   •  tiZANidine (ZANAFLEX) 4 MG tablet, TAKE 1 TABLET BY MOUTH EVERY 8 HOURS AS NEEDED FOR MUSCLE SPASMS, Disp: 60 tablet, Rfl: 0  •  vitamin D (ERGOCALCIFEROL) 1.25 MG (87346 UT) capsule capsule, Take 1 capsule by mouth Every 7 (Seven) Days., Disp: 5 capsule, Rfl: 2    Allergies:   Allergies   Allergen Reactions   • Morphine And Related GI Intolerance       Objective     Physical Exam:   Vital Signs: There were no vitals filed for this visit.  There is no height or weight on file to calculate BMI.        Physical Exam  Constitutional:       General: She is not in acute distress.     Appearance: She is not ill-appearing.   Pulmonary:      Effort: Pulmonary effort is normal.   Neurological:      General: No focal deficit present.      Mental Status: She is oriented to person, place, and time.   Psychiatric:         Mood and Affect: Mood normal.         Assessment / Plan      Assessment/Plan:   Diagnoses and all orders for this visit:    1. Right hip  pain (Primary)  -     Ambulatory Referral to Orthopedic Surgery     Gabapentin 300 mg daily.   Patient to only take medication prescribed to her.     Patient will return to office for CSA; will try gabapentin 300 mg once a day. The patient is taking the following controlled substance(s)Gabapentin for pain. A history was obtained from the patient and the following were reviewed: family history, social history, psychiatric history, and substance abuse history.  A baseline assessment was performed and includes a controlled substance agreement, CHAPARRITA (within 12 months prior to today's encounter), and a physical exam, if appropriate, was performed within the past year.  It is medically appropriate to prescribe her the medication(s) above.  If applicable, prior treatment records were obtained and reviewed to justify long term prescribing of controlled substances.  The patient was educated on the following: Limited use of the medication, need to discontinue the medication when her condition resolves, proper storage and disposal of medication(s), and risks and dangers associated with controlled substances including tolerance, dependence, and addiction.  A written informed consent was obtained and includes objectives of treatment, further diagnostic testing if appropriate, and an exit strategy for discontinuing the medication on the condition resolves, if appropriate.  In addition, if appropriate, the patient will be screened for other medical conditions that may impact the prescribing of controlled substances.  Previous treatments have been tried including trials of noncontrolled substances or lower doses of controlled substances.  The controlled medication(s) have been titrated to the level appropriate and necessary and the medication(s) are not causing unacceptable side effects.     30 min visit  I discussed that assessment is limited due to video visit and if patient does not improve they would need to come into the  clinic for evaluation.  If any problems or concerns follow-up in clinic.  Patient verbalized understanding.  ER with any shortness of breath or emergencies.  Follow Up:   One month    ROSALES White [facility]

## 2022-12-12 ENCOUNTER — OFFICE VISIT (OUTPATIENT)
Dept: ORTHOPEDIC SURGERY | Facility: CLINIC | Age: 52
End: 2022-12-12

## 2022-12-12 VITALS
WEIGHT: 283 LBS | HEIGHT: 66 IN | DIASTOLIC BLOOD PRESSURE: 90 MMHG | SYSTOLIC BLOOD PRESSURE: 130 MMHG | BODY MASS INDEX: 45.48 KG/M2

## 2022-12-12 DIAGNOSIS — M25.551 CHRONIC RIGHT HIP PAIN: Primary | ICD-10-CM

## 2022-12-12 DIAGNOSIS — M17.11 PRIMARY OSTEOARTHRITIS OF RIGHT KNEE: ICD-10-CM

## 2022-12-12 DIAGNOSIS — J44.9 CHRONIC OBSTRUCTIVE PULMONARY DISEASE, UNSPECIFIED COPD TYPE: ICD-10-CM

## 2022-12-12 DIAGNOSIS — G89.29 CHRONIC RIGHT HIP PAIN: Primary | ICD-10-CM

## 2022-12-12 PROCEDURE — 99204 OFFICE O/P NEW MOD 45 MIN: CPT | Performed by: ORTHOPAEDIC SURGERY

## 2022-12-12 RX ORDER — BUDESONIDE AND FORMOTEROL FUMARATE DIHYDRATE 160; 4.5 UG/1; UG/1
AEROSOL RESPIRATORY (INHALATION)
Qty: 30.6 G | Refills: 0 | Status: SHIPPED | OUTPATIENT
Start: 2022-12-12 | End: 2023-03-09

## 2022-12-12 RX ORDER — DIAZEPAM 5 MG/1
5 TABLET ORAL
Qty: 1 TABLET | Refills: 0 | Status: SHIPPED | OUTPATIENT
Start: 2022-12-12 | End: 2022-12-12

## 2022-12-12 RX ORDER — BUDESONIDE AND FORMOTEROL FUMARATE DIHYDRATE 160; 4.5 UG/1; UG/1
AEROSOL RESPIRATORY (INHALATION)
Qty: 10.2 G | Refills: 0 | Status: SHIPPED | OUTPATIENT
Start: 2022-12-12 | End: 2022-12-12

## 2022-12-15 DIAGNOSIS — G89.29 CHRONIC LEFT-SIDED LOW BACK PAIN WITH LEFT-SIDED SCIATICA: ICD-10-CM

## 2022-12-15 DIAGNOSIS — M54.42 CHRONIC LEFT-SIDED LOW BACK PAIN WITH LEFT-SIDED SCIATICA: ICD-10-CM

## 2022-12-15 RX ORDER — TIZANIDINE 4 MG/1
TABLET ORAL
Qty: 60 TABLET | Refills: 0 | Status: SHIPPED | OUTPATIENT
Start: 2022-12-15 | End: 2023-01-03

## 2022-12-16 ENCOUNTER — TELEPHONE (OUTPATIENT)
Dept: ORTHOPEDIC SURGERY | Facility: CLINIC | Age: 52
End: 2022-12-16

## 2022-12-16 NOTE — TELEPHONE ENCOUNTER
Caller: ANNALISE STATES    Relationship to patient: SELF    Best call back number: [phone]    Patient is needing: PATIENT WOULD LIKE TO KNOW THE NUMBER OF WHERE SHE IS GOING TO HAVE MRI PERFORMED.  SHE WANTS TO BE SCHEDULED BEFORE 01/28/2022.

## 2022-12-17 ENCOUNTER — APPOINTMENT (OUTPATIENT)
Dept: MRI IMAGING | Facility: HOSPITAL | Age: 52
End: 2022-12-17

## 2023-01-01 DIAGNOSIS — M54.42 CHRONIC LEFT-SIDED LOW BACK PAIN WITH LEFT-SIDED SCIATICA: ICD-10-CM

## 2023-01-01 DIAGNOSIS — G89.29 CHRONIC LEFT-SIDED LOW BACK PAIN WITH LEFT-SIDED SCIATICA: ICD-10-CM

## 2023-01-03 RX ORDER — TIZANIDINE 4 MG/1
TABLET ORAL
Qty: 60 TABLET | Refills: 0 | Status: SHIPPED | OUTPATIENT
Start: 2023-01-03 | End: 2023-01-23

## 2023-01-05 ENCOUNTER — TELEPHONE (OUTPATIENT)
Dept: INTERNAL MEDICINE | Facility: CLINIC | Age: 53
End: 2023-01-05
Payer: MEDICARE

## 2023-01-05 NOTE — TELEPHONE ENCOUNTER
Caller: Loreta Robertson    Relationship: Self    Best call back number: [phone]    What is the best time to reach you: ANYTIME     Who are you requesting to speak with (clinical staff, provider,  specific staff member): CLINICAL STAFF        What was the call regarding: THE PATIENT SCHEDULED A PHYSICAL WITH PAP SMEAR ON 02/06/2023 SHE STATES THAT SHE DOES NOT HAVE A UTERUS SHE WOULD LIKE TO KNOW IF SHE STILL NEEDS A PAP SMEAR SHE STATES THAT SHE HAS A PARTIAL HYSTERECTOMY     Do you require a callback: YES

## 2023-01-06 NOTE — TELEPHONE ENCOUNTER
It depends on the reason the hysterectomy was completed. If there was any uterine or cervical cancer (abnormal paps) it is recommended to continue screening.  It looks like pap smears have been completed in the past. We can discuss at her appointment.

## 2023-01-09 ENCOUNTER — HOSPITAL ENCOUNTER (OUTPATIENT)
Dept: MRI IMAGING | Facility: HOSPITAL | Age: 53
Discharge: HOME | End: 2023-01-09
Admitting: ORTHOPAEDIC SURGERY
Payer: MEDICARE

## 2023-01-09 DIAGNOSIS — G89.29 CHRONIC RIGHT HIP PAIN: ICD-10-CM

## 2023-01-09 DIAGNOSIS — M25.551 CHRONIC RIGHT HIP PAIN: ICD-10-CM

## 2023-01-09 PROCEDURE — 73721 MRI JNT OF LWR EXTRE W/O DYE: CPT

## 2023-01-21 DIAGNOSIS — G89.29 CHRONIC LEFT-SIDED LOW BACK PAIN WITH LEFT-SIDED SCIATICA: ICD-10-CM

## 2023-01-21 DIAGNOSIS — M54.42 CHRONIC LEFT-SIDED LOW BACK PAIN WITH LEFT-SIDED SCIATICA: ICD-10-CM

## 2023-01-23 RX ORDER — TIZANIDINE 4 MG/1
TABLET ORAL
Qty: 60 TABLET | Refills: 0 | Status: SHIPPED | OUTPATIENT
Start: 2023-01-23 | End: 2023-02-10

## 2023-01-25 ENCOUNTER — TELEPHONE (OUTPATIENT)
Dept: INTERNAL MEDICINE | Facility: CLINIC | Age: 53
End: 2023-01-25
Payer: MEDICARE

## 2023-01-25 DIAGNOSIS — R11.0 NAUSEA: Primary | ICD-10-CM

## 2023-01-25 RX ORDER — PROMETHAZINE HYDROCHLORIDE 12.5 MG/1
12.5 TABLET ORAL EVERY 6 HOURS PRN
Qty: 20 TABLET | Refills: 0 | Status: SHIPPED | OUTPATIENT
Start: 2023-01-25

## 2023-01-25 NOTE — TELEPHONE ENCOUNTER
Caller: Loreta Robertson    Relationship: Self    Best call back number: [phone]    What medication are you requesting: PHENERGAN    What are your current symptoms: UPSET STOMACH, NAUSEA    How long have you been experiencing symptoms: 3-4 DAYS  Have you had these symptoms before:    [x] Yes  [] No    Have you been treated for these symptoms before:   [x] Yes  [] No    If a prescription is needed, what is your preferred pharmacy and phone number:    [facility] [phone]  Additional notes:    PATIENT HAS HISTORY OF STOMACH ISSUES AND THE LAST FEW DAYS HAS REALLY BEEN UPSET AND WOULD LIKE SOME PHENERGAN. [facility] DOES NOT WORK FOR HER. PLEASE ADVISE

## 2023-01-28 ENCOUNTER — APPOINTMENT (OUTPATIENT)
Dept: MRI IMAGING | Facility: HOSPITAL | Age: 53
End: 2023-01-28

## 2023-02-01 ENCOUNTER — OFFICE VISIT (OUTPATIENT)
Dept: ORTHOPEDIC SURGERY | Facility: CLINIC | Age: 53
End: 2023-02-01
Payer: MEDICARE

## 2023-02-01 VITALS
DIASTOLIC BLOOD PRESSURE: 84 MMHG | SYSTOLIC BLOOD PRESSURE: 122 MMHG | HEIGHT: 66 IN | BODY MASS INDEX: 45.71 KG/M2 | WEIGHT: 284.39 LBS

## 2023-02-01 DIAGNOSIS — M87.051 AVASCULAR NECROSIS OF BONE OF RIGHT HIP: Primary | ICD-10-CM

## 2023-02-01 DIAGNOSIS — E66.01 OBESITY, MORBID, BMI 40.0-49.9: ICD-10-CM

## 2023-02-01 PROCEDURE — 99214 OFFICE O/P EST MOD 30 MIN: CPT | Performed by: ORTHOPAEDIC SURGERY

## 2023-02-01 RX ORDER — ACETAMINOPHEN 325 MG/1
1000 TABLET ORAL ONCE
Status: CANCELLED | OUTPATIENT
Start: 2023-02-01 | End: 2023-02-01

## 2023-02-01 RX ORDER — MELOXICAM 7.5 MG/1
15 TABLET ORAL ONCE
Status: CANCELLED | OUTPATIENT
Start: 2023-02-01 | End: 2023-02-01

## 2023-02-01 RX ORDER — PREGABALIN 150 MG/1
150 CAPSULE ORAL ONCE
Status: CANCELLED | OUTPATIENT
Start: 2023-02-01 | End: 2023-02-01

## 2023-02-01 NOTE — PROGRESS NOTES
[facility] [place] [facility] [place]    Subjective     Chief Complaint   Patient presents with   • Follow-up     7 week follow up - Chronic right hip pain ( MRI 1/9/23)        HPI    It has been 7  week(s) since Ms. Robertson's last visit. She returns to clinic today for follow-up of right hip pain. The issue has been ongoing for 4 month(s). She rates her pain a 8/10 on the pain scale. Previous/current treatments: cane/walker and weight loss. Current symptoms: pain, stiffness and giving way/buckling. The pain is worse with walking, standing, climbing stairs and rising from seated position; sitting improve the pain. Overall, she is doing worse.  Here today to review the MRI results.  Her pain continues, and she is interested in further surgical treatment if possible.  No history of clots or clotting disorders.  No heart disease.  No diabetes.  She lives alone, but she believes her neighbors could help her out postoperatively.    I have reviewed the following portions of the patient's history and agree with: History of Present Illness and Review of Systems    Patient Active Problem List   Diagnosis   • Tobacco use   • Bipolar 1 disorder, manic, mild ([facility])   • Morbid obesity with BMI of 50.0-59.9, adult ([facility])   • Other hyperlipidemia   • HTN (hypertension)   • Chronic obstructive pulmonary disease (HCC)   • Impaired glucose tolerance   • Chronic right shoulder pain   • Avascular necrosis of bone of right hip ([facility])     Past Medical History:   Diagnosis Date   • Anxiety    • Bipolar 1 disorder (HCC)    • Chronic bronchitis ([facility])    • Chronic constipation    • Depression    • Hip arthrosis 2 months   • Hyperlipidemia    • Knee swelling 2-3 years   • Parkinson's disease ([facility])    • Primary generalized (osteo)arthritis    • PTSD (post-traumatic stress disorder)    • RLS (restless legs syndrome)       Past Surgical History:   Procedure Laterality Date   • ABDOMINOPLASTY     • ANTERIOR AND POSTERIOR VAGINAL REPAIR       PATIENT REPORTS ANTERIOR AND POSTERIOR REPAIRS OF UTERUS AND BOTTOM   • BREAST BIOPSY Right     US BIOPSY   • BREAST BIOPSY Right    •  SECTION      X 2   • CHOLECYSTECTOMY     • COLONOSCOPY     • COLONOSCOPY N/A 2019    Procedure: COLONOSCOPY;  Surgeon: Jonathan Pablo MD;  Location: [facility] ENDOSCOPY;  Service: Gastroenterology   • HYSTERECTOMY      AGE 33   • LIPOSUCTION ABDOMINAL  1998      Family History   Problem Relation Age of Onset   • Breast cancer Mother 50   • Diabetes Mother    • Cancer Mother    • Diabetes Father    • Hypertension Father    • Heart attack Father    • Obesity Father    • Breast cancer Maternal Grandmother 40   • Ovarian cancer Maternal Grandmother 40   • Cervical cancer Maternal Grandmother    • Cancer Maternal Grandfather      Social History     Socioeconomic History   • Marital status: Single   Tobacco Use   • Smoking status: Every Day     Packs/day: 1.00     Years: 30.00     Pack years: 30.00     Types: Cigarettes     Start date: 1980   • Smokeless tobacco: Never   Vaping Use   • Vaping Use: Never used   Substance and Sexual Activity   • Alcohol use: No   • Drug use: No   • Sexual activity: Not Currently      Current Outpatient Medications on File Prior to Visit   Medication Sig Dispense Refill   • albuterol sulfate  (90 Base) MCG/ACT inhaler Inhale 2 puffs Every 4 (Four) Hours As Needed for Wheezing. 18 g 2   • atorvastatin (LIPITOR) 40 MG tablet      • azithromycin (ZITHROMAX) 250 MG tablet Z-Ruy as directed 6 tablet 0   • budesonide-formoterol (SYMBICORT) 160-4.5 MCG/ACT inhaler INHALE 2 PUFFS BY MOUTH TWICE DAILY 30.6 g 0   • buPROPion XL (WELLBUTRIN XL) 150 MG 24 hr tablet Take 150 mg by mouth Every Morning.     • divalproex (DEPAKOTE ER) 500 MG 24 hr tablet Take 500 mg by mouth Every Night.     • hydrOXYzine (ATARAX) 10 MG tablet Take 10 mg by mouth At Night As Needed.     • ibuprofen (ADVIL,MOTRIN) 600 MG tablet Take 1 tablet by mouth Every 8  (Eight) Hours As Needed for Mild Pain or Moderate Pain. 30 tablet 0   • nitrofurantoin, macrocrystal-monohydrate, (Macrobid) 100 MG capsule Take 1 capsule by mouth 2 (Two) Times a Day. 14 capsule 0   • prazosin (MINIPRESS) 5 MG capsule Take 5 mg by mouth Every Night.  1   • predniSONE (DELTASONE) 10 MG tablet 6-day taper:6,5,4,3,2,1 21 tablet 0   • primidone (MYSOLINE) 50 MG tablet Take 50 mg by mouth 2 (Two) Times a Day.     • promethazine (PHENERGAN) 12.5 MG tablet Take 1 tablet by mouth Every 6 (Six) Hours As Needed for Nausea or Vomiting. 20 tablet 0   • promethazine-dextromethorphan (PROMETHAZINE-DM) 6.25-15 MG/5ML syrup 1 to 2 teaspoons 3 times daily as needed cough congestion. 120 mL 0   • QUEtiapine (SEROquel) 200 MG tablet Take 200 mg by mouth every night at bedtime.     • tiZANidine (ZANAFLEX) 4 MG tablet TAKE 1 TABLET BY MOUTH EVERY 8 HOURS AS NEEDED FOR MUSCLE SPASMS 60 tablet 0   • vitamin D (ERGOCALCIFEROL) 1.25 MG (43692 UT) capsule capsule Take 1 capsule by mouth Every 7 (Seven) Days. 5 capsule 2     No current facility-administered medications on file prior to visit.      Allergies   Allergen Reactions   • Morphine And Related GI Intolerance        Review of Systems   Constitutional: Negative for activity change, appetite change, chills, diaphoresis, fatigue, fever and unexpected weight change.   HENT: Negative for congestion, dental problem, drooling, ear discharge, ear pain, facial swelling, hearing loss, mouth sores, nosebleeds, postnasal drip, rhinorrhea, sinus pressure, sneezing, sore throat, tinnitus, trouble swallowing and voice change.    Eyes: Negative for photophobia, pain, discharge, redness, itching and visual disturbance.   Respiratory: Negative for apnea, cough, choking, chest tightness, shortness of breath, wheezing and stridor.    Cardiovascular: Negative for chest pain, palpitations and leg swelling.   Gastrointestinal: Negative for abdominal distention, abdominal pain, anal  "bleeding, blood in stool, constipation, diarrhea, nausea, rectal pain and vomiting.   Endocrine: Negative for cold intolerance, heat intolerance, polydipsia, polyphagia and polyuria.   Genitourinary: Negative for decreased urine volume, difficulty urinating, dysuria, enuresis, flank pain, frequency, genital sores, hematuria and urgency.   Musculoskeletal: Positive for arthralgias. Negative for back pain, gait problem, joint swelling, myalgias, neck pain and neck stiffness.   Skin: Negative for color change, pallor, rash and wound.   Allergic/Immunologic: Negative for environmental allergies, food allergies and immunocompromised state.   Neurological: Negative for dizziness, tremors, seizures, syncope, facial asymmetry, speech difficulty, weakness, light-headedness, numbness and headaches.   Hematological: Negative for adenopathy. Does not bruise/bleed easily.   Psychiatric/Behavioral: Negative for agitation, behavioral problems, confusion, decreased concentration, dysphoric mood, hallucinations, self-injury, sleep disturbance and suicidal ideas. The patient is not nervous/anxious and is not hyperactive.         Objective      Physical Exam  /84   Ht 167.6 cm (65.98\")   Wt 129 kg (284 lb 6.3 oz)   BMI 45.92 kg/m²     Body mass index is 45.92 kg/m².    General:   Mental Status:  Alert   Appearance: Cooperative, in no acute distress   Build and Nutrition: Obese by BMI female   Orientation: Alert and oriented to person, place and time   Posture: Normal   Gait: Limp on the right with a cane    Integument:   Right hip: No skin lesions, no rash, no ecchymosis    Lower Extremity:   Right Hip:    Tenderness:  None    Swelling:  None    Crepitus:  None    Range of motion:  External Rotation: 30°, with pain       Internal Rotation: 30°, with pain       Flexion:  100°       Extension:  0°    Deformities:  None  Functional testing: Positive [facility]    No leg length discrepancy      Imaging/Studies  Imaging Results " (Last 24 Hours)     ** No results found for the last 24 hours. **          MRI HIP RIGHT WO CONTRAST     Date of Exam: 1/9/2023 8:23 PM EST     Indication: Hip pain, osteonecrosis suspected, x-ray done  Hip pain, chronic, articular cartilage eval, x-ray done.     Comparison: Hip radiographs 12/12/2022     Technique:  Routine multiplanar/multisequence sequence images of the right hip were obtained without contrast administration.       Findings:   Full field of view pelvis: No evidence of acute fracture. Serpiginous sclerosis with a small amount of edema seen in the left femoral head. No definite evidence of articular surface collapse. Visualized pelvic organs appear within normal limits. Flow   voids are intact.     Right hip: Serpiginous sclerosis with moderate edema throughout the femoral head extending into the neck. There is articular surface irregularity and flattening seen along the anterior superior aspect of the femoral head. Small right hip joint effusion.   Degenerative tearing of the anterior superior labrum. Ligamentum teres is not well visualized. Moderate degenerative changes of the right hip with chondral thinning and subchondral edema most predominantly along the superior joint.     Muscles and tendons: Adductor muscles are intact. Gluteal muscles and tendons are intact. Rectus femoris muscles and tendons are intact. Hamstrings are intact. Normal ischiofemoral intervals. Iliopsoas muscles and tendons are intact.     IMPRESSION:  Impression:   Serpiginous sclerosis of the right femoral head with associated edema extending throughout the head and neck consistent with osteonecrosis. There is irregularity and flattening seen along the anterior superior aspect of the femoral head suggesting early   articular surface collapse.     Moderate degenerative changes of the right hip with small effusion.     Serpiginous sclerosis and minimal edema of the left femoral head also concerning for  osteonecrosis.     Electronically Signed: Sebas Hodges    1/10/2023 9:55 AM EST    Workstation ID: [id number]    I reviewed the above imaging, and agree with findings.  AVN right hip.      Assessment and Plan     Diagnoses and all orders for this visit:    1. Avascular necrosis of bone of right hip (HCC) (Primary)  -     Case Request; Standing  -     Instructions on coughing, deep breathing, and incentive spirometry.; Future  -     CBC and Differential; Future  -     Basic metabolic panel; Future  -     Protime-INR; Future  -     APTT; Future  -     Hemoglobin A1c; Future  -     Sedimentation rate; Future  -     C-reactive protein; Future  -     Tranexamic Acid 1,000 mg in sodium chloride 0.9 % 100 mL  -     Tranexamic Acid 1,000 mg in sodium chloride 0.9 % 100 mL  -     ethyl alcohol 62 % 2 each  -     acetaminophen (TYLENOL) tablet 975 mg  -     meloxicam (MOBIC) tablet 15 mg  -     pregabalin (LYRICA) capsule 150 mg  -     ceFAZolin (ANCEF) 3 g in sodium chloride 0.9 % 100 mL IVPB  -     Case Request    2. Obesity, morbid, BMI 40.0-49.9 (HCC)    Other orders  -     Follow Anesthesia Guidelines / Protocol; Future  -     Obtain informed consent  -     Provide instructions to patient regarding NPO status  -     Chlorhexidine Skin Prep - Educate and Review With Patient; Future  -     Provide Patient With ERAS Hydration Instructions  -     Provide Patient With Enhanced Recovery Booklet(s) or Handout  -     Provide Instructions/Handout For Benzoyl Peroxide 5% Wash If Having Shoulder/Arm Surgery (If Prescribed)  -     Provide Instructions/Handout For Bactroban And Chlorhexidine Shower (If Prescribed)  -     Perform A Memory Screening On All Hip/Knee Replacement Patients >Or Equal To 65 Years Or Older  -     Complete A PROMIS And HOOS Or KOOS Survey If Having Hip Or Knee Replacement  -     Follow Anesthesia Guidelines / Protocol; Standing  -     Verify NPO Status; Standing  -     Verify The Time Patient Completed ERAS  Hydration Drink; Standing  -     SCD (sequential compression device)- to be placed on patient in Pre-op; Standing  -     Clip operative site; Standing  -     Obtain informed consent (if not collected inpatient or PAT); Standing  -     Notify Physician - Standard; Standing  -     Provide Patient With Carbo Loading Instructions  -     Provide Patient With ERAS Booklet(s)/Handout  -     Outpatient In A Bed; Standing  -     chlorhexidine (HIBICLENS) 4 % external liquid; Apply  topically to the appropriate area as directed Daily. Shower with hibiclens solution as directed for 5 days prior to surgery  Dispense: 236 mL; Refill: 0        1. Avascular necrosis of bone of right hip (HCC)    2. Obesity, morbid, BMI 40.0-49.9 (HCC)        I reviewed my findings with the patient.  She does have avascular necrosis in her right hip, we discussed treatment options.  She would like to proceed with a right total hip arthroplasty.  Currently her body mass index is 45.92, and I recommended a goal for less than 40 before proceeding with surgery.  She is going to continue to work on weight loss, and I will see her back for weight check prior to proceeding with surgery.  Risks, benefits, and alternatives to surgery been discussed.  Please see my counseling note for details.    Surgical Counseling     I have informed the patient of the diagnosis and the prognosis.  Exhaustive conservative treatment modalities have not resulted in long term pain relief.  The symptoms have progressed to the point of daily pain and inability to perform activities of daily living without significant pain.  The patient has reached the point of desiring to proceed with total hip arthroplasty after discussing the risks, benefits and alternatives to the procedure.  The surgical procedure itself was discussed in detail.  Risks of the procedure were discussed, which included but are not limited to, bleeding, infection, damage to blood vessels and nerves, incomplete  pain relief, loosening of the prosthesis (early or late), deep infection (early or late), need for further surgery, leg length discrepancy, hip dislocation, loss of limb, deep venous thrombosis, pulmonary embolus, death, heart attack, stroke, kidney failure, liver failure, and anesthetic complications.  In addition, the potential for deep infection developing in the future was discussed, which could require further surgery.  The hip would have to be re-opened, debrided, and potentially remove the prosthesis, which may or may not be replaced in the future.  Also, the possibility for loosening of the prosthesis has been mentioned.  If the prosthesis loosened, a revision arthroplasty could be performed, with results that are not as predictable compared to the original procedure.  The typical rehabilitative course has also been discussed, and full recovery may take up to a year to see the maximum benefit.  The importance of patient cooperation in the rehabilitative efforts has also been discussed.  No guarantees were given.  The patient understands the potential risks versus the benefits and desires to proceed with total hip arthroplasty at a mutually convenient time.    Return for surgery.      Adrián Leyva MD  02/01/23  15:06 EST

## 2023-02-10 DIAGNOSIS — M54.42 CHRONIC LEFT-SIDED LOW BACK PAIN WITH LEFT-SIDED SCIATICA: ICD-10-CM

## 2023-02-10 DIAGNOSIS — G89.29 CHRONIC LEFT-SIDED LOW BACK PAIN WITH LEFT-SIDED SCIATICA: ICD-10-CM

## 2023-02-10 RX ORDER — TIZANIDINE 4 MG/1
TABLET ORAL
Qty: 60 TABLET | Refills: 0 | Status: SHIPPED | OUTPATIENT
Start: 2023-02-10 | End: 2023-03-06

## 2023-02-15 ENCOUNTER — OFFICE VISIT (OUTPATIENT)
Dept: INTERNAL MEDICINE | Facility: CLINIC | Age: 53
End: 2023-02-15
Payer: MEDICARE

## 2023-02-15 VITALS
HEIGHT: 66 IN | OXYGEN SATURATION: 94 % | TEMPERATURE: 97.5 F | BODY MASS INDEX: 44.97 KG/M2 | DIASTOLIC BLOOD PRESSURE: 74 MMHG | SYSTOLIC BLOOD PRESSURE: 130 MMHG | HEART RATE: 64 BPM | RESPIRATION RATE: 22 BRPM | WEIGHT: 279.8 LBS

## 2023-02-15 DIAGNOSIS — G20 PARKINSONISM, UNSPECIFIED PARKINSONISM TYPE: ICD-10-CM

## 2023-02-15 DIAGNOSIS — R25.1 TREMOR: ICD-10-CM

## 2023-02-15 DIAGNOSIS — Z12.2 ENCOUNTER FOR SCREENING FOR LUNG CANCER: ICD-10-CM

## 2023-02-15 DIAGNOSIS — M87.051 AVASCULAR NECROSIS OF BONE OF RIGHT HIP: ICD-10-CM

## 2023-02-15 DIAGNOSIS — E78.49 OTHER HYPERLIPIDEMIA: ICD-10-CM

## 2023-02-15 DIAGNOSIS — J44.9 CHRONIC OBSTRUCTIVE PULMONARY DISEASE, UNSPECIFIED COPD TYPE: ICD-10-CM

## 2023-02-15 DIAGNOSIS — E53.8 B12 DEFICIENCY: ICD-10-CM

## 2023-02-15 DIAGNOSIS — E55.9 VITAMIN D DEFICIENCY: ICD-10-CM

## 2023-02-15 DIAGNOSIS — F31.11 BIPOLAR 1 DISORDER, MANIC, MILD: ICD-10-CM

## 2023-02-15 DIAGNOSIS — Z23 ENCOUNTER FOR IMMUNIZATION: ICD-10-CM

## 2023-02-15 DIAGNOSIS — Z87.891 PERSONAL HISTORY OF NICOTINE DEPENDENCE: ICD-10-CM

## 2023-02-15 DIAGNOSIS — Z12.31 ENCOUNTER FOR SCREENING MAMMOGRAM FOR BREAST CANCER: ICD-10-CM

## 2023-02-15 DIAGNOSIS — Z79.899 ENCOUNTER FOR MEDICATION MANAGEMENT: ICD-10-CM

## 2023-02-15 DIAGNOSIS — Z00.00 MEDICARE ANNUAL WELLNESS VISIT, SUBSEQUENT: Primary | ICD-10-CM

## 2023-02-15 PROCEDURE — 96160 PT-FOCUSED HLTH RISK ASSMT: CPT | Performed by: NURSE PRACTITIONER

## 2023-02-15 PROCEDURE — 36415 COLL VENOUS BLD VENIPUNCTURE: CPT | Performed by: NURSE PRACTITIONER

## 2023-02-15 PROCEDURE — 90677 PCV20 VACCINE IM: CPT | Performed by: NURSE PRACTITIONER

## 2023-02-15 PROCEDURE — 1160F RVW MEDS BY RX/DR IN RCRD: CPT | Performed by: NURSE PRACTITIONER

## 2023-02-15 PROCEDURE — G0008 ADMIN INFLUENZA VIRUS VAC: HCPCS | Performed by: NURSE PRACTITIONER

## 2023-02-15 PROCEDURE — G0439 PPPS, SUBSEQ VISIT: HCPCS | Performed by: NURSE PRACTITIONER

## 2023-02-15 PROCEDURE — 99214 OFFICE O/P EST MOD 30 MIN: CPT | Performed by: NURSE PRACTITIONER

## 2023-02-15 PROCEDURE — 90686 IIV4 VACC NO PRSV 0.5 ML IM: CPT | Performed by: NURSE PRACTITIONER

## 2023-02-15 PROCEDURE — G0009 ADMIN PNEUMOCOCCAL VACCINE: HCPCS | Performed by: NURSE PRACTITIONER

## 2023-02-15 PROCEDURE — 1125F AMNT PAIN NOTED PAIN PRSNT: CPT | Performed by: NURSE PRACTITIONER

## 2023-02-15 PROCEDURE — 1170F FXNL STATUS ASSESSED: CPT | Performed by: NURSE PRACTITIONER

## 2023-02-15 NOTE — PROGRESS NOTES
The ABCs of the Annual Wellness Visit  Subsequent Medicare Wellness Visit    Subjective    Loreta Platt States is a 52 y.o. female who presents for a Subsequent Medicare Wellness Visit. She reports that she is happy with her current weight and notes she was previously 356 pounds and is currently 279 pounds. She notes that she is scheduled to have a hip replacement 06/2023 as long as her BMI is 40 or lower. She admits that she is regularly in pain and reports pain in her knee and groin region. She is seen by Dr. Leyva for her avascular necrosis    She admits to a 20-pack-year history smoking cigarettes.    The patient attests to a history of breast and brain cancer in her mother who passed away at 60 years old.    The following portions of the patient's history were reviewed and   updated as appropriate: allergies, current medications, past medical history, past social history, past surgical history and problem list.    Compared to one year ago, the patient feels her physical   health is worse.    Compared to one year ago, the patient feels her mental   health is the same.    Recent Hospitalizations:  She was admitted within the past 365 days at [facility].       Current Medical Providers:  Patient Care Team:  Carolina Gutierrez APRN as PCP - General (Nurse Practitioner)  Joel Murillo Jr., MD (Psychiatry)  Zackery Reid MD (Pain Medicine)  Gary Leonard MBBS (Psychiatry)  Yogesh Geiger MD (Psychiatry)  Richmond Adams MD as Consulting Physician (Psychiatry)    Outpatient Medications Prior to Visit   Medication Sig Dispense Refill   • albuterol sulfate  (90 Base) MCG/ACT inhaler Inhale 2 puffs Every 4 (Four) Hours As Needed for Wheezing. 18 g 2   • atorvastatin (LIPITOR) 40 MG tablet      • budesonide-formoterol (SYMBICORT) 160-4.5 MCG/ACT inhaler INHALE 2 PUFFS BY MOUTH TWICE DAILY 30.6 g 0   • buPROPion XL (WELLBUTRIN XL) 150 MG 24 hr tablet Take 150 mg by mouth Every Morning.     • divalproex  (DEPAKOTE ER) 500 MG 24 hr tablet Take 500 mg by mouth Every Night.     • hydrOXYzine (ATARAX) 10 MG tablet Take 10 mg by mouth At Night As Needed.     • ibuprofen (ADVIL,MOTRIN) 600 MG tablet Take 1 tablet by mouth Every 8 (Eight) Hours As Needed for Mild Pain or Moderate Pain. 30 tablet 0   • prazosin (MINIPRESS) 5 MG capsule Take 5 mg by mouth Every Night.  1   • primidone (MYSOLINE) 50 MG tablet Take 50 mg by mouth 2 (Two) Times a Day.     • QUEtiapine (SEROquel) 200 MG tablet Take 200 mg by mouth every night at bedtime.     • tiZANidine (ZANAFLEX) 4 MG tablet TAKE 1 TABLET BY MOUTH EVERY 8 HOURS AS NEEDED FOR MUSCLE SPASMS 60 tablet 0   • Chlorhexidine Gluconate 4 % solution Shower daily with solution as directed for 5 days prior to surgery. 237 mL 0   • promethazine (PHENERGAN) 12.5 MG tablet Take 1 tablet by mouth Every 6 (Six) Hours As Needed for Nausea or Vomiting. 20 tablet 0   • promethazine-dextromethorphan (PROMETHAZINE-DM) 6.25-15 MG/5ML syrup 1 to 2 teaspoons 3 times daily as needed cough congestion. 120 mL 0   • vitamin D (ERGOCALCIFEROL) 1.25 MG (87282 UT) capsule capsule Take 1 capsule by mouth Every 7 (Seven) Days. 5 capsule 2   • azithromycin (ZITHROMAX) 250 MG tablet Z-Ruy as directed 6 tablet 0   • nitrofurantoin, macrocrystal-monohydrate, (Macrobid) 100 MG capsule Take 1 capsule by mouth 2 (Two) Times a Day. 14 capsule 0   • predniSONE (DELTASONE) 10 MG tablet 6-day taper:6,5,4,3,2,1 21 tablet 0     No facility-administered medications prior to visit.       No opioid medication identified on active medication list. I have reviewed chart for other potential  high risk medication/s and harmful drug interactions in the elderly.          Aspirin is not on active medication list.  Aspirin use is not indicated based on review of current medical condition/s. Risk of harm outweighs potential benefits.  .    Patient Active Problem List   Diagnosis   • Tobacco use   • Bipolar 1 disorder, manic, mild  "([facility])   • Morbid obesity with BMI of 50.0-59.9, adult ([facility])   • Other hyperlipidemia   • HTN (hypertension)   • Chronic obstructive pulmonary disease (HCC)   • Impaired glucose tolerance   • Chronic right shoulder pain   • Avascular necrosis of bone of right hip (HCC)     Advance Care Planning  Advance Directive is not on file.  ACP discussion was held with the patient during this visit. Patient does not have an advance directive, information provided.     Objective    Vitals:    02/15/23 0955   BP: 130/74   BP Location: Right arm   Patient Position: Sitting   Cuff Size: Adult   Pulse: 64   Resp: 22   Temp: 97.5 °F (36.4 °C)   TempSrc: Infrared   SpO2: 94%   Weight: 127 kg (279 lb 12.8 oz)   Height: 167.6 cm (66\")   PainSc:   9     Estimated body mass index is 45.16 kg/m² as calculated from the following:    Height as of this encounter: 167.6 cm (66\").    Weight as of this encounter: 127 kg (279 lb 12.8 oz).    Class 3 Severe Obesity (BMI >=40). Obesity-related health conditions include the following: dyslipidemias. Obesity is improving with lifestyle modifications. BMI is is above average; BMI management plan is completed. We discussed pharmacologic options including GLP-1. .      Does the patient have evidence of cognitive impairment? No          HEALTH RISK ASSESSMENT    Smoking Status:  Social History     Tobacco Use   Smoking Status Every Day   • Packs/day: 1.00   • Years: 30.00   • Pack years: 30.00   • Types: Cigarettes   • Start date: 1/1/1980   Smokeless Tobacco Never     Alcohol Consumption:  Social History     Substance and Sexual Activity   Alcohol Use No     Fall Risk Screen:    STEADI Fall Risk Assessment was completed, and patient is at LOW risk for falls.Assessment completed on:2/15/2023    Depression Screening:  PHQ-2/PHQ-9 Depression Screening 2/15/2023   Little Interest or Pleasure in Doing Things 3-->nearly every day   Feeling Down, Depressed or Hopeless 3-->nearly every day   Trouble Falling or " Staying Asleep, or Sleeping Too Much 3-->nearly every day   Feeling Tired or Having Little Energy 1-->several days   Poor Appetite or Overeating 1-->several days   Feeling Bad about Yourself - or that You are a Failure or Have Let Yourself or Your Family Down 1-->several days   Trouble Concentrating on Things, Such as Reading the Newspaper or Watching Television 1-->several days   Moving or Speaking So Slowly that Other People Could Have Noticed? Or the Opposite - Being So Fidgety 1-->several days   Thoughts that You Would be Better Off Dead or of Hurting Yourself in Some Way 0-->not at all   PHQ-9: Brief Depression Severity Measure Score 14   If You Checked Off Any Problems, How Difficult Have These Problems Made It For You to Do Your Work, Take Care of Things at Home, or Get Along with Other People? very difficult       Health Habits and Functional and Cognitive Screening:  Functional & Cognitive Status 2/15/2023   Do you have difficulty preparing food and eating? Yes   Do you have difficulty bathing yourself, getting dressed or grooming yourself? Yes   Do you have difficulty using the toilet? No   Do you have difficulty moving around from place to place? Yes   Do you have trouble with steps or getting out of a bed or a chair? Yes   Current Diet Limited Junk Food   Dental Exam Up to date   Eye Exam Up to date   Exercise (times per week) 0 times per week   Current Exercises Include No Regular Exercise   Current Exercise Activities Include -   Do you need help using the phone?  No   Are you deaf or do you have serious difficulty hearing?  No   Do you need help with transportation? No   Do you need help shopping? Yes   Do you need help preparing meals?  Yes   Do you need help with housework?  Yes   Do you need help with laundry? Yes   Do you need help taking your medications? No   Do you need help managing money? Yes   Do you ever drive or ride in a car without wearing a seat belt? No   Have you felt unusual stress,  anger or loneliness in the last month? Yes   Who do you live with? Alone   If you need help, do you have trouble finding someone available to you? No   Have you been bothered in the last four weeks by sexual problems? No   Do you have difficulty concentrating, remembering or making decisions? Yes       Age-appropriate Screening Schedule:  Refer to the list below for future screening recommendations based on patient's age, sex and/or medical conditions. Orders for these recommended tests are listed in the plan section. The patient has been provided with a written plan.    Health Maintenance   Topic Date Due   • TDAP/TD VACCINES (1 - Tdap) Never done   • ZOSTER VACCINE (1 of 2) Never done   • MAMMOGRAM  04/05/2020   • PAP SMEAR  03/05/2021   • LIPID PANEL  09/07/2023   • INFLUENZA VACCINE  Completed                CMS Preventative Services Quick Reference  Risk Factors Identified During Encounter  Chronic Pain: Chronic Pain Educational material Discussed and Shared in After Visit Summary for Patient.  Depression/Dysphoria: follows with psychiatry.   Immunizations Discussed/Encouraged: Hepatitis B Vaccine/Series, Influenza, Prevnar 20 (Pneumococcal 20-valent conjugate) and Shingrix  Inactivity/Sedentary: Patient has difficulty with movement secondary to avascular necrosis; we will explore GLP 1  The above risks/problems have been discussed with the patient.  Pertinent information has been shared with the patient in the After Visit Summary.  An After Visit Summary and PPPS were made available to the patient.    Follow Up:   Next Medicare Wellness visit to be scheduled in 1 year.       Additional E&M Note during same encounter follows:  Patient has multiple medical problems which are significant and separately identifiable that require additional work above and beyond the Medicare Wellness Visit.      Chief Complaint  Medicare Wellness-subsequent    Subjective      HPI  Loreta Platt States is also being seen today for  "chronic conditions.     Review of Systems   Musculoskeletal: Positive for arthralgias.   Neurological: Positive for tremors.   Psychiatric/Behavioral: Positive for dysphoric mood.   Tremors/Parkinsonism: She follows with Dr. Geiger at  since 4/22 for bilateral upper extremity tremors on primidone 50 mg BID. She has noticed slight worsening over the last few weeks. [facility]  Dr. Leonard, diagnosed with parkinsonism, likely drug induced vs idiopathic. Latuda discontinued. She is on seroquel and depakote for mood.   -Letha scan showed right BG abnormality confirming Parkinsonism     9/2022 admitted to   r/o stroke. No evidence of bleed or infarction.   MRI Brain Without Contrast (09/09/2022 06:40)    CT Angiogram Neck (09/08/2022 02:02)    CT Head Without Contrast (09/08/2022 02:02)    CT Angiogram Head (09/08/2022 02:02)    Chronic obstructive pulmonary disease: Rescue inhaler usage every 4-6 hours prn; uses it rarely. She is using symbicort inconsistently. The patient reports no known triggers.  Since the last office visit, the patient has not sought emergency care.She has no dyspnea, wheezing or productive sputum. She is a smoker.    Bipolar  She follows with Dr. Campo and mood is stable on depakote and seroquel. Latuda was dc'd due to tremors.       B12 deficiency  No taking supplements    Vitamin D deficiency  Not taking supplements       Objective   Vital Signs:  /74 (BP Location: Right arm, Patient Position: Sitting, Cuff Size: Adult)   Pulse 64   Temp 97.5 °F (36.4 °C) (Infrared)   Resp 22   Ht 167.6 cm (66\")   Wt 127 kg (279 lb 12.8 oz)   SpO2 94%   BMI 45.16 kg/m²     Physical Exam  Constitutional:       General: She is not in acute distress.     Appearance: She is not ill-appearing.   HENT:      Head: Normocephalic.   Cardiovascular:      Rate and Rhythm: Normal rate and regular rhythm.      Heart sounds: Normal heart sounds. No murmur heard.  Pulmonary: "      Breath sounds: Normal breath sounds.   Abdominal:      General: Bowel sounds are normal.      Tenderness: There is no abdominal tenderness.   Lymphadenopathy:      Cervical: No cervical adenopathy.   Neurological:      General: No focal deficit present.      Mental Status: She is oriented to person, place, and time.      Cranial Nerves: Cranial nerves 2-12 are intact.      Sensory: Sensation is intact.      Motor: Tremor present.   Psychiatric:         Mood and Affect: Mood normal.        Finger Rub Hearing{Test (right ear):passed  Finger Rub Hearing{Test (left ear):passed               Assessment and Plan   Diagnoses and all orders for this visit:    1. Medicare annual wellness visit, subsequent (Primary)    2. Avascular necrosis of bone of right hip (HCC)    3. Tremor    4. Chronic obstructive pulmonary disease, unspecified COPD type (HCC)    5. Bipolar 1 disorder, manic, mild (HCC)  -     Comprehensive Metabolic Panel; Future  -     TSH; Future  -     CBC & Differential; Future  -     CBC & Differential  -     TSH  -     Comprehensive Metabolic Panel    6. Other hyperlipidemia  -     Lipid Panel; Future  -     Lipid Panel    7. Vitamin D deficiency  -     Vitamin D,25-Hydroxy; Future  -     Vitamin D,25-Hydroxy    8. Encounter for medication management  -     Valproic Acid Level, Total; Future  -     Valproic Acid Level, Total    9. Encounter for immunization  -     Pneumococcal Conjugate Vaccine 20-Valent (PCV20)  -     FluLaval/Fluarix/Fluzone >6 Months    10. Encounter for screening mammogram for breast cancer  -     Mammo Screening Digital Tomosynthesis Bilateral With CAD; Future    11. Encounter for screening for lung cancer  -     CT Chest Low Dose Wo; Future    12. Personal history of nicotine dependence  -     CT Chest Low Dose Wo; Future    13. Parkinsonism, unspecified Parkinsonism type (HCC)    14. B12 deficiency  -     Vitamin B12; Future  -     Folate; Future  -     Folate  -     Vitamin  B12      She is interested in GLP-1. I discussed the risks and adverse effects with her. We will draw labs today.     We will delay pap smear until after hip surgery. She has never had an abnormal pap smear. We will order a mammogram as the patient is overdue.    She will be sent to have a lung cancer screening secondary to a 20-pack-year history smoking cigarettes.    Patient verbalized an understanding and agreement with plan of care.       Follow Up   Return in about 4 weeks (around 3/15/2023) for Recheck.  Patient was given instructions and counseling regarding her condition or for health maintenance advice. Please see specific information pulled into the AVS if appropriate.     Transcribed from ambient dictation for ROSALES White by Jaquelin Lynch.  02/15/23   13:02 EST    Patient or patient representative verbalized consent to the visit recording.  I have personally performed the services described in this document as transcribed by the above individual, and it is both accurate and complete.

## 2023-02-16 ENCOUNTER — TELEPHONE (OUTPATIENT)
Dept: INTERNAL MEDICINE | Facility: CLINIC | Age: 53
End: 2023-02-16
Payer: MEDICARE

## 2023-02-16 DIAGNOSIS — M87.051 AVASCULAR NECROSIS OF BONE OF RIGHT HIP: Primary | ICD-10-CM

## 2023-02-16 LAB
25(OH)D3+25(OH)D2 SERPL-MCNC: 17.1 NG/ML (ref 30–100)
ALBUMIN SERPL-MCNC: 3.7 G/DL (ref 3.8–4.9)
ALBUMIN/GLOB SERPL: 1.5 {RATIO} (ref 1.2–2.2)
ALP SERPL-CCNC: 103 IU/L (ref 44–121)
ALT SERPL-CCNC: 35 IU/L (ref 0–32)
AST SERPL-CCNC: 25 IU/L (ref 0–40)
BASOPHILS # BLD AUTO: 0 X10E3/UL (ref 0–0.2)
BASOPHILS NFR BLD AUTO: 1 %
BILIRUB SERPL-MCNC: 0.3 MG/DL (ref 0–1.2)
BUN SERPL-MCNC: 20 MG/DL (ref 6–24)
BUN/CREAT SERPL: 26 (ref 9–23)
CALCIUM SERPL-MCNC: 9.3 MG/DL (ref 8.7–10.2)
CHLORIDE SERPL-SCNC: 105 MMOL/L (ref 96–106)
CHOLEST SERPL-MCNC: 138 MG/DL (ref 100–199)
CO2 SERPL-SCNC: 22 MMOL/L (ref 20–29)
CREAT SERPL-MCNC: 0.76 MG/DL (ref 0.57–1)
EGFRCR SERPLBLD CKD-EPI 2021: 93 ML/MIN/1.73
EOSINOPHIL # BLD AUTO: 0.1 X10E3/UL (ref 0–0.4)
EOSINOPHIL NFR BLD AUTO: 2 %
ERYTHROCYTE [DISTWIDTH] IN BLOOD BY AUTOMATED COUNT: 13.2 % (ref 11.7–15.4)
FOLATE SERPL-MCNC: 8.3 NG/ML
GLOBULIN SER CALC-MCNC: 2.4 G/DL (ref 1.5–4.5)
GLUCOSE SERPL-MCNC: ABNORMAL MG/DL
HCT VFR BLD AUTO: 42.8 % (ref 34–46.6)
HDLC SERPL-MCNC: 43 MG/DL
HGB BLD-MCNC: 14.2 G/DL (ref 11.1–15.9)
IMM GRANULOCYTES # BLD AUTO: 0 X10E3/UL (ref 0–0.1)
IMM GRANULOCYTES NFR BLD AUTO: 0 %
LDLC SERPL CALC-MCNC: 67 MG/DL (ref 0–99)
LYMPHOCYTES # BLD AUTO: 2.2 X10E3/UL (ref 0.7–3.1)
LYMPHOCYTES NFR BLD AUTO: 49 %
MCH RBC QN AUTO: 31.2 PG (ref 26.6–33)
MCHC RBC AUTO-ENTMCNC: 33.2 G/DL (ref 31.5–35.7)
MCV RBC AUTO: 94 FL (ref 79–97)
MONOCYTES # BLD AUTO: 0.4 X10E3/UL (ref 0.1–0.9)
MONOCYTES NFR BLD AUTO: 8 %
NEUTROPHILS # BLD AUTO: 1.8 X10E3/UL (ref 1.4–7)
NEUTROPHILS NFR BLD AUTO: 40 %
PLATELET # BLD AUTO: 167 X10E3/UL (ref 150–450)
POTASSIUM SERPL-SCNC: ABNORMAL MMOL/L
PROT SERPL-MCNC: 6.1 G/DL (ref 6–8.5)
RBC # BLD AUTO: 4.55 X10E6/UL (ref 3.77–5.28)
SODIUM SERPL-SCNC: 143 MMOL/L (ref 134–144)
TRIGL SERPL-MCNC: 161 MG/DL (ref 0–149)
TSH SERPL DL<=0.005 MIU/L-ACNC: 4.17 UIU/ML (ref 0.45–4.5)
VALPROATE SERPL-MCNC: 47 UG/ML (ref 50–100)
VIT B12 SERPL-MCNC: 325 PG/ML (ref 232–1245)
VLDLC SERPL CALC-MCNC: 28 MG/DL (ref 5–40)
WBC # BLD AUTO: 4.6 X10E3/UL (ref 3.4–10.8)

## 2023-02-16 NOTE — TELEPHONE ENCOUNTER
Caller: Loreta Robertson    Relationship: Self    Best call back number: [phone]    What is the medical concern/diagnosis: HIP REPLACEMENT     What specialty or service is being requested: PAIN CLINIC    What is the provider, practice or medical service name:[facility]    What is the office location: [address] [place] [address]    What is the office phone number: [phone]    Any additional details: PATIENT EXPRESSES SHE IS IN A LOT OF PAIN AND HAS BEEN TAKING IBUPROFEN 600 MG 2 TABLETS

## 2023-02-16 NOTE — TELEPHONE ENCOUNTER
I placed a referral for pain management to DR PAULINA BRAVO MD at  [facility].

## 2023-02-16 NOTE — TELEPHONE ENCOUNTER
Caller: Jonathan Robertsonssbronson Platt    Relationship: Self    Best call back number: [phone]    What is the medical concern/diagnosis: HIP PAIN AND REPLACEMENT     What specialty or service is being requested: ORTHOPEDIC    What is the provider, practice or medical service name: CHRISTIAN HAZEL MD  [facility]    What is the office location: [facility] [place] [address]    What is the office phone number:  [phone]    Any additional details: PATIENT WANTS A 2ND OPINION ON HER HIP.  PATIENT WANTED TO SEE WHAT HE SAYS ABOUT HER WEIGHT AND THE WAIT TIME FOR THE REPLACEMENT

## 2023-02-16 NOTE — PATIENT INSTRUCTIONS
MyPlate from USDA  MyPlate is an outline of a general healthy diet based on the Dietary Guidelines for Americans, 9822-3140, from the U.S. Department of Agriculture (USDA). It sets guidelines for how much food you should eat from each food group based on your age, sex, and level of physical activity.  What are tips for following MyPlate?  To follow MyPlate recommendations:  Eat a wide variety of fruits and vegetables, grains, and protein foods.  Serve smaller portions and eat less food throughout the day.  Limit portion sizes to avoid overeating.  Enjoy your food.  Get at least 150 minutes of exercise every week. This is about 30 minutes each day, 5 or more days per week.  It can be difficult to have every meal look like MyPlate. Think about MyPlate as eating guidelines for an entire day, rather than each individual meal.  Fruits and vegetables  Make one half of your plate fruits and vegetables.  Eat many different colors of fruits and vegetables each day.  For a 2,000-calorie daily food plan, eat:  2½ cups of vegetables every day.  2 cups of fruit every day.  1 cup is equal to:  1 cup raw or cooked vegetables.  1 cup raw fruit.  1 medium-sized orange, apple, or banana.  1 cup 100% fruit or vegetable juice.  2 cups raw leafy greens, such as lettuce, spinach, or kale.  ½ cup dried fruit.  Grains  One fourth of your plate should be grains.  Make at least half of the grains you eat each day whole grains.  For a 2,000-calorie daily food plan, eat 6 oz of grains every day.  1 oz is equal to:  1 slice bread.  1 cup cereal.  ½ cup cooked rice, cereal, or pasta.  Protein  One fourth of your plate should be protein.  Eat a wide variety of protein foods, including meat, poultry, fish, eggs, beans, nuts, and tofu.  For a 2,000-calorie daily food plan, eat 5½ oz of protein every day.  1 oz is equal to:  1 oz meat, poultry, or fish.  ¼ cup cooked beans.  1 egg.  ½ oz nuts or seeds.  1 Tbsp peanut butter.  Dairy  Drink fat-free  or low-fat (1%) milk.  Eat or drink dairy as a side to meals.  For a 2,000-calorie daily food plan, eat or drink 3 cups of dairy every day.  1 cup is equal to:  1 cup milk, yogurt, cottage cheese, or soy milk (soy beverage).  2 oz processed cheese.  1½ oz natural cheese.  Fats, oils, salt, and sugars  Only small amounts of oils are recommended.  Avoid foods that are high in calories and low in nutritional value (empty calories), like foods high in fat or added sugars.  Choose foods that are low in salt (sodium). Choose foods that have less than 140 milligrams (mg) of sodium per serving.  Drink water instead of sugary drinks. Drink enough fluid to keep your urine pale yellow.  Where to find support  Work with your health care provider or a dietitian to develop a customized eating plan that is right for you.  Download an bridgette (mobile application) to help you track your daily food intake.  Where to find more information  USDA: ChooseMyPlate.gov  Summary  MyPlate is a general guideline for healthy eating from the USDA. It is based on the Dietary Guidelines for Americans, 2463-7099.  In general, fruits and vegetables should take up one half of your plate, grains should take up one fourth of your plate, and protein should take up one fourth of your plate.  This information is not intended to replace advice given to you by your health care provider. Make sure you discuss any questions you have with your health care provider.  Document Revised: 11/08/2021 Document Reviewed: 11/08/2021  [employer] Patient Education © 2022 [employer]

## 2023-02-19 DIAGNOSIS — E55.9 VITAMIN D DEFICIENCY: ICD-10-CM

## 2023-02-19 RX ORDER — ERGOCALCIFEROL 1.25 MG/1
50000 CAPSULE ORAL
Qty: 5 CAPSULE | Refills: 2 | Status: SHIPPED | OUTPATIENT
Start: 2023-02-19

## 2023-03-04 DIAGNOSIS — G89.29 CHRONIC LEFT-SIDED LOW BACK PAIN WITH LEFT-SIDED SCIATICA: ICD-10-CM

## 2023-03-04 DIAGNOSIS — M54.42 CHRONIC LEFT-SIDED LOW BACK PAIN WITH LEFT-SIDED SCIATICA: ICD-10-CM

## 2023-03-06 RX ORDER — TIZANIDINE 4 MG/1
TABLET ORAL
Qty: 60 TABLET | Refills: 1 | Status: SHIPPED | OUTPATIENT
Start: 2023-03-06

## 2023-03-09 DIAGNOSIS — J44.9 CHRONIC OBSTRUCTIVE PULMONARY DISEASE, UNSPECIFIED COPD TYPE: ICD-10-CM

## 2023-03-09 RX ORDER — BUDESONIDE AND FORMOTEROL FUMARATE DIHYDRATE 160; 4.5 UG/1; UG/1
AEROSOL RESPIRATORY (INHALATION)
Qty: 30.6 G | Refills: 0 | Status: SHIPPED | OUTPATIENT
Start: 2023-03-09

## 2023-04-07 ENCOUNTER — TELEPHONE (OUTPATIENT)
Dept: INTERNAL MEDICINE | Facility: CLINIC | Age: 53
End: 2023-04-07

## 2023-04-07 NOTE — TELEPHONE ENCOUNTER
Caller: Loreta Robertson    Relationship: Self    Best call back number: [phone]    What is the best time to reach you:IN AFTERNOONS      Who are you requesting to speak with (clinical staff, provider,  specific staff member): CLINICAL     Do you know the name of the person who called:     What was the call regarding: ORTHOPEDIC REFERRAL     Do you require a callback: YES

## 2023-04-07 NOTE — TELEPHONE ENCOUNTER
Patient states her current doctor is putting restrictions on her having surgery. He wants her BMI below 40. She has thought about switching providers but now is deciding to stay with Dr. Leyva. She is scheduled for surgery June 1st. She has a pre-op appointment in may where they will evaluate her weight loss. She states she has been working on it.

## 2023-04-07 NOTE — TELEPHONE ENCOUNTER
It appears patient is established with Dr. Leyva. Does she want a second opinion? What is it concerning?

## 2023-04-14 ENCOUNTER — OFFICE VISIT (OUTPATIENT)
Dept: INTERNAL MEDICINE | Facility: CLINIC | Age: 53
End: 2023-04-14
Payer: MEDICARE

## 2023-04-14 VITALS
DIASTOLIC BLOOD PRESSURE: 74 MMHG | HEART RATE: 69 BPM | RESPIRATION RATE: 22 BRPM | HEIGHT: 66 IN | SYSTOLIC BLOOD PRESSURE: 130 MMHG | OXYGEN SATURATION: 98 % | BODY MASS INDEX: 42.59 KG/M2 | TEMPERATURE: 97.3 F | WEIGHT: 265 LBS

## 2023-04-14 DIAGNOSIS — R11.0 NAUSEA: Primary | ICD-10-CM

## 2023-04-14 DIAGNOSIS — F19.982 DRUG-INDUCED INSOMNIA: ICD-10-CM

## 2023-04-14 PROCEDURE — 1159F MED LIST DOCD IN RCRD: CPT | Performed by: NURSE PRACTITIONER

## 2023-04-14 PROCEDURE — 3075F SYST BP GE 130 - 139MM HG: CPT | Performed by: NURSE PRACTITIONER

## 2023-04-14 PROCEDURE — 99213 OFFICE O/P EST LOW 20 MIN: CPT | Performed by: NURSE PRACTITIONER

## 2023-04-14 PROCEDURE — 3078F DIAST BP <80 MM HG: CPT | Performed by: NURSE PRACTITIONER

## 2023-04-14 PROCEDURE — 1160F RVW MEDS BY RX/DR IN RCRD: CPT | Performed by: NURSE PRACTITIONER

## 2023-04-14 RX ORDER — PROMETHAZINE HYDROCHLORIDE 12.5 MG/1
12.5 TABLET ORAL EVERY 6 HOURS PRN
Qty: 20 TABLET | Refills: 0 | Status: SHIPPED | OUTPATIENT
Start: 2023-04-14

## 2023-04-14 RX ORDER — HYDROCODONE BITARTRATE AND ACETAMINOPHEN 7.5; 325 MG/1; MG/1
TABLET ORAL
COMMUNITY
Start: 2023-03-22

## 2023-04-14 NOTE — PROGRESS NOTES
Patient Name: Loreta Robertson  : 1970   MRN: [id number]     Chief Complaint:    Chief Complaint   Patient presents with   • Nausea     For 2 weeks since starting Loratab   • Insomnia       History of Present Illness:     Loreta Robertson is a 52-year-old female who presents to clinic for nausea.     She has experiencing overwhelming nausea. She has also been vomiting. She has been taking 2 Phenergan daily. She saw Dr. Ramirez and was prescribed Lortab 7.5 mg approximately 2 weeks ago. She states she has been having bowel movements daily. She learned early on when taking lortab, she had to eat before taking her medication. On 2023, she is going back to see Dr. Ramirez. Zofran does not work for her, she must take Phenergan. She thinks Tylenol in the medication is causing her issues. She is no longer taking ibuprofen due to being out of the medication.     She is taking cholesterol medicine and Wellbutrin. She does not take her cough syrup while taking Lortab.     The patient states she has been experiencing insomnia. She takes Depakote, Zanaflex, Seroquel, primidone, prazosin, atorvastatin, and Lortab at 11 PM. She states it will be 2 AM before she falls asleep, and she will be awake between 4 or 5 AM. She states she will be awake for several hours, when she falls asleep, she will sleep until late in the afternoon. She has not had a sleep study and denies snoring. She states she can stay at her neighbor's house and go to sleep and stay asleep easily. She has lived alone for many years, and it has not bothered her. She states she does not know if everything is weighing on her since she is trying to lose weight to have her hip surgery. She decided not to have a second opinion. She is still seeing her psychologist and thinks her next appointment is next month.     The patient has been losing weight. She wants her BMI under 40. She goes next month for labs and weight check to see how she is doing. Her  surgery is scheduled for 06/01/2023.       Subjective     Review of System: Review of Systems   Constitutional: Negative for fatigue and fever.   HENT: Negative for congestion and rhinorrhea.    Respiratory: Negative for shortness of breath and wheezing.    Cardiovascular: Negative for chest pain and palpitations.   Gastrointestinal: Positive for nausea and vomiting. Negative for abdominal pain and diarrhea.   Genitourinary: Negative for dysuria.   Musculoskeletal: Negative for myalgias.   Skin: Negative for rash.   Neurological: Negative for headaches.   Hematological: Negative for adenopathy.   Psychiatric/Behavioral: Negative for dysphoric mood.      A review of systems was performed, and the pertinent positives are noted in the HPI.      Medications:     Current Outpatient Medications:   •  albuterol sulfate  (90 Base) MCG/ACT inhaler, Inhale 2 puffs Every 4 (Four) Hours As Needed for Wheezing., Disp: 18 g, Rfl: 2  •  atorvastatin (LIPITOR) 40 MG tablet, , Disp: , Rfl:   •  buPROPion XL (WELLBUTRIN XL) 150 MG 24 hr tablet, Take 1 tablet by mouth Every Morning., Disp: , Rfl:   •  divalproex (DEPAKOTE ER) 500 MG 24 hr tablet, Take 1 tablet by mouth Every Night., Disp: , Rfl:   •  HYDROcodone-acetaminophen (NORCO) 7.5-325 MG per tablet, TAKE 1 TABLET BY MOUTH EVERY 8 HOURS AS NEEDED FOR 30 DAYS, Disp: , Rfl:   •  hydrOXYzine (ATARAX) 10 MG tablet, Take 1 tablet by mouth At Night As Needed., Disp: , Rfl:   •  prazosin (MINIPRESS) 5 MG capsule, Take 1 capsule by mouth Every Night., Disp: , Rfl: 1  •  primidone (MYSOLINE) 50 MG tablet, Take 1 tablet by mouth 2 (Two) Times a Day., Disp: , Rfl:   •  promethazine (PHENERGAN) 12.5 MG tablet, Take 1 tablet by mouth Every 6 (Six) Hours As Needed for Nausea or Vomiting., Disp: 20 tablet, Rfl: 0  •  promethazine-dextromethorphan (PROMETHAZINE-DM) 6.25-15 MG/5ML syrup, 1 to 2 teaspoons 3 times daily as needed cough congestion., Disp: 120 mL, Rfl: 0  •  QUEtiapine  "(SEROquel) 200 MG tablet, Take 1 tablet by mouth every night at bedtime., Disp: , Rfl:   •  Symbicort 160-4.5 MCG/ACT inhaler, INHALE 2 PUFFS BY MOUTH TWICE DAILY, Disp: 30.6 g, Rfl: 0  •  vitamin D (ERGOCALCIFEROL) 1.25 MG (58213 UT) capsule capsule, Take 1 capsule by mouth Every 7 (Seven) Days., Disp: 5 capsule, Rfl: 2  •  Chlorhexidine Gluconate 4 % solution, Shower daily with solution as directed for 5 days prior to surgery. (Patient not taking: Reported on 4/14/2023), Disp: 237 mL, Rfl: 0  •  tiZANidine (ZANAFLEX) 4 MG tablet, TAKE 1 TABLET BY MOUTH EVERY 8 HOURS AS NEEDED FOR MUSCLE SPASMS, Disp: 60 tablet, Rfl: 1    Allergies:   Allergies   Allergen Reactions   • Morphine And Related GI Intolerance       Objective     Physical Exam:   Vital Signs:   Vitals:    04/14/23 1515   BP: 130/74   BP Location: Right arm   Patient Position: Sitting   Cuff Size: Adult   Pulse: 69   Resp: 22   Temp: 97.3 °F (36.3 °C)   TempSrc: Infrared   SpO2: 98%   Weight: 120 kg (265 lb)   Height: 167.6 cm (66\")   PainSc:   8           Physical Exam  Constitutional:       General: She is not in acute distress.     Appearance: She is not ill-appearing.   HENT:      Head: Normocephalic.   Cardiovascular:      Rate and Rhythm: Normal rate and regular rhythm.      Heart sounds: Normal heart sounds. No murmur heard.  Pulmonary:      Breath sounds: Normal breath sounds.   Abdominal:      General: Bowel sounds are normal.      Tenderness: There is no abdominal tenderness.   Neurological:      General: No focal deficit present.      Mental Status: She is oriented to person, place, and time.   Psychiatric:         Mood and Affect: Mood normal.         Assessment / Plan      Assessment/Plan:   Diagnoses and all orders for this visit:    1. Nausea (Primary)  -     promethazine (PHENERGAN) 12.5 MG tablet; Take 1 tablet by mouth Every 6 (Six) Hours As Needed for Nausea or Vomiting.  Dispense: 20 tablet; Refill: 0    2. Drug-induced insomnia     "     1. Nausea   - Refill of Phenergan was sent to her preferred pharmacy. Discussed taking pain medications earlier in the afternoon, after dinner. Advised her to discuss her current symptoms at her follow up with Dr. Ramirez.     2. Insomnia   - She has been having trouble falling asleep and staying asleep. Advised the patient to take her Lortab with her dinner to see if that improves her symptoms.       Follow Up:   ROSALES Cali  [facility]    Transcribed from ambient dictation for ROSALES White by Marilee Blankenship, Quality .  04/17/23   09:54 EDT    Patient or patient representative verbalized consent to the visit recording.  I have personally performed the services described in this document as transcribed by the above individual, and it is both accurate and complete.

## 2023-04-20 DIAGNOSIS — M54.42 CHRONIC LEFT-SIDED LOW BACK PAIN WITH LEFT-SIDED SCIATICA: ICD-10-CM

## 2023-04-20 DIAGNOSIS — G89.29 CHRONIC LEFT-SIDED LOW BACK PAIN WITH LEFT-SIDED SCIATICA: ICD-10-CM

## 2023-04-20 RX ORDER — TIZANIDINE 4 MG/1
TABLET ORAL
Qty: 60 TABLET | Refills: 1 | Status: SHIPPED | OUTPATIENT
Start: 2023-04-20

## 2023-05-19 ENCOUNTER — PRE-ADMISSION TESTING (OUTPATIENT)
Dept: PREADMISSION TESTING | Facility: HOSPITAL | Age: 53
End: 2023-05-19
Payer: MEDICARE

## 2023-05-19 VITALS — HEIGHT: 65 IN | BODY MASS INDEX: 41.75 KG/M2 | WEIGHT: 250.6 LBS

## 2023-05-19 VITALS — WEIGHT: 255.07 LBS | BODY MASS INDEX: 40.99 KG/M2 | HEIGHT: 66 IN

## 2023-05-19 DIAGNOSIS — M87.051 AVASCULAR NECROSIS OF BONE OF RIGHT HIP: ICD-10-CM

## 2023-05-19 LAB
ANION GAP SERPL CALCULATED.3IONS-SCNC: 14 MMOL/L (ref 5–15)
APTT PPP: 33.7 SECONDS (ref 22–39)
BASOPHILS # BLD AUTO: 0.04 10*3/MM3 (ref 0–0.2)
BASOPHILS NFR BLD AUTO: 0.9 % (ref 0–1.5)
BUN SERPL-MCNC: 10 MG/DL (ref 6–20)
BUN/CREAT SERPL: 14.5 (ref 7–25)
CALCIUM SPEC-SCNC: 10 MG/DL (ref 8.6–10.5)
CHLORIDE SERPL-SCNC: 103 MMOL/L (ref 98–107)
CO2 SERPL-SCNC: 23 MMOL/L (ref 22–29)
CREAT SERPL-MCNC: 0.69 MG/DL (ref 0.57–1)
CRP SERPL-MCNC: 0.59 MG/DL (ref 0–0.5)
DEPRECATED RDW RBC AUTO: 52.9 FL (ref 37–54)
EGFRCR SERPLBLD CKD-EPI 2021: 102.6 ML/MIN/1.73
EOSINOPHIL # BLD AUTO: 0.09 10*3/MM3 (ref 0–0.4)
EOSINOPHIL NFR BLD AUTO: 1.9 % (ref 0.3–6.2)
ERYTHROCYTE [DISTWIDTH] IN BLOOD BY AUTOMATED COUNT: 15.1 % (ref 12.3–15.4)
ERYTHROCYTE [SEDIMENTATION RATE] IN BLOOD: 27 MM/HR (ref 0–30)
GLUCOSE SERPL-MCNC: 96 MG/DL (ref 65–99)
HBA1C MFR BLD: 5.3 % (ref 4.8–5.6)
HCT VFR BLD AUTO: 47.7 % (ref 34–46.6)
HGB BLD-MCNC: 15.3 G/DL (ref 12–15.9)
IMM GRANULOCYTES # BLD AUTO: 0 10*3/MM3 (ref 0–0.05)
IMM GRANULOCYTES NFR BLD AUTO: 0 % (ref 0–0.5)
INR PPP: 1 (ref 0.89–1.12)
LYMPHOCYTES # BLD AUTO: 1.78 10*3/MM3 (ref 0.7–3.1)
LYMPHOCYTES NFR BLD AUTO: 38.4 % (ref 19.6–45.3)
MCH RBC QN AUTO: 30.7 PG (ref 26.6–33)
MCHC RBC AUTO-ENTMCNC: 32.1 G/DL (ref 31.5–35.7)
MCV RBC AUTO: 95.6 FL (ref 79–97)
MONOCYTES # BLD AUTO: 0.31 10*3/MM3 (ref 0.1–0.9)
MONOCYTES NFR BLD AUTO: 6.7 % (ref 5–12)
NEUTROPHILS NFR BLD AUTO: 2.42 10*3/MM3 (ref 1.7–7)
NEUTROPHILS NFR BLD AUTO: 52.1 % (ref 42.7–76)
NRBC BLD AUTO-RTO: 0 /100 WBC (ref 0–0.2)
PLATELET # BLD AUTO: 180 10*3/MM3 (ref 140–450)
PMV BLD AUTO: 12.1 FL (ref 6–12)
POTASSIUM SERPL-SCNC: 4.3 MMOL/L (ref 3.5–5.2)
PROTHROMBIN TIME: 13.3 SECONDS (ref 12.2–14.5)
RBC # BLD AUTO: 4.99 10*6/MM3 (ref 3.77–5.28)
SODIUM SERPL-SCNC: 140 MMOL/L (ref 136–145)
WBC NRBC COR # BLD: 4.64 10*3/MM3 (ref 3.4–10.8)

## 2023-05-19 PROCEDURE — 85610 PROTHROMBIN TIME: CPT

## 2023-05-19 PROCEDURE — 36415 COLL VENOUS BLD VENIPUNCTURE: CPT

## 2023-05-19 PROCEDURE — 80048 BASIC METABOLIC PNL TOTAL CA: CPT

## 2023-05-19 PROCEDURE — 85025 COMPLETE CBC W/AUTO DIFF WBC: CPT

## 2023-05-19 PROCEDURE — 93005 ELECTROCARDIOGRAM TRACING: CPT

## 2023-05-19 PROCEDURE — 85730 THROMBOPLASTIN TIME PARTIAL: CPT

## 2023-05-19 PROCEDURE — 83036 HEMOGLOBIN GLYCOSYLATED A1C: CPT

## 2023-05-19 PROCEDURE — 85652 RBC SED RATE AUTOMATED: CPT

## 2023-05-19 PROCEDURE — 86140 C-REACTIVE PROTEIN: CPT

## 2023-05-19 NOTE — PAT
An arrival time for procedure was not provided during PAT visit. If patient had any questions or concerns about their arrival time, they were instructed to contact their surgeon/physician.  Additionally, if the patient referred to an arrival time that was acquired from their my chart account, patient was encouraged to verify that time with their surgeon/physician. Arrival times are NOT provided in Pre Admission Testing Department.    Patient to apply Chlorhexadine wipes  to surgical area (as instructed) the night before procedure and the AM of procedure. Wipes provided.    Patient instructed to drink 20 ounces of Gatorade and it needs to be completed 1 hour (for Main OR patients) or 2 hours (scheduled  section & [facility] patients) before given arrival time for procedure (NO RED Gatorade)    Patient verbalized understanding.    Prescription for Chlorhexidine shower called into patient's pharmacy or [facility] pharmacy by patient's surgeon.  Reinforced with patient to  the prescription from applicable pharmacy if they haven't already.  Verbal and written instructions given regarding proper use of Chlorhexidine body wash to patient and/or famlily during PAT visit. Patient/family also instructed to complete checklist and return it to Pre-op on the day of surgery.  Patient and/or family verbalized understanding.      Patient denies any current skin issues.     Discussed with patient options for receiving total joint replacement education and assessed patient's ability and preference. Joint Replacement Guide given to patient during PAT visit since not received a copy within the last year. Encouraged patient/family to read guide thoroughly and notify PAT staff with any questions or concerns. Handout provided directing patient to links to watch online videos related to joint replacement surgery on the [facility] website. The handout gives detailed instructions for joining an online joint replacement class  through Zoom or phone conference offered on Thursdays. Patient agreed to participate by watching videos online. Patient verbalized understanding of instructions and to complete the online learning tool survey. Encouraged to share information with family and/or . An overview of the joint replacement education was provided during the visit including general perioperative instructions that are routine for all surgical patients (PAT PASS, wipes, directions to pre-op, etc.).    Post-Surgery Information Instruction Sheet given to patient during Pre-Admission Testing Visit with verbal instructions to patient to return with PAT PASS on the day of surgery. Additionally, encouraged patient to review the information provided.    PATIENT EKG ON CHART AND IN Epic FROM 05/2023

## 2023-05-20 LAB
QT INTERVAL: 380 MS
QTC INTERVAL: 441 MS

## 2023-05-26 ENCOUNTER — OFFICE VISIT (OUTPATIENT)
Dept: ORTHOPEDIC SURGERY | Facility: CLINIC | Age: 53
End: 2023-05-26
Payer: MEDICARE

## 2023-05-26 VITALS
SYSTOLIC BLOOD PRESSURE: 140 MMHG | HEIGHT: 65 IN | DIASTOLIC BLOOD PRESSURE: 90 MMHG | WEIGHT: 246 LBS | BODY MASS INDEX: 40.98 KG/M2

## 2023-05-26 DIAGNOSIS — M87.051 AVASCULAR NECROSIS OF BONE OF RIGHT HIP: Primary | ICD-10-CM

## 2023-05-26 PROCEDURE — 1159F MED LIST DOCD IN RCRD: CPT | Performed by: ORTHOPAEDIC SURGERY

## 2023-05-26 PROCEDURE — 99212 OFFICE O/P EST SF 10 MIN: CPT | Performed by: ORTHOPAEDIC SURGERY

## 2023-05-26 PROCEDURE — 3080F DIAST BP >= 90 MM HG: CPT | Performed by: ORTHOPAEDIC SURGERY

## 2023-05-26 PROCEDURE — 1160F RVW MEDS BY RX/DR IN RCRD: CPT | Performed by: ORTHOPAEDIC SURGERY

## 2023-05-26 PROCEDURE — 3077F SYST BP >= 140 MM HG: CPT | Performed by: ORTHOPAEDIC SURGERY

## 2023-05-26 NOTE — PROGRESS NOTES
[facility] Clinic Note    Subjective     Chief Complaint   Patient presents with   • Follow-up     3 month follow up --  Avascular necrosis of bone of right hip         HPI    It has been 3  month(s) since Ms. Robertson's last visit. She returns to clinic today for follow-up of right hip pain. The issue has been ongoing for 9 month(s). She rates her pain a 10/10 on the pain scale. Previous/current treatments: cane/walker, NSAIDS and weight loss. Current symptoms: pain, stiffness and giving way/buckling. The pain is worse with walking, standing, sitting and any movement of the joint; nothing improve the pain. Overall, she is doing worse.  She is scheduled for surgery next week.  She had a weight check, and her body mass index is just over 40.  She has lost 100 pounds over the past year.  Pain is located in the groin region.  Limited to ambulation with a cane.  No history of clots nor clotting disorders.  No blood thinners.  No heart disease.  She has help from neighbors, friends, and her son postoperatively.    I have reviewed the following portions of the patient's history and agree with: History of Present Illness and Review of Systems    Patient Active Problem List   Diagnosis   • Tobacco use   • Bipolar 1 disorder, manic, mild   • Morbid obesity with BMI of 50.0-59.9, adult   • Other hyperlipidemia   • HTN (hypertension)   • Chronic obstructive pulmonary disease   • Impaired glucose tolerance   • Chronic right shoulder pain   • Avascular necrosis of bone of right hip     Past Medical History:   Diagnosis Date   • Anxiety    • Bipolar 1 disorder    • Cataract 2 years   • Chronic bronchitis    • Chronic constipation    • Colon polyp 2 years   • Depression    • Hip arthrosis 2 months   • Hyperlipidemia    • Knee swelling 2-3 years   • Obesity Years   • Parkinsonism     DRUG INDUCED   • Primary generalized (osteo)arthritis    • PTSD (post-traumatic stress disorder)    • RLS (restless legs syndrome)        Past Surgical History:   Procedure Laterality Date   • ABDOMINOPLASTY     • ANTERIOR AND POSTERIOR VAGINAL REPAIR      PATIENT REPORTS ANTERIOR AND POSTERIOR REPAIRS OF UTERUS AND BOTTOM   • BREAST BIOPSY Right     US BIOPSY   •  SECTION      X 2   • CHOLECYSTECTOMY     • COLONOSCOPY     • COLONOSCOPY N/A 2019    Procedure: COLONOSCOPY;  Surgeon: Jonathan Pablo MD;  Location: [facility] ENDOSCOPY;  Service: Gastroenterology   • HYSTERECTOMY      AGE 33   • LIPOSUCTION ABDOMINAL  1998      Family History   Problem Relation Age of Onset   • Breast cancer Mother 50   • Diabetes Mother    • Cancer Mother    • Diabetes Father    • Hypertension Father    • Heart attack Father    • Obesity Father    • Breast cancer Maternal Grandmother 40   • Ovarian cancer Maternal Grandmother 40   • Cervical cancer Maternal Grandmother    • Cancer Maternal Grandfather      Social History     Socioeconomic History   • Marital status: Single   Tobacco Use   • Smoking status: Every Day     Packs/day: 0.50     Years: 30.00     Pack years: 15.00     Types: Cigarettes     Start date: 1980   • Smokeless tobacco: Never   Vaping Use   • Vaping Use: Never used   Substance and Sexual Activity   • Alcohol use: No   • Drug use: Yes     Types: Marijuana   • Sexual activity: Not Currently      Current Outpatient Medications on File Prior to Visit   Medication Sig Dispense Refill   • albuterol sulfate  (90 Base) MCG/ACT inhaler Inhale 2 puffs Every 4 (Four) Hours As Needed for Wheezing. 18 g 2   • atorvastatin (LIPITOR) 40 MG tablet      • buPROPion XL (WELLBUTRIN XL) 150 MG 24 hr tablet Take 1 tablet by mouth Every Morning.     • divalproex (DEPAKOTE ER) 500 MG 24 hr tablet Take 1 tablet by mouth Every Night.     • HYDROcodone-acetaminophen (NORCO) 7.5-325 MG per tablet TAKE 1 TABLET BY MOUTH EVERY 8 HOURS AS NEEDED FOR 30 DAYS     • hydrOXYzine (ATARAX) 10 MG tablet Take 1 tablet by mouth At Night As Needed.     • prazosin  (MINIPRESS) 5 MG capsule Take 1 capsule by mouth Every Night.  1   • primidone (MYSOLINE) 50 MG tablet Take 1 tablet by mouth 2 (Two) Times a Day.     • promethazine (PHENERGAN) 12.5 MG tablet Take 1 tablet by mouth Every 6 (Six) Hours As Needed for Nausea or Vomiting. 20 tablet 0   • QUEtiapine (SEROquel) 200 MG tablet Take 1.5 tablets by mouth every night at bedtime.     • Symbicort 160-4.5 MCG/ACT inhaler INHALE 2 PUFFS BY MOUTH TWICE DAILY 30.6 g 0   • tiZANidine (ZANAFLEX) 4 MG tablet TAKE 1 TABLET BY MOUTH EVERY 8 HOURS AS NEEDED FOR MUSCLE SPASMS 60 tablet 1   • [DISCONTINUED] Chlorhexidine Gluconate 4 % solution Shower daily with solution as directed for 5 days prior to surgery. 237 mL 0     No current facility-administered medications on file prior to visit.      Allergies   Allergen Reactions   • Morphine And Related GI Intolerance        Review of Systems   Constitutional: Negative for activity change, appetite change, chills, diaphoresis, fatigue, fever and unexpected weight change.   HENT: Negative for congestion, dental problem, drooling, ear discharge, ear pain, facial swelling, hearing loss, mouth sores, nosebleeds, postnasal drip, rhinorrhea, sinus pressure, sneezing, sore throat, tinnitus, trouble swallowing and voice change.    Eyes: Negative for photophobia, pain, discharge, redness, itching and visual disturbance.   Respiratory: Negative for apnea, cough, choking, chest tightness, shortness of breath, wheezing and stridor.    Cardiovascular: Negative for chest pain, palpitations and leg swelling.   Gastrointestinal: Negative for abdominal distention, abdominal pain, anal bleeding, blood in stool, constipation, diarrhea, nausea, rectal pain and vomiting.   Endocrine: Negative for cold intolerance, heat intolerance, polydipsia, polyphagia and polyuria.   Genitourinary: Negative for decreased urine volume, difficulty urinating, dysuria, enuresis, flank pain, frequency, genital sores, hematuria and  "urgency.   Musculoskeletal: Positive for arthralgias. Negative for back pain, gait problem, joint swelling, myalgias, neck pain and neck stiffness.   Skin: Negative for color change, pallor, rash and wound.   Allergic/Immunologic: Negative for environmental allergies, food allergies and immunocompromised state.   Neurological: Negative for dizziness, tremors, seizures, syncope, facial asymmetry, speech difficulty, weakness, light-headedness, numbness and headaches.   Hematological: Negative for adenopathy. Does not bruise/bleed easily.   Psychiatric/Behavioral: Negative for agitation, behavioral problems, confusion, decreased concentration, dysphoric mood, hallucinations, self-injury, sleep disturbance and suicidal ideas. The patient is not nervous/anxious and is not hyperactive.         Objective      Physical Exam  /90   Ht 165 cm (64.96\")   Wt 112 kg (246 lb)   BMI 40.99 kg/m²     Body mass index is 40.99 kg/m².    General:   Mental Status:  Alert   Appearance: Cooperative, in no acute distress   Build and Nutrition: Obese by BMI female   Orientation: Alert and oriented to person, place and time   Posture: Normal   Gait: Limp on the right with a cane    Integument:              Right hip: No skin lesions, no rash, no ecchymosis     Lower Extremity:              Right Hip:                          Tenderness:    None                          Swelling:          None                          Crepitus:          None                          Range of motion:        External Rotation:       30°, with pain                                                              Internal Rotation:        30°, with pain                                                              Flexion:                       90°                                                              Extension:                   0°                       Deformities:     None  Functional testing: Positive [place]                          No leg " length discrepancy    Imaging/Studies  Imaging Results (Last 24 Hours)     Procedure Component Value Units Date/Time    XR Hip With or Without Pelvis 2 - 3 View Right [[id number]] Resulted: 05/26/23 1158     Updated: 05/26/23 1159    Narrative:      Right Hip Radiographs  Indication: right hip pain  Views: low AP pelvis and lateral of the right hip    Comparison: 12/12/2022    Findings:   Increased sclerosis in the femoral head, positive crescent sign, area of   flattening the superior aspect of the femoral head, no acute bony   abnormalities.  No unusual bony features.  Avascular changes in the   femoral head have worsened since the previous imaging.            Assessment and Plan     Diagnoses and all orders for this visit:    1. Avascular necrosis of bone of right hip (Primary)  -     XR Hip With or Without Pelvis 2 - 3 View Right    Other orders  -     Chlorhexidine Gluconate 4 % solution; Shower daily with solution as directed for 5 days prior to surgery.  Dispense: 237 mL; Refill: 0        1. Avascular necrosis of bone of right hip        I reviewed my findings with the patient.  She is ready to proceed with her right total hip arthroplasty, and has significant disability secondary to her hip.  She has worked on weight loss, losing 100 pounds over the past year.  She wishes to proceed with surgery as planned next week.  Questions were answered.  Please see my previous note for details regarding preoperative counseling.    Return for surgery.      Adrián Leyva MD  05/26/23  12:01 EDT

## 2023-05-31 ENCOUNTER — ANESTHESIA EVENT (OUTPATIENT)
Dept: PERIOP | Facility: HOSPITAL | Age: 53
End: 2023-05-31
Payer: MEDICARE

## 2023-05-31 DIAGNOSIS — M54.42 CHRONIC LEFT-SIDED LOW BACK PAIN WITH LEFT-SIDED SCIATICA: ICD-10-CM

## 2023-05-31 DIAGNOSIS — G89.29 CHRONIC LEFT-SIDED LOW BACK PAIN WITH LEFT-SIDED SCIATICA: ICD-10-CM

## 2023-05-31 RX ORDER — SODIUM CHLORIDE 0.9 % (FLUSH) 0.9 %
10 SYRINGE (ML) INJECTION EVERY 12 HOURS SCHEDULED
Status: CANCELLED | OUTPATIENT
Start: 2023-05-31

## 2023-05-31 RX ORDER — TIZANIDINE 4 MG/1
TABLET ORAL
Qty: 60 TABLET | Refills: 1 | Status: ON HOLD | OUTPATIENT
Start: 2023-05-31

## 2023-05-31 RX ORDER — SODIUM CHLORIDE 9 MG/ML
40 INJECTION, SOLUTION INTRAVENOUS AS NEEDED
Status: CANCELLED | OUTPATIENT
Start: 2023-05-31

## 2023-05-31 RX ORDER — SODIUM CHLORIDE 0.9 % (FLUSH) 0.9 %
10 SYRINGE (ML) INJECTION AS NEEDED
Status: CANCELLED | OUTPATIENT
Start: 2023-05-31

## 2023-06-01 ENCOUNTER — ANESTHESIA (OUTPATIENT)
Dept: PERIOP | Facility: HOSPITAL | Age: 53
End: 2023-06-01
Payer: MEDICARE

## 2023-06-01 ENCOUNTER — HOSPITAL ENCOUNTER (OUTPATIENT)
Facility: HOSPITAL | Age: 53
Discharge: HOME | End: 2023-06-06
Attending: ORTHOPAEDIC SURGERY | Admitting: ORTHOPAEDIC SURGERY
Payer: MEDICARE

## 2023-06-01 ENCOUNTER — APPOINTMENT (OUTPATIENT)
Dept: GENERAL RADIOLOGY | Facility: HOSPITAL | Age: 53
End: 2023-06-01
Payer: MEDICARE

## 2023-06-01 DIAGNOSIS — M87.051 AVASCULAR NECROSIS OF BONE OF RIGHT HIP: ICD-10-CM

## 2023-06-01 DIAGNOSIS — Z96.641 STATUS POST RIGHT HIP REPLACEMENT: Primary | ICD-10-CM

## 2023-06-01 PROBLEM — E66.9 OBESITY: Status: ACTIVE | Noted: 2023-06-01

## 2023-06-01 LAB — GLUCOSE BLDC GLUCOMTR-MCNC: 116 MG/DL (ref 70–130)

## 2023-06-01 PROCEDURE — 63710000001 ATORVASTATIN 10 MG TABLET: Performed by: INTERNAL MEDICINE

## 2023-06-01 PROCEDURE — 63710000001 TERAZOSIN 5 MG CAPSULE: Performed by: INTERNAL MEDICINE

## 2023-06-01 PROCEDURE — 25010000002 ONDANSETRON PER 1 MG: Performed by: ANESTHESIOLOGY

## 2023-06-01 PROCEDURE — A9270 NON-COVERED ITEM OR SERVICE: HCPCS | Performed by: ORTHOPAEDIC SURGERY

## 2023-06-01 PROCEDURE — 25010000002 FENTANYL CITRATE (PF) 50 MCG/ML SOLUTION

## 2023-06-01 PROCEDURE — 25010000002 PROPOFOL 10 MG/ML EMULSION

## 2023-06-01 PROCEDURE — A9270 NON-COVERED ITEM OR SERVICE: HCPCS | Performed by: INTERNAL MEDICINE

## 2023-06-01 PROCEDURE — 25010000002 HYDROMORPHONE 1 MG/ML SOLUTION: Performed by: ORTHOPAEDIC SURGERY

## 2023-06-01 PROCEDURE — C1776 JOINT DEVICE (IMPLANTABLE): HCPCS | Performed by: ORTHOPAEDIC SURGERY

## 2023-06-01 PROCEDURE — C1755 CATHETER, INTRASPINAL: HCPCS | Performed by: ORTHOPAEDIC SURGERY

## 2023-06-01 PROCEDURE — 63710000001 ONDANSETRON PER 8 MG: Performed by: ORTHOPAEDIC SURGERY

## 2023-06-01 PROCEDURE — 27130 TOTAL HIP ARTHROPLASTY: CPT | Performed by: PHYSICIAN ASSISTANT

## 2023-06-01 PROCEDURE — 25010000002 PHENYLEPHRINE 10 MG/ML SOLUTION 1 ML VIAL: Performed by: ANESTHESIOLOGY

## 2023-06-01 PROCEDURE — 82948 REAGENT STRIP/BLOOD GLUCOSE: CPT

## 2023-06-01 PROCEDURE — 25010000002 CEFAZOLIN IN DEXTROSE 2-4 GM/100ML-% SOLUTION: Performed by: ORTHOPAEDIC SURGERY

## 2023-06-01 PROCEDURE — 73502 X-RAY EXAM HIP UNI 2-3 VIEWS: CPT

## 2023-06-01 PROCEDURE — 25010000002 HYDROMORPHONE 1 MG/ML SOLUTION

## 2023-06-01 PROCEDURE — 63710000001 PRIMIDONE 50 MG TABLET: Performed by: INTERNAL MEDICINE

## 2023-06-01 PROCEDURE — 63710000001 DIVALPROEX 250 MG TABLET SUSTAINED-RELEASE 24 HOUR: Performed by: INTERNAL MEDICINE

## 2023-06-01 PROCEDURE — 76000 FLUOROSCOPY <1 HR PHYS/QHP: CPT

## 2023-06-01 PROCEDURE — 63710000001 QUETIAPINE 100 MG TABLET: Performed by: INTERNAL MEDICINE

## 2023-06-01 PROCEDURE — 63710000001 OXYCODONE 5 MG TABLET: Performed by: ORTHOPAEDIC SURGERY

## 2023-06-01 PROCEDURE — 27130 TOTAL HIP ARTHROPLASTY: CPT | Performed by: ORTHOPAEDIC SURGERY

## 2023-06-01 PROCEDURE — 25010000002 DEXAMETHASONE PER 1 MG

## 2023-06-01 PROCEDURE — 25010000002 ROPIVACAINE PER 1 MG: Performed by: ORTHOPAEDIC SURGERY

## 2023-06-01 DEVICE — DEV CONTRL TISS STRATAFIX SPIRAL MNCRYL UD 3/0 PLS 60CM: Type: IMPLANTABLE DEVICE | Site: HIP | Status: FUNCTIONAL

## 2023-06-01 DEVICE — DEV CONTRL TISS STRATAFIX SYMM PDS PLUS VIL CT-1 45CM: Type: IMPLANTABLE DEVICE | Site: HIP | Status: FUNCTIONAL

## 2023-06-01 DEVICE — R3 US DELTA HEAD 36 +4
Type: IMPLANTABLE DEVICE | Site: FEMUR | Status: FUNCTIONAL
Brand: R3

## 2023-06-01 DEVICE — R3 0 DEGREE XLPE ACETABULAR LINER                                    36MM INNER DIAMETER X OUTER DIAMETER 52MM
Type: IMPLANTABLE DEVICE | Site: ACETABULUM | Status: FUNCTIONAL
Brand: R3

## 2023-06-01 DEVICE — R3 3 HOLE ACETABULAR SHELL 52MM
Type: IMPLANTABLE DEVICE | Site: ACETABULUM | Status: FUNCTIONAL
Brand: R3 ACETABULAR

## 2023-06-01 DEVICE — POLARSTEM VALGUS TI/HA 5
Type: IMPLANTABLE DEVICE | Site: FEMUR | Status: FUNCTIONAL
Brand: POLARSTEM

## 2023-06-01 DEVICE — REFLECTION SPHERICAL HEAD SCREW 25MM
Type: IMPLANTABLE DEVICE | Site: ACETABULUM | Status: FUNCTIONAL
Brand: REFLECTION

## 2023-06-01 DEVICE — IMPLANTABLE DEVICE: Type: IMPLANTABLE DEVICE | Site: FEMUR | Status: FUNCTIONAL

## 2023-06-01 RX ORDER — OXYCODONE HYDROCHLORIDE 5 MG/1
5 TABLET ORAL EVERY 4 HOURS PRN
Status: DISCONTINUED | OUTPATIENT
Start: 2023-06-01 | End: 2023-06-06 | Stop reason: HOSPADM

## 2023-06-01 RX ORDER — MORPHINE SULFATE 4 MG/ML
4 INJECTION, SOLUTION INTRAMUSCULAR; INTRAVENOUS
Status: DISCONTINUED | OUTPATIENT
Start: 2023-06-01 | End: 2023-06-06 | Stop reason: HOSPADM

## 2023-06-01 RX ORDER — PRIMIDONE 50 MG/1
50 TABLET ORAL 2 TIMES DAILY
Status: DISCONTINUED | OUTPATIENT
Start: 2023-06-01 | End: 2023-06-06 | Stop reason: HOSPADM

## 2023-06-01 RX ORDER — PREGABALIN 150 MG/1
150 CAPSULE ORAL ONCE
Status: COMPLETED | OUTPATIENT
Start: 2023-06-01 | End: 2023-06-01

## 2023-06-01 RX ORDER — SODIUM CHLORIDE 0.9 % (FLUSH) 0.9 %
10 SYRINGE (ML) INJECTION EVERY 12 HOURS SCHEDULED
Status: DISCONTINUED | OUTPATIENT
Start: 2023-06-01 | End: 2023-06-06 | Stop reason: HOSPADM

## 2023-06-01 RX ORDER — FENTANYL CITRATE 50 UG/ML
50 INJECTION, SOLUTION INTRAMUSCULAR; INTRAVENOUS
Status: DISCONTINUED | OUTPATIENT
Start: 2023-06-01 | End: 2023-06-01 | Stop reason: HOSPADM

## 2023-06-01 RX ORDER — DEXAMETHASONE SODIUM PHOSPHATE 4 MG/ML
INJECTION, SOLUTION INTRA-ARTICULAR; INTRALESIONAL; INTRAMUSCULAR; INTRAVENOUS; SOFT TISSUE AS NEEDED
Status: DISCONTINUED | OUTPATIENT
Start: 2023-06-01 | End: 2023-06-01 | Stop reason: SURG

## 2023-06-01 RX ORDER — FENTANYL CITRATE 50 UG/ML
INJECTION, SOLUTION INTRAMUSCULAR; INTRAVENOUS
Status: COMPLETED
Start: 2023-06-01 | End: 2023-06-01

## 2023-06-01 RX ORDER — EPHEDRINE SULFATE 50 MG/ML
INJECTION INTRAVENOUS AS NEEDED
Status: DISCONTINUED | OUTPATIENT
Start: 2023-06-01 | End: 2023-06-01 | Stop reason: SURG

## 2023-06-01 RX ORDER — ATORVASTATIN CALCIUM 10 MG/1
10 TABLET, FILM COATED ORAL NIGHTLY
Status: DISCONTINUED | OUTPATIENT
Start: 2023-06-01 | End: 2023-06-06 | Stop reason: HOSPADM

## 2023-06-01 RX ORDER — FAMOTIDINE 20 MG/1
20 TABLET, FILM COATED ORAL ONCE
Status: COMPLETED | OUTPATIENT
Start: 2023-06-01 | End: 2023-06-01

## 2023-06-01 RX ORDER — QUETIAPINE FUMARATE 100 MG/1
300 TABLET, FILM COATED ORAL NIGHTLY
Status: DISCONTINUED | OUTPATIENT
Start: 2023-06-01 | End: 2023-06-06 | Stop reason: HOSPADM

## 2023-06-01 RX ORDER — ONDANSETRON 2 MG/ML
4 INJECTION INTRAMUSCULAR; INTRAVENOUS EVERY 6 HOURS PRN
Status: DISCONTINUED | OUTPATIENT
Start: 2023-06-01 | End: 2023-06-06 | Stop reason: HOSPADM

## 2023-06-01 RX ORDER — CEFAZOLIN SODIUM 2 G/100ML
2 INJECTION, SOLUTION INTRAVENOUS EVERY 8 HOURS
Status: COMPLETED | OUTPATIENT
Start: 2023-06-01 | End: 2023-06-02

## 2023-06-01 RX ORDER — ROPIVACAINE HYDROCHLORIDE 5 MG/ML
INJECTION, SOLUTION EPIDURAL; INFILTRATION; PERINEURAL AS NEEDED
Status: DISCONTINUED | OUTPATIENT
Start: 2023-06-01 | End: 2023-06-01 | Stop reason: HOSPADM

## 2023-06-01 RX ORDER — TERAZOSIN 5 MG/1
5 CAPSULE ORAL NIGHTLY
Status: DISCONTINUED | OUTPATIENT
Start: 2023-06-01 | End: 2023-06-06 | Stop reason: HOSPADM

## 2023-06-01 RX ORDER — MIDAZOLAM HYDROCHLORIDE 1 MG/ML
1 INJECTION INTRAMUSCULAR; INTRAVENOUS
Status: DISCONTINUED | OUTPATIENT
Start: 2023-06-01 | End: 2023-06-01

## 2023-06-01 RX ORDER — SODIUM CHLORIDE, SODIUM LACTATE, POTASSIUM CHLORIDE, CALCIUM CHLORIDE 600; 310; 30; 20 MG/100ML; MG/100ML; MG/100ML; MG/100ML
9 INJECTION, SOLUTION INTRAVENOUS CONTINUOUS
Status: DISCONTINUED | OUTPATIENT
Start: 2023-06-01 | End: 2023-06-01

## 2023-06-01 RX ORDER — SODIUM CHLORIDE 9 MG/ML
40 INJECTION, SOLUTION INTRAVENOUS AS NEEDED
Status: DISCONTINUED | OUTPATIENT
Start: 2023-06-01 | End: 2023-06-06 | Stop reason: HOSPADM

## 2023-06-01 RX ORDER — LIDOCAINE HYDROCHLORIDE 10 MG/ML
INJECTION, SOLUTION EPIDURAL; INFILTRATION; INTRACAUDAL; PERINEURAL AS NEEDED
Status: DISCONTINUED | OUTPATIENT
Start: 2023-06-01 | End: 2023-06-01 | Stop reason: SURG

## 2023-06-01 RX ORDER — MAGNESIUM HYDROXIDE 1200 MG/15ML
LIQUID ORAL AS NEEDED
Status: DISCONTINUED | OUTPATIENT
Start: 2023-06-01 | End: 2023-06-01 | Stop reason: HOSPADM

## 2023-06-01 RX ORDER — SODIUM CHLORIDE 0.9 % (FLUSH) 0.9 %
1-10 SYRINGE (ML) INJECTION AS NEEDED
Status: DISCONTINUED | OUTPATIENT
Start: 2023-06-01 | End: 2023-06-06 | Stop reason: HOSPADM

## 2023-06-01 RX ORDER — BUPIVACAINE HYDROCHLORIDE 5 MG/ML
INJECTION, SOLUTION PERINEURAL
Status: COMPLETED | OUTPATIENT
Start: 2023-06-01 | End: 2023-06-01

## 2023-06-01 RX ORDER — TIZANIDINE 4 MG/1
4 TABLET ORAL EVERY 8 HOURS PRN
Status: DISCONTINUED | OUTPATIENT
Start: 2023-06-01 | End: 2023-06-06 | Stop reason: HOSPADM

## 2023-06-01 RX ORDER — DIVALPROEX SODIUM 250 MG/1
500 TABLET, EXTENDED RELEASE ORAL NIGHTLY
Status: DISCONTINUED | OUTPATIENT
Start: 2023-06-01 | End: 2023-06-06 | Stop reason: HOSPADM

## 2023-06-01 RX ORDER — MELOXICAM 15 MG/1
15 TABLET ORAL ONCE
Status: COMPLETED | OUTPATIENT
Start: 2023-06-01 | End: 2023-06-01

## 2023-06-01 RX ORDER — TRANEXAMIC ACID 10 MG/ML
1000 INJECTION, SOLUTION INTRAVENOUS ONCE
Status: COMPLETED | OUTPATIENT
Start: 2023-06-01 | End: 2023-06-01

## 2023-06-01 RX ORDER — ONDANSETRON 2 MG/ML
INJECTION INTRAMUSCULAR; INTRAVENOUS AS NEEDED
Status: DISCONTINUED | OUTPATIENT
Start: 2023-06-01 | End: 2023-06-01 | Stop reason: SURG

## 2023-06-01 RX ORDER — BUDESONIDE AND FORMOTEROL FUMARATE DIHYDRATE 160; 4.5 UG/1; UG/1
2 AEROSOL RESPIRATORY (INHALATION) 2 TIMES DAILY
Status: DISCONTINUED | OUTPATIENT
Start: 2023-06-01 | End: 2023-06-06 | Stop reason: HOSPADM

## 2023-06-01 RX ORDER — BUPROPION HYDROCHLORIDE 150 MG/1
150 TABLET ORAL EVERY MORNING
Status: DISCONTINUED | OUTPATIENT
Start: 2023-06-02 | End: 2023-06-06 | Stop reason: HOSPADM

## 2023-06-01 RX ORDER — SODIUM CHLORIDE 9 MG/ML
120 INJECTION, SOLUTION INTRAVENOUS CONTINUOUS
Status: DISCONTINUED | OUTPATIENT
Start: 2023-06-01 | End: 2023-06-06 | Stop reason: HOSPADM

## 2023-06-01 RX ORDER — ONDANSETRON 4 MG/1
4 TABLET, FILM COATED ORAL EVERY 6 HOURS PRN
Status: DISCONTINUED | OUTPATIENT
Start: 2023-06-01 | End: 2023-06-06 | Stop reason: HOSPADM

## 2023-06-01 RX ORDER — PROPOFOL 10 MG/ML
VIAL (ML) INTRAVENOUS AS NEEDED
Status: DISCONTINUED | OUTPATIENT
Start: 2023-06-01 | End: 2023-06-01 | Stop reason: SURG

## 2023-06-01 RX ORDER — FAMOTIDINE 10 MG/ML
20 INJECTION, SOLUTION INTRAVENOUS ONCE
Status: DISCONTINUED | OUTPATIENT
Start: 2023-06-01 | End: 2023-06-01

## 2023-06-01 RX ORDER — LIDOCAINE HYDROCHLORIDE 10 MG/ML
0.5 INJECTION, SOLUTION EPIDURAL; INFILTRATION; INTRACAUDAL; PERINEURAL ONCE AS NEEDED
Status: COMPLETED | OUTPATIENT
Start: 2023-06-01 | End: 2023-06-01

## 2023-06-01 RX ORDER — NALOXONE HCL 0.4 MG/ML
0.1 VIAL (ML) INJECTION
Status: DISCONTINUED | OUTPATIENT
Start: 2023-06-01 | End: 2023-06-06 | Stop reason: HOSPADM

## 2023-06-01 RX ORDER — MELOXICAM 15 MG/1
15 TABLET ORAL DAILY
Status: DISCONTINUED | OUTPATIENT
Start: 2023-06-02 | End: 2023-06-06 | Stop reason: HOSPADM

## 2023-06-01 RX ORDER — ACETAMINOPHEN 500 MG
1000 TABLET ORAL ONCE
Status: COMPLETED | OUTPATIENT
Start: 2023-06-01 | End: 2023-06-01

## 2023-06-01 RX ORDER — ATORVASTATIN CALCIUM 10 MG/1
10 TABLET, FILM COATED ORAL DAILY
Status: DISCONTINUED | OUTPATIENT
Start: 2023-06-01 | End: 2023-06-01

## 2023-06-01 RX ORDER — PHENYLEPHRINE HCL IN 0.9% NACL 1 MG/10 ML
SYRINGE (ML) INTRAVENOUS AS NEEDED
Status: DISCONTINUED | OUTPATIENT
Start: 2023-06-01 | End: 2023-06-01 | Stop reason: SURG

## 2023-06-01 RX ORDER — CEFAZOLIN SODIUM 2 G/100ML
2 INJECTION, SOLUTION INTRAVENOUS ONCE
Status: COMPLETED | OUTPATIENT
Start: 2023-06-01 | End: 2023-06-01

## 2023-06-01 RX ORDER — ALBUTEROL SULFATE 90 UG/1
2 AEROSOL, METERED RESPIRATORY (INHALATION) EVERY 4 HOURS PRN
Status: DISCONTINUED | OUTPATIENT
Start: 2023-06-01 | End: 2023-06-06 | Stop reason: HOSPADM

## 2023-06-01 RX ORDER — ASPIRIN 325 MG
325 TABLET, DELAYED RELEASE (ENTERIC COATED) ORAL DAILY
Status: DISCONTINUED | OUTPATIENT
Start: 2023-06-02 | End: 2023-06-06 | Stop reason: HOSPADM

## 2023-06-01 RX ORDER — NALOXONE HCL 0.4 MG/ML
0.4 VIAL (ML) INJECTION
Status: DISCONTINUED | OUTPATIENT
Start: 2023-06-01 | End: 2023-06-06 | Stop reason: HOSPADM

## 2023-06-01 RX ORDER — TRANEXAMIC ACID 10 MG/ML
1000 INJECTION, SOLUTION INTRAVENOUS ONCE
Status: DISCONTINUED | OUTPATIENT
Start: 2023-06-01 | End: 2023-06-01

## 2023-06-01 RX ADMIN — CEFAZOLIN SODIUM 2 G: 2 INJECTION, SOLUTION INTRAVENOUS at 20:34

## 2023-06-01 RX ADMIN — HYDROMORPHONE HYDROCHLORIDE 0.5 MG: 1 INJECTION, SOLUTION INTRAMUSCULAR; INTRAVENOUS; SUBCUTANEOUS at 14:36

## 2023-06-01 RX ADMIN — SODIUM CHLORIDE, POTASSIUM CHLORIDE, SODIUM LACTATE AND CALCIUM CHLORIDE 9 ML/HR: 600; 310; 30; 20 INJECTION, SOLUTION INTRAVENOUS at 08:46

## 2023-06-01 RX ADMIN — FENTANYL CITRATE 50 MCG: 50 INJECTION, SOLUTION INTRAMUSCULAR; INTRAVENOUS at 13:25

## 2023-06-01 RX ADMIN — FENTANYL CITRATE 50 MCG: 50 INJECTION, SOLUTION INTRAMUSCULAR; INTRAVENOUS at 15:56

## 2023-06-01 RX ADMIN — ONDANSETRON 4 MG: 2 INJECTION INTRAMUSCULAR; INTRAVENOUS at 12:29

## 2023-06-01 RX ADMIN — Medication 0.5 MG: at 17:06

## 2023-06-01 RX ADMIN — OXYCODONE HYDROCHLORIDE 5 MG: 5 TABLET ORAL at 20:41

## 2023-06-01 RX ADMIN — PROPOFOL 25 MG: 10 INJECTION, EMULSION INTRAVENOUS at 10:17

## 2023-06-01 RX ADMIN — DEXAMETHASONE SODIUM PHOSPHATE 4 MG: 4 INJECTION, SOLUTION INTRAMUSCULAR; INTRAVENOUS at 10:25

## 2023-06-01 RX ADMIN — Medication 0.5 MG: at 13:31

## 2023-06-01 RX ADMIN — Medication 100 MCG: at 11:57

## 2023-06-01 RX ADMIN — Medication 50 MCG: at 10:56

## 2023-06-01 RX ADMIN — QUETIAPINE FUMARATE 300 MG: 100 TABLET ORAL at 20:34

## 2023-06-01 RX ADMIN — FAMOTIDINE 20 MG: 20 TABLET ORAL at 08:46

## 2023-06-01 RX ADMIN — EPHEDRINE SULFATE 5 MG: 50 INJECTION INTRAVENOUS at 10:48

## 2023-06-01 RX ADMIN — EPHEDRINE SULFATE 10 MG: 50 INJECTION INTRAVENOUS at 11:12

## 2023-06-01 RX ADMIN — TRANEXAMIC ACID 1000 MG: 10 INJECTION, SOLUTION INTRAVENOUS at 12:23

## 2023-06-01 RX ADMIN — EPHEDRINE SULFATE 5 MG: 50 INJECTION INTRAVENOUS at 11:15

## 2023-06-01 RX ADMIN — PROPOFOL 50 MCG/KG/MIN: 10 INJECTION, EMULSION INTRAVENOUS at 10:25

## 2023-06-01 RX ADMIN — DIVALPROEX SODIUM 500 MG: 250 TABLET, EXTENDED RELEASE ORAL at 20:35

## 2023-06-01 RX ADMIN — PHENYLEPHRINE HYDROCHLORIDE 0.5 MCG/KG/MIN: 10 INJECTION INTRAVENOUS at 12:18

## 2023-06-01 RX ADMIN — Medication 100 MCG: at 11:33

## 2023-06-01 RX ADMIN — HYDROMORPHONE HYDROCHLORIDE 0.5 MG: 1 INJECTION, SOLUTION INTRAMUSCULAR; INTRAVENOUS; SUBCUTANEOUS at 13:31

## 2023-06-01 RX ADMIN — HYDROMORPHONE HYDROCHLORIDE 0.5 MG: 1 INJECTION, SOLUTION INTRAMUSCULAR; INTRAVENOUS; SUBCUTANEOUS at 18:37

## 2023-06-01 RX ADMIN — PREGABALIN 150 MG: 150 CAPSULE ORAL at 09:30

## 2023-06-01 RX ADMIN — Medication 100 MCG: at 11:18

## 2023-06-01 RX ADMIN — MELOXICAM 15 MG: 15 TABLET ORAL at 09:30

## 2023-06-01 RX ADMIN — Medication 100 MCG: at 11:47

## 2023-06-01 RX ADMIN — ATORVASTATIN CALCIUM 10 MG: 10 TABLET, FILM COATED ORAL at 20:34

## 2023-06-01 RX ADMIN — SODIUM CHLORIDE 120 ML/HR: 9 INJECTION, SOLUTION INTRAVENOUS at 20:34

## 2023-06-01 RX ADMIN — Medication 0.5 MG: at 14:36

## 2023-06-01 RX ADMIN — LIDOCAINE HYDROCHLORIDE 0.5 ML: 10 INJECTION, SOLUTION EPIDURAL; INFILTRATION; INTRACAUDAL; PERINEURAL at 08:46

## 2023-06-01 RX ADMIN — TERAZOSIN HYDROCHLORIDE 5 MG: 5 CAPSULE ORAL at 20:34

## 2023-06-01 RX ADMIN — PRIMIDONE 50 MG: 50 TABLET ORAL at 20:35

## 2023-06-01 RX ADMIN — EPHEDRINE SULFATE 10 MG: 50 INJECTION INTRAVENOUS at 10:55

## 2023-06-01 RX ADMIN — TRANEXAMIC ACID 1000 MG: 10 INJECTION, SOLUTION INTRAVENOUS at 10:25

## 2023-06-01 RX ADMIN — HYDROMORPHONE HYDROCHLORIDE 0.5 MG: 1 INJECTION, SOLUTION INTRAMUSCULAR; INTRAVENOUS; SUBCUTANEOUS at 17:06

## 2023-06-01 RX ADMIN — LIDOCAINE HYDROCHLORIDE 50 MG: 10 INJECTION, SOLUTION EPIDURAL; INFILTRATION; INTRACAUDAL; PERINEURAL at 10:17

## 2023-06-01 RX ADMIN — ONDANSETRON HYDROCHLORIDE 4 MG: 4 TABLET, FILM COATED ORAL at 20:34

## 2023-06-01 RX ADMIN — PROPOFOL 25 MG: 10 INJECTION, EMULSION INTRAVENOUS at 10:25

## 2023-06-01 RX ADMIN — ACETAMINOPHEN 1000 MG: 500 TABLET ORAL at 09:30

## 2023-06-01 RX ADMIN — EPHEDRINE SULFATE 10 MG: 50 INJECTION INTRAVENOUS at 11:02

## 2023-06-01 RX ADMIN — BUPIVACAINE HYDROCHLORIDE 2 ML: 5 INJECTION, SOLUTION PERINEURAL at 10:21

## 2023-06-01 RX ADMIN — CEFAZOLIN SODIUM 2 G: 2 INJECTION, SOLUTION INTRAVENOUS at 10:25

## 2023-06-01 NOTE — H&P
Patient Name: Loreta Robertson  MRN: [id number]  : 1970  DOS: 2023    Attending: Adrián Leyva MD    Primary Care Provider: Carolina Gutierrez APRN      Chief complaint:  Right hip pain    Subjective   Patient is a pleasant 53 y.o. female presented for scheduled surgery by .    Per his note (The patient is a 53 y.o. female with a history of debilitating right hip pain secondary to avascular necrosis, that failed to improve in spite of conservative treatment. The patient opted for a right total hip arthroplasty at this time and consented for the procedure. Please see my office notes for details with regard to preoperative counseling and operative rationale.).    She underwent right total hip arthroplasty, modified anterior, under spinal anesthesia, tolerated surgery well.    Seen in PACU, doing well, no complains of nausea, vomiting, or shortness of breath.    She has no history of DVT or PE.    Noting her past medical history of hyperlipidemia, bipolar disorder, medication induced Parkinson's syndrome, tobacco abuse and COPD.      Allergies   Allergen Reactions   • Morphine And Related GI Intolerance       Medications Prior to Admission   Medication Sig Dispense Refill Last Dose   • atorvastatin (LIPITOR) 40 MG tablet    2023   • buPROPion XL (WELLBUTRIN XL) 150 MG 24 hr tablet Take 1 tablet by mouth Every Morning.   2023   • divalproex (DEPAKOTE ER) 500 MG 24 hr tablet Take 1 tablet by mouth Every Night.   2023   • HYDROcodone-acetaminophen (NORCO) 7.5-325 MG per tablet TAKE 1 TABLET BY MOUTH EVERY 8 HOURS AS NEEDED FOR 30 DAYS   2023   • hydrOXYzine (ATARAX) 10 MG tablet Take 1 tablet by mouth At Night As Needed.   2023   • prazosin (MINIPRESS) 5 MG capsule Take 1 capsule by mouth Every Night.  1 2023   • primidone (MYSOLINE) 50 MG tablet Take 1 tablet by mouth 2 (Two) Times a Day.   2023   • promethazine (PHENERGAN) 12.5 MG tablet Take 1 tablet by mouth  Every 6 (Six) Hours As Needed for Nausea or Vomiting. 20 tablet 0 Past Month   • QUEtiapine (SEROquel) 200 MG tablet Take 1.5 tablets by mouth every night at bedtime.   2023   • Symbicort 160-4.5 MCG/ACT inhaler INHALE 2 PUFFS BY MOUTH TWICE DAILY 30.6 g 0 Past Month   • tiZANidine (ZANAFLEX) 4 MG tablet TAKE 1 TABLET BY MOUTH EVERY 8 HOURS AS NEEDED FOR MUSCLE SPASMS 60 tablet 1 2023   • albuterol sulfate  (90 Base) MCG/ACT inhaler Inhale 2 puffs Every 4 (Four) Hours As Needed for Wheezing. 18 g 2 More than a month            Past Medical History:   Diagnosis Date   • Anxiety    • Bipolar 1 disorder    • Cataract    • Chronic bronchitis    • Chronic constipation    • Colon polyp    • COPD (chronic obstructive pulmonary disease)    • Depression    • Hip arthrosis    • Hyperlipidemia    • Knee swelling    • Obesity    • Parkinsonism     DRUG INDUCED   • Primary generalized (osteo)arthritis    • PTSD (post-traumatic stress disorder)    • RLS (restless legs syndrome)      Past Surgical History:   Procedure Laterality Date   • ABDOMINOPLASTY     • ANTERIOR AND POSTERIOR VAGINAL REPAIR     • BREAST BIOPSY Right     US BIOPSY   •  SECTION      X 2   • CHOLECYSTECTOMY     • COLONOSCOPY N/A 2019    Procedure: COLONOSCOPY;  Surgeon: Jonathan Pablo MD;  Location: [facility] ENDOSCOPY;  Service: Gastroenterology   • HYSTERECTOMY      AGE 33   • LIPOSUCTION ABDOMINAL  1998     Family History   Problem Relation Age of Onset   • Breast cancer Mother 50   • Diabetes Mother    • Cancer Mother    • Diabetes Father    • Hypertension Father    • Heart attack Father    • Obesity Father    • Breast cancer Maternal Grandmother 40   • Ovarian cancer Maternal Grandmother 40   • Cervical cancer Maternal Grandmother    • Cancer Maternal Grandfather      Social History     Tobacco Use   • Smoking status: Every Day     Packs/day: 0.50     Years: 30.00     Pack years: 15.00     Types: Cigarettes     Start date:  "1/1/1980   • Smokeless tobacco: Never   Vaping Use   • Vaping Use: Never used   Substance Use Topics   • Alcohol use: No   • Drug use: Yes     Types: Marijuana       Review of Systems  Pertinent items are noted in HPI, all other systems reviewed and negative  She has history of chronic constipation    Vital Signs  /86 (BP Location: Right arm, Patient Position: Lying)   Pulse 53   Temp 98 °F (36.7 °C) (Oral)   Resp 12   Ht 165.1 cm (65\")   Wt 112 kg (246 lb)   SpO2 100%   BMI 40.94 kg/m²     Physical Exam:    General Appearance:    Alert, cooperative, in no acute distress   Head:    Normocephalic, without obvious abnormality, atraumatic   Eyes:            Lids and lashes normal, conjunctivae and sclerae normal, no   icterus, no pallor, corneas clear    Ears:    Ears appear intact with no abnormalities noted   Throat:   No oral lesions, no thrush, oral mucosa moist   Neck:   No adenopathy, supple, trachea midline, no thyromegaly         Lungs:     Clear to auscultation,respirations regular, even and   unlabored. No wheezes or rales.    Heart:    Regular rhythm and normal rate, normal S1 and S2, no murmur, no gallop   Abdomen:     Normal bowel sounds, no masses, no organomegaly, soft        non-tender, non-distended, no guarding, no rebound                 tenderness   Genitalia:    Deferred   Extremities:  Right LE, CDI dressing over right hip.  No clubbing, cyanosis, or edema.   Pulses:   Pulses palpable and equal bilaterally   Skin:   No bleeding, bruising or rash   Neurologic:   Cranial nerves 2 - 12 grossly intact, decreased movement and sensation distal lower extremities due to spinal anesthesia effects      I reviewed the patient's new clinical results.             Invalid input(s): NEUTOPHILPCT,  EOSPCT        Invalid input(s): LABALBU, PROT  Lab Results   Component Value Date    HGBA1C 5.30 05/19/2023        Latest Reference Range & Units 05/19/23 14:37   Sodium 136 - 145 mmol/L 140   Potassium " 3.5 - 5.2 mmol/L 4.3   Chloride 98 - 107 mmol/L 103   CO2 22.0 - 29.0 mmol/L 23.0   Anion Gap 5.0 - 15.0 mmol/L 14.0   BUN 6 - 20 mg/dL 10   Creatinine 0.57 - 1.00 mg/dL 0.69   BUN/Creatinine Ratio 7.0 - 25.0  14.5   eGFR >60.0 mL/min/1.73 102.6   Glucose 65 - 99 mg/dL 96   Calcium 8.6 - 10.5 mg/dL 10.0   Hemoglobin A1C 4.80 - 5.60 % 5.30   C-Reactive Protein 0.00 - 0.50 mg/dL 0.59 (H)   Protime 12.2 - 14.5 Seconds 13.3   INR 0.89 - 1.12  1.00   PTT 22.0 - 39.0 seconds 33.7   WBC 3.40 - 10.80 10*3/mm3 4.64   RBC 3.77 - 5.28 10*6/mm3 4.99   Hemoglobin 12.0 - 15.9 g/dL 15.3   Hematocrit 34.0 - 46.6 % 47.7 (H)   Platelets 140 - 450 10*3/mm3 180   RDW 12.3 - 15.4 % 15.1   MCV 79.0 - 97.0 fL 95.6   MCH 26.6 - 33.0 pg 30.7   MCHC 31.5 - 35.7 g/dL 32.1   MPV 6.0 - 12.0 fL 12.1 (H)   (H): Data is abnormally high    Assessment and Plan:       Status post right hip replacement    Bipolar 1 disorder, manic, mild    HTN (hypertension)    Chronic obstructive pulmonary disease    Avascular necrosis of bone of right hip    Obesity      Plan:    1. PT/OT,  Weight bearing as tolerated right LE.  Total hip precautions  2. Pain control-prns  3. IS-encourage  4. DVT proph- Mechanicals and aspirin  5. Bowel regimen  6. Resume home medications as appropriate  7. Monitor post-op labs  8. DC planning for home.      - Hypertension:  Resume home medications as appropriate, formulary substitution when indicated.  Holding parameters.  Prn medications for elevated blood pressure.    -Bipolar disorder: Maintained on home regimen.    -Obesity: Complicates all aspects of care.    -COPD/tobacco abuse:Tobacco Cessation Counselin  minutes of tobacco cessation counseling provided, including but not limited to, risks of ongoing tobacco use, pertinence to current or future health status and availability/examples of cessation resources.  Patient expresses desire to quit.  Maintained on home regimen including Symbicort and bronchodilators as  needed        Dragon disclaimer:  Part of this encounter note is an electronic transcription/translation of spoken language to printed text. The electronic translation of spoken language may permit erroneous, or at times, nonsensical words or phrases to be inadvertently transcribed; Although I have reviewed the note for such errors, some may still exist.    Ra Solitario MD  06/01/23  18:45 EDT

## 2023-06-01 NOTE — OP NOTE
DATE OF PROCEDURE:  06/01/23    PREOPERATIVE DIAGNOSIS: right hip arthritis    POSTOPERATIVE DIAGNOSIS: right hip arthritis    PROCEDURE PERFORMED: right total hip arthroplasty with [employer] components, anterior modified Parker-Jacobs approach    Surgical exposure was difficult secondary to body habitus, with BMI over 40, adding to the complexity of the surgery, therefore modifier added    Surgical Approach: Anterior, modified anterior Parker-Jacobs approach    IMPLANTS: #52 press-fit R3 cup, 25 screw,  neutral polyethylene liner,  #5 valgus offset press-fit Polarstem, +4 x 36 Bio-lox ceramic head    SURGEON: Adrián Leyva MD    ASSISTANT: Nicole Olivarez PA-C  (Nicole Olivarez PA-C was present and necessary for positioning, draping, retraction, instrumentation and closure.)    SPECIMENS: None    IMPLANTS:   Implant Name Type Inv. Item Serial No.  Lot No. LRB No. Used Action   DEV CONTRL TISS STRATAFIX SPIRAL MNCRYL UD 3/0 PLS 60CM - [id number] Implant DEV CONTRL TISS STRATAFIX SPIRAL MNCRYL UD 3/0 PLS 60CM  [employer]  Right 1 Implanted   DEV CONTRL TISS STRATAFIX SYMM PDS PLUS TON CT-1 45CM - [id number] Implant DEV CONTRL TISS STRATAFIX SYMM PDS PLUS TON CT-1 45CM  [employer]  [employer]  Right 1 Implanted   SHLL ACET R3 3H STD 52MM - [id number] Implant SHLL ACET R3 3H STD 52MM  [employer] [id number] Right 1 Implanted   LINER ACET R3 XLPE 0D 41S84CC - [id number] Implant LINER ACET R3 XLPE 0D [id number]  [employer] [id number] Right 1 Implanted   SCRW SPH HD REFLECTION 6.5X25MM - [id number] Implant SCRW SPH HD REFLECTION 6.5X25MM  [employer] [id number] Right 1 Implanted   polarstem stem valgue with Ti/COLLIER Implant   [employer] [id number] Right 1 Implanted   HD FEM/HIP BIOLOXDELTA R3 12/14 MD 36MM PLS4 - [id number] Implant HD FEM/HIP BIOLOXDELTA R3 12/14 MD 36MM PLS4  [employer] [id number] Right 1 Implanted         ANESTHESIA:  Spinal    STAFF:   Circulator: Nicci Moreno RN; Loreta Levi RN  Radiology Technologist: Simba Adames RT  Scrub Person: Wilfrid Warren; Lauro Ward PCT  Vendor Representative: Adán Pedroza ([employer])  Nursing Assistant: Tomas Sosa PCT; Uirah Vora  Assistant: Nicole Olivarez PA-C    ESTIMATED BLOOD LOSS: 200ml     COMPLICATIONS: None    PREOPERATIVE ANTIBIOTICS: Ancef 2 g    INDICATIONS: The patient is a 53 y.o. female with a history of debilitating right hip pain secondary to avascular necrosis, that failed to improve in spite of conservative treatment. The patient opted for a right total hip arthroplasty at this time and consented for the procedure. Please see my office notes for details with regard to preoperative counseling and operative rationale.     DESCRIPTION OF PROCEDURE: The patient was positively identified in the preoperative holding area, brought to the operative suite, and placed in a supine position. After adequate spinal anesthetic had been achieved, the patient was placed in the supine position with a well padded folded blanket bolster under the right flank area, leaving the buttock free. After sterile prep and drape of the right hip and lower extremity, as well as draping the non-operative extremity to allow access for limb length assessment, a timeout procedure was performed to confirm the operative site, as well as the other parameters.     After placing a bump under the knee, a skin incision was made over the lateral border of the tensor fascia latae from the level of the anterior superior iliac spine distally on the anterior aspect of the hip for a modified Parker-Jacobs approach. Following a sharp skin incision, dissection was carried down to the level of the fascia, ensuring clear identification of the interval between the tensor and the fascia laterally.  Fascia was then incised from proximal to distal, leaving a good cuff of tissue for later repair, then working form  "distal to proximal perforating vessels were identified and cauterized, including the perforating vessels along the anterior edge of the gluteus medius.  With the anterior edge of the gluteus medius identified, a Cobra retractor was placed on the capsule over the superior aspect of the femoral neck.  After identifying the superior edge of the vastus lateralis distally, a second Cobra retractor was placed over the capsule overlying the inferior femoral neck.  The reflected head of the rectus femoris was identified, and the fascia released enough to allow placement of a single prong acetabular retractor. The knee bump was then removed, leg externally rotated, and anterior capsule excised first laterally then medially, and the labrum incised superiorly.  Cobra retractors were repositioned on the exposed femoral neck both superiorly and inferiorly.  Description of femoral head: Flattening of the femoral head, with softening of the cartilage and subchondral segment consistent with avascular necrosis.  A neck cut was then made at the head and neck junction with the aid of an oscillating saw blade, followed by a second cut at the base of the femoral neck.  The \"napkin ring\" femoral neck fragment was removed, as well as the femoral head after repositioning the posterior Cobra retractor just outside the hip capsule.    Acetabular exposure was then addressed by repositioning the anterior Cobra retractor between the capsule and the psoas tendon.  The labrum was then removed from the rim of the acetabulum anteriorly, superiorly and posteriorly, preserving the transverse acetabular ligament. The anterior Cobra retractor was replaced over the transverse acetabular ligament, and a Holcomb retractor placed over the posterior wall of the acetabulum.  Description of acetabulum: Thickening of the labrum around the periphery with mild degeneration. With the transverse acetabular ligament as a reference for cup positioning, the " acetabulum was sequentially reamed up to 52 to accommodate a 52 press-fit R3 cup, which had excellent press-fit characteristics.  A single 25 mm screw was placed which had excellent purchase, followed by a neutral polyethylene liner.    Attention was then redirected towards the femoral aspect. The non-operative limb was then placed on a padded Martinez stand, to allow for appropriate positioning of the operative limb.  Using the bone hook for traction in the calcar region of the proximal femur, the posterior capsule was released adjacent to the greater trochanter to allow for delivery of the proximal femur with a double fang retractor.  With appropriate external rotation of the proximal femur and further adduction of the leg following double fang retractor placement and removal of the single-toothed fang anteriorly, a Holcomb retractor was placed in the region of the calcar and femoral preparations were made to accommodate the polar stem.  Box osteotome was used to prepare the lateral aspect, followed by the T-handle canal finder, then sequential broaching of the femur to accommodate a #5 broach, valgus offset neck, with a +4 x 36 head.      Trial reduction was performed, full arc of motion noted, with appropriate limb lengths after leveling the table.  Intraoperative fluoroscopy showed appropriate implant alignment and leg lengths.  The hip was again dislocated and the final #5 valgus offset press-fit Polar stem placed, followed by +4 x 36 ceramic head, with the same reduction characteristics were noted as with the trial with no dislocation throughout the full arc of motion and appropriate limb lengths.      Therefore, the hip was copiously irrigated and attention directed towards closure. Fascia latae was closed with #1 Vicryl in an interrupted figure-of-eight fashion in 3 strategic locations both proximally, distally and in the central portion, followed by oversewing this from distal to proximal with a #1 StrataFix  symmetric, which nicely sealed that layer, followed by closure of the subcutaneous layer with 2-0 Vicryl and the skin with 3-0 StrataFix in a running subcuticular fashion.  Adhesive wound closure dressing was applied followed by a sterile dressing with 4 x 4s secured with micropore tape.  The patient tolerated the procedure well and was brought to the recovery room in good condition.     POSTOPERATIVE PLAN:  1. The patient will begin early range of motion and weight-bearing per the post anterior total hip arthroplasty protocol.   2. I anticipate brief hospitalization for initial rehabilitation and pain control followed by continued rehabilitation home health/outpatient physical therapy setting.  Patient may be ready for discharge later today if she is cleared medically and by physical therapy.  Follow-up in 3 weeks as planned.   3. Postoperative medical management with Dr. Solitario.  4. Postoperative DVT prophylaxis with aspirin.  5. Postoperative IV antibiotics with Ancef.      Adrián Leyva MD  06/01/23  12:54 EDT

## 2023-06-01 NOTE — ANESTHESIA POSTPROCEDURE EVALUATION
Patient: Loreta Robertson    Procedure Summary     Date: 06/01/23 Room / [facility]:  [facility]    Anesthesia Start: 1011 Anesthesia Stop: 1309    Procedure: MODIFIED ANTERIOR TOTAL HIP ARTHROPLASTY - RIGHT (Right: Hip) Diagnosis:       Avascular necrosis of bone of right hip      (Avascular necrosis of bone of right hip (HCC) [M87.051])    Surgeons: Adrián Leyva MD Provider: Christopher Melgar MD    Anesthesia Type: spinal ASA Status: 3          Anesthesia Type: spinal    Vitals  Vitals Value Taken Time   /66 06/01/23 1311   Temp 97 °F (36.1 °C) 06/01/23 1311   Pulse 60 06/01/23 1311   Resp 15 06/01/23 1311   SpO2 95 % 06/01/23 1311           Post Anesthesia Care and Evaluation    Patient location during evaluation: PACU  Patient participation: complete - patient participated  Level of consciousness: awake and alert  Pain score: 0  Pain management: adequate    Airway patency: patent  Anesthetic complications: No anesthetic complications  PONV Status: none  Cardiovascular status: hemodynamically stable and acceptable  Respiratory status: nonlabored ventilation, acceptable and nasal cannula  Hydration status: acceptable

## 2023-06-01 NOTE — H&P
Pre-Op H&P  Loreta Platt States  [id number]  1970      Chief complaint: Right hip pain      Subjective:  Patient is a 53 y.o.female presents for scheduled surgery by Dr. Leyva. She anticipates a MODIFIED ANTERIOR TOTAL HIP ARTHROPLASTY - RIGHT today. She has had worsening hip pain since last October. She denies injury. She has been using a cane for ambulation. Current symptoms: pain, stiffness and giving way/buckling. The pain is worse with walking, standing, sitting and any movement of the joint; nothing improve the pain.      Review of Systems:  Constitutional-- No fever, chills or sweats. No fatigue.  CV-- No chest pain, palpitation or syncope. +HTN, HLD  Resp-- No cough, hemoptysis. +SOB, COPD  Skin--No rashes or lesions      Allergies:   Allergies   Allergen Reactions    Morphine And Related GI Intolerance         Home Meds:  Medications Prior to Admission   Medication Sig Dispense Refill Last Dose    atorvastatin (LIPITOR) 40 MG tablet    5/31/2023    buPROPion XL (WELLBUTRIN XL) 150 MG 24 hr tablet Take 1 tablet by mouth Every Morning.   5/31/2023    divalproex (DEPAKOTE ER) 500 MG 24 hr tablet Take 1 tablet by mouth Every Night.   5/31/2023    HYDROcodone-acetaminophen (NORCO) 7.5-325 MG per tablet TAKE 1 TABLET BY MOUTH EVERY 8 HOURS AS NEEDED FOR 30 DAYS   5/31/2023    hydrOXYzine (ATARAX) 10 MG tablet Take 1 tablet by mouth At Night As Needed.   5/31/2023    prazosin (MINIPRESS) 5 MG capsule Take 1 capsule by mouth Every Night.  1 5/31/2023    primidone (MYSOLINE) 50 MG tablet Take 1 tablet by mouth 2 (Two) Times a Day.   5/31/2023    promethazine (PHENERGAN) 12.5 MG tablet Take 1 tablet by mouth Every 6 (Six) Hours As Needed for Nausea or Vomiting. 20 tablet 0 Past Month    QUEtiapine (SEROquel) 200 MG tablet Take 1.5 tablets by mouth every night at bedtime.   5/31/2023    Symbicort 160-4.5 MCG/ACT inhaler INHALE 2 PUFFS BY MOUTH TWICE DAILY 30.6 g 0 Past Month    tiZANidine (ZANAFLEX) 4 MG tablet  "TAKE 1 TABLET BY MOUTH EVERY 8 HOURS AS NEEDED FOR MUSCLE SPASMS 60 tablet 1 2023    albuterol sulfate  (90 Base) MCG/ACT inhaler Inhale 2 puffs Every 4 (Four) Hours As Needed for Wheezing. 18 g 2 More than a month         PMH:   Past Medical History:   Diagnosis Date    Anxiety     Bipolar 1 disorder     Cataract     Chronic bronchitis     Chronic constipation     Colon polyp     COPD (chronic obstructive pulmonary disease)     Depression     Hip arthrosis     Hyperlipidemia     Knee swelling     Obesity     Parkinsonism     DRUG INDUCED    Primary generalized (osteo)arthritis     PTSD (post-traumatic stress disorder)     RLS (restless legs syndrome)      PSH:    Past Surgical History:   Procedure Laterality Date    ABDOMINOPLASTY      ANTERIOR AND POSTERIOR VAGINAL REPAIR      BREAST BIOPSY Right     US BIOPSY     SECTION      X 2    CHOLECYSTECTOMY      COLONOSCOPY N/A 2019    Procedure: COLONOSCOPY;  Surgeon: Jonathan Pablo MD;  Location: Replaced by [facility] ENDOSCOPY;  Service: Gastroenterology    HYSTERECTOMY      AGE 33    LIPOSUCTION ABDOMINAL  1998       Immunization History:  Influenza:   Pneumococcal: UTD  Tetanus: UTD  Covid : x2    Social History:   Tobacco:   Social History     Tobacco Use   Smoking Status Every Day    Packs/day: 0.50    Years: 30.00    Pack years: 15.00    Types: Cigarettes    Start date: 1980   Smokeless Tobacco Never      Alcohol:     Social History     Substance and Sexual Activity   Alcohol Use No         Physical Exam:/77 (BP Location: Right arm, Patient Position: Lying)   Pulse 69   Temp 97 °F (36.1 °C) (Temporal)   Resp 18   Ht 165.1 cm (65\")   Wt 112 kg (246 lb)   SpO2 93%   BMI 40.94 kg/m²       General Appearance:    Alert, cooperative, no distress, appears stated age   Head:    Normocephalic, without obvious abnormality, atraumatic   Lungs:     Clear to auscultation bilaterally, respirations unlabored    Heart:   Regular rate and " rhythm, S1 and S2 normal    Abdomen:    Soft without tenderness   Extremities:   Extremities normal, atraumatic, no cyanosis or edema   Skin:   Skin color, texture, turgor normal, no rashes or lesions   Neurologic:   Grossly intact     Results Review:     LABS:  Lab Results   Component Value Date    WBC 4.64 05/19/2023    HGB 15.3 05/19/2023    HCT 47.7 (H) 05/19/2023    MCV 95.6 05/19/2023     05/19/2023    NEUTROABS 2.42 05/19/2023    GLUCOSE 96 05/19/2023    BUN 10 05/19/2023    CREATININE 0.69 05/19/2023    EGFRIFNONA 78 11/04/2021    EGFRIFAFRI 95 11/04/2021     05/19/2023    K 4.3 05/19/2023     05/19/2023    CO2 23.0 05/19/2023    MG 2.0 09/07/2022    PHOS 2.9 07/24/2020    CALCIUM 10.0 05/19/2023    ALBUMIN 3.7 (L) 02/15/2023    AST 25 02/15/2023    ALT 35 (H) 02/15/2023    BILITOT 0.3 02/15/2023       RADIOLOGY:  Imaging Results (Last 72 Hours)       ** No results found for the last 72 hours. **            I reviewed the patient's new clinical results.    Cancer Staging (if applicable)  Cancer Patient: __ yes __no __unknown; If yes, clinical stage T:__ N:__M:__, stage group or __N/A      Impression: Avascular necrosis of bone of right hip      Plan: MODIFIED ANTERIOR TOTAL HIP ARTHROPLASTY - RIGHT      Raina Titus, ROSALES   6/1/2023   08:47 EDT    Agree with above - plan for right ABDIAS    Adrián Leyva MD  06/01/23  09:33 EDT

## 2023-06-01 NOTE — ANESTHESIA PREPROCEDURE EVALUATION
Anesthesia Evaluation                  Airway   Mallampati: I  TM distance: >3 FB  Neck ROM: full  No difficulty expected  Dental      Pulmonary    (+) COPD,   Cardiovascular     ECG reviewed    (+) hypertension,       Neuro/Psych  (+) psychiatric history,    GI/Hepatic/Renal/Endo    (+) obesity,       Musculoskeletal     Abdominal    Substance History      OB/GYN          Other   arthritis,                      Anesthesia Plan    ASA 3     spinal     intravenous induction     Anesthetic plan, risks, benefits, and alternatives have been provided, discussed and informed consent has been obtained with: patient.    Plan discussed with CRNA.        CODE STATUS:

## 2023-06-01 NOTE — ANESTHESIA PROCEDURE NOTES
Spinal Block      Patient reassessed immediately prior to procedure    Patient location during procedure: OR  Start Time: 6/1/2023 10:21 AM  Indication:at surgeon's request  Preanesthetic Checklist  Completed: patient identified, IV checked, site marked, risks and benefits discussed, surgical consent, monitors and equipment checked, pre-op evaluation and timeout performed  Spinal Block Prep:  Patient Position:sitting  Sterile Tech:cap, gloves, sterile barriers and mask  Prep:Chloraprep  Patient Monitoring:blood pressure monitoring, continuous pulse oximetry and EKG    Spinal Block Procedure  Approach:midline  Guidance:landmark technique and palpation technique  Location:L4-L5  Needle Type:Nahed  Needle Gauge:25 G  Placement of Spinal needle event:cerebrospinal fluid aspirated  Paresthesia: no  Fluid Appearance:clear  Medications: bupivacaine (MARCAINE) 0.5 % injection - Injection   2 mL - 6/1/2023 10:21:00 AM   Post Assessment  Patient Tolerance:patient tolerated the procedure well with no apparent complications  Complications no  Additional Notes  Procedure:  Pt assisted to sitting position, with legs in position of comfort over side of bed.  Pt. instructed in optimal spine presentation, the spine was prepped/ Draped and the skin at insertion site was anesthetized with 1% Lidocaine 2 ml.  The spinal needle was then advanced until CSF flow was obtained and LA was injected:     Spinal kit lot# = [id number]  Bupivacaine lot # = [id number], expiration:11/26

## 2023-06-02 LAB
DEPRECATED RDW RBC AUTO: 52.9 FL (ref 37–54)
ERYTHROCYTE [DISTWIDTH] IN BLOOD BY AUTOMATED COUNT: 14.7 % (ref 12.3–15.4)
HCT VFR BLD AUTO: 42.1 % (ref 34–46.6)
HGB BLD-MCNC: 13.5 G/DL (ref 12–15.9)
MCH RBC QN AUTO: 31 PG (ref 26.6–33)
MCHC RBC AUTO-ENTMCNC: 32.1 G/DL (ref 31.5–35.7)
MCV RBC AUTO: 96.8 FL (ref 79–97)
PLATELET # BLD AUTO: 148 10*3/MM3 (ref 140–450)
PMV BLD AUTO: 13 FL (ref 6–12)
RBC # BLD AUTO: 4.35 10*6/MM3 (ref 3.77–5.28)
WBC NRBC COR # BLD: 7.9 10*3/MM3 (ref 3.4–10.8)

## 2023-06-02 PROCEDURE — A9270 NON-COVERED ITEM OR SERVICE: HCPCS | Performed by: INTERNAL MEDICINE

## 2023-06-02 PROCEDURE — 25010000002 HYDROMORPHONE 1 MG/ML SOLUTION: Performed by: ORTHOPAEDIC SURGERY

## 2023-06-02 PROCEDURE — A9270 NON-COVERED ITEM OR SERVICE: HCPCS | Performed by: ORTHOPAEDIC SURGERY

## 2023-06-02 PROCEDURE — 63710000001 DIVALPROEX 250 MG TABLET SUSTAINED-RELEASE 24 HOUR: Performed by: INTERNAL MEDICINE

## 2023-06-02 PROCEDURE — 97165 OT EVAL LOW COMPLEX 30 MIN: CPT

## 2023-06-02 PROCEDURE — 63710000001 ASPIRIN 325 MG TABLET DELAYED-RELEASE: Performed by: ORTHOPAEDIC SURGERY

## 2023-06-02 PROCEDURE — 63710000001 BUPROPION XL 150 MG TABLET SUSTAINED-RELEASE 24 HOUR: Performed by: INTERNAL MEDICINE

## 2023-06-02 PROCEDURE — 63710000001 QUETIAPINE 100 MG TABLET: Performed by: INTERNAL MEDICINE

## 2023-06-02 PROCEDURE — 25010000002 ONDANSETRON PER 1 MG: Performed by: ORTHOPAEDIC SURGERY

## 2023-06-02 PROCEDURE — 63710000001 ATORVASTATIN 10 MG TABLET: Performed by: INTERNAL MEDICINE

## 2023-06-02 PROCEDURE — 63710000001 MELOXICAM 15 MG TABLET: Performed by: ORTHOPAEDIC SURGERY

## 2023-06-02 PROCEDURE — 63710000001 OXYCODONE 5 MG TABLET: Performed by: ORTHOPAEDIC SURGERY

## 2023-06-02 PROCEDURE — 97161 PT EVAL LOW COMPLEX 20 MIN: CPT

## 2023-06-02 PROCEDURE — 63710000001 TIZANIDINE 4 MG TABLET: Performed by: INTERNAL MEDICINE

## 2023-06-02 PROCEDURE — 97535 SELF CARE MNGMENT TRAINING: CPT

## 2023-06-02 PROCEDURE — 25010000002 MORPHINE PER 10 MG: Performed by: ORTHOPAEDIC SURGERY

## 2023-06-02 PROCEDURE — 97110 THERAPEUTIC EXERCISES: CPT

## 2023-06-02 PROCEDURE — 63710000001 TERAZOSIN 5 MG CAPSULE: Performed by: INTERNAL MEDICINE

## 2023-06-02 PROCEDURE — 97116 GAIT TRAINING THERAPY: CPT

## 2023-06-02 PROCEDURE — 99024 POSTOP FOLLOW-UP VISIT: CPT | Performed by: ORTHOPAEDIC SURGERY

## 2023-06-02 PROCEDURE — 63710000001 PRIMIDONE 50 MG TABLET: Performed by: INTERNAL MEDICINE

## 2023-06-02 PROCEDURE — 25010000002 CEFAZOLIN IN DEXTROSE 2-4 GM/100ML-% SOLUTION: Performed by: ORTHOPAEDIC SURGERY

## 2023-06-02 PROCEDURE — 85027 COMPLETE CBC AUTOMATED: CPT | Performed by: ORTHOPAEDIC SURGERY

## 2023-06-02 RX ADMIN — OXYCODONE HYDROCHLORIDE 5 MG: 5 TABLET ORAL at 05:17

## 2023-06-02 RX ADMIN — TERAZOSIN HYDROCHLORIDE 5 MG: 5 CAPSULE ORAL at 20:43

## 2023-06-02 RX ADMIN — QUETIAPINE FUMARATE 300 MG: 100 TABLET ORAL at 20:43

## 2023-06-02 RX ADMIN — MELOXICAM 15 MG: 15 TABLET ORAL at 08:57

## 2023-06-02 RX ADMIN — OXYCODONE HYDROCHLORIDE 5 MG: 5 TABLET ORAL at 22:11

## 2023-06-02 RX ADMIN — ASPIRIN 325 MG: 325 TABLET, COATED ORAL at 08:57

## 2023-06-02 RX ADMIN — OXYCODONE HYDROCHLORIDE 5 MG: 5 TABLET ORAL at 17:28

## 2023-06-02 RX ADMIN — PRIMIDONE 50 MG: 50 TABLET ORAL at 20:43

## 2023-06-02 RX ADMIN — BUPROPION HYDROCHLORIDE 150 MG: 150 TABLET, FILM COATED, EXTENDED RELEASE ORAL at 08:57

## 2023-06-02 RX ADMIN — OXYCODONE HYDROCHLORIDE 5 MG: 5 TABLET ORAL at 09:13

## 2023-06-02 RX ADMIN — DIVALPROEX SODIUM 500 MG: 250 TABLET, EXTENDED RELEASE ORAL at 20:43

## 2023-06-02 RX ADMIN — ATORVASTATIN CALCIUM 10 MG: 10 TABLET, FILM COATED ORAL at 20:43

## 2023-06-02 RX ADMIN — Medication 10 ML: at 20:44

## 2023-06-02 RX ADMIN — ONDANSETRON 4 MG: 2 INJECTION INTRAMUSCULAR; INTRAVENOUS at 22:15

## 2023-06-02 RX ADMIN — HYDROMORPHONE HYDROCHLORIDE 0.5 MG: 1 INJECTION, SOLUTION INTRAMUSCULAR; INTRAVENOUS; SUBCUTANEOUS at 18:11

## 2023-06-02 RX ADMIN — OXYCODONE HYDROCHLORIDE 5 MG: 5 TABLET ORAL at 13:25

## 2023-06-02 RX ADMIN — HYDROMORPHONE HYDROCHLORIDE 0.5 MG: 1 INJECTION, SOLUTION INTRAMUSCULAR; INTRAVENOUS; SUBCUTANEOUS at 20:44

## 2023-06-02 RX ADMIN — TIZANIDINE 4 MG: 4 TABLET ORAL at 20:43

## 2023-06-02 RX ADMIN — MORPHINE SULFATE 4 MG: 4 INJECTION, SOLUTION INTRAMUSCULAR; INTRAVENOUS at 22:16

## 2023-06-02 RX ADMIN — CEFAZOLIN SODIUM 2 G: 2 INJECTION, SOLUTION INTRAVENOUS at 05:15

## 2023-06-02 NOTE — CASE MANAGEMENT/SOCIAL WORK
"Discharge Planning Assessment  [facility]     Patient Name: Loreta Robertson  MRN: [id number]  Today's Date: 6/2/2023    Admit Date: 6/1/2023    Plan: Home with friend's assistance and [facility]                   Discharge Plan     Row Name 06/02/23 1347       Plan    Plan Home with friend's assistance and [facility]    Patient/Family in Agreement with Plan yes    Plan Comments Met with Ms. Robertson at the bedside for discharge planning.    Ms. Robertson lives alone in [place].  She states that she will have assistance at home from multiple friends that live nearby.    PCP is Carolina Gutierrez.  Insurance is [employer] with no interruption in coverage.  No POA or ACP documents.    Ms. Robertson has been evaluated by PT and per notes, \"Pt presents with decreased strength, impaired endurance, and increased pain causing functional mobility below baseline. Pt ambulated 40' with CGAx1 and FWW. PT encouraged further gait but pt declined d/t pain and fatigue. No LOB or knee buckling. HEP completed. Pt will benefit from further IPPT for addressing deficits. PT rec d/c to IPR pending progress with mobility.\"    Ms. Robertson feels that she will able to progress with therapy and return home over the weekend.    DC plan is to return home.  Ms. Robertson has a st cane at home. She requested a rolling walker and did not have preference for provider.  Contacted [facility] and they delivered a walker to the hospital room.    Discussed physical therapy and Ms. Robertson requested [facility].  Referral given to Danielle with [facility].  Danielle will contact Ms. Robertson to schedule her home appointment.   If rehab is still recommended on Monday, Ms. Robertson requests [facility].  A prior auth would be required for a rehab admission from the patient's [employer].    Ms. Robertson will be transported  home by her son.    CM will continue to follow.    Final Discharge Disposition Code 01 - home or self-care          "     Continued Care and Services - Admitted Since 6/1/2023     Durable Medical Equipment     Service Provider Request Status Selected Services Address Phone Fax Patient Preferred    [facility]  Selected Durable Medical Equipment [address] [place] [id number] [phone] --              Expected Discharge Date and Time     Expected Discharge Date Expected Discharge Time    Eladio 3, 2023             Savita Alvarez RN

## 2023-06-02 NOTE — THERAPY EVALUATION
Patient Name: Loreta Robertson  : 1970    MRN: [id number]                              Today's Date: 2023       Admit Date: 2023    Visit Dx: No diagnosis found.  Patient Active Problem List   Diagnosis   • Tobacco use   • Bipolar 1 disorder, manic, mild   • Morbid obesity with BMI of 50.0-59.9, adult   • Other hyperlipidemia   • HTN (hypertension)   • Chronic obstructive pulmonary disease   • Impaired glucose tolerance   • Chronic right shoulder pain   • Avascular necrosis of bone of right hip   • Status post right hip replacement   • Obesity     Past Medical History:   Diagnosis Date   • Anxiety    • Bipolar 1 disorder    • Cataract    • Chronic bronchitis    • Chronic constipation    • Colon polyp    • COPD (chronic obstructive pulmonary disease)    • Depression    • Hip arthrosis    • Hyperlipidemia    • Knee swelling    • Obesity    • Parkinsonism     DRUG INDUCED   • Primary generalized (osteo)arthritis    • PTSD (post-traumatic stress disorder)    • RLS (restless legs syndrome)      Past Surgical History:   Procedure Laterality Date   • ABDOMINOPLASTY     • ANTERIOR AND POSTERIOR VAGINAL REPAIR     • BREAST BIOPSY Right     US BIOPSY   •  SECTION      X 2   • CHOLECYSTECTOMY     • COLONOSCOPY N/A 2019    Procedure: COLONOSCOPY;  Surgeon: Jonathan Pablo MD;  Location: [facility] ENDOSCOPY;  Service: Gastroenterology   • HYSTERECTOMY      AGE 33   • LIPOSUCTION ABDOMINAL  1998      General Information     Row Name 23 1116          Physical Therapy Time and Intention    Document Type evaluation  -AB     Mode of Treatment physical therapy;individual therapy  -AB     Row Name 23 1116          General Information    Patient Profile Reviewed yes  -AB     Prior Level of Function independent:;all household mobility;community mobility;gait;transfer;bed mobility;ADL's  Limited by pain all activities prior. SPC for mobility. No falls.  -AB     Existing  Precautions/Restrictions right;hip, anterior  s/p R Ant. Modified ABDIAS, WBAT  -AB     Barriers to Rehab none identified  -AB     Row Name 06/02/23 1116          Living Environment    People in Home alone;other (see comments)  Pt reports she does not have anyone to come check on her or stay with her once home.  -AB     Row Name 06/02/23 1116          Home Main Entrance    Number of Stairs, Main Entrance none  -AB     Row Name 06/02/23 1116          Stairs Within Home, Primary    Number of Stairs, Within Home, Primary none  -AB     Row Name 06/02/23 1116          Cognition    Orientation Status (Cognition) oriented x 4  -AB     Row Name 06/02/23 1116          Safety Issues, Functional Mobility    Safety Issues Affecting Function (Mobility) insight into deficits/self-awareness;awareness of need for assistance;safety precaution awareness;safety precautions follow-through/compliance  -AB     Impairments Affecting Function (Mobility) balance;endurance/activity tolerance;pain;strength;range of motion (ROM)  -AB           User Key  (r) = Recorded By, (t) = Taken By, (c) = Cosigned By    Initials Name Provider Type    AB Ignacia Hernandez, PT Physical Therapist               Mobility     Row Name 06/02/23 1121          Bed Mobility    Comment, (Bed Mobility) Received [facility].  -AB     Row Name 06/02/23 1121          Transfers    Comment, (Transfers) Cues for hand placement, sequencing, and walker management. Anterior hip precautions reviewed, pt verbalized understanding.  -AB     Row Name 06/02/23 1121          Sit-Stand Transfer    Sit-Stand [place] (Transfers) minimum assist (75% patient effort);1 person assist;verbal cues  -AB     Assistive Device (Sit-Stand Transfers) walker, front-wheeled  -AB     Comment, (Sit-Stand Transfer) STS from multiple surface heights.  -AB     Row Name 06/02/23 1121          Gait/Stairs (Locomotion)    [place] Level (Gait) contact guard;1 person assist;verbal cues  -AB     Assistive Device  (Gait) walker, front-wheeled  -AB     Ambulated day of surgery or within 4 hours of PACU discharge no, other medical factors prevent ambulation  -AB     Distance in Feet (Gait) 40'  -AB     Deviations/Abnormal Patterns (Gait) bilateral deviations;base of support, wide;meredith decreased;gait speed decreased;stride length decreased;steppage  -AB     Bilateral Gait Deviations forward flexed posture;heel strike decreased  -AB     Right Sided Gait Deviations weight shift ability decreased  -AB     [place] Level (Stairs) not tested  -AB     Comment, (Gait/Stairs) Pt demo's step to gait pattern with poor weight acceptance onto RLE and short/shuffling steps. Cues to increased step length and decrease WB through UE's. Pt with limited improvements in gait mechanics. No LOB or knee buckling. PT provided encouragement for pt to ambulate further but pt deferred d/t pain.  -AB     Row Name 06/02/23 1121          Mobility    Extremity Weight-bearing Status right lower extremity  -AB     Right Lower Extremity (Weight-bearing Status) weight-bearing as tolerated (WBAT)  -AB           User Key  (r) = Recorded By, (t) = Taken By, (c) = Cosigned By    Initials Name Provider Type    AB Ignacia Hernandez, PT Physical Therapist               Obj/Interventions     Row Name 06/02/23 1125          Range of Motion Comprehensive    General Range of Motion bilateral lower extremity ROM WFL  -AB     Row Name 06/02/23 1125          Strength Comprehensive (MMT)    General Manual Muscle Testing (MMT) Assessment lower extremity strength deficits identified  -AB     Comment, General Manual Muscle Testing (MMT) Assessment Pt able to perform active LAQ and DF bilaterally.  -AB     Row Name 06/02/23 1125          Motor Skills    Therapeutic Exercise hip;knee;ankle  -AB     Row Name 06/02/23 1125          Hip (Therapeutic Exercise)    Hip (Therapeutic Exercise) AAROM (active assistive range of motion)  -AB     Hip AAROM (Therapeutic Exercise)  right;aBduction;flexion;10 repetitions  -AB     Row Name 06/02/23 1125          Knee (Therapeutic Exercise)    Knee (Therapeutic Exercise) strengthening exercise;isometric exercises  -AB     Knee Isometrics (Therapeutic Exercise) bilateral;quad sets;10 repetitions  -AB     Knee Strengthening (Therapeutic Exercise) right;heel slides;LAQ (long arc quad);10 repetitions  -AB     Row Name 06/02/23 1125          Ankle (Therapeutic Exercise)    Ankle (Therapeutic Exercise) AROM (active range of motion)  -AB     Ankle AROM (Therapeutic Exercise) bilateral;10 repetitions;dorsiflexion;plantarflexion  -AB     Row Name 06/02/23 1125          Balance    Balance Assessment sitting static balance;sitting dynamic balance;standing static balance;standing dynamic balance  -AB     Static Sitting Balance standby assist  -AB     Dynamic Sitting Balance standby assist  -AB     Position, Sitting Balance unsupported;sitting edge of bed  -AB     Static Standing Balance contact guard;1-person assist  -AB     Dynamic Standing Balance contact guard;1-person assist;verbal cues  -AB     Position/Device Used, Standing Balance supported;walker, front-wheeled  -AB     Balance Interventions sitting;standing;sit to stand;supported;static;dynamic  -AB     Comment, Balance No overt LOB or knee buckling.  -AB     Row Name 06/02/23 1125          Sensory Assessment (Somatosensory)    Sensory Assessment (Somatosensory) LE sensation intact  -AB           User Key  (r) = Recorded By, (t) = Taken By, (c) = Cosigned By    Initials Name Provider Type    AB Ignacia Hernandez, PT Physical Therapist               Goals/Plan     Row Name 06/02/23 1141          Bed Mobility Goal 1 (PT)    Activity/Assistive Device (Bed Mobility Goal 1, PT) sit to supine;supine to sit  -AB     [place] Level/Cues Needed (Bed Mobility Goal 1, PT) independent  -AB     Time Frame (Bed Mobility Goal 1, PT) long term goal (LTG);10 days  -AB     Row Name 06/02/23 1141          Transfer  Goal 1 (PT)    Activity/Assistive Device (Transfer Goal 1, PT) sit-to-stand/stand-to-sit;bed-to-chair/chair-to-bed;walker, rolling  -AB     [place] Level/Cues Needed (Transfer Goal 1, PT) modified independence  -AB     Time Frame (Transfer Goal 1, PT) long term goal (LTG);10 days  -AB     Row Name 06/02/23 1141          Gait Training Goal 1 (PT)    Activity/Assistive Device (Gait Training Goal 1, PT) gait (walking locomotion);assistive device use;walker, rolling  -AB     [place] Level (Gait Training Goal 1, PT) modified independence  -AB     Distance (Gait Training Goal 1, PT) 150  -AB     Time Frame (Gait Training Goal 1, PT) long term goal (LTG);10 days  -AB     Row Name 06/02/23 1141          Therapy Assessment/Plan (PT)    Planned Therapy Interventions (PT) balance training;bed mobility training;gait training;home exercise program;postural re-education;patient/family education;ROM (range of motion);strengthening;stretching;transfer training  -AB           User Key  (r) = Recorded By, (t) = Taken By, (c) = Cosigned By    Initials Name Provider Type    AB Ignacia Hernandez, PT Physical Therapist               Clinical Impression     Row Name 06/02/23 1127          Pain    Pretreatment Pain Rating 8/10  -AB     Posttreatment Pain Rating 9/10  -AB     Pain Location - Side/Orientation Right  -AB     Pain Location generalized  -AB     Pain Location - hip  -AB     Pre/Posttreatment Pain Comment Pt deferred further ambulation d/t pain.  -AB     Pain Intervention(s) Ambulation/increased activity;Repositioned;Cold applied;Nursing Notified  -AB     Additional Documentation Pain Scale: Numbers Pre/Post-Treatment (Group)  -AB     Row Name 06/02/23 1127          Pain Scale: FACES Pre/Post-Treatment    Pain: FACES Scale, Pretreatment 2-->hurts little bit  -AB     Posttreatment Pain Rating 2-->hurts little bit  -AB     Row Name 06/02/23 1127          Plan of Care Review    Plan of Care Reviewed With patient  -AB      Progress no change  -AB     Outcome Evaluation PT initial eval completed. Pt presents with decreased strength, impaired endurance, and increased pain causing functional mobility below baseline. Pt ambulated 40' with CGAx1 and FWW. PT encouraged further gait but pt declined d/t pain and fatigue. No LOB or knee buckling. HEP completed. Pt will benefit from further IPPT for addressing deficits. PT rec d/c to IPR pending progress with mobility.  -AB     Row Name 06/02/23 1127          Therapy Assessment/Plan (PT)    Rehab Potential (PT) good, to achieve stated therapy goals  -AB     Criteria for Skilled Interventions Met (PT) yes;meets criteria;skilled treatment is necessary  -AB     Therapy Frequency (PT) 2 times/day  -AB     Row Name 06/02/23 1127          Vital Signs    Pre Systolic BP Rehab 168  -AB     Pre Treatment Diastolic BP 70  -AB     Post Systolic BP Rehab 144  -AB     Post Treatment Diastolic BP 71  -AB     Pretreatment Heart Rate (beats/min) 74  -AB     Posttreatment Heart Rate (beats/min) 73  -AB     Pre SpO2 (%) 95  -AB     O2 Delivery Pre Treatment room air  -AB     O2 Delivery Intra Treatment room air  -AB     Post SpO2 (%) 95  -AB     O2 Delivery Post Treatment room air  -AB     Pre Patient Position Sitting  -AB     Intra Patient Position Standing  -AB     Post Patient Position Sitting  -AB     Row Name 06/02/23 1127          Positioning and Restraints    Pre-Treatment Position sitting in chair/recliner  -AB     Post Treatment Position chair  -AB     In Chair notified nsg;reclined;sitting;call light within reach;encouraged to call for assist;exit alarm on;with family/caregiver;legs elevated;waffle cushion  -AB           User Key  (r) = Recorded By, (t) = Taken By, (c) = Cosigned By    Initials Name Provider Type    AB Ignacia Hernandez, PT Physical Therapist               Outcome Measures     Row Name 06/02/23 1142          How much help from another person do you currently need...    Turning from your  back to your side while in flat bed without using bedrails? 3  -AB     Moving from lying on back to sitting on the side of a flat bed without bedrails? 3  -AB     Moving to and from a bed to a chair (including a wheelchair)? 3  -AB     Standing up from a chair using your arms (e.g., wheelchair, bedside chair)? 3  -AB     Climbing 3-5 steps with a railing? 2  -AB     To walk in hospital room? 3  -AB     AM-PAC 6 Clicks Score (PT) 17  -AB     Highest level of mobility 5 --> Static standing  -AB     Row Name 06/02/23 1142          PADD    Diagnosis 2  -AB     Gender 1  -AB     Age Group 1  -AB     Gait Distance 0  -AB     Assist Level 1  -AB     Home Support 0  -AB     PADD Score 6  -AB     Patient Preference extended rehabilitation  -AB     Prediction by PADD Score extended rehabilitation  -AB     Row Name 06/02/23 1142 06/02/23 0954       Functional Assessment    Outcome Measure Options AM-PAC 6 Clicks Basic Mobility (PT);PADD  -AB AM-PAC 6 Clicks Daily Activity (OT)  -TB          User Key  (r) = Recorded By, (t) = Taken By, (c) = Cosigned By    Initials Name Provider Type    TB Audrey Montes, OT Occupational Therapist    AB Ignacia Hernandez, PT Physical Therapist                             Physical Therapy Education     Title: PT OT SLP Therapies (In Progress)     Topic: Physical Therapy (Done)     Point: Mobility training (Done)     Learning Progress Summary           Patient Acceptance, E,D, VU,NR by AB at 6/2/2023 1143                   Point: Home exercise program (Done)     Learning Progress Summary           Patient Acceptance, E,D, VU,NR by AB at 6/2/2023 1143                   Point: Body mechanics (Done)     Learning Progress Summary           Patient Acceptance, E,D, VU,NR by AB at 6/2/2023 1143                   Point: Precautions (Done)     Learning Progress Summary           Patient Acceptance, E,D, VU,NR by AB at 6/2/2023 1143                               User Key     Initials Effective Dates  Name Provider Type Discipline    AB 09/22/22 -  Ignacia Hernandez, PT Physical Therapist PT              PT Recommendation and Plan  Planned Therapy Interventions (PT): balance training, bed mobility training, gait training, home exercise program, postural re-education, patient/family education, ROM (range of motion), strengthening, stretching, transfer training  Plan of Care Reviewed With: patient  Progress: no change  Outcome Evaluation: PT initial eval completed. Pt presents with decreased strength, impaired endurance, and increased pain causing functional mobility below baseline. Pt ambulated 40' with CGAx1 and FWW. PT encouraged further gait but pt declined d/t pain and fatigue. No LOB or knee buckling. HEP completed. Pt will benefit from further IPPT for addressing deficits. PT rec d/c to IPR pending progress with mobility.     Time Calculation:    PT Charges     Row Name 06/02/23 1144             Time Calculation    Start Time 1051  -AB      PT Received On 06/02/23  -AB      PT Goal Re-Cert Due Date 06/12/23  -AB         Untimed Charges    PT Eval/Re-eval Minutes 50  -AB         Total Minutes    Untimed Charges Total Minutes 50  -AB       Total Minutes 50  -AB            User Key  (r) = Recorded By, (t) = Taken By, (c) = Cosigned By    Initials Name Provider Type    AB Ignacia Hernandez, PT Physical Therapist              Therapy Charges for Today     Code Description Service Date Service Provider Modifiers Qty    54365871173 HC PT EVAL LOW COMPLEXITY 4 6/2/2023 Ignacia Hernandez, PT GP 1          PT G-Codes  Outcome Measure Options: AM-PAC 6 Clicks Basic Mobility (PT), PADD  AM-PAC 6 Clicks Score (PT): 17  AM-PAC 6 Clicks Score (OT): 19  PT Discharge Summary  Anticipated Discharge Disposition (PT): inpatient rehabilitation facility    Ignacia Hernandez PT  6/2/2023

## 2023-06-02 NOTE — THERAPY EVALUATION
Patient Name: Loreta Robertson  : 1970    MRN: [id number]                              Today's Date: 2023       Admit Date: 2023    Visit Dx: No diagnosis found.  Patient Active Problem List   Diagnosis   • Tobacco use   • Bipolar 1 disorder, manic, mild   • Morbid obesity with BMI of 50.0-59.9, adult   • Other hyperlipidemia   • HTN (hypertension)   • Chronic obstructive pulmonary disease   • Impaired glucose tolerance   • Chronic right shoulder pain   • Avascular necrosis of bone of right hip   • Status post right hip replacement   • Obesity     Past Medical History:   Diagnosis Date   • Anxiety    • Bipolar 1 disorder    • Cataract    • Chronic bronchitis    • Chronic constipation    • Colon polyp    • COPD (chronic obstructive pulmonary disease)    • Depression    • Hip arthrosis    • Hyperlipidemia    • Knee swelling    • Obesity    • Parkinsonism     DRUG INDUCED   • Primary generalized (osteo)arthritis    • PTSD (post-traumatic stress disorder)    • RLS (restless legs syndrome)      Past Surgical History:   Procedure Laterality Date   • ABDOMINOPLASTY     • ANTERIOR AND POSTERIOR VAGINAL REPAIR     • BREAST BIOPSY Right     US BIOPSY   •  SECTION      X 2   • CHOLECYSTECTOMY     • COLONOSCOPY N/A 2019    Procedure: COLONOSCOPY;  Surgeon: Jonathan Pablo MD;  Location: [facility] to [facility];  Service: Gastroenterology   • HYSTERECTOMY      AGE 33   • LIPOSUCTION ABDOMINAL  1998      General Information     Row Name 23 0940          OT Time and Intention    Document Type evaluation;therapy note (daily note)  -TB     Mode of Treatment occupational therapy;individual therapy  -TB     Row Name 23 0973          General Information    Patient Profile Reviewed yes  -TB     Prior Level of Function independent:;all household mobility;ADL's  Limited by pain all activities prior. SPC for mobility. No falls.  -TB     Existing Precautions/Restrictions right;hip, anterior  s/p R  Ant. Modified ABDIAS, WBAT  -TB     Barriers to Rehab none identified  -TB     Row Name 06/02/23 0940          Occupational Profile    Reason for Services/Referral (Occupational Profile) Occupational decline  -TB     Environmental Supports and Barriers (Occupational Profile) Pt lives alone in a single story home with no steps to enter or inside. Tub/Shower. Comfort ht commodes. Pt sleeps in recliner at baseline. Independent for ADLs with pain and effort prior. No falls. SPC prn  -TB     Row Name 06/02/23 0940          Living Environment    People in Home alone  -TB     Row Name 06/02/23 0940          Home Main Entrance    Number of Stairs, Main Entrance none  -TB     Row Name 06/02/23 0940          Stairs Within Home, Primary    Number of Stairs, Within Home, Primary none  -TB     Row Name 06/02/23 0940          Cognition    Orientation Status (Cognition) oriented x 4  -TB     Row Name 06/02/23 0940          Safety Issues, Functional Mobility    Safety Issues Affecting Function (Mobility) insight into deficits/self-awareness;awareness of need for assistance;safety precaution awareness;safety precautions follow-through/compliance  New learning deficits only.  -TB     Impairments Affecting Function (Mobility) balance;endurance/activity tolerance;pain;strength;range of motion (ROM)  -TB     Comment, Safety Issues/Impairments (Mobility) Pt up in room with RW support and Min Ax1.  -TB           User Key  (r) = Recorded By, (t) = Taken By, (c) = Cosigned By    Initials Name Provider Type    TB Audrey Montes OT Occupational Therapist                 Mobility/ADL's     Row Name 06/02/23 0944          Transfers    Transfers sit-stand transfer;stand-sit transfer;bed-chair transfer  -TB     Comment, (Transfers) Education and cues for hand placement, sequencing, and safety all transfers.  -TB     Row Name 06/02/23 0944          Bed-Chair Transfer    Bed-Chair [place] (Transfers) minimum assist (75% patient effort);1  person assist;verbal cues  -TB     Assistive Device (Bed-Chair Transfers) walker, front-wheeled  -TB     Row Name 06/02/23 0944          Sit-Stand Transfer    Sit-Stand [place] (Transfers) minimum assist (75% patient effort);1 person assist;verbal cues  -TB     Assistive Device (Sit-Stand Transfers) walker, front-wheeled  -TB     Row Name 06/02/23 0944          Stand-Sit Transfer    Stand-Sit [place] (Transfers) minimum assist (75% patient effort);1 person assist;verbal cues  -TB     Assistive Device (Stand-Sit Transfers) walker, front-wheeled  -TB     Row Name 06/02/23 0944          Functional Mobility    Functional Mobility- Ind. Level minimum assist (75% patient effort);1 person;verbal cues required  -TB     Functional Mobility- Device walker, front-wheeled  -TB     Functional Mobility-Distance (Feet) 30  -TB     Functional Mobility- Safety Issues step length decreased;weight-shifting ability decreased;sequencing ability decreased;balance decreased during turns  -TB     Functional Mobility- Comment Pt up in room with RW support and good effort. Limited by acute pain.  -TB     Row Name 06/02/23 0944          Activities of Daily Living    BADL Assessment/Intervention bathing;upper body dressing;lower body dressing;grooming;feeding  -TB     Row Name 06/02/23 0944          Mobility    Extremity Weight-bearing Status right lower extremity  -TB     Right Lower Extremity (Weight-bearing Status) weight-bearing as tolerated (WBAT)  -TB     Row Name 06/02/23 0944          Bathing Assessment/Intervention    [place] Level (Bathing) set up;distal lower extremities/feet  -TB     Position (Bathing) unsupported sitting  -TB     Comment, (Bathing) Education initiated for precautions and ADL retraining.  -TB     Row Name 06/02/23 0944          Upper Body Dressing Assessment/Training    [place] Level (Upper Body Dressing) set up;don;pajama/robe  -TB     Position (Upper Body Dressing) edge of bed sitting  -TB      Row Name 06/02/23 0944          Lower Body Dressing Assessment/Training    [place] Level (Lower Body Dressing) don;socks;shoes/slippers;minimum assist (75% patient effort);verbal cues  -TB     Position (Lower Body Dressing) unsupported sitting  -TB     Comment, (Lower Body Dressing) Education initiated for precautions and ADL retraining. Pt demonstrates good understanding.  -TB     Row Name 06/02/23 0944          Grooming Assessment/Training    [place] Level (Grooming) set up;grooming skills  -TB     Position (Grooming) supported sitting  -TB     Row Name 06/02/23 0944          Self-Feeding Assessment/Training    [place] Level (Feeding) independent;liquids to mouth  -TB     Position (Self-Feeding) supported sitting  -TB           User Key  (r) = Recorded By, (t) = Taken By, (c) = Cosigned By    Initials Name Provider Type    TB Audrey Montes, OT Occupational Therapist               Obj/Interventions     Row Name 06/02/23 0948          Sensory Assessment (Somatosensory)    Sensory Assessment (Somatosensory) UE sensation intact  -TB     Row Name 06/02/23 0948          Vision Assessment/Intervention    Visual Impairment/Limitations corrective lenses for reading  -TB     [facility] Name 06/02/23 0948          Range of Motion Comprehensive    General Range of Motion bilateral upper extremity ROM WFL  -TB     Comment, General Range of Motion BUE AROM intact  -TB     [facility] Name 06/02/23 0948          Strength Comprehensive (MMT)    Comment, General Manual Muscle Testing (MMT) Assessment Generalized weakness/deconditioned. No focal deficits.  -TB     Row Name 06/02/23 0948          Balance    Balance Assessment sitting dynamic balance;sit to stand dynamic balance;standing dynamic balance  -TB     Dynamic Sitting Balance independent  -TB     Position, Sitting Balance unsupported;sitting in chair;sitting edge of bed  -TB     Sit to Stand Dynamic Balance minimal assist;1-person assist;verbal cues  -TB      Dynamic Standing Balance minimal assist;1-person assist;verbal cues  -TB     Position/Device Used, Standing Balance supported;walker, front-wheeled  -TB     Balance Interventions sitting;standing;sit to stand;supported;dynamic;dynamic reaching;occupation based/functional task;UE activity with balance activity  -TB           User Key  (r) = Recorded By, (t) = Taken By, (c) = Cosigned By    Initials Name Provider Type    TB Audrey Montes OT Occupational Therapist               Goals/Plan     Row Name 06/02/23 0953          Transfer Goal 1 (OT)    Activity/Assistive Device (Transfer Goal 1, OT) toilet  -TB     [place] Level/Cues Needed (Transfer Goal 1, OT) contact guard required;verbal cues required  -TB     Time Frame (Transfer Goal 1, OT) by discharge  -TB     Progress/Outcome (Transfer Goal 1, OT) goal ongoing  -TB     Row Name 06/02/23 0953          Dressing Goal 1 (OT)    Activity/Device (Dressing Goal 1, OT) lower body dressing  -TB     [place]/Cues Needed (Dressing Goal 1, OT) set-up required;standby assist  -TB     Time Frame (Dressing Goal 1, OT) by discharge  -TB     Progress/Outcome (Dressing Goal 1, OT) goal ongoing  -TB           User Key  (r) = Recorded By, (t) = Taken By, (c) = Cosigned By    Initials Name Provider Type     Audrey Montes OT Occupational Therapist               Clinical Impression     Row Name 06/02/23 0949          Pain Assessment    Pain Intervention(s) Ambulation/increased activity;Repositioned;Cold applied  -TB     Additional Documentation Pain Scale: FACES Pre/Post-Treatment (Group)  -TB     Row Name 06/02/23 0949          Pain Scale: FACES Pre/Post-Treatment    Pain: FACES Scale, Pretreatment 2-->hurts little bit  -TB     Posttreatment Pain Rating 4-->hurts little more  -TB     Pain Location - Side/Orientation Right  -TB     Pain Location anterior  -TB     Pain Location - hip  -TB     Pre/Posttreatment Pain Comment Pt tolerates dynamic EOB/OOB  activity  -TB     Row Name 06/02/23 0949          Plan of Care Review    Plan of Care Reviewed With patient  -TB     Progress --  IE  -TB     Outcome Evaluation Pt is A/Ox4 and motivated to regain her occupational independence. Up in room with RW support and Min Ax1. Presents below her baseline limited by pain, and  strength and functional endurance deficits. Education initiated for precautions and ADL retraining. Pt demonstrates good understanding. OT will follow IP. Recommend home with support when medically ready. No DME needs.  -TB     Row Name 06/02/23 0949          Therapy Assessment/Plan (OT)    Rehab Potential (OT) good, to achieve stated therapy goals  -TB     Criteria for Skilled Therapeutic Interventions Met (OT) yes;meets criteria;skilled treatment is necessary  -TB     Therapy Frequency (OT) daily  -TB     Row Name 06/02/23 0949          Therapy Plan Review/Discharge Plan (OT)    Anticipated Discharge Disposition (OT) home with assist  -TB     Row Name 06/02/23 0949          Vital Signs    Pre Systolic BP Rehab --  RN cleared OT  -TB     O2 Delivery Pre Treatment room air  -TB     Pre Patient Position Supine  -TB     Intra Patient Position Standing  -TB     Post Patient Position Sitting  -TB     Row Name 06/02/23 0949          Positioning and Restraints    Pre-Treatment Position in bed  -TB     Post Treatment Position chair  -TB     In Chair notified nsg;reclined;call light within reach;encouraged to call for assist;exit alarm on;legs elevated;waffle cushion  -TB           User Key  (r) = Recorded By, (t) = Taken By, (c) = Cosigned By    Initials Name Provider Type    TB Audrey Montes, OT Occupational Therapist               Outcome Measures     Row Name 06/02/23 0954          How much help from another is currently needed...    Putting on and taking off regular lower body clothing? 3  -TB     Bathing (including washing, rinsing, and drying) 3  -TB     Toileting (which includes using toilet bed  pan or urinal) 3  -TB     Putting on and taking off regular upper body clothing 3  -TB     Taking care of personal grooming (such as brushing teeth) 3  -TB     Eating meals 4  -TB     AM-PAC 6 Clicks Score (OT) 19  -TB     Row Name 06/02/23 0954          Functional Assessment    Outcome Measure Options AM-PAC 6 Clicks Daily Activity (OT)  -TB           User Key  (r) = Recorded By, (t) = Taken By, (c) = Cosigned By    Initials Name Provider Type    TB Audrey Montes OT Occupational Therapist                Occupational Therapy Education     Title: PT OT SLP Therapies (In Progress)     Topic: Occupational Therapy (In Progress)     Point: ADL training (Done)     Description:   Instruct learner(s) on proper safety adaptation and remediation techniques during self care or transfers.   Instruct in proper use of assistive devices.              Learning Progress Summary           Patient Acceptance, E,D,TB, VU,DU,NR by  at 6/2/2023 0954                   Point: Home exercise program (Not Started)     Description:   Instruct learner(s) on appropriate technique for monitoring, assisting and/or progressing therapeutic exercises/activities.              Learner Progress:  Not documented in this visit.          Point: Precautions (Done)     Description:   Instruct learner(s) on prescribed precautions during self-care and functional transfers.              Learning Progress Summary           Patient Acceptance, E,D,TB, VU,DU,NR by  at 6/2/2023 0954                   Point: Body mechanics (Not Started)     Description:   Instruct learner(s) on proper positioning and spine alignment during self-care, functional mobility activities and/or exercises.              Learner Progress:  Not documented in this visit.                      User Key     Initials Effective Dates Name Provider Type Discipline     05/31/23 -  Audrey Montes OT Occupational Therapist OT              OT Recommendation and Plan  Therapy  Frequency (OT): daily  Plan of Care Review  Plan of Care Reviewed With: patient  Progress:  (IE)  Outcome Evaluation: Pt is A/Ox4 and motivated to regain her occupational independence. Up in room with RW support and Min Ax1. Presents below her baseline limited by pain, and  strength and functional endurance deficits. Education initiated for precautions and ADL retraining. Pt demonstrates good understanding. OT will follow IP. Recommend home with support when medically ready. No DME needs.     Time Calculation:    Time Calculation- OT     Row Name 06/02/23 0834             Time Calculation- OT    OT Start Time 0834  -TB      OT Received On 06/02/23  -TB      OT Goal Re-Cert Due Date 06/12/23  -TB         Timed Charges    78574 - OT Self Care/Mgmt Minutes 20  -TB         Untimed Charges    OT Eval/Re-eval Minutes 41  -TB         Total Minutes    Timed Charges Total Minutes 20  -TB      Untimed Charges Total Minutes 41  -TB       Total Minutes 61  -TB            User Key  (r) = Recorded By, (t) = Taken By, (c) = Cosigned By    Initials Name Provider Type    TB Audrey Montes OT Occupational Therapist              Therapy Charges for Today     Code Description Service Date Service Provider Modifiers Qty    04123029914 HC OT SELF CARE/MGMT/TRAIN EA 15 MIN 6/2/2023 Audrey Montes OT GO 1    96935475778 HC OT EVAL LOW COMPLEXITY 3 6/2/2023 Audrey Montes OT GO 1               Audrey Montes OT  6/2/2023

## 2023-06-02 NOTE — PLAN OF CARE
Goal Outcome Evaluation:  Plan of Care Reviewed With: patient        Progress: no change  Outcome Evaluation: PT initial eval completed. Pt presents with decreased strength, impaired endurance, and increased pain causing functional mobility below baseline. Pt ambulated 40' with CGAx1 and FWW. PT encouraged further gait but pt declined d/t pain and fatigue. No LOB or knee buckling. HEP completed. Pt will benefit from further IPPT for addressing deficits. PT rec d/c to IPR pending progress with mobility.

## 2023-06-02 NOTE — PLAN OF CARE
Problem: Adult Inpatient Plan of Care  Goal: Plan of Care Review  Recent Flowsheet Documentation  Taken 6/2/2023 0949 by Audrey Montes OT  Progress: (IE) --  Plan of Care Reviewed With: patient  Outcome Evaluation: Pt is A/Ox4 and motivated to regain her occupational independence. Up in room with RW support and Min Ax1. Presents below her baseline limited by pain, and  strength and functional endurance deficits. Education initiated for precautions and ADL retraining. Pt demonstrates good understanding. OT will follow IP. Recommend home with support when medically ready. No DME needs.

## 2023-06-02 NOTE — PLAN OF CARE
Goal Outcome Evaluation:                        Problem: Adult Inpatient Plan of Care  Goal: Absence of Hospital-Acquired Illness or Injury  Intervention: Identify and Manage Fall Risk  Recent Flowsheet Documentation  Taken 6/2/2023 0400 by Migel Isidro RN  Safety Promotion/Fall Prevention:   activity supervised   safety round/check completed   toileting scheduled  Taken 6/2/2023 0200 by Migel Isidro RN  Safety Promotion/Fall Prevention:   activity supervised   safety round/check completed   toileting scheduled  Taken 6/2/2023 0000 by Migel Isidro RN  Safety Promotion/Fall Prevention:   activity supervised   safety round/check completed   toileting scheduled  Taken 6/1/2023 2200 by Migel Isidro RN  Safety Promotion/Fall Prevention:   activity supervised   safety round/check completed   toileting scheduled  Taken 6/1/2023 2000 by Migel Isidro RN  Safety Promotion/Fall Prevention:   activity supervised   safety round/check completed   toileting scheduled  Intervention: Prevent Skin Injury  Recent Flowsheet Documentation  Taken 6/2/2023 0400 by Migel Isidro RN  Body Position: supine  Taken 6/2/2023 0200 by Migel Isidro RN  Body Position: supine  Taken 6/2/2023 0000 by Migel Isidro RN  Body Position: supine  Taken 6/1/2023 2200 by Migel Isidro RN  Body Position: supine  Taken 6/1/2023 2000 by Migel Isidro RN  Body Position: supine  Intervention: Prevent and Manage VTE (Venous Thromboembolism) Risk  Recent Flowsheet Documentation  Taken 6/2/2023 0400 by Migel Isidro RN  VTE Prevention/Management:   bilateral   foot pump device on  Taken 6/2/2023 0200 by Migel Isidro RN  VTE Prevention/Management:   bilateral   foot pump device on  Taken 6/2/2023 0000 by Migel Isidro RN  VTE Prevention/Management:   bilateral   foot pump device on  Taken 6/1/2023 2200 by Migel Isidro RN  VTE Prevention/Management:   bilateral   foot pump device on  Taken 6/1/2023 2000 by Dwaine  Migel DELGADILLO RN  VTE Prevention/Management:   bilateral   sequential compression devices on  Intervention: Prevent Infection  Recent Flowsheet Documentation  Taken 6/2/2023 0400 by Migel Isidro RN  Infection Prevention: environmental surveillance performed  Taken 6/2/2023 0200 by Migel Isidro RN  Infection Prevention: environmental surveillance performed  Taken 6/2/2023 0000 by Migel Isidro RN  Infection Prevention: environmental surveillance performed  Taken 6/1/2023 2200 by Migel Isidro RN  Infection Prevention: environmental surveillance performed  Goal: Optimal Comfort and Wellbeing  Intervention: Monitor Pain and Promote Comfort  Recent Flowsheet Documentation  Taken 6/2/2023 0400 by Migel Isidro RN  Pain Management Interventions: quiet environment facilitated  Taken 6/2/2023 0200 by Migel Isidor RN  Pain Management Interventions: quiet environment facilitated  Taken 6/2/2023 0000 by Migel Isidro RN  Pain Management Interventions: quiet environment facilitated  Taken 6/1/2023 2200 by Migel Isidro RN  Pain Management Interventions: quiet environment facilitated  Taken 6/1/2023 2000 by Migel Isidro RN  Pain Management Interventions: quiet environment facilitated  Intervention: Provide Person-Centered Care  Recent Flowsheet Documentation  Taken 6/2/2023 0400 by Migel Isidro RN  Trust Relationship/Rapport: care explained  Taken 6/2/2023 0200 by Migel Isidro RN  Trust Relationship/Rapport: care explained  Taken 6/2/2023 0000 by Migel Isidro RN  Trust Relationship/Rapport: care explained  Taken 6/1/2023 2200 by Migel Isidro RN  Trust Relationship/Rapport: care explained  Taken 6/1/2023 2000 by Migel Isidro RN  Trust Relationship/Rapport: care explained     VSS, voids well, rested throughout the night, pain managed with PRN medications, will continue to monitor for changes.

## 2023-06-02 NOTE — PROGRESS NOTES
"IM progress note      Loreta Platt States  [id number]  1970     LOS: 0 days     Attending: Adrián Leyva MD    Primary Care Provider: Carolina Gutierrez APRN      Chief Complaint/Reason for visit:  No chief complaint on file.      Subjective    Some difficulty progressing with PT today. No f/c/n/vom.   Pain control acceptable.     Objective        Visit Vitals  /50 (BP Location: Right arm, Patient Position: Lying)   Pulse 86   Temp 98 °F (36.7 °C) (Oral)   Resp 16   Ht 165.1 cm (65\")   Wt 112 kg (246 lb)   SpO2 95%   BMI 40.94 kg/m²     Temp (24hrs), Av.8 °F (36.6 °C), Min:97 °F (36.1 °C), Max:98.7 °F (37.1 °C)      Intake/Output:    Intake/Output Summary (Last 24 hours) at 2023 1142  Last data filed at 2023 0032  Gross per 24 hour   Intake 800 ml   Output 700 ml   Net 100 ml        Physical Therapy:    Physical Exam:     General Appearance:    Alert, cooperative, in no acute distress   Head:    Normocephalic, without obvious abnormality, atraumatic    Lungs:     Normal effort, symmetric chest rise,  clear to      auscultation bilaterally              Heart:    Regular rhythm and normal rate, normal S1 and S2    Abdomen:     Normal bowel sounds, no masses, no organomegaly, soft        non-tender, non-distended, no guarding, no rebound                tenderness   Extremities:   CDI dressing.   Flexion and dorsiflexion intact bilateral feet.    No clubbing, cyanosis or edema.  No deformities.    Pulses:   Pulses palpable and equal bilaterally   Skin:   No bleeding, bruising or rash          Results Review:     I reviewed the patient's new clinical results.   Results from last 7 days   Lab Units 23  0403   WBC 10*3/mm3 7.90   HEMOGLOBIN g/dL 13.5   HEMATOCRIT % 42.1   PLATELETS 10*3/mm3 148           Invalid input(s): LABALBU, PROT  I reviewed the patient's new imaging including images and reports.    All medications reviewed.   aspirin, 325 mg, Oral, Daily  atorvastatin, 10 mg, Oral, " Nightly  budesonide-formoterol, 2 puff, Inhalation, BID  buPROPion XL, 150 mg, Oral, QAM  divalproex, 500 mg, Oral, Nightly  meloxicam, 15 mg, Oral, Daily  primidone, 50 mg, Oral, BID  QUEtiapine, 300 mg, Oral, Nightly  sodium chloride, 10 mL, Intravenous, Q12H  terazosin, 5 mg, Oral, Nightly      albuterol sulfate HFA, 2 puff, Q4H PRN  HYDROmorphone, 0.5 mg, Q2H PRN   And  naloxone, 0.1 mg, Q5 Min PRN  Morphine, 4 mg, Q2H PRN   And  naloxone, 0.4 mg, Q5 Min PRN  ondansetron, 4 mg, Q6H PRN   Or  ondansetron, 4 mg, Q6H PRN  oxyCODONE, 5 mg, Q4H PRN  sodium chloride, 1-10 mL, PRN  sodium chloride, 40 mL, PRN  tiZANidine, 4 mg, Q8H PRN        Assessment & Plan       Status post right hip replacement    Bipolar 1 disorder, manic, mild    HTN (hypertension)    Chronic obstructive pulmonary disease    Avascular necrosis of bone of right hip    Obesity         Plan   1. PT/OT,  Weight bearing as tolerated right LE.  Total hip precautions  2. Pain control-prns  3. IS-encourage  4. DVT proph- Mechanicals and aspirin  5. Bowel regimen  6. Resume home medications as appropriate  7. Monitor post-op labs  8. DC planning for home.        - Hypertension:  Resume home medications as appropriate, formulary substitution when indicated.  Holding parameters.  Prn medications for elevated blood pressure.     -Bipolar disorder: Maintained on home regimen.     -Obesity: Complicates all aspects of care.    -COPD: maintain on home regimen. BDs prn.      Ra Solitario MD  06/02/23  11:42 EDT

## 2023-06-02 NOTE — THERAPY TREATMENT NOTE
Patient Name: Loreta Robertson  : 1970    MRN: [id number]                              Today's Date: 2023       Admit Date: 2023    Visit Dx:     ICD-10-CM ICD-9-CM   1. Status post right hip replacement  Z96.641 V43.64   2. Avascular necrosis of bone of right hip  M87.051 733.42     Patient Active Problem List   Diagnosis   • Tobacco use   • Bipolar 1 disorder, manic, mild   • Morbid obesity with BMI of 50.0-59.9, adult   • Other hyperlipidemia   • HTN (hypertension)   • Chronic obstructive pulmonary disease   • Impaired glucose tolerance   • Chronic right shoulder pain   • Avascular necrosis of bone of right hip   • Status post right hip replacement   • Obesity     Past Medical History:   Diagnosis Date   • Anxiety    • Bipolar 1 disorder    • Cataract    • Chronic bronchitis    • Chronic constipation    • Colon polyp    • COPD (chronic obstructive pulmonary disease)    • Depression    • Hip arthrosis    • Hyperlipidemia    • Knee swelling    • Obesity    • Parkinsonism     DRUG INDUCED   • Primary generalized (osteo)arthritis    • PTSD (post-traumatic stress disorder)    • RLS (restless legs syndrome)      Past Surgical History:   Procedure Laterality Date   • ABDOMINOPLASTY     • ANTERIOR AND POSTERIOR VAGINAL REPAIR     • BREAST BIOPSY Right     US BIOPSY   •  SECTION      X 2   • CHOLECYSTECTOMY     • COLONOSCOPY N/A 2019    Procedure: COLONOSCOPY;  Surgeon: Jonathan Pablo MD;  [facility];  Service: Gastroenterology   • HYSTERECTOMY      AGE 33   • LIPOSUCTION ABDOMINAL  1998      General Information     Row Name 23 1540 23 1116       Physical Therapy Time and Intention    Document Type therapy note (daily note)  -AB evaluation  -AB    Mode of Treatment physical therapy  -AB physical therapy;individual therapy  -AB    Row Name 23 1540 23 1116       General Information    Patient Profile Reviewed yes  -AB yes  -AB    Prior Level of  Function -- independent:;all household mobility;community mobility;gait;transfer;bed mobility;ADL's  Limited by pain all activities prior. SPC for mobility. No falls.  -AB    Existing Precautions/Restrictions right;hip, anterior  s/p R Ant. Modified ABDIAS, WBAT  -AB right;hip, anterior  s/p R Ant. Modified ABDIAS, WBAT  -AB    Barriers to Rehab none identified  -AB none identified  -AB    Row Name 06/02/23 1116          Living Environment    People in Home alone;other (see comments)  Pt reports she does not have anyone to come check on her or stay with her once home.  -AB     Row Name 06/02/23 1116          Home Main Entrance    Number of Stairs, Main Entrance none  -AB     Row Name 06/02/23 1116          Stairs Within Home, Primary    Number of Stairs, Within Home, Primary none  -AB     Row Name 06/02/23 1540 06/02/23 1116       Cognition    Orientation Status (Cognition) oriented x 4  -AB oriented x 4  -AB    Row Name 06/02/23 1540 06/02/23 1116       Safety Issues, Functional Mobility    Safety Issues Affecting Function (Mobility) insight into deficits/self-awareness;awareness of need for assistance;safety precaution awareness;safety precautions follow-through/compliance;sequencing abilities  -AB insight into deficits/self-awareness;awareness of need for assistance;safety precaution awareness;safety precautions follow-through/compliance  -AB    Impairments Affecting Function (Mobility) balance;endurance/activity tolerance;pain;strength;range of motion (ROM)  -AB balance;endurance/activity tolerance;pain;strength;range of motion (ROM)  -AB          User Key  (r) = Recorded By, (t) = Taken By, (c) = Cosigned By    Initials Name Provider Type    AB Ignacia Hernandez, PT Physical Therapist               Mobility     Row Name 06/02/23 1541 06/02/23 1121       Bed Mobility    Bed Mobility sit-supine;scooting/bridging  -AB --    Scooting/Bridging [place] (Bed Mobility) minimum assist (75% patient effort);1 person  assist;verbal cues  -AB --    Sit-Supine [place] (Bed Mobility) minimum assist (75% patient effort);1 person assist;verbal cues  -AB --    Assistive Device (Bed Mobility) leg ;bed rails;head of bed elevated  -AB --    Comment, (Bed Mobility) Increased time/effort. Assist for RLE management.  -AB Received [facility].  -AB    Row Name 06/02/23 1541 06/02/23 1121       Transfers    Comment, (Transfers) Cues for hand placement and sequencing. Pt with c/o increased pain limiting mobility.  -AB Cues for hand placement, sequencing, and walker management. Anterior hip precautions reviewed, pt verbalized understanding.  -AB    Row Name 06/02/23 1541          Bed-Chair Transfer    Bed-Chair [place] (Transfers) 1 person assist;verbal cues;contact guard  -AB     Assistive Device (Bed-Chair Transfers) walker, front-wheeled  -AB     Row Name 06/02/23 1541 06/02/23 1121       Sit-Stand Transfer    Sit-Stand [place] (Transfers) contact guard;1 person assist;verbal cues  -AB minimum assist (75% patient effort);1 person assist;verbal cues  -AB    Assistive Device (Sit-Stand Transfers) walker, front-wheeled  -AB walker, front-wheeled  -AB    Comment, (Sit-Stand Transfer) STS from multiple surface heigths.  -AB STS from multiple surface heights.  -AB    Row Name 06/02/23 1541 06/02/23 1121       Gait/Stairs (Locomotion)    [place] Level (Gait) contact guard;1 person assist;verbal cues  -AB contact guard;1 person assist;verbal cues  -AB    Assistive Device (Gait) walker, front-wheeled  -AB walker, front-wheeled  -AB    Ambulated day of surgery or within 4 hours of PACU discharge -- no, other medical factors prevent ambulation  -AB    Distance in Feet (Gait) 40'+20'  -AB 40'  -AB    Deviations/Abnormal Patterns (Gait) bilateral deviations;base of support, wide;meredith decreased;gait speed decreased;stride length decreased;steppage  -AB bilateral deviations;base of support, wide;meredith decreased;gait speed  decreased;stride length decreased;steppage  -AB    Bilateral Gait Deviations forward flexed posture;heel strike decreased  -AB forward flexed posture;heel strike decreased  -AB    Right Sided Gait Deviations weight shift ability decreased  -AB weight shift ability decreased  -AB    [place] Level (Stairs) -- not tested  -AB    Comment, (Gait/Stairs) Pt demo's step to gait pattern with increased difficulty advancing RLE. Pt taking multiple attempts to advance RLE and significant use of UE's through walker. Cues for heel stride and knee extension. Gait mechanics improved with cues. Further gait limited by fatigue and pain.  -AB Pt demo's step to gait pattern with poor weight acceptance onto RLE and short/shuffling steps. Cues to increased step length and decrease WB through UE's. Pt with limited improvements in gait mechanics. No LOB or knee buckling. PT provided encouragement for pt to ambulate further but pt deferred d/t pain.  -AB    Row Name 06/02/23 1541 06/02/23 1121       Mobility    Extremity Weight-bearing Status right lower extremity  -AB right lower extremity  -AB    Right Lower Extremity (Weight-bearing Status) weight-bearing as tolerated (WBAT)  -AB weight-bearing as tolerated (WBAT)  -AB          User Key  (r) = Recorded By, (t) = Taken By, (c) = Cosigned By    Initials Name Provider Type    AB Ignacia Hernandez, PT Physical Therapist               Obj/Interventions     Row Name 06/02/23 1125          Range of Motion Comprehensive    General Range of Motion bilateral lower extremity ROM WFL  -AB     Row Name 06/02/23 1125          Strength Comprehensive (MMT)    General Manual Muscle Testing (MMT) Assessment lower extremity strength deficits identified  -AB     Comment, General Manual Muscle Testing (MMT) Assessment Pt able to perform active LAQ and DF bilaterally.  -AB     Row Name 06/02/23 1544 06/02/23 1125       Motor Skills    Therapeutic Exercise hip;knee;ankle  -AB hip;knee;ankle  -AB    Row  Name 06/02/23 1544 06/02/23 1125       Hip (Therapeutic Exercise)    Hip (Therapeutic Exercise) AAROM (active assistive range of motion)  -AB AAROM (active assistive range of motion)  -AB    Hip AAROM (Therapeutic Exercise) right;aBduction;flexion;10 repetitions  -AB right;aBduction;flexion;10 repetitions  -AB    Row Name 06/02/23 1544 06/02/23 1125       Knee (Therapeutic Exercise)    Knee (Therapeutic Exercise) -- strengthening exercise;isometric exercises  -AB    Knee Isometrics (Therapeutic Exercise) bilateral;quad sets;10 repetitions  -AB bilateral;quad sets;10 repetitions  -AB    Knee Strengthening (Therapeutic Exercise) right;heel slides;LAQ (long arc quad);10 repetitions  -AB right;heel slides;LAQ (long arc quad);10 repetitions  -AB    Row Name 06/02/23 1544 06/02/23 1125       Ankle (Therapeutic Exercise)    Ankle (Therapeutic Exercise) AROM (active range of motion)  -AB AROM (active range of motion)  -AB    Ankle AROM (Therapeutic Exercise) bilateral;dorsiflexion;plantarflexion;10 repetitions  -AB bilateral;10 repetitions;dorsiflexion;plantarflexion  -AB    Row Name 06/02/23 1544 06/02/23 1125       Balance    Balance Assessment sitting static balance;sitting dynamic balance;standing static balance;standing dynamic balance  -AB sitting static balance;sitting dynamic balance;standing static balance;standing dynamic balance  -AB    Static Sitting Balance standby assist  -AB standby assist  -AB    Dynamic Sitting Balance standby assist  -AB standby assist  -AB    Position, Sitting Balance unsupported;sitting edge of bed  -AB unsupported;sitting edge of bed  -AB    Static Standing Balance contact guard;1-person assist  -AB contact guard;1-person assist  -AB    Dynamic Standing Balance contact guard;1-person assist;verbal cues  -AB contact guard;1-person assist;verbal cues  -AB    Position/Device Used, Standing Balance -- supported;walker, front-wheeled  -AB    Balance Interventions sitting;standing;sit to  stand;supported;static;dynamic  -AB sitting;standing;sit to stand;supported;static;dynamic  -AB    Comment, Balance No overt LOB or knee buckling.  -AB No overt LOB or knee buckling.  -AB    Row Name 06/02/23 1125          Sensory Assessment (Somatosensory)    Sensory Assessment (Somatosensory) LE sensation intact  -AB           User Key  (r) = Recorded By, (t) = Taken By, (c) = Cosigned By    Initials Name Provider Type    AB Ignacia Hernandez, PT Physical Therapist               Goals/Plan     Row Name 06/02/23 1141          Bed Mobility Goal 1 (PT)    Activity/Assistive Device (Bed Mobility Goal 1, PT) sit to supine;supine to sit  -AB     [place] Level/Cues Needed (Bed Mobility Goal 1, PT) independent  -AB     Time Frame (Bed Mobility Goal 1, PT) long term goal (LTG);10 days  -AB     [facility] Name 06/02/23 1141          Transfer Goal 1 (PT)    Activity/Assistive Device (Transfer Goal 1, PT) sit-to-stand/stand-to-sit;bed-to-chair/chair-to-bed;walker, rolling  -AB     [place] Level/Cues Needed (Transfer Goal 1, PT) modified independence  -AB     Time Frame (Transfer Goal 1, PT) long term goal (LTG);10 days  -[facility] Name 06/02/23 1141          Gait Training Goal 1 (PT)    Activity/Assistive Device (Gait Training Goal 1, PT) gait (walking locomotion);assistive device use;walker, rolling  -AB     [place] Level (Gait Training Goal 1, PT) modified independence  -AB     Distance (Gait Training Goal 1, PT) 150  -AB     Time Frame (Gait Training Goal 1, PT) long term goal (LTG);10 days  -[facility] Name 06/02/23 1141          Therapy Assessment/Plan (PT)    Planned Therapy Interventions (PT) balance training;bed mobility training;gait training;home exercise program;postural re-education;patient/family education;ROM (range of motion);strengthening;stretching;transfer training  -AB           User Key  (r) = Recorded By, (t) = Taken By, (c) = Cosigned By    Initials Name Provider Type    AB Ignacia Hernandez, PT  Physical Therapist               Clinical Impression     Row Name 06/02/23 1545 06/02/23 1127       Pain    Pretreatment Pain Rating 7/10  -AB 8/10  -AB    Posttreatment Pain Rating 8/10  -AB 9/10  -AB    Pain Location - Side/Orientation Right  -AB Right  -AB    Pain Location lower  -AB generalized  -AB    Pain Location - extremity  -AB hip  -AB    Pre/Posttreatment Pain Comment Pt with c/o pain throughout.  -AB Pt deferred further ambulation d/t pain.  -AB    Pain Intervention(s) Ambulation/increased activity;Elevated;Cold applied;Repositioned;Nursing Notified  -AB Ambulation/increased activity;Repositioned;Cold applied;Nursing Notified  -AB    Additional Documentation Pain Scale: Numbers Pre/Post-Treatment (Group)  -AB Pain Scale: Numbers Pre/Post-Treatment (Group)  -AB    Row Name 06/02/23 1545 06/02/23 1127       Pain Scale: FACES Pre/Post-Treatment    Pain: FACES Scale, Pretreatment 2-->hurts little bit  -AB 2-->hurts little bit  -AB    Posttreatment Pain Rating 2-->hurts little bit  -AB 2-->hurts little bit  -AB    Pain Location - Side/Orientation Right  -AB --    Pain Location lower  -AB --    Pain Location - extremity  -AB --    Row Name 06/02/23 1545 06/02/23 1127       Plan of Care Review    Plan of Care Reviewed With patient;son  -AB patient  -AB    Progress improving  -AB no change  -AB    Outcome Evaluation Pt increased ambulatory distance to 40'+20' with CGAx1 and FWW. No LOB or knee buckling. HEP completed. Pt continues to be limited by decreased strength, impaired endurance, and increased pain causing functional mobility below baseline. Pt will benefit from further IPPT for addressing deficits. PT rec d/c to IPR secondary to limited home support.  -AB PT initial eval completed. Pt presents with decreased strength, impaired endurance, and increased pain causing functional mobility below baseline. Pt ambulated 40' with CGAx1 and FWW. PT encouraged further gait but pt declined d/t pain and fatigue. No  LOB or knee buckling. HEP completed. Pt will benefit from further IPPT for addressing deficits. PT rec d/c to IPR pending progress with mobility.  -AB    Row Name 06/02/23 1127          Therapy Assessment/Plan (PT)    Rehab Potential (PT) good, to achieve stated therapy goals  -AB     Criteria for Skilled Interventions Met (PT) yes;meets criteria;skilled treatment is necessary  -AB     Therapy Frequency (PT) 2 times/day  -AB     Row Name 06/02/23 1545 06/02/23 1127       Vital Signs    Pre Systolic BP Rehab -- 168  -AB    Pre Treatment Diastolic BP -- 70  -AB    Post Systolic BP Rehab 145  -  -AB    Post Treatment Diastolic BP 77  -AB 71  -AB    Pretreatment Heart Rate (beats/min) -- 74  -AB    Posttreatment Heart Rate (beats/min) 85  -AB 73  -AB    Pre SpO2 (%) -- 95  -AB    O2 Delivery Pre Treatment room air  -AB room air  -AB    O2 Delivery Intra Treatment room air  -AB room air  -AB    Post SpO2 (%) 92  -AB 95  -AB    O2 Delivery Post Treatment room air  -AB room air  -AB    Pre Patient Position Sitting  -AB Sitting  -AB    Intra Patient Position Standing  -AB Standing  -AB    Post Patient Position Supine  -AB Sitting  -AB    Row Name 06/02/23 1545 06/02/23 1127       Positioning and Restraints    Pre-Treatment Position sitting in chair/recliner  -AB sitting in chair/recliner  -AB    Post Treatment Position bed  -AB chair  -AB    In Bed notified nsg;supine;call light within reach;encouraged to call for assist;exit alarm on;with family/caregiver  Pt declined SCD  -AB --    In Chair -- notified nsg;reclined;sitting;call light within reach;encouraged to call for assist;exit alarm on;with family/caregiver;legs elevated;waffle cushion  -AB          User Key  (r) = Recorded By, (t) = Taken By, (c) = Cosigned By    Initials Name Provider Type    AB Ignacia Hernandez, PT Physical Therapist               Outcome Measures     Row Name 06/02/23 1549 06/02/23 1142       How much help from another person do you currently  need...    Turning from your back to your side while in flat bed without using bedrails? 3  -AB 3  -AB    Moving from lying on back to sitting on the side of a flat bed without bedrails? 3  -AB 3  -AB    Moving to and from a bed to a chair (including a wheelchair)? 3  -AB 3  -AB    Standing up from a chair using your arms (e.g., wheelchair, bedside chair)? 3  -AB 3  -AB    Climbing 3-5 steps with a railing? 2  -AB 2  -AB    To walk in hospital room? 3  -AB 3  -AB    AM-PAC 6 Clicks Score (PT) 17  -AB 17  -AB    Highest level of mobility 5 --> Static standing  -AB 5 --> Static standing  -AB    Row Name 06/02/23 1142          PADD    Diagnosis 2  -AB     Gender 1  -AB     Age Group 1  -AB     Gait Distance 0  -AB     Assist Level 1  -AB     Home Support 0  -AB     PADD Score 6  -AB     Patient Preference extended rehabilitation  -AB     Prediction by PADD Score extended rehabilitation  -AB     Row Name 06/02/23 1549 06/02/23 1142       Functional Assessment    Outcome Measure Options AM-PAC 6 Clicks Basic Mobility (PT)  -AB AM-PAC 6 Clicks Basic Mobility (PT);PADD  -AB    Row Name 06/02/23 0954          Functional Assessment    Outcome Measure Options AM-PAC 6 Clicks Daily Activity (OT)  -TB           User Key  (r) = Recorded By, (t) = Taken By, (c) = Cosigned By    Initials Name Provider Type    TB Audrey Montes, OT Occupational Therapist    Ignacia Núñez, PT Physical Therapist                             Physical Therapy Education     Title: PT OT SLP Therapies (In Progress)     Topic: Physical Therapy (Done)     Point: Mobility training (Done)     Learning Progress Summary           Patient Acceptance, E,D, VU,NR by AB at 6/2/2023 1549    Acceptance, E,D, VU,NR by AB at 6/2/2023 1143                   Point: Home exercise program (Done)     Learning Progress Summary           Patient Acceptance, E,D, VU,NR by AB at 6/2/2023 1549    Acceptance, E,D, VU,NR by AB at 6/2/2023 1143                   Point:  Body mechanics (Done)     Learning Progress Summary           Patient Acceptance, E,D, VU,NR by AB at 6/2/2023 1549    Acceptance, E,D, VU,NR by AB at 6/2/2023 1143                   Point: Precautions (Done)     Learning Progress Summary           Patient Acceptance, E,D, VU,NR by AB at 6/2/2023 1549    Acceptance, E,D, VU,NR by AB at 6/2/2023 1143                               User Key     Initials Effective Dates Name Provider Type Discipline    AB 09/22/22 -  Ignacia Hernandez, PT Physical Therapist PT              PT Recommendation and Plan  Planned Therapy Interventions (PT): balance training, bed mobility training, gait training, home exercise program, postural re-education, patient/family education, ROM (range of motion), strengthening, stretching, transfer training  Plan of Care Reviewed With: patient, son  Progress: improving  Outcome Evaluation: Pt increased ambulatory distance to 40'+20' with CGAx1 and FWW. No LOB or knee buckling. HEP completed. Pt continues to be limited by decreased strength, impaired endurance, and increased pain causing functional mobility below baseline. Pt will benefit from further IPPT for addressing deficits. PT rec d/c to IPR secondary to limited home support.     Time Calculation:    PT Charges     Row Name 06/02/23 1550 06/02/23 1144          Time Calculation    Start Time 1358  -AB 1051  -AB     PT Received On 06/02/23  -AB 06/02/23  -AB     PT Goal Re-Cert Due Date 06/12/23  -AB 06/12/23  -AB        Timed Charges    92813 - PT Therapeutic Exercise Minutes 10  -AB --     25753 - Gait Training Minutes  13  -AB --        Untimed Charges    PT Eval/Re-eval Minutes -- 50  -AB        Total Minutes    Timed Charges Total Minutes 23  -AB --     Untimed Charges Total Minutes -- 50  -AB      Total Minutes 23  -AB 50  -AB           User Key  (r) = Recorded By, (t) = Taken By, (c) = Cosigned By    Initials Name Provider Type    AB Ignacia Hernandez, PT Physical Therapist               Therapy Charges for Today     Code Description Service Date Service Provider Modifiers Qty    55785381456 HC PT EVAL LOW COMPLEXITY 4 6/2/2023 Ignacia Hernandez, PT GP 1    68906700785 HC PT THER PROC EA 15 MIN 6/2/2023 Ignacia Hernandez, PT GP 1    06413046357 HC GAIT TRAINING EA 15 MIN 6/2/2023 Ignacia Hernandez, PT GP 1          PT G-Codes  Outcome Measure Options: AM-PAC 6 Clicks Basic Mobility (PT)  AM-PAC 6 Clicks Score (PT): 17  AM-PAC 6 Clicks Score (OT): 19  PT Discharge Summary  Anticipated Discharge Disposition (PT): inpatient rehabilitation facility    Ignacia Hernandez PT  6/2/2023

## 2023-06-02 NOTE — PLAN OF CARE
Goal Outcome Evaluation:      VSS, alert and orientated, pain well controlled with PRN meds. Pt voiding well. Still determining if pt will go to rehab or go home with rehab.

## 2023-06-02 NOTE — PLAN OF CARE
Goal Outcome Evaluation:  Plan of Care Reviewed With: patient, son        Progress: improving  Outcome Evaluation: Pt increased ambulatory distance to 40'+20' with CGAx1 and FWW. No LOB or knee buckling. HEP completed. Pt continues to be limited by decreased strength, impaired endurance, and increased pain causing functional mobility below baseline. Pt will benefit from further IPPT for addressing deficits. PT rec d/c to IPR secondary to limited home support.

## 2023-06-02 NOTE — PROGRESS NOTES
"      [facility] [place] [facility] Progress Note    Subjective      LOS: 0 days   Patient Care Team:  Carolina Gutierrez APRN as PCP - General (Nurse Practitioner)  Joel Murillo Jr., MD (Psychiatry)  Zackery Reid MD (Pain Medicine)  Gary Leonard MBBS (Psychiatry)  Yogesh Geiger MD (Psychiatry)  Richmond Adams MD as Consulting Physician (Psychiatry)  Angelo Ramirez MD (Physical Medicine and Rehabilitation)    CC: Right hip pain    Interval History:   Patient resting comfortably in bed.  Pain controlled.  No groin pain.  She has been up to the bedside commode twice overnight, but has not done therapy yet.    Objective      Vital Signs  Temp (24hrs), Av.7 °F (36.5 °C), Min:97 °F (36.1 °C), Max:98.7 °F (37.1 °C)      /58 (BP Location: Right arm, Patient Position: Sitting)   Pulse 73   Temp 97.9 °F (36.6 °C) (Oral)   Resp 12   Ht 165.1 cm (65\")   Wt 112 kg (246 lb)   SpO2 93%   BMI 40.94 kg/m²     Examination:   Examination of the right hip: The wound is clean, dry, and intact.  Knee flexion, knee extension, ankle dorsiflexion, ankle plantar flexion, and EHL are intact.  Sensation intact in the foot to light touch.   Thigh is soft and nontender.      Labs:  Results from last 7 days   Lab Units 23  0403   WBC 10*3/mm3 7.90   HEMOGLOBIN g/dL 13.5   HEMATOCRIT % 42.1   MCV fL 96.8   PLATELETS 10*3/mm3 148       Radiology:  Imaging Results (Last 24 Hours)     Procedure Component Value Units Date/Time    XR Hip With or Without Pelvis 2 - 3 View Right [[id number]] Collected: 23 1353     Updated: 23 1402    Narrative:      XR HIP W OR WO PELVIS 2-3 VIEW RIGHT    Date of Exam: 2023 1:25 PM EDT    Indication: post-op right ABDIAS    Comparison: None available.    FINDINGS:  Postoperative changes are noted status post hip arthroplasty. Near anatomic alignment is observed. There is no evidence for periimplant fracture. Surgical changes are seen within the surrounding soft " tissues.      Impression:      Postoperative changes status post hip arthroplasty. Near-anatomic alignment is noted. There is no evidence for periimplant fracture.    Electronically Signed: Juan Jose Silvalett    6/1/2023 1:58 PM EDT    Workstation ID: [id number]    FL C Arm During Surgery [[id number]] Resulted: 06/01/23 1240     Updated: 06/01/23 1240    Narrative:      This procedure was auto-finalized with no dictation required.          PT:  Physical Therapy - Plan of Care Review - Outcome Summary:   ]       Results Review:     I reviewed the patient's new clinical results.    Assessment and Plan     Assessment:   Status post right total hip arthroplasty-doing well      Status post right hip replacement    Bipolar 1 disorder, manic, mild    HTN (hypertension)    Chronic obstructive pulmonary disease    Avascular necrosis of bone of right hip    Obesity      Plan for disposition: Plan for discharge home today if cleared by physical therapy and medically.  Follow-up in 3 weeks as planned.      Future Appointments   Date Time Provider Department Center   6/26/2023  1:40 PM Shilpa Fraga PA-C [facility]   6/29/2023  3:00 PM [facility] (SCREENING ROOM) [facility]   6/30/2023  2:30 PM [facility]           Adrián Leyva MD  06/02/23  06:05 EDT

## 2023-06-03 LAB
DEPRECATED RDW RBC AUTO: 54.8 FL (ref 37–54)
ERYTHROCYTE [DISTWIDTH] IN BLOOD BY AUTOMATED COUNT: 15.2 % (ref 12.3–15.4)
HCT VFR BLD AUTO: 39.1 % (ref 34–46.6)
HGB BLD-MCNC: 12.7 G/DL (ref 12–15.9)
MCH RBC QN AUTO: 31.8 PG (ref 26.6–33)
MCHC RBC AUTO-ENTMCNC: 32.5 G/DL (ref 31.5–35.7)
MCV RBC AUTO: 98 FL (ref 79–97)
PLATELET # BLD AUTO: 131 10*3/MM3 (ref 140–450)
PMV BLD AUTO: 12.9 FL (ref 6–12)
RBC # BLD AUTO: 3.99 10*6/MM3 (ref 3.77–5.28)
WBC NRBC COR # BLD: 5.73 10*3/MM3 (ref 3.4–10.8)

## 2023-06-03 PROCEDURE — 85027 COMPLETE CBC AUTOMATED: CPT | Performed by: ORTHOPAEDIC SURGERY

## 2023-06-03 PROCEDURE — 63710000001 ASPIRIN 325 MG TABLET DELAYED-RELEASE: Performed by: ORTHOPAEDIC SURGERY

## 2023-06-03 PROCEDURE — 63710000001 TERAZOSIN 5 MG CAPSULE: Performed by: INTERNAL MEDICINE

## 2023-06-03 PROCEDURE — 97116 GAIT TRAINING THERAPY: CPT

## 2023-06-03 PROCEDURE — 63710000001 ATORVASTATIN 10 MG TABLET: Performed by: INTERNAL MEDICINE

## 2023-06-03 PROCEDURE — 63710000001 OXYCODONE 5 MG TABLET: Performed by: ORTHOPAEDIC SURGERY

## 2023-06-03 PROCEDURE — A9270 NON-COVERED ITEM OR SERVICE: HCPCS | Performed by: INTERNAL MEDICINE

## 2023-06-03 PROCEDURE — 25010000002 HYDROMORPHONE 1 MG/ML SOLUTION: Performed by: ORTHOPAEDIC SURGERY

## 2023-06-03 PROCEDURE — 97110 THERAPEUTIC EXERCISES: CPT

## 2023-06-03 PROCEDURE — A9270 NON-COVERED ITEM OR SERVICE: HCPCS | Performed by: ORTHOPAEDIC SURGERY

## 2023-06-03 PROCEDURE — 63710000001 DIVALPROEX 250 MG TABLET SUSTAINED-RELEASE 24 HOUR: Performed by: INTERNAL MEDICINE

## 2023-06-03 PROCEDURE — 63710000001 QUETIAPINE 100 MG TABLET: Performed by: INTERNAL MEDICINE

## 2023-06-03 PROCEDURE — 63710000001 MELOXICAM 15 MG TABLET: Performed by: ORTHOPAEDIC SURGERY

## 2023-06-03 PROCEDURE — 63710000001 PRIMIDONE 50 MG TABLET: Performed by: INTERNAL MEDICINE

## 2023-06-03 PROCEDURE — 63710000001 BUPROPION XL 150 MG TABLET SUSTAINED-RELEASE 24 HOUR: Performed by: INTERNAL MEDICINE

## 2023-06-03 RX ADMIN — HYDROMORPHONE HYDROCHLORIDE 0.5 MG: 1 INJECTION, SOLUTION INTRAMUSCULAR; INTRAVENOUS; SUBCUTANEOUS at 13:26

## 2023-06-03 RX ADMIN — BUPROPION HYDROCHLORIDE 150 MG: 150 TABLET, FILM COATED, EXTENDED RELEASE ORAL at 06:15

## 2023-06-03 RX ADMIN — DIVALPROEX SODIUM 500 MG: 250 TABLET, EXTENDED RELEASE ORAL at 20:48

## 2023-06-03 RX ADMIN — TERAZOSIN HYDROCHLORIDE 5 MG: 5 CAPSULE ORAL at 20:48

## 2023-06-03 RX ADMIN — MELOXICAM 15 MG: 15 TABLET ORAL at 08:57

## 2023-06-03 RX ADMIN — Medication 10 ML: at 20:50

## 2023-06-03 RX ADMIN — Medication 10 ML: at 08:59

## 2023-06-03 RX ADMIN — OXYCODONE HYDROCHLORIDE 5 MG: 5 TABLET ORAL at 13:26

## 2023-06-03 RX ADMIN — ATORVASTATIN CALCIUM 10 MG: 10 TABLET, FILM COATED ORAL at 20:48

## 2023-06-03 RX ADMIN — OXYCODONE HYDROCHLORIDE 5 MG: 5 TABLET ORAL at 20:48

## 2023-06-03 RX ADMIN — PRIMIDONE 50 MG: 50 TABLET ORAL at 20:48

## 2023-06-03 RX ADMIN — QUETIAPINE FUMARATE 300 MG: 100 TABLET ORAL at 20:47

## 2023-06-03 RX ADMIN — OXYCODONE HYDROCHLORIDE 5 MG: 5 TABLET ORAL at 08:57

## 2023-06-03 RX ADMIN — PRIMIDONE 50 MG: 50 TABLET ORAL at 09:01

## 2023-06-03 RX ADMIN — HYDROMORPHONE HYDROCHLORIDE 0.5 MG: 1 INJECTION, SOLUTION INTRAMUSCULAR; INTRAVENOUS; SUBCUTANEOUS at 08:57

## 2023-06-03 RX ADMIN — HYDROMORPHONE HYDROCHLORIDE 0.5 MG: 1 INJECTION, SOLUTION INTRAMUSCULAR; INTRAVENOUS; SUBCUTANEOUS at 20:47

## 2023-06-03 RX ADMIN — ASPIRIN 325 MG: 325 TABLET, COATED ORAL at 08:57

## 2023-06-03 NOTE — PROGRESS NOTES
"      [facility] [facility]    Subjective      LOS: 0 days   Patient Care Team:  Carolina Gutierrez APRN as PCP - General (Nurse Practitioner)  Joel Murillo Jr., MD (Psychiatry)  Zackery Reid MD (Pain Medicine)  Gary Leonard MBBS (Psychiatry)  Yogesh Geiger MD (Psychiatry)  Richmond Adams MD as Consulting Physician (Psychiatry)  Angelo Ramirez MD (Physical Medicine and Rehabilitation)    CC: Right hip pain    Interval History:   Patient resting comfortably in chair.  Pain controlled.  No groin pain.  Notes some incisional pain and burning, up with therapy this morning.     Objective      Vital Signs  Temp (24hrs), Av.2 °F (36.8 °C), Min:97.6 °F (36.4 °C), Max:98.6 °F (37 °C)      /62 (BP Location: Right arm, Patient Position: Lying)   Pulse 81   Temp 98.6 °F (37 °C) (Oral)   Resp 16   Ht 165.1 cm (65\")   Wt 112 kg (246 lb)   SpO2 96%   BMI 40.94 kg/m²     Examination:   Examination of the right hip: The wound is clean, dry, and intact.  Knee flexion, knee extension, ankle dorsiflexion, ankle plantar flexion, and EHL are intact.  Sensation intact in the foot to light touch.   Thigh is soft and nontender.      Labs:  Results from last 7 days   Lab Units 23  0426 23  0403   WBC 10*3/mm3 5.73 7.90   HEMOGLOBIN g/dL 12.7 13.5   HEMATOCRIT % 39.1 42.1   MCV fL 98.0* 96.8   PLATELETS 10*3/mm3 131* 148         Radiology:  Imaging Results (Last 24 Hours)       ** No results found for the last 24 hours. **            PT:  Physical Therapy - Plan of Care Review - Outcome Summary:  Outcome Evaluation: Pt increased ambulatory distance to 40'+20' with CGAx1 and FWW. No LOB or knee buckling. HEP completed. Pt continues to be limited by decreased strength, impaired endurance, and increased pain causing functional mobility below baseline. Pt will benefit from further IPPT for addressing deficits. PT rec d/c to IPR secondary to limited home support. (23 " [id number])]       Results Review:     I reviewed the patient's new clinical results.    Assessment and Plan     Assessment:   Status post right total hip arthroplasty-doing well      Status post right hip replacement    Bipolar 1 disorder, manic, mild    HTN (hypertension)    Chronic obstructive pulmonary disease    Avascular necrosis of bone of right hip    Obesity      Plan for disposition: Plan for discharge home vs. IPR pending progress with PT.  Follow-up in 3 weeks as planned.      Future Appointments   Date Time Provider Department Center   6/26/2023  1:40 PM Shilpa Fraga PA-C [facility]   6/29/2023  3:00 PM [facility] (SCREENING ROOM)  [facility]   6/30/2023  2:30 PM [facility] 1 [facility] James Benito PA-C  06/03/23  10:47 EDT

## 2023-06-03 NOTE — PLAN OF CARE
Goal Outcome Evaluation:  Plan of Care Reviewed With: patient        Progress: improving  Outcome Evaluation: Patient increased gait distance to 70 feet with RW, CGA, step to gait pattern, able to achieve full step with R LE this PM. Gait limited by fatigue and weakness. Patient below baseline, demonstrating decreased functional mobility status and decreased R hip strength/ROM. Will address these deficits to promote return to PLOF. Recommend IRF at d/c.

## 2023-06-03 NOTE — PLAN OF CARE
Problem: Adult Inpatient Plan of Care  Goal: Plan of Care Review  Outcome: Ongoing, Progressing  Goal: Patient-Specific Goal (Individualized)  Outcome: Ongoing, Progressing  Goal: Absence of Hospital-Acquired Illness or Injury  Outcome: Ongoing, Progressing  Intervention: Identify and Manage Fall Risk  Recent Flowsheet Documentation  Taken 6/2/2023 2043 by Loreta Kurtz RN  Safety Promotion/Fall Prevention:   activity supervised   assistive device/personal items within reach   clutter free environment maintained   fall prevention program maintained   nonskid shoes/slippers when out of bed   room organization consistent   safety round/check completed  Intervention: Prevent Skin Injury  Recent Flowsheet Documentation  Taken 6/2/2023 2043 by Loreta Kurtz RN  Body Position: position changed independently  Skin Protection:   adhesive use limited   incontinence pads utilized   transparent dressing maintained  Intervention: Prevent and Manage VTE (Venous Thromboembolism) Risk  Recent Flowsheet Documentation  Taken 6/2/2023 2043 by Loreta Kurtz RN  Activity Management: up in chair  VTE Prevention/Management:   foot pump device off   patient refused intervention  Range of Motion:   active ROM (range of motion) encouraged   ROM (range of motion) performed  Intervention: Prevent Infection  Recent Flowsheet Documentation  Taken 6/2/2023 2043 by Loreta Kurtz RN  Infection Prevention:   hand hygiene promoted   rest/sleep promoted   single patient room provided  Goal: Optimal Comfort and Wellbeing  Outcome: Ongoing, Progressing  Intervention: Monitor Pain and Promote Comfort  Recent Flowsheet Documentation  Taken 6/2/2023 2216 by Loreta Kurtz RN  Pain Management Interventions: see MAR  Taken 6/2/2023 2211 by Loreta Kurtz RN  Pain Management Interventions: see MAR  Taken 6/2/2023 2043 by Loreta Kurtz RN  Pain Management Interventions:   see MAR   cold applied  Intervention: Provide  Person-Centered Care  Recent Flowsheet Documentation  Taken 6/2/2023 2043 by Loreta Kurtz, RN  Trust Relationship/Rapport:   care explained   choices provided   questions answered   questions encouraged   thoughts/feelings acknowledged   reassurance provided  Goal: Readiness for Transition of Care  Outcome: Ongoing, Progressing   Goal Outcome Evaluation:

## 2023-06-03 NOTE — PROGRESS NOTES
"IM progress note      Loreta Platt States  [id number]  1970     LOS: 0 days     Attending: Adrián Leyva MD    Primary Care Provider: Carolina Gutierrez APRN      Chief Complaint/Reason for visit:  No chief complaint on file.      Subjective    Feels ok. Some incisional pain. No f/c/n/vom/sob.     Objective        Visit Vitals  /62 (BP Location: Right arm, Patient Position: Lying)   Pulse 81   Temp 98.6 °F (37 °C) (Oral)   Resp 16   Ht 165.1 cm (65\")   Wt 112 kg (246 lb)   SpO2 96%   BMI 40.94 kg/m²     Temp (24hrs), Av.2 °F (36.8 °C), Min:97.6 °F (36.4 °C), Max:98.6 °F (37 °C)      Intake/Output:    Intake/Output Summary (Last 24 hours) at 6/3/2023 1230  Last data filed at 6/3/2023 1100  Gross per 24 hour   Intake --   Output 1000 ml   Net -1000 ml          Physical Therapy:  Progress: no change  Outcome Evaluation: Patient ambulated 30 feet with RW, CGA, step to gait pattern, limited by fatigue, weakness, and burning R hip/thigh pain. Patient continues to have significant difficulty with advancing R LE, only able to scoot R foot forward 3-4 times to achieve full step length. Patient continues to be below baseline, demonstrating decreased functional mobility status and decreased R hip strength/ROM. Will address these deficits to promote return to PLOF. Recommend IRF at d/c, patient currently unable to ambulate household distances.     Physical Exam:     General Appearance:    Alert, cooperative, in no acute distress   Head:    Normocephalic, without obvious abnormality, atraumatic    Lungs:     Normal effort, symmetric chest rise,  clear to      auscultation bilaterally              Heart:    Regular rhythm and normal rate, normal S1 and S2    Abdomen:     Normal bowel sounds, no masses, no organomegaly, soft        non-tender, non-distended, no guarding, no rebound                tenderness   Extremities:   CDI dressing.   Flexion and dorsiflexion intact bilateral feet.    No clubbing, cyanosis " or edema.  No deformities.    Pulses:   Pulses palpable and equal bilaterally   Skin:   No bleeding, bruising or rash          Results Review:     I reviewed the patient's new clinical results.   Results from last 7 days   Lab Units 06/03/23  0426 06/02/23  0403   WBC 10*3/mm3 5.73 7.90   HEMOGLOBIN g/dL 12.7 13.5   HEMATOCRIT % 39.1 42.1   PLATELETS 10*3/mm3 131* 148             Invalid input(s): ALYSSA CLAUDIO  I reviewed the patient's new imaging including images and reports.    All medications reviewed.   aspirin, 325 mg, Oral, Daily  atorvastatin, 10 mg, Oral, Nightly  budesonide-formoterol, 2 puff, Inhalation, BID  buPROPion XL, 150 mg, Oral, QAM  divalproex, 500 mg, Oral, Nightly  meloxicam, 15 mg, Oral, Daily  primidone, 50 mg, Oral, BID  QUEtiapine, 300 mg, Oral, Nightly  sodium chloride, 10 mL, Intravenous, Q12H  terazosin, 5 mg, Oral, Nightly      albuterol sulfate HFA, 2 puff, Q4H PRN  HYDROmorphone, 0.5 mg, Q2H PRN   And  naloxone, 0.1 mg, Q5 Min PRN  Morphine, 4 mg, Q2H PRN   And  naloxone, 0.4 mg, Q5 Min PRN  ondansetron, 4 mg, Q6H PRN   Or  ondansetron, 4 mg, Q6H PRN  oxyCODONE, 5 mg, Q4H PRN  sodium chloride, 1-10 mL, PRN  sodium chloride, 40 mL, PRN  tiZANidine, 4 mg, Q8H PRN        Assessment & Plan       Status post right hip replacement    Bipolar 1 disorder, manic, mild    HTN (hypertension)    Chronic obstructive pulmonary disease    Avascular necrosis of bone of right hip    Obesity         Plan   1. PT/OT,  Weight bearing as tolerated right LE.  Total hip precautions  2. Pain control-prns  3. IS-encourage  4. DVT proph- Mechanicals and aspirin  5. Bowel regimen  6. Resume home medications as appropriate  7. Monitor post-op labs  8. DC planning pending further progress with PT/OT . So far IPR is recommended.        - Hypertension:  Resumed home medications as appropriate, formulary substitution when indicated.  Holding parameters.  Prn medications for elevated blood pressure.     -Bipolar  disorder: Maintained on home regimen.     -Obesity: Complicates all aspects of care.    -COPD: maintain on home regimen. BDs prn.      Ra Solitario MD  06/03/23  12:30 EDT

## 2023-06-03 NOTE — PLAN OF CARE
Goal Outcome Evaluation:  Plan of Care Reviewed With: patient        Progress: no change  Outcome Evaluation: Patient ambulated 30 feet with RW, CGA, step to gait pattern, limited by fatigue, weakness, and burning R hip/thigh pain. Patient continues to have significant difficulty with advancing R LE, only able to scoot R foot forward 3-4 times to achieve full step length. Patient continues to be below baseline, demonstrating decreased functional mobility status and decreased R hip strength/ROM. Will address these deficits to promote return to PLOF. Recommend IRF at d/c, patient currently unable to ambulate household distances.

## 2023-06-03 NOTE — THERAPY TREATMENT NOTE
Patient Name: Loreta Platt States  : 1970    MRN: [id number]                              Today's Date: 6/3/2023       Admit Date: 2023    Visit Dx:     ICD-10-CM ICD-9-CM   1. Status post right hip replacement  Z96.641 V43.64   2. Avascular necrosis of bone of right hip  M87.051 733.42     Patient Active Problem List   Diagnosis    Tobacco use    Bipolar 1 disorder, manic, mild    Morbid obesity with BMI of 50.0-59.9, adult    Other hyperlipidemia    HTN (hypertension)    Chronic obstructive pulmonary disease    Impaired glucose tolerance    Chronic right shoulder pain    Avascular necrosis of bone of right hip    Status post right hip replacement    Obesity     Past Medical History:   Diagnosis Date    Anxiety     Bipolar 1 disorder     Cataract     Chronic bronchitis     Chronic constipation     Colon polyp     COPD (chronic obstructive pulmonary disease)     Depression     Hip arthrosis     Hyperlipidemia     Knee swelling     Obesity     Parkinsonism     DRUG INDUCED    Primary generalized (osteo)arthritis     PTSD (post-traumatic stress disorder)     RLS (restless legs syndrome)      Past Surgical History:   Procedure Laterality Date    ABDOMINOPLASTY      ANTERIOR AND POSTERIOR VAGINAL REPAIR      BREAST BIOPSY Right     US BIOPSY     SECTION      X 2    CHOLECYSTECTOMY      COLONOSCOPY N/A 2019    Procedure: COLONOSCOPY;  Surgeon: Jonathan Pablo MD;  Location: [facility] ENDOSCOPY;  Service: Gastroenterology    HYSTERECTOMY      AGE 33    LIPOSUCTION ABDOMINAL  1998      General Information       Row Name 23 1419 23 0851       Physical Therapy Time and Intention    Document Type therapy note (daily note)  -LR therapy note (daily note)  -LR    Mode of Treatment physical therapy;individual therapy  -LR physical therapy;individual therapy  -LR      Row Name 23 1419 23 0851       General Information    Patient Profile Reviewed yes  -LR yes  -LR    Existing  Precautions/Restrictions fall;right;hip, anterior  -LR fall;right;hip, anterior  -LR    Barriers to Rehab previous functional deficit  -LR previous functional deficit  -LR      Row Name 06/03/23 1419 06/03/23 0851       Cognition    Orientation Status (Cognition) oriented x 4  -LR oriented x 4  -LR      Row Name 06/03/23 1419 06/03/23 0851       Safety Issues, Functional Mobility    Safety Issues Affecting Function (Mobility) safety precautions follow-through/compliance;safety precaution awareness;positioning of assistive device  -LR safety precautions follow-through/compliance;safety precaution awareness;positioning of assistive device  -LR    Impairments Affecting Function (Mobility) balance;endurance/activity tolerance;pain;strength;range of motion (ROM)  -LR balance;endurance/activity tolerance;pain;strength;range of motion (ROM)  -LR              User Key  (r) = Recorded By, (t) = Taken By, (c) = Cosigned By      Initials Name Provider Type    LR Krystyna Nassar, PT Physical Therapist                   Mobility       Row Name 06/03/23 1419 06/03/23 0851       Bed Mobility    Supine-Sit [place] (Bed Mobility) verbal cues;standby assist  -LR not tested  -LR    Sit-Supine [place] (Bed Mobility) not tested  -LR not tested  -LR    Assistive Device (Bed Mobility) head of bed elevated;bed rails  -LR --    Comment, (Bed Mobility) Verbal cues to move LEs towards EOB and to push up from bed to raise trunk into sitting and to scoot hips out to get feet on floor. Required increased time to perform and used UEs to assist in moving R LE. Denied dizziness upon sitting up.  -LR UIC on arrival and at end of treatment.  -LR      Row Name 06/03/23 1419 06/03/23 0851       Transfers    Comment, (Transfers) Verbal cues for correct hand placement with t/f and to step R LE out before t/f for comfort. Assisted to and from bathroom, cues for sequencing.  -LR Verbal cues for correct hand placement with t/f and to step R  LE out before t/f for comfort. Assisted patient to and from bathroom. Cues for correct sequencing to turn in front of commode. Increased assist required to rise from commode, min assist.  -LR      Row Name 06/03/23 1419 06/03/23 0851       Bed-Chair Transfer    Bed-Chair [place] (Transfers) not tested  -LR not tested  -LR      Row Name 06/03/23 1419 06/03/23 0851       Sit-Stand Transfer    Sit-Stand [place] (Transfers) verbal cues;contact guard  -LR verbal cues;contact guard  -LR    Assistive Device (Sit-Stand Transfers) walker, front-wheeled  -LR walker, front-wheeled  -LR      Row Name 06/03/23 1419 06/03/23 0851       Gait/Stairs (Locomotion)    [place] Level (Gait) verbal cues;contact guard  -LR verbal cues;contact guard  -LR    Assistive Device (Gait) walker, front-wheeled  -LR walker, front-wheeled  -LR    Distance in Feet (Gait) 70  -LR 30  -LR    Deviations/Abnormal Patterns (Gait) bilateral deviations;weight shifting decreased;gait speed decreased;stride length decreased;right sided deviations;antalgic  -LR bilateral deviations;meredith decreased;gait speed decreased;stride length decreased;right sided deviations;antalgic  -LR    Bilateral Gait Deviations heel strike decreased  -LR forward flexed posture;heel strike decreased  -LR    Right Sided Gait Deviations weight shift ability decreased  -LR weight shift ability decreased  -LR    [place] Level (Stairs) not tested  -LR not tested  -LR    Comment, (Gait/Stairs) Patient ambulated with step to gait pattern at slow pace. Verbal cues for weight shifting and sequencing of steps. Patient able to take full steps with R LE this PM, not having to shuffle/scoot R foot forward. Verbal cues for increased R knee flexion and R ankle DF during swing phase to compensate for decreased R hip flexion. Gait limited by fatigue and weakness.  -LR Patient ambulated with step to gait pattern at slow pace. Patient continues to have significant difficulty with  advancing R LE, scoots foot forward 3-4 times to achieve full step length. Verbal cues for weight shifting and techniques for compensation but little improvement noted. Gait limited by fatigue, weakness, and burning R hip/thigh pain.  -LR      Row Name 06/03/23 1419 06/03/23 0851       Mobility    Extremity Weight-bearing Status right lower extremity  -LR right lower extremity  -LR    Right Lower Extremity (Weight-bearing Status) weight-bearing as tolerated (WBAT)  -LR weight-bearing as tolerated (WBAT)  -LR              User Key  (r) = Recorded By, (t) = Taken By, (c) = Cosigned By      Initials Name Provider Type    LR Krystyna Nassar, PT Physical Therapist                   Obj/Interventions       Row Name 06/03/23 1419 06/03/23 0851       Motor Skills    Therapeutic Exercise ankle;knee;hip;other (see comments)  cues for technique; min assist R SLR, R heel slides, R hip abd  -LR ankle;knee;hip;other (see comments)  cues for technique; min assist R Hip abd, R heel slides, max assist R SLR  -LR      Row Name 06/03/23 1419 06/03/23 0851       Hip (Therapeutic Exercise)    Hip (Therapeutic Exercise) strengthening exercise;isometric exercises  -LR strengthening exercise;isometric exercises  -LR    Hip Isometrics (Therapeutic Exercise) bilateral;gluteal sets;sitting;10 repetitions  -LR bilateral;gluteal sets;sitting;10 repetitions  -LR    Hip Strengthening (Therapeutic Exercise) right;heel slides;aBduction;sitting;10 repetitions  -LR right;heel slides;aBduction;sitting;10 repetitions  -LR      Row Name 06/03/23 1419 06/03/23 0851       Knee (Therapeutic Exercise)    Knee (Therapeutic Exercise) strengthening exercise;isometric exercises  -LR strengthening exercise;isometric exercises  -LR    Knee Isometrics (Therapeutic Exercise) bilateral;quad sets;sitting;10 repetitions  -LR right;quad sets;sitting;10 repetitions;5 repetitions  -LR    Knee Strengthening (Therapeutic Exercise) right;SLR (straight leg raise);SAQ  (short arc quad);LAQ (long arc quad);sitting;10 repetitions  -LR right;SLR (straight leg raise);SAQ (short arc quad);LAQ (long arc quad);sitting;10 repetitions  -LR      Row Name 06/03/23 1419 06/03/23 0851       Ankle (Therapeutic Exercise)    Ankle (Therapeutic Exercise) AROM (active range of motion)  -LR AROM (active range of motion)  -LR    Ankle AROM (Therapeutic Exercise) bilateral;dorsiflexion;plantarflexion;sitting;10 repetitions  -LR bilateral;dorsiflexion;plantarflexion;sitting;10 repetitions  -LR      Row Name 06/03/23 1419 06/03/23 0851       Balance    Balance Assessment sitting static balance;sitting dynamic balance;standing static balance;standing dynamic balance  -LR sitting static balance;sitting dynamic balance;standing static balance;standing dynamic balance  -LR    Static Sitting Balance standby assist  -LR standby assist  -LR    Dynamic Sitting Balance standby assist  -LR standby assist  -LR    Position, Sitting Balance unsupported;sitting edge of bed  -LR unsupported;sitting in chair  -LR    Static Standing Balance contact guard  -LR contact guard  -LR    Dynamic Standing Balance contact guard  -LR contact guard  -LR    Position/Device Used, Standing Balance supported;walker, rolling  -LR supported;walker, rolling  -LR              User Key  (r) = Recorded By, (t) = Taken By, (c) = Cosigned By      Initials Name Provider Type    Krystyna Frances, PT Physical Therapist                   Goals/Plan       Row Name 06/03/23 1419 06/03/23 0851       Bed Mobility Goal 1 (PT)    Activity/Assistive Device (Bed Mobility Goal 1, PT) sit to supine;supine to sit  -LR sit to supine;supine to sit  -LR    [place] Level/Cues Needed (Bed Mobility Goal 1, PT) independent  -LR independent  -LR    Time Frame (Bed Mobility Goal 1, PT) long term goal (LTG);10 days  -LR long term goal (LTG);10 days  -LR    Progress/Outcomes (Bed Mobility Goal 1, PT) goal ongoing;continuing progress toward goal  -LR goal  ongoing  -LR      Row Name 06/03/23 1419 06/03/23 0851       Transfer Goal 1 (PT)    Activity/Assistive Device (Transfer Goal 1, PT) sit-to-stand/stand-to-sit;bed-to-chair/chair-to-bed;walker, rolling  -LR sit-to-stand/stand-to-sit;bed-to-chair/chair-to-bed;walker, rolling  -LR    [place] Level/Cues Needed (Transfer Goal 1, PT) modified independence  -LR modified independence  -LR    Time Frame (Transfer Goal 1, PT) long term goal (LTG);10 days  -LR long term goal (LTG);10 days  -LR    Progress/Outcome (Transfer Goal 1, PT) continuing progress toward goal;goal ongoing  -LR continuing progress toward goal;goal ongoing  -LR      Row Name 06/03/23 1419 06/03/23 0851       Gait Training Goal 1 (PT)    Activity/Assistive Device (Gait Training Goal 1, PT) gait (walking locomotion);assistive device use;walker, rolling  -LR gait (walking locomotion);assistive device use;walker, rolling  -LR    [place] Level (Gait Training Goal 1, PT) modified independence  -LR modified independence  -LR    Distance (Gait Training Goal 1, PT) 150 feet  - feet  -LR    Time Frame (Gait Training Goal 1, PT) long term goal (LTG);10 days  -LR long term goal (LTG);10 days  -LR    Progress/Outcome (Gait Training Goal 1, PT) continuing progress toward goal;goal ongoing  -LR progress slower than expected;goal ongoing  -LR              User Key  (r) = Recorded By, (t) = Taken By, (c) = Cosigned By      Initials Name Provider Type    LR Krystyna Nassar, PT Physical Therapist                   Clinical Impression       Row Name 06/03/23 1419 06/03/23 0851       Pain    Pretreatment Pain Rating -- 10/10  -LR    Posttreatment Pain Rating -- 8/10  -LR    Pain Location - Side/Orientation -- Right  -LR    Pain Location -- anterior  -LR    Pain Location - -- hip  -LR    Pain Intervention(s) Ambulation/increased activity;Repositioned  -LR Ambulation/increased activity;Cold applied;Repositioned;Medication (See MAR)  -LR    Additional  Documentation Pain Scale: FACES Pre/Post-Treatment (Group)  -LR --      Row Name 06/03/23 1419          Pain Scale: FACES Pre/Post-Treatment    Pain: FACES Scale, Pretreatment 2-->hurts little bit  -LR     Posttreatment Pain Rating 4-->hurts little more  -LR     Pain Location - Side/Orientation Right  -LR     Pain Location - hip  -LR       Row Name 06/03/23 1419 06/03/23 0851       Plan of Care Review    Plan of Care Reviewed With patient  -LR patient  -LR    Progress improving  -LR no change  -LR    Outcome Evaluation Patient increased gait distance to 70 feet with RW, CGA, step to gait pattern, able to achieve full step with R LE this PM. Gait limited by fatigue and weakness. Patient below baseline, demonstrating decreased functional mobility status and decreased R hip strength/ROM. Will address these deficits to promote return to PLOF. Recommend IRF at d/c.  -LR Patient ambulated 30 feet with RW, CGA, step to gait pattern, limited by fatigue, weakness, and burning R hip/thigh pain. Patient continues to have significant difficulty with advancing R LE, only able to scoot R foot forward 3-4 times to achieve full step length. Patient continues to be below baseline, demonstrating decreased functional mobility status and decreased R hip strength/ROM. Will address these deficits to promote return to PLOF. Recommend IRF at d/c, patient currently unable to ambulate household distances.  -LR      Row Name 06/03/23 1419 06/03/23 0851       Therapy Assessment/Plan (PT)    Rehab Potential (PT) fair, will monitor progress closely  -LR fair, will monitor progress closely  -LR    Criteria for Skilled Interventions Met (PT) yes;meets criteria;skilled treatment is necessary  -LR yes;meets criteria;skilled treatment is necessary  -LR    Therapy Frequency (PT) 2 times/day  -LR 2 times/day  -LR      Row Name 06/03/23 1419 06/03/23 0851       Positioning and Restraints    Pre-Treatment Position in bed  -LR sitting in chair/recliner   -LR    Post Treatment Position chair  -LR chair  -LR    In Chair notified nsg;reclined;sitting;call light within reach;encouraged to call for assist;exit alarm on;legs elevated  SCDs not on on arrival  -LR notified nsg;reclined;sitting;call light within reach;encouraged to call for assist;exit alarm on;legs elevated;waffle cushion  SCDs not on on arrival  -LR              User Key  (r) = Recorded By, (t) = Taken By, (c) = Cosigned By      Initials Name Provider Type    Krystyna Frances, FLORIN Physical Therapist                   Outcome Measures       Row Name 06/03/23 1419 06/03/23 0851       How much help from another person do you currently need...    Turning from your back to your side while in flat bed without using bedrails? 3  -LR 3  -LR    Moving from lying on back to sitting on the side of a flat bed without bedrails? 3  -LR 3  -LR    Moving to and from a bed to a chair (including a wheelchair)? 3  -LR 3  -LR    Standing up from a chair using your arms (e.g., wheelchair, bedside chair)? 3  -LR 3  -LR    Climbing 3-5 steps with a railing? 2  -LR 1  -LR    To walk in hospital room? 3  -LR 3  -LR    AM-PAC 6 Clicks Score (PT) 17  -LR 16  -LR    Highest level of mobility 5 --> Static standing  -LR 5 --> Static standing  -LR      Row Name 06/03/23 1419 06/03/23 0851       Functional Assessment    Outcome Measure Options AM-PAC 6 Clicks Basic Mobility (PT)  -LR AM-PAC 6 Clicks Basic Mobility (PT)  -LR              User Key  (r) = Recorded By, (t) = Taken By, (c) = Cosigned By      Initials Name Provider Type    Krystyna Frances, PT Physical Therapist                                 Physical Therapy Education       Title: PT OT SLP Therapies (In Progress)       Topic: Physical Therapy (Done)       Point: Mobility training (Done)       Learning Progress Summary             Patient Acceptance, E,D, VU,NR by LR at 6/3/2023 1419    Comment: Educated on precautions, weight bearing status, safety with  mobility, correct supine to sit t/f technique, correct sit<->stand t/f technique, correct gait mechanics, LE HEP, and progression of POC.    Acceptance, E,D, VU,NR by LR at 6/3/2023 0851    Comment: Educated on anterior hip precautions, weight bearing status, safety with mobility, correct sit<->stand t/f technique, correct gait mechanics, LE ther ex,  and progression of POC.    Acceptance, E,D, VU,NR by AB at 6/2/2023 1549    Acceptance, E,D, VU,NR by AB at 6/2/2023 1143                         Point: Home exercise program (Done)       Learning Progress Summary             Patient Acceptance, E,D, VU,NR by LR at 6/3/2023 1419    Comment: Educated on precautions, weight bearing status, safety with mobility, correct supine to sit t/f technique, correct sit<->stand t/f technique, correct gait mechanics, LE HEP, and progression of POC.    Acceptance, E,D, VU,NR by LR at 6/3/2023 0851    Comment: Educated on anterior hip precautions, weight bearing status, safety with mobility, correct sit<->stand t/f technique, correct gait mechanics, LE ther ex,  and progression of POC.    Acceptance, E,D, VU,NR by AB at 6/2/2023 1549    Acceptance, E,D, VU,NR by AB at 6/2/2023 1143                         Point: Body mechanics (Done)       Learning Progress Summary             Patient Acceptance, E,D, VU,NR by LR at 6/3/2023 1419    Comment: Educated on precautions, weight bearing status, safety with mobility, correct supine to sit t/f technique, correct sit<->stand t/f technique, correct gait mechanics, LE HEP, and progression of POC.    Acceptance, E,D, VU,NR by LR at 6/3/2023 0851    Comment: Educated on anterior hip precautions, weight bearing status, safety with mobility, correct sit<->stand t/f technique, correct gait mechanics, LE ther ex,  and progression of POC.    Acceptance, E,D, VU,NR by AB at 6/2/2023 1549    Acceptance, E,D, VU,NR by AB at 6/2/2023 1143                         Point: Precautions (Done)       Learning  Progress Summary             Patient Acceptance, E,D, VU,NR by LR at 6/3/2023 1419    Comment: Educated on precautions, weight bearing status, safety with mobility, correct supine to sit t/f technique, correct sit<->stand t/f technique, correct gait mechanics, LE HEP, and progression of POC.    Acceptance, E,D, VU,NR by LR at 6/3/2023 0851    Comment: Educated on anterior hip precautions, weight bearing status, safety with mobility, correct sit<->stand t/f technique, correct gait mechanics, LE ther ex,  and progression of POC.    Acceptance, E,D, VU,NR by AB at 6/2/2023 1549    Acceptance, E,D, VU,NR by AB at 6/2/2023 1143                                         User Key       Initials Effective Dates Name Provider Type Discipline    LR 02/03/23 -  Krysytna Nassar, PT Physical Therapist PT    AB 09/22/22 -  Ignacia Hernandez, PT Physical Therapist PT                  PT Recommendation and Plan     Plan of Care Reviewed With: patient  Progress: improving  Outcome Evaluation: Patient increased gait distance to 70 feet with RW, CGA, step to gait pattern, able to achieve full step with R LE this PM. Gait limited by fatigue and weakness. Patient below baseline, demonstrating decreased functional mobility status and decreased R hip strength/ROM. Will address these deficits to promote return to PLOF. Recommend IRF at d/c.     Time Calculation:    PT Charges       Row Name 06/03/23 1419 06/03/23 0851          Time Calculation    Start Time 1419  -LR 0851  -LR     PT Received On 06/03/23  -LR 06/03/23  -LR     PT Goal Re-Cert Due Date 06/12/23  -LR 06/12/23  -LR        Timed Charges    18234 - PT Therapeutic Exercise Minutes 10  -LR 10  -LR     15630 - Gait Training Minutes  13  -LR 10  -LR     57400 - PT Therapeutic Activity Minutes -- 5  -LR        Total Minutes    Timed Charges Total Minutes 23  -LR 25  -LR      Total Minutes 23  -LR 25  -LR               User Key  (r) = Recorded By, (t) = Taken By, (c) = Cosigned By       Initials Name Provider Type    LR Krystyna Nassar, PT Physical Therapist                  Therapy Charges for Today       Code Description Service Date Service Provider Modifiers Qty    45395434931 HC PT THER PROC EA 15 MIN 6/3/2023 Krystyna Nassar, PT GP 1    54707891211 HC GAIT TRAINING EA 15 MIN 6/3/2023 Krystyna Nassar, PT GP 1    [id number] HC PT THER PROC EA 15 MIN 6/3/2023 Krystyna Nassar, PT GP 1    96641371871 HC GAIT TRAINING EA 15 MIN 6/3/2023 Krystyna Nassar, PT GP 1            PT G-Codes  Outcome Measure Options: AM-PAC 6 Clicks Basic Mobility (PT)  AM-PAC 6 Clicks Score (PT): 17  AM-PAC 6 Clicks Score (OT): 19  PT Discharge Summary  Anticipated Discharge Disposition (PT): inpatient rehabilitation facility    Krystyna Nassar, PT  6/3/2023

## 2023-06-03 NOTE — THERAPY TREATMENT NOTE
Patient Name: Loreta Platt States  : 1970    MRN: [id number]                              Today's Date: 6/3/2023       Admit Date: 2023    Visit Dx:     ICD-10-CM ICD-9-CM   1. Status post right hip replacement  Z96.641 V43.64   2. Avascular necrosis of bone of right hip  M87.051 733.42     Patient Active Problem List   Diagnosis    Tobacco use    Bipolar 1 disorder, manic, mild    Morbid obesity with BMI of 50.0-59.9, adult    Other hyperlipidemia    HTN (hypertension)    Chronic obstructive pulmonary disease    Impaired glucose tolerance    Chronic right shoulder pain    Avascular necrosis of bone of right hip    Status post right hip replacement    Obesity     Past Medical History:   Diagnosis Date    Anxiety     Bipolar 1 disorder     Cataract     Chronic bronchitis     Chronic constipation     Colon polyp     COPD (chronic obstructive pulmonary disease)     Depression     Hip arthrosis     Hyperlipidemia     Knee swelling     Obesity     Parkinsonism     DRUG INDUCED    Primary generalized (osteo)arthritis     PTSD (post-traumatic stress disorder)     RLS (restless legs syndrome)      Past Surgical History:   Procedure Laterality Date    ABDOMINOPLASTY      ANTERIOR AND POSTERIOR VAGINAL REPAIR      BREAST BIOPSY Right     US BIOPSY     SECTION      X 2    CHOLECYSTECTOMY      COLONOSCOPY N/A 2019    Procedure: COLONOSCOPY;  Surgeon: Jonathan Pablo MD;  Location: [facility] ENDOSCOPY;  Service: Gastroenterology    HYSTERECTOMY      AGE 33    LIPOSUCTION ABDOMINAL  1998      General Information       Row Name 23 0851          Physical Therapy Time and Intention    Document Type therapy note (daily note)  -LR     Mode of Treatment physical therapy;individual therapy  -LR       Row Name 23 0851          General Information    Patient Profile Reviewed yes  -LR     Existing Precautions/Restrictions fall;right;hip, anterior  -LR     Barriers to Rehab previous functional deficit   -LR       Row Name 06/03/23 0851          Cognition    Orientation Status (Cognition) oriented x 4  -LR       Row Name 06/03/23 0851          Safety Issues, Functional Mobility    Safety Issues Affecting Function (Mobility) safety precautions follow-through/compliance;safety precaution awareness;positioning of assistive device  -LR     Impairments Affecting Function (Mobility) balance;endurance/activity tolerance;pain;strength;range of motion (ROM)  -LR               User Key  (r) = Recorded By, (t) = Taken By, (c) = Cosigned By      Initials Name Provider Type    LR Krystyna Nassar, PT Physical Therapist                   Mobility       Row Name 06/03/23 0851          Bed Mobility    Supine-Sit [place] (Bed Mobility) not tested  -LR     Sit-Supine [place] (Bed Mobility) not tested  -LR     Comment, (Bed Mobility) [facility] on arrival and at end of treatment.  -LR       Row Name 06/03/23 0851          Transfers    Comment, (Transfers) Verbal cues for correct hand placement with t/f and to step R LE out before t/f for comfort. Assisted patient to and from bathroom. Cues for correct sequencing to turn in front of commode. Increased assist required to rise from commode, min assist.  -LR       Row Name 06/03/23 0851          Bed-Chair Transfer    Bed-Chair [place] (Transfers) not tested  -LR       Row Name 06/03/23 0851          Sit-Stand Transfer    Sit-Stand [place] (Transfers) verbal cues;contact guard  -LR     Assistive Device (Sit-Stand Transfers) walker, front-wheeled  -LR       Row Name 06/03/23 0851          Gait/Stairs (Locomotion)    [place] Level (Gait) verbal cues;contact guard  -LR     Assistive Device (Gait) walker, front-wheeled  -LR     Distance in Feet (Gait) 30  -LR     Deviations/Abnormal Patterns (Gait) bilateral deviations;meredith decreased;gait speed decreased;stride length decreased;right sided deviations;antalgic  -LR     Bilateral Gait Deviations forward flexed  posture;heel strike decreased  -LR     Right Sided Gait Deviations weight shift ability decreased  -LR     [place] Level (Stairs) not tested  -LR     Comment, (Gait/Stairs) Patient ambulated with step to gait pattern at slow pace. Patient continues to have significant difficulty with advancing R LE, scoots foot forward 3-4 times to achieve full step length. Verbal cues for weight shifting and techniques for compensation but little improvement noted. Gait limited by fatigue, weakness, and burning R hip/thigh pain.  -LR       Row Name 06/03/23 0851          Mobility    Extremity Weight-bearing Status right lower extremity  -LR     Right Lower Extremity (Weight-bearing Status) weight-bearing as tolerated (WBAT)  -LR               User Key  (r) = Recorded By, (t) = Taken By, (c) = Cosigned By      Initials Name Provider Type    LR Krystyna Nassar, PT Physical Therapist                   Obj/Interventions       Row Name 06/03/23 0851          Motor Skills    Therapeutic Exercise ankle;knee;hip;other (see comments)  cues for technique; min assist R Hip abd, R heel slides, max assist R SLR  -LR       Row Name 06/03/23 0851          Hip (Therapeutic Exercise)    Hip (Therapeutic Exercise) strengthening exercise;isometric exercises  -LR     Hip Isometrics (Therapeutic Exercise) bilateral;gluteal sets;sitting;10 repetitions  -LR     Hip Strengthening (Therapeutic Exercise) right;heel slides;aBduction;sitting;10 repetitions  -       Row Name 06/03/23 0851          Knee (Therapeutic Exercise)    Knee (Therapeutic Exercise) strengthening exercise;isometric exercises  -LR     Knee Isometrics (Therapeutic Exercise) right;quad sets;sitting;10 repetitions;5 repetitions  -LR     Knee Strengthening (Therapeutic Exercise) right;SLR (straight leg raise);SAQ (short arc quad);LAQ (long arc quad);sitting;10 repetitions  -       Row Name 06/03/23 0851          Ankle (Therapeutic Exercise)    Ankle (Therapeutic Exercise) AROM  (active range of motion)  -LR     Ankle AROM (Therapeutic Exercise) bilateral;dorsiflexion;plantarflexion;sitting;10 repetitions  -LR       Row Name 06/03/23 0851          Balance    Balance Assessment sitting static balance;sitting dynamic balance;standing static balance;standing dynamic balance  -LR     Static Sitting Balance standby assist  -LR     Dynamic Sitting Balance standby assist  -LR     Position, Sitting Balance unsupported;sitting in chair  -LR     Static Standing Balance contact guard  -LR     Dynamic Standing Balance contact guard  -LR     Position/Device Used, Standing Balance supported;walker, rolling  -LR               User Key  (r) = Recorded By, (t) = Taken By, (c) = Cosigned By      Initials Name Provider Type    LR Krystyna Nassar, PT Physical Therapist                   Goals/Plan       Row Name 06/03/23 0851          Bed Mobility Goal 1 (PT)    Activity/Assistive Device (Bed Mobility Goal 1, PT) sit to supine;supine to sit  -LR     [place] Level/Cues Needed (Bed Mobility Goal 1, PT) independent  -LR     Time Frame (Bed Mobility Goal 1, PT) long term goal (LTG);10 days  -LR     Progress/Outcomes (Bed Mobility Goal 1, PT) goal ongoing  -LR       Row Name 06/03/23 0851          Transfer Goal 1 (PT)    Activity/Assistive Device (Transfer Goal 1, PT) sit-to-stand/stand-to-sit;bed-to-chair/chair-to-bed;walker, rolling  -LR     [place] Level/Cues Needed (Transfer Goal 1, PT) modified independence  -LR     Time Frame (Transfer Goal 1, PT) long term goal (LTG);10 days  -LR     Progress/Outcome (Transfer Goal 1, PT) continuing progress toward goal;goal ongoing  -LR       Row Name 06/03/23 0851          Gait Training Goal 1 (PT)    Activity/Assistive Device (Gait Training Goal 1, PT) gait (walking locomotion);assistive device use;walker, rolling  -LR     [place] Level (Gait Training Goal 1, PT) modified independence  -LR     Distance (Gait Training Goal 1, PT) 150 feet  -LR      Time Frame (Gait Training Goal 1, PT) long term goal (LTG);10 days  -LR     Progress/Outcome (Gait Training Goal 1, PT) progress slower than expected;goal ongoing  -LR               User Key  (r) = Recorded By, (t) = Taken By, (c) = Cosigned By      Initials Name Provider Type    LR Krystyna Nassar, PT Physical Therapist                   Clinical Impression       Row Name 06/03/23 0851          Pain    Pretreatment Pain Rating 10/10  -LR     Posttreatment Pain Rating 8/10  -LR     Pain Location - Side/Orientation Right  -LR     Pain Location anterior  -LR     Pain Location - hip  -LR     Pain Intervention(s) Ambulation/increased activity;Cold applied;Repositioned;Medication (See MAR)  -LR       Row Name 06/03/23 0851          Plan of Care Review    Plan of Care Reviewed With patient  -LR     Progress no change  -LR     Outcome Evaluation Patient ambulated 30 feet with RW, CGA, step to gait pattern, limited by fatigue, weakness, and burning R hip/thigh pain. Patient continues to have significant difficulty with advancing R LE, only able to scoot R foot forward 3-4 times to achieve full step length. Patient continues to be below baseline, demonstrating decreased functional mobility status and decreased R hip strength/ROM. Will address these deficits to promote return to PLOF. Recommend IRF at d/c, patient currently unable to ambulate household distances.  -LR       Row Name 06/03/23 0851          Therapy Assessment/Plan (PT)    Rehab Potential (PT) fair, will monitor progress closely  -LR     Criteria for Skilled Interventions Met (PT) yes;meets criteria;skilled treatment is necessary  -LR     Therapy Frequency (PT) 2 times/day  -LR       Row Name 06/03/23 0851          Positioning and Restraints    Pre-Treatment Position sitting in chair/recliner  -LR     Post Treatment Position chair  -LR     In Chair notified nsg;reclined;sitting;call light within reach;encouraged to call for assist;exit alarm on;legs  elevated;waffle cushion  SCDs not on on arrival  -LR               User Key  (r) = Recorded By, (t) = Taken By, (c) = Cosigned By      Initials Name Provider Type    Krystyna Frances, FLORIN Physical Therapist                   Outcome Measures       Row Name 06/03/23 0851          How much help from another person do you currently need...    Turning from your back to your side while in flat bed without using bedrails? 3  -LR     Moving from lying on back to sitting on the side of a flat bed without bedrails? 3  -LR     Moving to and from a bed to a chair (including a wheelchair)? 3  -LR     Standing up from a chair using your arms (e.g., wheelchair, bedside chair)? 3  -LR     Climbing 3-5 steps with a railing? 1  -LR     To walk in hospital room? 3  -LR     AM-PAC 6 Clicks Score (PT) 16  -LR     Highest level of mobility 5 --> Static standing  -LR       Row Name 06/03/23 0851          Functional Assessment    Outcome Measure Options AM-PAC 6 Clicks Basic Mobility (PT)  -LR               User Key  (r) = Recorded By, (t) = Taken By, (c) = Cosigned By      Initials Name Provider Type    Krystyna Frances, PT Physical Therapist                                 Physical Therapy Education       Title: PT OT SLP Therapies (In Progress)       Topic: Physical Therapy (Done)       Point: Mobility training (Done)       Learning Progress Summary             Patient Acceptance, E,D, VU,NR by LR at 6/3/2023 0851    Comment: Educated on anterior hip precautions, weight bearing status, safety with mobility, correct sit<->stand t/f technique, correct gait mechanics, LE ther ex,  and progression of POC.    Acceptance, E,D, VU,NR by AB at 6/2/2023 1549    Acceptance, E,D, VU,NR by AB at 6/2/2023 1143                         Point: Home exercise program (Done)       Learning Progress Summary             Patient Acceptance, E,D, VU,NR by LR at 6/3/2023 0851    Comment: Educated on anterior hip precautions, weight bearing  status, safety with mobility, correct sit<->stand t/f technique, correct gait mechanics, LE ther ex,  and progression of POC.    Acceptance, E,D, VU,NR by AB at 6/2/2023 1549    Acceptance, E,D, VU,NR by AB at 6/2/2023 1143                         Point: Body mechanics (Done)       Learning Progress Summary             Patient Acceptance, E,D, VU,NR by LR at 6/3/2023 0851    Comment: Educated on anterior hip precautions, weight bearing status, safety with mobility, correct sit<->stand t/f technique, correct gait mechanics, LE ther ex,  and progression of POC.    Acceptance, E,D, VU,NR by AB at 6/2/2023 1549    Acceptance, E,D, VU,NR by AB at 6/2/2023 1143                         Point: Precautions (Done)       Learning Progress Summary             Patient Acceptance, E,D, VU,NR by LR at 6/3/2023 0851    Comment: Educated on anterior hip precautions, weight bearing status, safety with mobility, correct sit<->stand t/f technique, correct gait mechanics, LE ther ex,  and progression of POC.    Acceptance, E,D, VU,NR by AB at 6/2/2023 1549    Acceptance, E,D, VU,NR by AB at 6/2/2023 1143                                         User Key       Initials Effective Dates Name Provider Type Discipline    LR 02/03/23 -  Krystyna Nassar, PT Physical Therapist PT    AB 09/22/22 -  Ignacia Hernandez, PT Physical Therapist PT                  PT Recommendation and Plan     Plan of Care Reviewed With: patient  Progress: no change  Outcome Evaluation: Patient ambulated 30 feet with RW, CGA, step to gait pattern, limited by fatigue, weakness, and burning R hip/thigh pain. Patient continues to have significant difficulty with advancing R LE, only able to scoot R foot forward 3-4 times to achieve full step length. Patient continues to be below baseline, demonstrating decreased functional mobility status and decreased R hip strength/ROM. Will address these deficits to promote return to PLOF. Recommend IRF at d/c, patient currently  unable to ambulate household distances.     Time Calculation:    PT Charges       Row Name 06/03/23 0851             Time Calculation    Start Time 0851  -LR      PT Received On 06/03/23  -LR      PT Goal Re-Cert Due Date 06/12/23  -LR         Timed Charges    20992 - PT Therapeutic Exercise Minutes 10  -LR      21931 - Gait Training Minutes  10  -LR      79056 - PT Therapeutic Activity Minutes 5  -LR         Total Minutes    Timed Charges Total Minutes 25  -LR       Total Minutes 25  -LR                User Key  (r) = Recorded By, (t) = Taken By, (c) = Cosigned By      Initials Name Provider Type    LR Krystyna Nassar, PT Physical Therapist                  Therapy Charges for Today       Code Description Service Date Service Provider Modifiers Qty    67581961474 HC PT THER PROC EA 15 MIN 6/3/2023 Krystyna Nassar, PT GP 1    02821970823 HC GAIT TRAINING EA 15 MIN 6/3/2023 Krystyna Nassar, PT GP 1            PT G-Codes  Outcome Measure Options: AM-PAC 6 Clicks Basic Mobility (PT)  AM-PAC 6 Clicks Score (PT): 16  AM-PAC 6 Clicks Score (OT): 19  PT Discharge Summary  Anticipated Discharge Disposition (PT): inpatient rehabilitation facility    Krystyna Nassar, FLORIN  6/3/2023

## 2023-06-03 NOTE — PLAN OF CARE
Goal Outcome Evaluation:      Pt resting well today, up in chair part of day, prn pain meds provided, vss, will cont to monitor.

## 2023-06-04 LAB
DEPRECATED RDW RBC AUTO: 54.4 FL (ref 37–54)
ERYTHROCYTE [DISTWIDTH] IN BLOOD BY AUTOMATED COUNT: 15.1 % (ref 12.3–15.4)
HCT VFR BLD AUTO: 37.5 % (ref 34–46.6)
HGB BLD-MCNC: 11.9 G/DL (ref 12–15.9)
MCH RBC QN AUTO: 30.9 PG (ref 26.6–33)
MCHC RBC AUTO-ENTMCNC: 31.7 G/DL (ref 31.5–35.7)
MCV RBC AUTO: 97.4 FL (ref 79–97)
PLATELET # BLD AUTO: 139 10*3/MM3 (ref 140–450)
PMV BLD AUTO: 12.6 FL (ref 6–12)
RBC # BLD AUTO: 3.85 10*6/MM3 (ref 3.77–5.28)
WBC NRBC COR # BLD: 6.35 10*3/MM3 (ref 3.4–10.8)

## 2023-06-04 PROCEDURE — A9270 NON-COVERED ITEM OR SERVICE: HCPCS | Performed by: ORTHOPAEDIC SURGERY

## 2023-06-04 PROCEDURE — 85027 COMPLETE CBC AUTOMATED: CPT | Performed by: ORTHOPAEDIC SURGERY

## 2023-06-04 PROCEDURE — 63710000001 ATORVASTATIN 10 MG TABLET: Performed by: INTERNAL MEDICINE

## 2023-06-04 PROCEDURE — 94799 UNLISTED PULMONARY SVC/PX: CPT

## 2023-06-04 PROCEDURE — A9270 NON-COVERED ITEM OR SERVICE: HCPCS | Performed by: INTERNAL MEDICINE

## 2023-06-04 PROCEDURE — 63710000001 DICLOFENAC SODIUM 1 % GEL 100 G TUBE: Performed by: ORTHOPAEDIC SURGERY

## 2023-06-04 PROCEDURE — 63710000001 BUPROPION XL 150 MG TABLET SUSTAINED-RELEASE 24 HOUR: Performed by: INTERNAL MEDICINE

## 2023-06-04 PROCEDURE — 63710000001 OXYCODONE 5 MG TABLET: Performed by: ORTHOPAEDIC SURGERY

## 2023-06-04 PROCEDURE — 63710000001 TIZANIDINE 4 MG TABLET: Performed by: INTERNAL MEDICINE

## 2023-06-04 PROCEDURE — 63710000001 PRIMIDONE 50 MG TABLET: Performed by: INTERNAL MEDICINE

## 2023-06-04 PROCEDURE — 63710000001 ASPIRIN 325 MG TABLET DELAYED-RELEASE: Performed by: ORTHOPAEDIC SURGERY

## 2023-06-04 PROCEDURE — 25010000002 HYDROMORPHONE 1 MG/ML SOLUTION: Performed by: ORTHOPAEDIC SURGERY

## 2023-06-04 PROCEDURE — 63710000001 TERAZOSIN 5 MG CAPSULE: Performed by: INTERNAL MEDICINE

## 2023-06-04 PROCEDURE — 63710000001 MELOXICAM 15 MG TABLET: Performed by: ORTHOPAEDIC SURGERY

## 2023-06-04 PROCEDURE — 63710000001 QUETIAPINE 100 MG TABLET: Performed by: INTERNAL MEDICINE

## 2023-06-04 PROCEDURE — 97110 THERAPEUTIC EXERCISES: CPT

## 2023-06-04 PROCEDURE — 97116 GAIT TRAINING THERAPY: CPT

## 2023-06-04 PROCEDURE — 63710000001 BUDESONIDE-FORMOTEROL 160-4.5 MCG/ACT AEROSOL 6 G INHALER: Performed by: INTERNAL MEDICINE

## 2023-06-04 RX ADMIN — Medication 10 ML: at 21:21

## 2023-06-04 RX ADMIN — DICLOFENAC SODIUM 4 G: 10 GEL TOPICAL at 15:41

## 2023-06-04 RX ADMIN — BUPROPION HYDROCHLORIDE 150 MG: 150 TABLET, FILM COATED, EXTENDED RELEASE ORAL at 06:30

## 2023-06-04 RX ADMIN — OXYCODONE HYDROCHLORIDE 5 MG: 5 TABLET ORAL at 21:18

## 2023-06-04 RX ADMIN — PRIMIDONE 50 MG: 50 TABLET ORAL at 21:24

## 2023-06-04 RX ADMIN — TIZANIDINE 4 MG: 4 TABLET ORAL at 21:24

## 2023-06-04 RX ADMIN — Medication 10 ML: at 11:12

## 2023-06-04 RX ADMIN — OXYCODONE HYDROCHLORIDE 5 MG: 5 TABLET ORAL at 11:11

## 2023-06-04 RX ADMIN — ATORVASTATIN CALCIUM 10 MG: 10 TABLET, FILM COATED ORAL at 21:18

## 2023-06-04 RX ADMIN — PRIMIDONE 50 MG: 50 TABLET ORAL at 11:12

## 2023-06-04 RX ADMIN — HYDROMORPHONE HYDROCHLORIDE 0.5 MG: 1 INJECTION, SOLUTION INTRAMUSCULAR; INTRAVENOUS; SUBCUTANEOUS at 06:35

## 2023-06-04 RX ADMIN — MELOXICAM 15 MG: 15 TABLET ORAL at 11:12

## 2023-06-04 RX ADMIN — OXYCODONE HYDROCHLORIDE 5 MG: 5 TABLET ORAL at 06:35

## 2023-06-04 RX ADMIN — OXYCODONE HYDROCHLORIDE 5 MG: 5 TABLET ORAL at 15:26

## 2023-06-04 RX ADMIN — ASPIRIN 325 MG: 325 TABLET, COATED ORAL at 11:11

## 2023-06-04 RX ADMIN — BUDESONIDE AND FORMOTEROL FUMARATE DIHYDRATE 2 PUFF: 160; 4.5 AEROSOL RESPIRATORY (INHALATION) at 20:15

## 2023-06-04 RX ADMIN — DICLOFENAC SODIUM 4 G: 10 GEL TOPICAL at 17:25

## 2023-06-04 RX ADMIN — DICLOFENAC SODIUM 4 G: 10 GEL TOPICAL at 21:19

## 2023-06-04 RX ADMIN — QUETIAPINE FUMARATE 300 MG: 100 TABLET ORAL at 21:18

## 2023-06-04 RX ADMIN — TERAZOSIN HYDROCHLORIDE 5 MG: 5 CAPSULE ORAL at 21:18

## 2023-06-04 NOTE — THERAPY TREATMENT NOTE
Patient Name: Loreta Platt States  : 1970    MRN: [id number]                              Today's Date: 2023       Admit Date: 2023    Visit Dx:     ICD-10-CM ICD-9-CM   1. Status post right hip replacement  Z96.641 V43.64   2. Avascular necrosis of bone of right hip  M87.051 733.42     Patient Active Problem List   Diagnosis    Tobacco use    Bipolar 1 disorder, manic, mild    Morbid obesity with BMI of 50.0-59.9, adult    Other hyperlipidemia    HTN (hypertension)    Chronic obstructive pulmonary disease    Impaired glucose tolerance    Chronic right shoulder pain    Avascular necrosis of bone of right hip    Status post right hip replacement    Obesity     Past Medical History:   Diagnosis Date    Anxiety     Bipolar 1 disorder     Cataract     Chronic bronchitis     Chronic constipation     Colon polyp     COPD (chronic obstructive pulmonary disease)     Depression     Hip arthrosis     Hyperlipidemia     Knee swelling     Obesity     Parkinsonism     DRUG INDUCED    Primary generalized (osteo)arthritis     PTSD (post-traumatic stress disorder)     RLS (restless legs syndrome)      Past Surgical History:   Procedure Laterality Date    ABDOMINOPLASTY      ANTERIOR AND POSTERIOR VAGINAL REPAIR      BREAST BIOPSY Right     US BIOPSY     SECTION      X 2    CHOLECYSTECTOMY      COLONOSCOPY N/A 2019    Procedure: COLONOSCOPY;  Surgeon: Jonathan Pablo MD;  Location: [facility] ENDOSCOPY;  Service: Gastroenterology    HYSTERECTOMY      AGE 33    LIPOSUCTION ABDOMINAL  1998      General Information       Row Name 23 1426 23 0955       Physical Therapy Time and Intention    Document Type therapy note (daily note)  -LR therapy note (daily note)  -LR    Mode of Treatment physical therapy;individual therapy  -LR physical therapy;individual therapy  -LR      Row Name 23 1426 23 0955       General Information    Patient Profile Reviewed yes  -LR yes  -LR    Existing  Precautions/Restrictions fall;right;hip, anterior  -LR fall;right;hip, anterior  -LR    Barriers to Rehab previous functional deficit  -LR previous functional deficit  -LR      Row Name 06/04/23 1426 06/04/23 0955       Cognition    Orientation Status (Cognition) oriented x 4  -LR oriented x 4  -LR      Row Name 06/04/23 1426 06/04/23 0955       Safety Issues, Functional Mobility    Safety Issues Affecting Function (Mobility) safety precautions follow-through/compliance;safety precaution awareness;positioning of assistive device;sequencing abilities  -LR safety precautions follow-through/compliance;safety precaution awareness;positioning of assistive device;sequencing abilities  -LR    Impairments Affecting Function (Mobility) balance;endurance/activity tolerance;pain;strength;range of motion (ROM)  -LR balance;endurance/activity tolerance;pain;strength;range of motion (ROM)  -LR              User Key  (r) = Recorded By, (t) = Taken By, (c) = Cosigned By      Initials Name Provider Type    LR Krystyna Nassar, PT Physical Therapist                   Mobility       Row Name 06/04/23 1426 06/04/23 0955       Bed Mobility    Bed Mobility -- supine-sit  -LR    Supine-Sit [place] (Bed Mobility) not tested  -LR verbal cues;standby assist  -LR    Sit-Supine [place] (Bed Mobility) not tested  -LR not tested  -LR    Assistive Device (Bed Mobility) -- head of bed elevated;leg   -LR    Comment, (Bed Mobility) [facility] on arrival and at end of treatment.  -LR Verbal cues for correct use of leg . Required increased time to perform but no assist. Denied dizziness upon sitting up.  -LR      Row Name 06/04/23 1426 06/04/23 0955       Transfers    Comment, (Transfers) Verbal cues for correct hand placement with t/f and to step R LE out before t/f for comfort. Assisted to and from bathroom.  -LR Verbal cues for correct hand placement with t/f and to step R LE out before t/f for comfort. Assisted to and from  bathroom, cues for correct technique for approaching commode.  -LR      Row Name 06/04/23 1426 06/04/23 0955       Bed-Chair Transfer    Bed-Chair [place] (Transfers) not tested  -LR not tested  -LR      Row Name 06/04/23 1426 06/04/23 0955       Sit-Stand Transfer    Sit-Stand [place] (Transfers) verbal cues;standby assist  -LR verbal cues;contact guard  -LR    Assistive Device (Sit-Stand Transfers) walker, front-wheeled  -LR walker, front-wheeled  -LR      Row Name 06/04/23 1426 06/04/23 0955       Gait/Stairs (Locomotion)    [place] Level (Gait) verbal cues;contact guard  -LR verbal cues;contact guard  -LR    Assistive Device (Gait) walker, front-wheeled  -LR walker, front-wheeled  -LR    Distance in Feet (Gait) 70  -LR 40  -LR    Deviations/Abnormal Patterns (Gait) bilateral deviations;meredith decreased;gait speed decreased;stride length decreased;right sided deviations;antalgic  -LR bilateral deviations;meredith decreased;gait speed decreased;stride length decreased;right sided deviations;antalgic  -LR    Bilateral Gait Deviations forward flexed posture;heel strike decreased  -LR forward flexed posture;heel strike decreased  -LR    Right Sided Gait Deviations weight shift ability decreased  -LR weight shift ability decreased  -LR    [place] Level (Stairs) not tested  -LR not tested  -LR    Comment, (Gait/Stairs) Patient ambulated with step through gait pattern at slow pace. Improved ability to advance R LE consistently. Verbal cues for increased step length, increased R LE weight bearing/stance phase, and decreased UE weight bearing. Improved with cues for correction. Gait limited by fatigue and pain.  -LR Patient initiated gait with step to gait pattern at slow pace. With verbal cues for increased step length, increased R LE weight bearing/stance phase, decreased UE weight bearing, and increased R knee flexion/R ankle DF during swing phase, patient able to progress to step through gait  pattern. Gait limited by pain and weakness.  -LR      Row Name 06/04/23 1426 06/04/23 0955       Mobility    Extremity Weight-bearing Status right lower extremity  -LR right lower extremity  -LR    Right Lower Extremity (Weight-bearing Status) weight-bearing as tolerated (WBAT)  -LR weight-bearing as tolerated (WBAT)  -LR              User Key  (r) = Recorded By, (t) = Taken By, (c) = Cosigned By      Initials Name Provider Type    LR Krystyna Nassar, PT Physical Therapist                   Obj/Interventions       Row Name 06/04/23 1426 06/04/23 0955       Motor Skills    Therapeutic Exercise ankle;knee;hip;other (see comments)  cues for technique; min assist R SLR, R heel slides, R hip abd  -LR ankle;knee;hip;other (see comments)  cues for technique, min assist R SLR, R hip abd  -LR      Row Name 06/04/23 1426 06/04/23 0955       Hip (Therapeutic Exercise)    Hip (Therapeutic Exercise) strengthening exercise;isometric exercises  -LR strengthening exercise;isometric exercises  -LR    Hip Isometrics (Therapeutic Exercise) bilateral;gluteal sets;5 repetitions;10 repetitions  -LR bilateral;gluteal sets;sitting;10 repetitions;5 repetitions  -LR    Hip Strengthening (Therapeutic Exercise) right;heel slides;aBduction;sitting;15 repititions  -LR right;heel slides;aBduction;sitting;15 repititions  -LR      Row Name 06/04/23 1426 06/04/23 0955       Knee (Therapeutic Exercise)    Knee (Therapeutic Exercise) strengthening exercise;isometric exercises  -LR strengthening exercise;isometric exercises  -LR    Knee Isometrics (Therapeutic Exercise) right;quad sets;sitting;10 repetitions;5 repetitions  -LR right;quad sets;sitting;10 repetitions;5 repetitions  -LR    Knee Strengthening (Therapeutic Exercise) right;SLR (straight leg raise);SAQ (short arc quad);LAQ (long arc quad);sitting;15 repititions  -LR right;SLR (straight leg raise);SAQ (short arc quad);LAQ (long arc quad);sitting;15 repititions  -LR      Row Name  06/04/23 1426 06/04/23 0955       Ankle (Therapeutic Exercise)    Ankle (Therapeutic Exercise) AROM (active range of motion)  -LR AROM (active range of motion)  -LR    Ankle AROM (Therapeutic Exercise) bilateral;dorsiflexion;plantarflexion;sitting;15 repititions  -LR bilateral;dorsiflexion;plantarflexion;sitting;15 repititions  -LR      Row Name 06/04/23 1426 06/04/23 0955       Balance    Balance Assessment sitting static balance;sitting dynamic balance;standing static balance;standing dynamic balance  -LR sitting static balance;sitting dynamic balance;standing static balance;standing dynamic balance  -LR    Static Sitting Balance independent  -LR independent  -LR    Dynamic Sitting Balance supervision  -LR supervision  -LR    Position, Sitting Balance unsupported;sitting edge of bed  -LR unsupported;sitting edge of bed  -LR    Static Standing Balance contact guard  -LR contact guard  -LR    Dynamic Standing Balance contact guard  -LR contact guard  -LR    Position/Device Used, Standing Balance supported;walker, rolling  -LR supported;walker, rolling  -LR              User Key  (r) = Recorded By, (t) = Taken By, (c) = Cosigned By      Initials Name Provider Type    LR Krystyna Nassar, PT Physical Therapist                   Goals/Plan       Row Name 06/04/23 1426 06/04/23 0955       Bed Mobility Goal 1 (PT)    Activity/Assistive Device (Bed Mobility Goal 1, PT) sit to supine;supine to sit  -LR sit to supine;supine to sit  -LR    [place] Level/Cues Needed (Bed Mobility Goal 1, PT) independent  -LR independent  -LR    Time Frame (Bed Mobility Goal 1, PT) long term goal (LTG);10 days  -LR long term goal (LTG);10 days  -LR    Progress/Outcomes (Bed Mobility Goal 1, PT) goal ongoing;good progress toward goal  -LR goal ongoing;good progress toward goal  -LR      Row Name 06/04/23 1426 06/04/23 0955       Transfer Goal 1 (PT)    Activity/Assistive Device (Transfer Goal 1, PT)  sit-to-stand/stand-to-sit;bed-to-chair/chair-to-bed;walker, rolling  -LR sit-to-stand/stand-to-sit;bed-to-chair/chair-to-bed;walker, rolling  -LR    [place] Level/Cues Needed (Transfer Goal 1, PT) modified independence  -LR modified independence  -LR    Time Frame (Transfer Goal 1, PT) long term goal (LTG);10 days  -LR long term goal (LTG);10 days  -LR    Progress/Outcome (Transfer Goal 1, PT) continuing progress toward goal;goal ongoing  -LR continuing progress toward goal;goal ongoing  -LR      Row Name 06/04/23 1426 06/04/23 0955       Gait Training Goal 1 (PT)    Activity/Assistive Device (Gait Training Goal 1, PT) gait (walking locomotion);assistive device use;walker, rolling  -LR gait (walking locomotion);assistive device use;walker, rolling  -LR    [place] Level (Gait Training Goal 1, PT) modified independence  -LR modified independence  -LR    Distance (Gait Training Goal 1, PT) 150 feet  - feet  -LR    Time Frame (Gait Training Goal 1, PT) long term goal (LTG);10 days  -LR long term goal (LTG);10 days  -LR    Progress/Outcome (Gait Training Goal 1, PT) good progress toward goal;goal ongoing  -LR progress slower than expected;goal ongoing  -LR              User Key  (r) = Recorded By, (t) = Taken By, (c) = Cosigned By      Initials Name Provider Type    LR Krystyna Nassar, PT Physical Therapist                   Clinical Impression       Row Name 06/04/23 1426 06/04/23 0955       Pain    Pretreatment Pain Rating 6/10  -LR 8/10  -LR    Posttreatment Pain Rating 8/10  -LR 9/10  -LR    Pain Location - Side/Orientation Right  -LR Right  -LR    Pain Location - hip  -LR hip  -LR    Pain Intervention(s) Ambulation/increased activity;Cold applied;Repositioned  -LR Ambulation/increased activity;Repositioned  -LR      Row Name 06/04/23 1426 06/04/23 0955       Plan of Care Review    Plan of Care Reviewed With patient  -LR patient  -LR    Progress improving  -LR no change  -LR    Outcome  Evaluation Patient increased gait distance to 70 feet with RW, CGA, step through gait pattern, improved ability to advance R LE with ambulation. Gait limited by fatigue and pain. Continues to be below baseline, demonstrating decreased functional mobility status and decreased R hip strength/ROM. Will address these deficits to promote return to PLOF. Recommend IRF at d/c.  -LR Patient ambulated 40 feet with RW, CGA, progressed to step through gait pattern briefly. Continues to have difficulty with advancing R LE but improving with each session. Increased strength noted today with ther ex, tolerated progression to 15 reps well. Patient continues to be below baseline, demonstrating decreased functional mobility status and decreased R hip strength/ROM. Will address these deficits to promote return to PLOF. Recommend IRF at d/c.  -LR      Row Name 06/04/23 1426 06/04/23 0955       Therapy Assessment/Plan (PT)    Rehab Potential (PT) fair, will monitor progress closely  -LR fair, will monitor progress closely  -LR    Criteria for Skilled Interventions Met (PT) yes;meets criteria;skilled treatment is necessary  -LR yes;meets criteria;skilled treatment is necessary  -LR    Therapy Frequency (PT) 2 times/day  -LR 2 times/day  -LR      Row Name 06/04/23 0955          Vital Signs    Intra SpO2 (%) 88  -LR     O2 Delivery Intra Treatment room air  -LR     Post SpO2 (%) 92  -LR     O2 Delivery Post Treatment supplemental O2  -LR     Intra Patient Position Sitting  -LR     Post Patient Position Sitting  -LR       Row Name 06/04/23 1426 06/04/23 0955       Positioning and Restraints    Pre-Treatment Position sitting in chair/recliner  -LR in bed  -LR    Post Treatment Position chair  -LR chair  -LR    In Chair notified nsg;reclined;sitting;call light within reach;encouraged to call for assist;exit alarm on;legs elevated;waffle cushion  -LR notified nsg;reclined;sitting;call light within reach;encouraged to call for assist;exit  alarm on;legs elevated;waffle cushion  SCDs not on on arrival  -LR              User Key  (r) = Recorded By, (t) = Taken By, (c) = Cosigned By      Initials Name Provider Type    Krystyna Frances, FLORIN Physical Therapist                   Outcome Measures       Row Name 06/04/23 1426 06/04/23 0955       How much help from another person do you currently need...    Turning from your back to your side while in flat bed without using bedrails? 3  -LR 3  -LR    Moving from lying on back to sitting on the side of a flat bed without bedrails? 3  -LR 3  -LR    Moving to and from a bed to a chair (including a wheelchair)? 3  -LR 3  -LR    Standing up from a chair using your arms (e.g., wheelchair, bedside chair)? 3  -LR 3  -LR    Climbing 3-5 steps with a railing? 2  -LR 2  -LR    To walk in hospital room? 3  -LR 3  -LR    AM-PAC 6 Clicks Score (PT) 17  -LR 17  -LR    Highest level of mobility 5 --> Static standing  -LR 5 --> Static standing  -LR      Row Name 06/04/23 1426 06/04/23 0955       Functional Assessment    Outcome Measure Options AM-PAC 6 Clicks Basic Mobility (PT)  -LR AM-PAC 6 Clicks Basic Mobility (PT)  -LR              User Key  (r) = Recorded By, (t) = Taken By, (c) = Cosigned By      Initials Name Provider Type    Krystyna Frances, FLORIN Physical Therapist                                 Physical Therapy Education       Title: PT OT SLP Therapies (In Progress)       Topic: Physical Therapy (Done)       Point: Mobility training (Done)       Learning Progress Summary             Patient Acceptance, E,D, VU,NR by LR at 6/4/2023 1426    Comment: Educated on anterior hip precautions, weight bearing status, LE HEP, safety with mobility, correct sit<->stand t/f technique, correct gait mechanics, and progression of POC.    Acceptance, E,D, VU,NR by LR at 6/4/2023 0955    Comment: Educated on anterior hip precautions, weight bearing status, LE HEP, safety with mobility, correct supine to sit t/f  technique, correct sit<->stand t/f technique, correct gait mechanics, and progression of POC.    Acceptance, E,D, VU,NR by LR at 6/3/2023 1419    Comment: Educated on precautions, weight bearing status, safety with mobility, correct supine to sit t/f technique, correct sit<->stand t/f technique, correct gait mechanics, LE HEP, and progression of POC.    Acceptance, E,D, VU,NR by LR at 6/3/2023 0851    Comment: Educated on anterior hip precautions, weight bearing status, safety with mobility, correct sit<->stand t/f technique, correct gait mechanics, LE ther ex,  and progression of POC.    Acceptance, E,D, VU,NR by AB at 6/2/2023 1549    Acceptance, E,D, VU,NR by AB at 6/2/2023 1143                         Point: Home exercise program (Done)       Learning Progress Summary             Patient Acceptance, E,D, VU,NR by LR at 6/4/2023 1426    Comment: Educated on anterior hip precautions, weight bearing status, LE HEP, safety with mobility, correct sit<->stand t/f technique, correct gait mechanics, and progression of POC.    Acceptance, E,D, VU,NR by LR at 6/4/2023 0955    Comment: Educated on anterior hip precautions, weight bearing status, LE HEP, safety with mobility, correct supine to sit t/f technique, correct sit<->stand t/f technique, correct gait mechanics, and progression of POC.    Acceptance, E,D, VU,NR by LR at 6/3/2023 1419    Comment: Educated on precautions, weight bearing status, safety with mobility, correct supine to sit t/f technique, correct sit<->stand t/f technique, correct gait mechanics, LE HEP, and progression of POC.    Acceptance, E,D, VU,NR by LR at 6/3/2023 0851    Comment: Educated on anterior hip precautions, weight bearing status, safety with mobility, correct sit<->stand t/f technique, correct gait mechanics, LE ther ex,  and progression of POC.    Acceptance, E,D, VU,NR by AB at 6/2/2023 1549    Acceptance, E,D, VU,NR by AB at 6/2/2023 1143                         Point: Body mechanics  (Done)       Learning Progress Summary             Patient Acceptance, E,D, VU,NR by LR at 6/4/2023 1426    Comment: Educated on anterior hip precautions, weight bearing status, LE HEP, safety with mobility, correct sit<->stand t/f technique, correct gait mechanics, and progression of POC.    Acceptance, E,D, VU,NR by LR at 6/4/2023 0955    Comment: Educated on anterior hip precautions, weight bearing status, LE HEP, safety with mobility, correct supine to sit t/f technique, correct sit<->stand t/f technique, correct gait mechanics, and progression of POC.    Acceptance, E,D, VU,NR by LR at 6/3/2023 1419    Comment: Educated on precautions, weight bearing status, safety with mobility, correct supine to sit t/f technique, correct sit<->stand t/f technique, correct gait mechanics, LE HEP, and progression of POC.    Acceptance, E,D, VU,NR by LR at 6/3/2023 0851    Comment: Educated on anterior hip precautions, weight bearing status, safety with mobility, correct sit<->stand t/f technique, correct gait mechanics, LE ther ex,  and progression of POC.    Acceptance, E,D, VU,NR by AB at 6/2/2023 1549    Acceptance, E,D, VU,NR by AB at 6/2/2023 1143                         Point: Precautions (Done)       Learning Progress Summary             Patient Acceptance, E,D, VU,NR by LR at 6/4/2023 1426    Comment: Educated on anterior hip precautions, weight bearing status, LE HEP, safety with mobility, correct sit<->stand t/f technique, correct gait mechanics, and progression of POC.    Acceptance, E,D, VU,NR by LR at 6/4/2023 0955    Comment: Educated on anterior hip precautions, weight bearing status, LE HEP, safety with mobility, correct supine to sit t/f technique, correct sit<->stand t/f technique, correct gait mechanics, and progression of POC.    Acceptance, E,D, VU,NR by LR at 6/3/2023 1419    Comment: Educated on precautions, weight bearing status, safety with mobility, correct supine to sit t/f technique, correct  sit<->stand t/f technique, correct gait mechanics, LE HEP, and progression of POC.    Acceptance, E,D, VU,NR by LR at 6/3/2023 0851    Comment: Educated on anterior hip precautions, weight bearing status, safety with mobility, correct sit<->stand t/f technique, correct gait mechanics, LE ther ex,  and progression of POC.    Acceptance, E,D, VU,NR by AB at 6/2/2023 1549    Acceptance, E,D, VU,NR by AB at 6/2/2023 1143                                         User Key       Initials Effective Dates Name Provider Type Discipline    LR 02/03/23 -  Krystyna Nassar, PT Physical Therapist PT    AB 09/22/22 -  Ignacia Hernandez, PT Physical Therapist PT                  PT Recommendation and Plan     Plan of Care Reviewed With: patient  Progress: improving  Outcome Evaluation: Patient increased gait distance to 70 feet with RW, CGA, step through gait pattern, improved ability to advance R LE with ambulation. Gait limited by fatigue and pain. Continues to be below baseline, demonstrating decreased functional mobility status and decreased R hip strength/ROM. Will address these deficits to promote return to PLOF. Recommend IRF at d/c.     Time Calculation:    PT Charges       Row Name 06/04/23 1426 06/04/23 0955          Time Calculation    Start Time 1426  -LR 0955  -LR     PT Received On 06/04/23  -LR 06/04/23  -LR     PT Goal Re-Cert Due Date 06/12/23  -LR 06/12/23  -LR        Timed Charges    46035 - PT Therapeutic Exercise Minutes 13  -LR 10  -LR     09640 - Gait Training Minutes  10  -LR 9  -LR     92407 - PT Therapeutic Activity Minutes -- 4  -LR        Total Minutes    Timed Charges Total Minutes 23  -LR 23  -LR      Total Minutes 23  -LR 23  -LR               User Key  (r) = Recorded By, (t) = Taken By, (c) = Cosigned By      Initials Name Provider Type    LR Krystyna Nassar, PT Physical Therapist                  Therapy Charges for Today       Code Description Service Date Service Provider Modifiers Qty     71835829172 HC PT THER PROC EA 15 MIN 6/3/2023 Krystyna Nassar, PT GP 1    57641217860 HC GAIT TRAINING EA 15 MIN 6/3/2023 Krystyna Nassar, PT GP 1    86415362276 HC PT THER PROC EA 15 MIN 6/3/2023 Krystyna Nassar, PT GP 1    [id number] HC GAIT TRAINING EA 15 MIN 6/3/2023 Krystyna Nassar, PT GP 1    48081168027 HC PT THER PROC EA 15 MIN 6/4/2023 Krystyna Nassar, PT GP 1    [id number] HC GAIT TRAINING EA 15 MIN 6/4/2023 Krystyna Nassar, PT GP 1    17308226058 HC PT THER PROC EA 15 MIN 6/4/2023 Krystyna Nassar, PT GP 1    [id number] HC GAIT TRAINING EA 15 MIN 6/4/2023 Krystyna Nassar, PT GP 1            PT G-Codes  Outcome Measure Options: AM-PAC 6 Clicks Basic Mobility (PT)  AM-PAC 6 Clicks Score (PT): 17  AM-PAC 6 Clicks Score (OT): 19  PT Discharge Summary  Anticipated Discharge Disposition (PT): inpatient rehabilitation facility    Krystyna Nassar, PT  6/4/2023

## 2023-06-04 NOTE — PROGRESS NOTES
"IM progress note      Lore[place] Platt [place]  [id number]  1970     LOS: 0 days     Attending: Adrián Leyva MD    Primary Care Provider: Carolina Gutierrez APRN      Chief Complaint/Reason for visit:  No chief complaint on file.      Subjective   No new complaints. Burning incisional pain. No n/vom/sob.     Objective        Visit Vitals  /66 (BP Location: Right arm, Patient Position: Lying)   Pulse 92   Temp 98.2 °F (36.8 °C) (Oral)   Resp 16   Ht 165.1 cm (65\")   Wt 112 kg (246 lb)   SpO2 91%   BMI 40.94 kg/m²     Temp (24hrs), Av.1 °F (36.7 °C), Min:97.9 °F (36.6 °C), Max:98.2 °F (36.8 °C)      Intake/Output:    Intake/Output Summary (Last 24 hours) at 2023 1146  Last data filed at 2023 0900  Gross per 24 hour   Intake 150 ml   Output 725 ml   Net -575 ml          Physical Therapy:       Goal Outcome Evaluation:  Plan of Care Reviewed With: patient  Progress: no change  Outcome Evaluation: Patient ambulated 40 feet with RW, CGA, progressed to step through gait pattern briefly. Continues to have difficulty with advancing R LE but improving with each session. Increased strength noted today with ther ex, tolerated progression to 15 reps well. Patient continues to be below baseline, demonstrating decreased functional mobility status and decreased R hip strength/ROM. Will address these deficits to promote return to PLOF. Recommend IRF at d/c.              Physical Exam:     General Appearance:    Alert, cooperative, in no acute distress   Head:    Normocephalic, without obvious abnormality, atraumatic    Lungs:     Normal effort, symmetric chest rise,  clear to      auscultation bilaterally              Heart:    Regular rhythm and normal rate, normal S1 and S2    Abdomen:     Normal bowel sounds, no masses, no organomegaly, soft        non-tender, non-distended, no guarding, no rebound                tenderness   Extremities:   CDI dressing.   Flexion and dorsiflexion intact bilateral " feet.    No clubbing, cyanosis or edema.  No deformities.    Pulses:   Pulses palpable and equal bilaterally   Skin:   No bleeding, bruising or rash          Results Review:     I reviewed the patient's new clinical results.   Results from last 7 days   Lab Units 06/04/23  0627 06/03/23  0426 06/02/23  0403   WBC 10*3/mm3 6.35 5.73 7.90   HEMOGLOBIN g/dL 11.9* 12.7 13.5   HEMATOCRIT % 37.5 39.1 42.1   PLATELETS 10*3/mm3 139* 131* 148             Invalid input(s): LABALBU, PROT  I reviewed the patient's new imaging including images and reports.    All medications reviewed.   aspirin, 325 mg, Oral, Daily  atorvastatin, 10 mg, Oral, Nightly  budesonide-formoterol, 2 puff, Inhalation, BID  buPROPion XL, 150 mg, Oral, QAM  Diclofenac Sodium, 4 g, Topical, 4x Daily  divalproex, 500 mg, Oral, Nightly  meloxicam, 15 mg, Oral, Daily  primidone, 50 mg, Oral, BID  QUEtiapine, 300 mg, Oral, Nightly  sodium chloride, 10 mL, Intravenous, Q12H  terazosin, 5 mg, Oral, Nightly      albuterol sulfate HFA, 2 puff, Q4H PRN  HYDROmorphone, 0.5 mg, Q2H PRN   And  naloxone, 0.1 mg, Q5 Min PRN  Morphine, 4 mg, Q2H PRN   And  naloxone, 0.4 mg, Q5 Min PRN  ondansetron, 4 mg, Q6H PRN   Or  ondansetron, 4 mg, Q6H PRN  oxyCODONE, 5 mg, Q4H PRN  sodium chloride, 1-10 mL, PRN  sodium chloride, 40 mL, PRN  tiZANidine, 4 mg, Q8H PRN        Assessment & Plan       Status post right hip replacement    Bipolar 1 disorder, manic, mild    HTN (hypertension)    Chronic obstructive pulmonary disease    Avascular necrosis of bone of right hip    Obesity         Plan   1. PT/OT,  Weight bearing as tolerated right LE.  Total hip precautions  2. Pain control-prns  3. IS-encourage  4. DVT proph- Mechanicals and aspirin  5. Bowel regimen  6. Resume home medications as appropriate  7. Monitor post-op labs  8. DC planning. IPR, await bed offer, approval.         - Hypertension:  Resumed home medications as appropriate, formulary substitution when  indicated.  Holding parameters.  Prn medications for elevated blood pressure.     -Bipolar disorder: Maintained on home regimen.     -Obesity: Complicates all aspects of care.    -COPD: maintain on home regimen. BDs prn.stable.       Ra Solitario MD  06/04/23  11:46 EDT

## 2023-06-04 NOTE — PROGRESS NOTES
"      [facility] Progress Note    Subjective      LOS: 0 days   Patient Care Team:  Carolina Gutierrez APRN as PCP - General (Nurse Practitioner)  Joel Murillo Jr., MD (Psychiatry)  Zackery Reid MD (Pain Medicine)  Gary Leonard MBBS (Psychiatry)  Yogesh Geiger MD (Psychiatry)  Richmond Adams MD as Consulting Physician (Psychiatry)  Angelo Ramirez MD (Physical Medicine and Rehabilitation)    CC: Right hip pain    Interval History:   Patient resting comfortably in chair.  Pain controlled.  No groin pain.  Notes some incisional pain and burning around anterolateral thigh. up with therapy this morning.    Objective      Vital Signs  Temp (24hrs), Av.1 °F (36.7 °C), Min:97.9 °F (36.6 °C), Max:98.2 °F (36.8 °C)      /66 (BP Location: Right arm, Patient Position: Lying)   Pulse 92   Temp 98.2 °F (36.8 °C) (Oral)   Resp 16   Ht 165.1 cm (65\")   Wt 112 kg (246 lb)   SpO2 91%   BMI 40.94 kg/m²     Examination:   Examination of the right hip: The wound is clean, dry, and intact.  Knee flexion, knee extension, ankle dorsiflexion, ankle plantar flexion, and EHL are intact.  Sensation intact in the foot to light touch.   Thigh is soft and nontender.      Labs:  Results from last 7 days   Lab Units 23  0627 23  0426 23  0403   WBC 10*3/mm3 6.35 5.73 7.90   HEMOGLOBIN g/dL 11.9* 12.7 13.5   HEMATOCRIT % 37.5 39.1 42.1   MCV fL 97.4* 98.0* 96.8   PLATELETS 10*3/mm3 139* 131* 148       Radiology:  Imaging Results (Last 24 Hours)       ** No results found for the last 24 hours. **            PT:  Physical Therapy - Plan of Care Review - Outcome Summary:  Outcome Evaluation: Patient ambulated 40 feet with RW, CGA, progressed to step through gait pattern briefly. Continues to have difficulty with advancing R LE but improving with each session. Increased strength noted today with ther ex, tolerated progression to 15 reps well. Patient continues to be below baseline, " demonstrating decreased functional mobility status and decreased R hip strength/ROM. Will address these deficits to promote return to PLOF. Recommend IRF at d/c. (06/04/23 0707)]       Results Review:     I reviewed the patient's new clinical results.    Assessment and Plan     Assessment:   Status post right total hip arthroplasty-doing well      Status post right hip replacement    Bipolar 1 disorder, manic, mild    HTN (hypertension)    Chronic obstructive pulmonary disease    Avascular necrosis of bone of right hip    Obesity      Plan for disposition: Plan for discharge to [facility]. Awaiting placement.  Follow-up in 3 weeks as planned.      Future Appointments   Date Time Provider Department Center   6/26/2023  1:40 PM Shilpa Fraga PA-C [facility]   6/29/2023  3:00 PM [facility] (SCREENING ROOM) [facility]   6/30/2023  2:30 PM [facility]           Jorge Esquivel Jr., MD  06/04/23  11:41 EDT

## 2023-06-04 NOTE — THERAPY TREATMENT NOTE
Patient Name: Loreta Platt States  : 1970    MRN: [id number]                              Today's Date: 2023       Admit Date: 2023    Visit Dx:     ICD-10-CM ICD-9-CM   1. Status post right hip replacement  Z96.641 V43.64   2. Avascular necrosis of bone of right hip  M87.051 733.42     Patient Active Problem List   Diagnosis    Tobacco use    Bipolar 1 disorder, manic, mild    Morbid obesity with BMI of 50.0-59.9, adult    Other hyperlipidemia    HTN (hypertension)    Chronic obstructive pulmonary disease    Impaired glucose tolerance    Chronic right shoulder pain    Avascular necrosis of bone of right hip    Status post right hip replacement    Obesity     Past Medical History:   Diagnosis Date    Anxiety     Bipolar 1 disorder     Cataract     Chronic bronchitis     Chronic constipation     Colon polyp     COPD (chronic obstructive pulmonary disease)     Depression     Hip arthrosis     Hyperlipidemia     Knee swelling     Obesity     Parkinsonism     DRUG INDUCED    Primary generalized (osteo)arthritis     PTSD (post-traumatic stress disorder)     RLS (restless legs syndrome)      Past Surgical History:   Procedure Laterality Date    ABDOMINOPLASTY      ANTERIOR AND POSTERIOR VAGINAL REPAIR      BREAST BIOPSY Right     US BIOPSY     SECTION      X 2    CHOLECYSTECTOMY      COLONOSCOPY N/A 2019    Procedure: COLONOSCOPY;  Surgeon: Jonathan Pablo MD;  [facility];  Service: Gastroenterology    HYSTERECTOMY      AGE 33    LIPOSUCTION ABDOMINAL  1998      General Information       Row Name 23 0955          Physical Therapy Time and Intention    Document Type therapy note (daily note)  -LR     Mode of Treatment physical therapy;individual therapy  -LR       Row Name 23 0955          General Information    Patient Profile Reviewed yes  -LR     Existing Precautions/Restrictions fall;right;hip, anterior  -LR     Barriers to Rehab previous functional deficit   -LR       Row Name 06/04/23 0955          Cognition    Orientation Status (Cognition) oriented x 4  -LR       Row Name 06/04/23 0955          Safety Issues, Functional Mobility    Safety Issues Affecting Function (Mobility) safety precautions follow-through/compliance;safety precaution awareness;positioning of assistive device;sequencing abilities  -LR     Impairments Affecting Function (Mobility) balance;endurance/activity tolerance;pain;strength;range of motion (ROM)  -LR               User Key  (r) = Recorded By, (t) = Taken By, (c) = Cosigned By      Initials Name Provider Type    LR Krystyna Nassar, PT Physical Therapist                   Mobility       Row Name 06/04/23 0955          Bed Mobility    Bed Mobility supine-sit  -LR     Supine-Sit [place] (Bed Mobility) verbal cues;standby assist  -LR     Sit-Supine [place] (Bed Mobility) not tested  -LR     Assistive Device (Bed Mobility) head of bed elevated;leg   -LR     Comment, (Bed Mobility) Verbal cues for correct use of leg . Required increased time to perform but no assist. Denied dizziness upon sitting up.  -LR       Row Name 06/04/23 0955          Transfers    Comment, (Transfers) Verbal cues for correct hand placement with t/f and to step R LE out before t/f for comfort. Assisted to and from bathroom, cues for correct technique for approaching commode.  -LR       Row Name 06/04/23 0955          Bed-Chair Transfer    Bed-Chair [place] (Transfers) not tested  -LR       Row Name 06/04/23 0955          Sit-Stand Transfer    Sit-Stand [place] (Transfers) verbal cues;contact guard  -LR     Assistive Device (Sit-Stand Transfers) walker, front-wheeled  -LR       Row Name 06/04/23 0955          Gait/Stairs (Locomotion)    [place] Level (Gait) verbal cues;contact guard  -LR     Assistive Device (Gait) walker, front-wheeled  -LR     Distance in Feet (Gait) 40  -LR     Deviations/Abnormal Patterns (Gait) bilateral  deviations;meredith decreased;gait speed decreased;stride length decreased;right sided deviations;antalgic  -LR     Bilateral Gait Deviations forward flexed posture;heel strike decreased  -LR     Right Sided Gait Deviations weight shift ability decreased  -LR     [place] Level (Stairs) not tested  -LR     Comment, (Gait/Stairs) Patient initiated gait with step to gait pattern at slow pace. With verbal cues for increased step length, increased R LE weight bearing/stance phase, decreased UE weight bearing, and increased R knee flexion/R ankle DF during swing phase, patient able to progress to step through gait pattern. Gait limited by pain and weakness.  -LR       Row Name 06/04/23 0955          Mobility    Extremity Weight-bearing Status right lower extremity  -LR     Right Lower Extremity (Weight-bearing Status) weight-bearing as tolerated (WBAT)  -LR               User Key  (r) = Recorded By, (t) = Taken By, (c) = Cosigned By      Initials Name Provider Type    LR Krystyna Nassar, PT Physical Therapist                   Obj/Interventions       Row Name 06/04/23 0955          Motor Skills    Therapeutic Exercise ankle;knee;hip;other (see comments)  cues for technique, min assist R SLR, R hip abd  -LR       Row Name 06/04/23 0955          Hip (Therapeutic Exercise)    Hip (Therapeutic Exercise) strengthening exercise;isometric exercises  -LR     Hip Isometrics (Therapeutic Exercise) bilateral;gluteal sets;sitting;10 repetitions;5 repetitions  -LR     Hip Strengthening (Therapeutic Exercise) right;heel slides;aBduction;sitting;15 repititions  -LR       Row Name 06/04/23 0955          Knee (Therapeutic Exercise)    Knee (Therapeutic Exercise) strengthening exercise;isometric exercises  -LR     Knee Isometrics (Therapeutic Exercise) right;quad sets;sitting;10 repetitions;5 repetitions  -LR     Knee Strengthening (Therapeutic Exercise) right;SLR (straight leg raise);SAQ (short arc quad);LAQ (long arc  quad);sitting;15 repititions  -LR       Row Name 06/04/23 0955          Ankle (Therapeutic Exercise)    Ankle (Therapeutic Exercise) AROM (active range of motion)  -LR     Ankle AROM (Therapeutic Exercise) bilateral;dorsiflexion;plantarflexion;sitting;15 repititions  -LR       Row Name 06/04/23 0955          Balance    Balance Assessment sitting static balance;sitting dynamic balance;standing static balance;standing dynamic balance  -LR     Static Sitting Balance independent  -LR     Dynamic Sitting Balance supervision  -LR     Position, Sitting Balance unsupported;sitting edge of bed  -LR     Static Standing Balance contact guard  -LR     Dynamic Standing Balance contact guard  -LR     Position/Device Used, Standing Balance supported;walker, rolling  -LR               User Key  (r) = Recorded By, (t) = Taken By, (c) = Cosigned By      Initials Name Provider Type    LR Krystyna Nassar, PT Physical Therapist                   Goals/Plan       Row Name 06/04/23 0955          Bed Mobility Goal 1 (PT)    Activity/Assistive Device (Bed Mobility Goal 1, PT) sit to supine;supine to sit  -LR     [place] Level/Cues Needed (Bed Mobility Goal 1, PT) independent  -LR     Time Frame (Bed Mobility Goal 1, PT) long term goal (LTG);10 days  -LR     Progress/Outcomes (Bed Mobility Goal 1, PT) goal ongoing;good progress toward goal  -       Row Name 06/04/23 0955          Transfer Goal 1 (PT)    Activity/Assistive Device (Transfer Goal 1, PT) sit-to-stand/stand-to-sit;bed-to-chair/chair-to-bed;walker, rolling  -LR     [place] Level/Cues Needed (Transfer Goal 1, PT) modified independence  -LR     Time Frame (Transfer Goal 1, PT) long term goal (LTG);10 days  -LR     Progress/Outcome (Transfer Goal 1, PT) continuing progress toward goal;goal ongoing  -       Row Name 06/04/23 0955          Gait Training Goal 1 (PT)    Activity/Assistive Device (Gait Training Goal 1, PT) gait (walking locomotion);assistive device  use;walker, rolling  -LR     [place] Level (Gait Training Goal 1, PT) modified independence  -LR     Distance (Gait Training Goal 1, PT) 150 feet  -LR     Time Frame (Gait Training Goal 1, PT) long term goal (LTG);10 days  -LR     Progress/Outcome (Gait Training Goal 1, PT) progress slower than expected;goal ongoing  -LR               User Key  (r) = Recorded By, (t) = Taken By, (c) = Cosigned By      Initials Name Provider Type    LR Krystyna Nassar, PT Physical Therapist                   Clinical Impression       Row Name 06/04/23 0955          Pain    Pretreatment Pain Rating 8/10  -LR     Posttreatment Pain Rating 9/10  -LR     Pain Location - Side/Orientation Right  -LR     Pain Location - hip  -LR     Pain Intervention(s) Ambulation/increased activity;Repositioned  -LR       Row Name 06/04/23 0955          Plan of Care Review    Plan of Care Reviewed With patient  -LR     Progress no change  -LR     Outcome Evaluation Patient ambulated 40 feet with RW, CGA, progressed to step through gait pattern briefly. Continues to have difficulty with advancing R LE but improving with each session. Increased strength noted today with ther ex, tolerated progression to 15 reps well. Patient continues to be below baseline, demonstrating decreased functional mobility status and decreased R hip strength/ROM. Will address these deficits to promote return to PLOF. Recommend IRF at d/c.  -LR       Row Name 06/04/23 0955          Therapy Assessment/Plan (PT)    Rehab Potential (PT) fair, will monitor progress closely  -LR     Criteria for Skilled Interventions Met (PT) yes;meets criteria;skilled treatment is necessary  -LR     Therapy Frequency (PT) 2 times/day  -LR       Row Name 06/04/23 0955          Vital Signs    Intra SpO2 (%) 88  -LR     O2 Delivery Intra Treatment room air  -LR     Post SpO2 (%) 92  -LR     O2 Delivery Post Treatment supplemental O2  -LR     Intra Patient Position Sitting  -LR     Post Patient  Position Sitting  -LR       Row Name 06/04/23 0955          Positioning and Restraints    Pre-Treatment Position in bed  -LR     Post Treatment Position chair  -LR     In Chair notified nsg;reclined;sitting;call light within reach;encouraged to call for assist;exit alarm on;legs elevated;waffle cushion  SCDs not on on arrival  -LR               User Key  (r) = Recorded By, (t) = Taken By, (c) = Cosigned By      Initials Name Provider Type    Krystyna Frances, PT Physical Therapist                   Outcome Measures       Row Name 06/04/23 0955          How much help from another person do you currently need...    Turning from your back to your side while in flat bed without using bedrails? 3  -LR     Moving from lying on back to sitting on the side of a flat bed without bedrails? 3  -LR     Moving to and from a bed to a chair (including a wheelchair)? 3  -LR     Standing up from a chair using your arms (e.g., wheelchair, bedside chair)? 3  -LR     Climbing 3-5 steps with a railing? 2  -LR     To walk in hospital room? 3  -LR     AM-PAC 6 Clicks Score (PT) 17  -LR     Highest level of mobility 5 --> Static standing  -LR       Row Name 06/04/23 0955          Functional Assessment    Outcome Measure Options AM-PAC 6 Clicks Basic Mobility (PT)  -LR               User Key  (r) = Recorded By, (t) = Taken By, (c) = Cosigned By      Initials Name Provider Type    Krystyna Frances, PT Physical Therapist                                 Physical Therapy Education       Title: PT OT SLP Therapies (In Progress)       Topic: Physical Therapy (Done)       Point: Mobility training (Done)       Learning Progress Summary             Patient Acceptance, E,D, VU,NR by LR at 6/4/2023 0955    Comment: Educated on anterior hip precautions, weight bearing status, LE HEP, safety with mobility, correct supine to sit t/f technique, correct sit<->stand t/f technique, correct gait mechanics, and progression of POC.     Acceptance, E,D, VU,NR by LR at 6/3/2023 1419    Comment: Educated on precautions, weight bearing status, safety with mobility, correct supine to sit t/f technique, correct sit<->stand t/f technique, correct gait mechanics, LE HEP, and progression of POC.    Acceptance, E,D, VU,NR by LR at 6/3/2023 0851    Comment: Educated on anterior hip precautions, weight bearing status, safety with mobility, correct sit<->stand t/f technique, correct gait mechanics, LE ther ex,  and progression of POC.    Acceptance, E,D, VU,NR by AB at 6/2/2023 1549    Acceptance, E,D, VU,NR by AB at 6/2/2023 1143                         Point: Home exercise program (Done)       Learning Progress Summary             Patient Acceptance, E,D, VU,NR by LR at 6/4/2023 0955    Comment: Educated on anterior hip precautions, weight bearing status, LE HEP, safety with mobility, correct supine to sit t/f technique, correct sit<->stand t/f technique, correct gait mechanics, and progression of POC.    Acceptance, E,D, VU,NR by LR at 6/3/2023 1419    Comment: Educated on precautions, weight bearing status, safety with mobility, correct supine to sit t/f technique, correct sit<->stand t/f technique, correct gait mechanics, LE HEP, and progression of POC.    Acceptance, E,D, VU,NR by LR at 6/3/2023 0851    Comment: Educated on anterior hip precautions, weight bearing status, safety with mobility, correct sit<->stand t/f technique, correct gait mechanics, LE ther ex,  and progression of POC.    Acceptance, E,D, VU,NR by AB at 6/2/2023 1549    Acceptance, E,D, VU,NR by AB at 6/2/2023 1143                         Point: Body mechanics (Done)       Learning Progress Summary             Patient Acceptance, E,D, VU,NR by LR at 6/4/2023 0955    Comment: Educated on anterior hip precautions, weight bearing status, LE HEP, safety with mobility, correct supine to sit t/f technique, correct sit<->stand t/f technique, correct gait mechanics, and progression of POC.     Acceptance, E,D, VU,NR by LR at 6/3/2023 1419    Comment: Educated on precautions, weight bearing status, safety with mobility, correct supine to sit t/f technique, correct sit<->stand t/f technique, correct gait mechanics, LE HEP, and progression of POC.    Acceptance, E,D, VU,NR by LR at 6/3/2023 0851    Comment: Educated on anterior hip precautions, weight bearing status, safety with mobility, correct sit<->stand t/f technique, correct gait mechanics, LE ther ex,  and progression of POC.    Acceptance, E,D, VU,NR by AB at 6/2/2023 1549    Acceptance, E,D, VU,NR by AB at 6/2/2023 1143                         Point: Precautions (Done)       Learning Progress Summary             Patient Acceptance, E,D, VU,NR by LR at 6/4/2023 0955    Comment: Educated on anterior hip precautions, weight bearing status, LE HEP, safety with mobility, correct supine to sit t/f technique, correct sit<->stand t/f technique, correct gait mechanics, and progression of POC.    Acceptance, E,D, VU,NR by LR at 6/3/2023 1419    Comment: Educated on precautions, weight bearing status, safety with mobility, correct supine to sit t/f technique, correct sit<->stand t/f technique, correct gait mechanics, LE HEP, and progression of POC.    Acceptance, E,D, VU,NR by LR at 6/3/2023 0851    Comment: Educated on anterior hip precautions, weight bearing status, safety with mobility, correct sit<->stand t/f technique, correct gait mechanics, LE ther ex,  and progression of POC.    Acceptance, E,D, VU,NR by AB at 6/2/2023 1549    Acceptance, E,D, VU,NR by AB at 6/2/2023 1143                                         User Key       Initials Effective Dates Name Provider Type Discipline    LR 02/03/23 -  Krystyna Nassar, PT Physical Therapist PT    AB 09/22/22 -  Ignacia Hernandez, PT Physical Therapist PT                  PT Recommendation and Plan     Plan of Care Reviewed With: patient  Progress: no change  Outcome Evaluation: Patient ambulated 40  feet with RW, CGA, progressed to step through gait pattern briefly. Continues to have difficulty with advancing R LE but improving with each session. Increased strength noted today with ther ex, tolerated progression to 15 reps well. Patient continues to be below baseline, demonstrating decreased functional mobility status and decreased R hip strength/ROM. Will address these deficits to promote return to PLOF. Recommend IRF at d/c.     Time Calculation:    PT Charges       Row Name 06/04/23 0955             Time Calculation    Start Time 0955  -LR      PT Received On 06/04/23  -LR      PT Goal Re-Cert Due Date 06/12/23  -LR         Timed Charges    58338 - PT Therapeutic Exercise Minutes 10  -LR      18535 - Gait Training Minutes  9  -LR      88082 - PT Therapeutic Activity Minutes 4  -LR         Total Minutes    Timed Charges Total Minutes 23  -LR       Total Minutes 23  -LR                User Key  (r) = Recorded By, (t) = Taken By, (c) = Cosigned By      Initials Name Provider Type    LR Krystyna Nassar, PT Physical Therapist                  Therapy Charges for Today       Code Description Service Date Service Provider Modifiers Qty    88834624308 HC PT THER PROC EA 15 MIN 6/3/2023 Krystyna Nassar, PT GP 1    05975006019 HC GAIT TRAINING EA 15 MIN 6/3/2023 Krystyna Nassar, PT GP 1    60292418729 HC PT THER PROC EA 15 MIN 6/3/2023 Krystyna Nassar, PT GP 1    40561177676 HC GAIT TRAINING EA 15 MIN 6/3/2023 Krystyna Nassar, PT GP 1    42787888940 HC PT THER PROC EA 15 MIN 6/4/2023 Krystyna Nassar, PT GP 1    41027494229 HC GAIT TRAINING EA 15 MIN 6/4/2023 Krystyna Nassar, PT GP 1            PT G-Codes  Outcome Measure Options: AM-PAC 6 Clicks Basic Mobility (PT)  AM-PAC 6 Clicks Score (PT): 17  AM-PAC 6 Clicks Score (OT): 19  PT Discharge Summary  Anticipated Discharge Disposition (PT): inpatient rehabilitation facility    Krystyna Nassar, PT  6/4/2023

## 2023-06-04 NOTE — PLAN OF CARE
Goal Outcome Evaluation:  Plan of Care Reviewed With: patient        Progress: improving  Outcome Evaluation: Patient increased gait distance to 70 feet with RW, CGA, step through gait pattern, improved ability to advance R LE with ambulation. Gait limited by fatigue and pain. Continues to be below baseline, demonstrating decreased functional mobility status and decreased R hip strength/ROM. Will address these deficits to promote return to PLOF. Recommend IRF at d/c.

## 2023-06-04 NOTE — PLAN OF CARE
Goal Outcome Evaluation:  Plan of Care Reviewed With: patient        Progress: no change  Outcome Evaluation: Patient ambulated 40 feet with RW, CGA, progressed to step through gait pattern briefly. Continues to have difficulty with advancing R LE but improving with each session. Increased strength noted today with ther ex, tolerated progression to 15 reps well. Patient continues to be below baseline, demonstrating decreased functional mobility status and decreased R hip strength/ROM. Will address these deficits to promote return to PLOF. Recommend IRF at d/c.

## 2023-06-05 PROCEDURE — A9270 NON-COVERED ITEM OR SERVICE: HCPCS | Performed by: ORTHOPAEDIC SURGERY

## 2023-06-05 PROCEDURE — 97110 THERAPEUTIC EXERCISES: CPT

## 2023-06-05 PROCEDURE — 63710000001 QUETIAPINE 100 MG TABLET: Performed by: INTERNAL MEDICINE

## 2023-06-05 PROCEDURE — 99024 POSTOP FOLLOW-UP VISIT: CPT | Performed by: ORTHOPAEDIC SURGERY

## 2023-06-05 PROCEDURE — A9270 NON-COVERED ITEM OR SERVICE: HCPCS | Performed by: INTERNAL MEDICINE

## 2023-06-05 PROCEDURE — 63710000001 POLYETHYLENE GLYCOL 17 G PACK: Performed by: INTERNAL MEDICINE

## 2023-06-05 PROCEDURE — 63710000001 ASPIRIN 325 MG TABLET DELAYED-RELEASE: Performed by: ORTHOPAEDIC SURGERY

## 2023-06-05 PROCEDURE — 63710000001 MELOXICAM 15 MG TABLET: Performed by: ORTHOPAEDIC SURGERY

## 2023-06-05 PROCEDURE — 63710000001 ATORVASTATIN 10 MG TABLET: Performed by: INTERNAL MEDICINE

## 2023-06-05 PROCEDURE — 63710000001 PRIMIDONE 50 MG TABLET: Performed by: INTERNAL MEDICINE

## 2023-06-05 PROCEDURE — 63710000001 BUPROPION XL 150 MG TABLET SUSTAINED-RELEASE 24 HOUR: Performed by: INTERNAL MEDICINE

## 2023-06-05 PROCEDURE — 63710000001 DIVALPROEX 250 MG TABLET SUSTAINED-RELEASE 24 HOUR: Performed by: INTERNAL MEDICINE

## 2023-06-05 PROCEDURE — 63710000001 SENNOSIDES-DOCUSATE 8.6-50 MG TABLET: Performed by: INTERNAL MEDICINE

## 2023-06-05 PROCEDURE — 63710000001 TIZANIDINE 4 MG TABLET: Performed by: INTERNAL MEDICINE

## 2023-06-05 PROCEDURE — 97116 GAIT TRAINING THERAPY: CPT

## 2023-06-05 PROCEDURE — 94799 UNLISTED PULMONARY SVC/PX: CPT

## 2023-06-05 PROCEDURE — 94664 DEMO&/EVAL PT USE INHALER: CPT

## 2023-06-05 PROCEDURE — 63710000001 OXYCODONE 5 MG TABLET: Performed by: ORTHOPAEDIC SURGERY

## 2023-06-05 PROCEDURE — 97535 SELF CARE MNGMENT TRAINING: CPT | Performed by: OCCUPATIONAL THERAPIST

## 2023-06-05 PROCEDURE — 63710000001 TERAZOSIN 5 MG CAPSULE: Performed by: INTERNAL MEDICINE

## 2023-06-05 RX ORDER — AMOXICILLIN 250 MG
2 CAPSULE ORAL 2 TIMES DAILY
Status: DISCONTINUED | OUTPATIENT
Start: 2023-06-05 | End: 2023-06-06 | Stop reason: HOSPADM

## 2023-06-05 RX ORDER — POLYETHYLENE GLYCOL 3350 17 G/17G
17 POWDER, FOR SOLUTION ORAL DAILY PRN
Status: DISCONTINUED | OUTPATIENT
Start: 2023-06-05 | End: 2023-06-06 | Stop reason: HOSPADM

## 2023-06-05 RX ADMIN — OXYCODONE HYDROCHLORIDE 5 MG: 5 TABLET ORAL at 12:05

## 2023-06-05 RX ADMIN — PRIMIDONE 50 MG: 50 TABLET ORAL at 20:56

## 2023-06-05 RX ADMIN — BUDESONIDE AND FORMOTEROL FUMARATE DIHYDRATE 2 PUFF: 160; 4.5 AEROSOL RESPIRATORY (INHALATION) at 19:15

## 2023-06-05 RX ADMIN — TERAZOSIN HYDROCHLORIDE 5 MG: 5 CAPSULE ORAL at 20:54

## 2023-06-05 RX ADMIN — QUETIAPINE FUMARATE 300 MG: 100 TABLET ORAL at 20:55

## 2023-06-05 RX ADMIN — OXYCODONE HYDROCHLORIDE 5 MG: 5 TABLET ORAL at 16:21

## 2023-06-05 RX ADMIN — MELOXICAM 15 MG: 15 TABLET ORAL at 08:02

## 2023-06-05 RX ADMIN — OXYCODONE HYDROCHLORIDE 5 MG: 5 TABLET ORAL at 08:02

## 2023-06-05 RX ADMIN — SENNOSIDES AND DOCUSATE SODIUM 2 TABLET: 50; 8.6 TABLET ORAL at 20:54

## 2023-06-05 RX ADMIN — BUDESONIDE AND FORMOTEROL FUMARATE DIHYDRATE 2 PUFF: 160; 4.5 AEROSOL RESPIRATORY (INHALATION) at 08:03

## 2023-06-05 RX ADMIN — PRIMIDONE 50 MG: 50 TABLET ORAL at 08:03

## 2023-06-05 RX ADMIN — OXYCODONE HYDROCHLORIDE 5 MG: 5 TABLET ORAL at 20:55

## 2023-06-05 RX ADMIN — DIVALPROEX SODIUM 500 MG: 250 TABLET, EXTENDED RELEASE ORAL at 02:27

## 2023-06-05 RX ADMIN — DICLOFENAC SODIUM 4 G: 10 GEL TOPICAL at 18:21

## 2023-06-05 RX ADMIN — DIVALPROEX SODIUM 500 MG: 250 TABLET, EXTENDED RELEASE ORAL at 20:56

## 2023-06-05 RX ADMIN — OXYCODONE HYDROCHLORIDE 5 MG: 5 TABLET ORAL at 02:42

## 2023-06-05 RX ADMIN — DICLOFENAC SODIUM 4 G: 10 GEL TOPICAL at 11:16

## 2023-06-05 RX ADMIN — TIZANIDINE 4 MG: 4 TABLET ORAL at 20:55

## 2023-06-05 RX ADMIN — SENNOSIDES AND DOCUSATE SODIUM 2 TABLET: 50; 8.6 TABLET ORAL at 12:05

## 2023-06-05 RX ADMIN — DICLOFENAC SODIUM 4 G: 10 GEL TOPICAL at 08:02

## 2023-06-05 RX ADMIN — DICLOFENAC SODIUM 4 G: 10 GEL TOPICAL at 20:55

## 2023-06-05 RX ADMIN — ASPIRIN 325 MG: 325 TABLET, COATED ORAL at 08:02

## 2023-06-05 RX ADMIN — Medication 10 ML: at 08:03

## 2023-06-05 RX ADMIN — ATORVASTATIN CALCIUM 10 MG: 10 TABLET, FILM COATED ORAL at 20:55

## 2023-06-05 RX ADMIN — POLYETHYLENE GLYCOL 3350 17 G: 17 POWDER, FOR SOLUTION ORAL at 15:17

## 2023-06-05 RX ADMIN — BUPROPION HYDROCHLORIDE 150 MG: 150 TABLET, FILM COATED, EXTENDED RELEASE ORAL at 06:58

## 2023-06-05 NOTE — PROGRESS NOTES
"          Orthopaedic Surgery Progress Note      LOS: 0 days   Patient Care Team:  Carolina Gutierrez APRN as PCP - General (Nurse Practitioner)  Joel Murillo Jr., MD (Psychiatry)  Zackery Reid MD (Pain Medicine)  Gary Leonard MBBS (Psychiatry)  Yogesh Geiger MD (Psychiatry)  Richmond Adams MD as Consulting Physician (Psychiatry)  Angelo Ramirez MD (Physical Medicine and Rehabilitation)    POD 4    Subjective     Interval History:   Pt doing well this am.  Having some burning in her thigh.  Denies chest pain or SOB    Objective     Vital Signs:  Temp (24hrs), Av °F (36.7 °C), Min:97.9 °F (36.6 °C), Max:98.1 °F (36.7 °C)    /50 (BP Location: Right arm, Patient Position: Lying)   Pulse 70   Temp 97.9 °F (36.6 °C) (Oral)   Resp 16   Ht 165.1 cm (65\")   Wt 112 kg (246 lb)   SpO2 96%   BMI 40.94 kg/m²     Labs:  Lab Results (last 24 hours)       ** No results found for the last 24 hours. **            Physical Exam:  Calf soft  Toes pink and warm  EHL/FHL intact    Assessment & Plan   POD #4 s/p R ABDIAS with Dr. Leyva  --D/c Planning      Mateo Dubose MD  23  07:55 EDT       "

## 2023-06-05 NOTE — PLAN OF CARE
Goal Outcome Evaluation:  Plan of Care Reviewed With: patient        Progress: improving  Outcome Evaluation: Pt with good effort and increased ambulatory distance to 75'+15' with CGAx1 and FWW. No overt LOB. HEP completed. Pt continues to be limited by increased pain, decreased strength, and impaired endurance causing functional mobility below baseline. Pt will benefit from further IPPT for addressing deficits. PT rec d/c to IPR.

## 2023-06-05 NOTE — PLAN OF CARE
Goal Outcome Evaluation:  Plan of Care Reviewed With: patient        Progress: improving  Outcome Evaluation: OT educated pt on ADL retraining and transfer training including AE use and incorporation of jc-dressing technique. She completed toileting with CGA using RW and LB dressing wiht CGA using AE. Pt limited with pain, decreased balance, decreased safety awareness and is performing below baseline status, recommend IPR.

## 2023-06-05 NOTE — PLAN OF CARE
Goal Outcome Evaluation:  Plan of Care Reviewed With: patient        Progress: improving  Outcome Evaluation: VSS, alert and oriented x4, pain controlled with prn pain medications, patient gets around well with a walker. No issues overnight.

## 2023-06-05 NOTE — THERAPY TREATMENT NOTE
Patient Name: Loreta Platt States  : 1970    MRN: [id number]                              Today's Date: 2023       Admit Date: 2023    Visit Dx:     ICD-10-CM ICD-9-CM   1. Status post right hip replacement  Z96.641 V43.64   2. Avascular necrosis of bone of right hip  M87.051 733.42     Patient Active Problem List   Diagnosis    Tobacco use    Bipolar 1 disorder, manic, mild    Morbid obesity with BMI of 50.0-59.9, adult    Other hyperlipidemia    HTN (hypertension)    Chronic obstructive pulmonary disease    Impaired glucose tolerance    Chronic right shoulder pain    Avascular necrosis of bone of right hip    Status post right hip replacement    Obesity     Past Medical History:   Diagnosis Date    Anxiety     Bipolar 1 disorder     Cataract     Chronic bronchitis     Chronic constipation     Colon polyp     COPD (chronic obstructive pulmonary disease)     Depression     Hip arthrosis     Hyperlipidemia     Knee swelling     Obesity     Parkinsonism     DRUG INDUCED    Primary generalized (osteo)arthritis     PTSD (post-traumatic stress disorder)     RLS (restless legs syndrome)      Past Surgical History:   Procedure Laterality Date    ABDOMINOPLASTY      ANTERIOR AND POSTERIOR VAGINAL REPAIR      BREAST BIOPSY Right     US BIOPSY     SECTION      X 2    CHOLECYSTECTOMY      COLONOSCOPY N/A 2019    Procedure: COLONOSCOPY;  Surgeon: Jonathan Pablo MD;  Location: [facility] ENDOSCOPY;  Service: Gastroenterology    HYSTERECTOMY      AGE 33    LIPOSUCTION ABDOMINAL  1998      General Information       Row Name 23 0834          Physical Therapy Time and Intention    Document Type therapy note (daily note)  -AB     Mode of Treatment physical therapy  -AB       Row Name 23 0834          General Information    Patient Profile Reviewed yes  -AB     Existing Precautions/Restrictions fall;right;hip, anterior  RLE WBAT  -AB     Barriers to Rehab previous functional deficit  -AB        Row Name 06/05/23 0834          Cognition    Orientation Status (Cognition) oriented x 4  -AB       Row Name 06/05/23 0834          Safety Issues, Functional Mobility    Safety Issues Affecting Function (Mobility) awareness of need for assistance;insight into deficits/self-awareness;safety precaution awareness;safety precautions follow-through/compliance;sequencing abilities  -AB     Impairments Affecting Function (Mobility) balance;endurance/activity tolerance;pain;strength;range of motion (ROM)  -AB               User Key  (r) = Recorded By, (t) = Taken By, (c) = Cosigned By      Initials Name Provider Type    AB Ignacia Hernandez PT Physical Therapist                   Mobility       Row Name 06/05/23 0834          Bed Mobility    Bed Mobility supine-sit;scooting/bridging  -AB     Scooting/Bridging [place] (Bed Mobility) contact guard;1 person assist  -AB     Supine-Sit [place] (Bed Mobility) contact guard;1 person assist;verbal cues  -AB     Assistive Device (Bed Mobility) head of bed elevated;leg   -AB     Comment, (Bed Mobility) Increased time/effort to advance RLE to EOB.  -AB       Row Name 06/05/23 0834          Transfers    Comment, (Transfers) Cues for hand placement and sequencing.  -AB       Row Name 06/05/23 0834          Bed-Chair Transfer    Bed-Chair [place] (Transfers) contact guard;1 person assist;verbal cues  -AB     Assistive Device (Bed-Chair Transfers) walker, front-wheeled  -AB       Row Name 06/05/23 0834          Sit-Stand Transfer    Sit-Stand [place] (Transfers) 1 person assist;verbal cues;standby assist  -AB     Assistive Device (Sit-Stand Transfers) walker, front-wheeled  -AB     Comment, (Sit-Stand Transfer) STS from multiple surface heights.  -AB       Row Name 06/05/23 0834          Gait/Stairs (Locomotion)    [place] Level (Gait) verbal cues;contact guard;1 person assist  -AB     Assistive Device (Gait) walker, front-wheeled  -AB     Distance in  Feet (Gait) 75'+15'  -AB     Deviations/Abnormal Patterns (Gait) bilateral deviations;meredith decreased;gait speed decreased;stride length decreased;right sided deviations;antalgic  -AB     Bilateral Gait Deviations forward flexed posture;heel strike decreased  -AB     Right Sided Gait Deviations weight shift ability decreased  -AB     [place] Level (Stairs) not tested  -AB     Comment, (Gait/Stairs) Pt demo's step through gait pattern with slowed meredith. Cues for increased stride length of LLE and heel strike for RLE. Gait mechanics improved with cues. No LOB  or knee buckling. Further gait limited by fatigue.  -AB       Row Name 06/05/23 0834          Mobility    Extremity Weight-bearing Status right lower extremity  -AB     Right Lower Extremity (Weight-bearing Status) weight-bearing as tolerated (WBAT)  -AB               User Key  (r) = Recorded By, (t) = Taken By, (c) = Cosigned By      Initials Name Provider Type    AB Ignacia Hernandez, PT Physical Therapist                   Obj/Interventions       Row Name 06/05/23 0837          Motor Skills    Therapeutic Exercise hip;knee;ankle  -AB       Row Name 06/05/23 0837          Hip (Therapeutic Exercise)    Hip Strengthening (Therapeutic Exercise) right;aBduction;heel slides;15 repititions  -AB       Row Name 06/05/23 0837          Knee (Therapeutic Exercise)    Knee Isometrics (Therapeutic Exercise) right;quad sets;10 repetitions  -AB     Knee Strengthening (Therapeutic Exercise) right;SLR (straight leg raise);LAQ (long arc quad);15 repititions  -AB       Row Name 06/05/23 0837          Ankle (Therapeutic Exercise)    Ankle (Therapeutic Exercise) AROM (active range of motion)  -AB     Ankle AROM (Therapeutic Exercise) bilateral;dorsiflexion;plantarflexion;10 repetitions  -AB       Row Name 06/05/23 0837          Balance    Balance Assessment sitting static balance;sitting dynamic balance;standing static balance;standing dynamic balance  -AB     Static  Sitting Balance independent  -AB     Dynamic Sitting Balance supervision  -AB     Position, Sitting Balance unsupported;sitting edge of bed  -AB     Static Standing Balance contact guard;1-person assist  -AB     Dynamic Standing Balance contact guard;1-person assist;verbal cues  -AB     Position/Device Used, Standing Balance supported;walker, front-wheeled  -AB     Balance Interventions sit to stand;sitting;standing;supported;dynamic;static  -AB     Comment, Balance No overt LOB.  -AB               User Key  (r) = Recorded By, (t) = Taken By, (c) = Cosigned By      Initials Name Provider Type    AB Ignacia Hernandez, PT Physical Therapist                   Goals/Plan    No documentation.                  Clinical Impression       Row Name 06/05/23 0841          Pain    Pretreatment Pain Rating 8/10  -AB     Posttreatment Pain Rating 8/10  -AB     Pain Location - Side/Orientation Right  -AB     Pain Location generalized  -AB     Pain Location - hip  -AB     Pre/Posttreatment Pain Comment Tolerated.  -AB     Pain Intervention(s) Cold applied;Ambulation/increased activity;Elevated;Repositioned;Nursing Notified  -AB     Additional Documentation Pain Scale: Numbers Pre/Post-Treatment (Group)  -AB       Row Name 06/05/23 0841          Plan of Care Review    Plan of Care Reviewed With patient  -AB     Progress improving  -AB     Outcome Evaluation Pt with good effort and increased ambulatory distance to 75'+15' with CGAx1 and FWW. No overt LOB. HEP completed. Pt continues to be limited by increased pain, decreased strength, and impaired endurance causing functional mobility below baseline. Pt will benefit from further IPPT for addressing deficits. PT rec d/c to IPR.  -AB       Row Name 06/05/23 0841          Vital Signs    Pre Systolic BP Rehab 90  -AB     Pre Treatment Diastolic BP 51  -AB     Post Systolic BP Rehab 149  -AB     Post Treatment Diastolic BP 50  -AB     Pretreatment Heart Rate (beats/min) 66  -AB      Posttreatment Heart Rate (beats/min) 72  -AB     Pre SpO2 (%) 94  -AB     O2 Delivery Pre Treatment nasal cannula  -AB     Intra SpO2 (%) 88  -AB     O2 Delivery Intra Treatment room air  -AB     Post SpO2 (%) 94  -AB     O2 Delivery Post Treatment nasal cannula  -AB     Pre Patient Position Supine  -AB     Intra Patient Position Standing  -AB     Post Patient Position Sitting  -AB       Row Name 06/05/23 0841          Positioning and Restraints    Pre-Treatment Position in bed  -AB     Post Treatment Position chair  -AB     In Chair notified nsg;reclined;sitting;call light within reach;encouraged to call for assist;exit alarm on;waffle cushion  -AB               User Key  (r) = Recorded By, (t) = Taken By, (c) = Cosigned By      Initials Name Provider Type    Ignacia Núñez PT Physical Therapist                   Outcome Measures       Row Name 06/05/23 0844          How much help from another person do you currently need...    Turning from your back to your side while in flat bed without using bedrails? 3  -AB     Moving from lying on back to sitting on the side of a flat bed without bedrails? 3  -AB     Moving to and from a bed to a chair (including a wheelchair)? 3  -AB     Standing up from a chair using your arms (e.g., wheelchair, bedside chair)? 3  -AB     Climbing 3-5 steps with a railing? 2  -AB     To walk in hospital room? 3  -AB     AM-PAC 6 Clicks Score (PT) 17  -AB     Highest level of mobility 5 --> Static standing  -AB       Row Name 06/05/23 0844          Functional Assessment    Outcome Measure Options AM-PAC 6 Clicks Basic Mobility (PT)  -AB               User Key  (r) = Recorded By, (t) = Taken By, (c) = Cosigned By      Initials Name Provider Type    Ignacia Núñez, PT Physical Therapist                                 Physical Therapy Education       Title: PT OT SLP Therapies (In Progress)       Topic: Physical Therapy (Done)       Point: Mobility training (Done)       Learning  Progress Summary             Patient Acceptance, E,D, VU,NR by AB at 6/5/2023 0845    Acceptance, E,D, VU,NR by LR at 6/4/2023 1426    Comment: Educated on anterior hip precautions, weight bearing status, LE HEP, safety with mobility, correct sit<->stand t/f technique, correct gait mechanics, and progression of POC.    Acceptance, E,D, VU,NR by LR at 6/4/2023 0955    Comment: Educated on anterior hip precautions, weight bearing status, LE HEP, safety with mobility, correct supine to sit t/f technique, correct sit<->stand t/f technique, correct gait mechanics, and progression of POC.    Acceptance, E,D, VU,NR by LR at 6/3/2023 1419    Comment: Educated on precautions, weight bearing status, safety with mobility, correct supine to sit t/f technique, correct sit<->stand t/f technique, correct gait mechanics, LE HEP, and progression of POC.    Acceptance, E,D, VU,NR by LR at 6/3/2023 0851    Comment: Educated on anterior hip precautions, weight bearing status, safety with mobility, correct sit<->stand t/f technique, correct gait mechanics, LE ther ex,  and progression of POC.    Acceptance, E,D, VU,NR by AB at 6/2/2023 1549    Acceptance, E,D, VU,NR by AB at 6/2/2023 1143                         Point: Home exercise program (Done)       Learning Progress Summary             Patient Acceptance, E,D, VU,NR by AB at 6/5/2023 0845    Acceptance, E,D, VU,NR by LR at 6/4/2023 1426    Comment: Educated on anterior hip precautions, weight bearing status, LE HEP, safety with mobility, correct sit<->stand t/f technique, correct gait mechanics, and progression of POC.    Acceptance, E,D, VU,NR by LR at 6/4/2023 0955    Comment: Educated on anterior hip precautions, weight bearing status, LE HEP, safety with mobility, correct supine to sit t/f technique, correct sit<->stand t/f technique, correct gait mechanics, and progression of POC.    Acceptance, E,D, VU,NR by LR at 6/3/2023 1419    Comment: Educated on precautions, weight  bearing status, safety with mobility, correct supine to sit t/f technique, correct sit<->stand t/f technique, correct gait mechanics, LE HEP, and progression of POC.    Acceptance, E,D, VU,NR by LR at 6/3/2023 0851    Comment: Educated on anterior hip precautions, weight bearing status, safety with mobility, correct sit<->stand t/f technique, correct gait mechanics, LE ther ex,  and progression of POC.    Acceptance, E,D, VU,NR by AB at 6/2/2023 1549    Acceptance, E,D, VU,NR by AB at 6/2/2023 1143                         Point: Body mechanics (Done)       Learning Progress Summary             Patient Acceptance, E,D, VU,NR by AB at 6/5/2023 0845    Acceptance, E,D, VU,NR by LR at 6/4/2023 1426    Comment: Educated on anterior hip precautions, weight bearing status, LE HEP, safety with mobility, correct sit<->stand t/f technique, correct gait mechanics, and progression of POC.    Acceptance, E,D, VU,NR by LR at 6/4/2023 0955    Comment: Educated on anterior hip precautions, weight bearing status, LE HEP, safety with mobility, correct supine to sit t/f technique, correct sit<->stand t/f technique, correct gait mechanics, and progression of POC.    Acceptance, E,D, VU,NR by LR at 6/3/2023 1419    Comment: Educated on precautions, weight bearing status, safety with mobility, correct supine to sit t/f technique, correct sit<->stand t/f technique, correct gait mechanics, LE HEP, and progression of POC.    Acceptance, E,D, VU,NR by LR at 6/3/2023 0851    Comment: Educated on anterior hip precautions, weight bearing status, safety with mobility, correct sit<->stand t/f technique, correct gait mechanics, LE ther ex,  and progression of POC.    Acceptance, E,D, VU,NR by AB at 6/2/2023 1549    Acceptance, E,D, VU,NR by AB at 6/2/2023 1143                         Point: Precautions (Done)       Learning Progress Summary             Patient Acceptance, E,D, VU,NR by AB at 6/5/2023 0845    Acceptance, E,D, VU,NR by LR at  6/4/2023 1426    Comment: Educated on anterior hip precautions, weight bearing status, LE HEP, safety with mobility, correct sit<->stand t/f technique, correct gait mechanics, and progression of POC.    Acceptance, E,D, VU,NR by LR at 6/4/2023 0955    Comment: Educated on anterior hip precautions, weight bearing status, LE HEP, safety with mobility, correct supine to sit t/f technique, correct sit<->stand t/f technique, correct gait mechanics, and progression of POC.    Acceptance, E,D, VU,NR by LR at 6/3/2023 1419    Comment: Educated on precautions, weight bearing status, safety with mobility, correct supine to sit t/f technique, correct sit<->stand t/f technique, correct gait mechanics, LE HEP, and progression of POC.    Acceptance, E,D, VU,NR by LR at 6/3/2023 0851    Comment: Educated on anterior hip precautions, weight bearing status, safety with mobility, correct sit<->stand t/f technique, correct gait mechanics, LE ther ex,  and progression of POC.    Acceptance, E,D, VU,NR by AB at 6/2/2023 1549    Acceptance, E,D, VU,NR by AB at 6/2/2023 1143                                         User Key       Initials Effective Dates Name Provider Type Discipline    LR 02/03/23 -  Krystyna Nassar, PT Physical Therapist PT    AB 09/22/22 -  Ignacia Hernandez, PT Physical Therapist PT                  PT Recommendation and Plan  Planned Therapy Interventions (PT): balance training, bed mobility training, gait training, home exercise program, postural re-education, patient/family education, ROM (range of motion), strengthening, stretching, transfer training  Plan of Care Reviewed With: patient  Progress: improving  Outcome Evaluation: Pt with good effort and increased ambulatory distance to 75'+15' with CGAx1 and FWW. No overt LOB. HEP completed. Pt continues to be limited by increased pain, decreased strength, and impaired endurance causing functional mobility below baseline. Pt will benefit from further IPPT for  addressing deficits. PT rec d/c to IPR.     Time Calculation:    PT Charges       Row Name 06/05/23 0845             Time Calculation    Start Time 0800  -AB      PT Received On 06/05/23  -AB      PT Goal Re-Cert Due Date 06/12/23  -AB         Timed Charges    74484 - PT Therapeutic Exercise Minutes 10  -AB      41266 - Gait Training Minutes  15  -AB         Total Minutes    Timed Charges Total Minutes 25  -AB       Total Minutes 25  -AB                User Key  (r) = Recorded By, (t) = Taken By, (c) = Cosigned By      Initials Name Provider Type    AB Ignacia Hernandez, PT Physical Therapist                  Therapy Charges for Today       Code Description Service Date Service Provider Modifiers Qty    80238262498 HC PT THER PROC EA 15 MIN 6/5/2023 Ignacia Hernandez, PT GP 1    21630794502 HC GAIT TRAINING EA 15 MIN 6/5/2023 Ignacia Hernandez, PT GP 1            PT G-Codes  Outcome Measure Options: AM-PAC 6 Clicks Basic Mobility (PT)  AM-PAC 6 Clicks Score (PT): 17  AM-PAC 6 Clicks Score (OT): 19  PT Discharge Summary  Anticipated Discharge Disposition (PT): inpatient rehabilitation facility    Ignacia Hernandez PT  6/5/2023

## 2023-06-05 NOTE — CASE MANAGEMENT/SOCIAL WORK
"Discharge Planning Assessment  [facility]     Patient Name: Loreta Robertson  MRN: [id number]  Today's Date: 6/5/2023    Admit Date: 6/1/2023    Plan: [facility], pending insurance auth                   Discharge Plan       Row Name 06/05/23 1551       Plan    Plan [facility], pending insurance auth    Patient/Family in Agreement with Plan yes    Plan Comments Followed up with Ms. Robertson at the bedside for discharge planning.   Ms. Robertson has been evaluated by PT and per notes, \"Pt with good effort and increased ambulatory distance to 75'+15' with CGAx1 and FWW. No overt LOB. HEP completed. Pt continues to be limited by increased pain, decreased strength, and impaired endurance causing functional mobility below baseline. Pt will benefit from further IPPT for addressing deficits. PT rec d/c to IPR.\"    Ms. Robertson requested a referral to [facility].  Referral given to April with [facility] and a prior auth with the patient's [employer] was initiated by the facility.    CM will follow up.    Final Discharge Disposition Code 03 - skilled nursing facility (SNF)                  Continued Care and Services - Admitted Since 6/1/2023       Destination Coordination complete.      Service Provider Request Status Selected Services Address Phone Fax Patient Preferred    [facility]  Selected Skilled Nursing [address] [facility] [id number] [phone] --              Durable Medical Equipment       Service Provider Request Status Selected Services Address Phone Fax Patient Preferred    [facility]  Selected Durable Medical Equipment [address] [place] [address] [id number] [phone] --                  Expected Discharge Date and Time       Expected Discharge Date Expected Discharge Time    Jun 6, 2023                      Savita Alvarez RN    "

## 2023-06-05 NOTE — THERAPY TREATMENT NOTE
Patient Name: Loreta Platt States  : 1970    MRN: [id number]                              Today's Date: 2023       Admit Date: 2023    Visit Dx:     ICD-10-CM ICD-9-CM   1. Status post right hip replacement  Z96.641 V43.64   2. Avascular necrosis of bone of right hip  M87.051 733.42     Patient Active Problem List   Diagnosis    Tobacco use    Bipolar 1 disorder, manic, mild    Morbid obesity with BMI of 50.0-59.9, adult    Other hyperlipidemia    HTN (hypertension)    Chronic obstructive pulmonary disease    Impaired glucose tolerance    Chronic right shoulder pain    Avascular necrosis of bone of right hip    Status post right hip replacement    Obesity     Past Medical History:   Diagnosis Date    Anxiety     Bipolar 1 disorder     Cataract     Chronic bronchitis     Chronic constipation     Colon polyp     COPD (chronic obstructive pulmonary disease)     Depression     Hip arthrosis     Hyperlipidemia     Knee swelling     Obesity     Parkinsonism     DRUG INDUCED    Primary generalized (osteo)arthritis     PTSD (post-traumatic stress disorder)     RLS (restless legs syndrome)      Past Surgical History:   Procedure Laterality Date    ABDOMINOPLASTY      ANTERIOR AND POSTERIOR VAGINAL REPAIR      BREAST BIOPSY Right     US BIOPSY     SECTION      X 2    CHOLECYSTECTOMY      COLONOSCOPY N/A 2019    Procedure: COLONOSCOPY;  Surgeon: Jonathan Pablo MD;  Location: [facility] ENDOSCOPY;  Service: Gastroenterology    HYSTERECTOMY      AGE 33    LIPOSUCTION ABDOMINAL  1998      General Information       Row Name 23 0917          OT Time and Intention    Document Type therapy note (daily note)  -AR     Mode of Treatment individual therapy;occupational therapy  -AR       Row Name 23          General Information    Existing Precautions/Restrictions fall;right;hip, anterior  POD#4 right modified anterior hip  -AR       Row Name 23 09          Cognition    Orientation  Status (Cognition) oriented x 4  -AR       Row Name 06/05/23 0917          Safety Issues, Functional Mobility    Safety Issues Affecting Function (Mobility) safety precaution awareness;safety precautions follow-through/compliance;sequencing abilities  -AR     Impairments Affecting Function (Mobility) balance;endurance/activity tolerance;pain;strength;range of motion (ROM)  -AR               User Key  (r) = Recorded By, (t) = Taken By, (c) = Cosigned By      Initials Name Provider Type    Raina Arrington OT Occupational Therapist                     Mobility/ADL's       Row Name 06/05/23 0918          Transfers    Transfers sit-stand transfer;stand-sit transfer;toilet transfer  -AR     Comment, (Transfers) Cues for chair approach. Reviewed safe car transfer technique and use of leg  to assist.  -AR       Row Name 06/05/23 0918          Bed-Chair Transfer    Bed-Chair [place] (Transfers) verbal cues;contact guard  -AR     Assistive Device (Bed-Chair Transfers) walker, front-wheeled  -AR       Row Name 06/05/23 0918          Sit-Stand Transfer    Sit-Stand [place] (Transfers) contact guard;verbal cues  -AR     Assistive Device (Sit-Stand Transfers) walker, front-wheeled  -AR       Row Name 06/05/23 0918          Stand-Sit Transfer    Stand-Sit [place] (Transfers) contact guard;verbal cues  -AR     Assistive Device (Stand-Sit Transfers) walker, front-wheeled  -AR       Row Name 06/05/23 0918          Toilet Transfer    Type (Toilet Transfer) sit-stand;stand-sit  -AR     [place] Level (Toilet Transfer) contact guard;verbal cues  -AR     Assistive Device (Toilet Transfer) walker, front-wheeled  -AR       Row Name 06/05/23 0918          Functional Mobility    Functional Mobility- Ind. Level contact guard assist  -AR     Functional Mobility- Device walker, front-wheeled  -AR     Functional Mobility-Distance (Feet) 20  -AR       Row Name 06/05/23 0918          Activities of Daily Living     BADL Assessment/Intervention lower body dressing;bathing;toileting  -AR       Row Name 06/05/23 0918          Mobility    Extremity Weight-bearing Status right lower extremity  -AR     Right Lower Extremity (Weight-bearing Status) weight-bearing as tolerated (WBAT)  -AR       Row Name 06/05/23 0918          Bathing Assessment/Intervention    Comment, (Bathing) Reviewed use of LH sponge to assist with bathing.  -AR       Row Name 06/05/23 0918          Lower Body Dressing Assessment/Training    [place] Level (Lower Body Dressing) don;doff;socks;contact guard assist;verbal cues  -AR     Assistive Devices (Lower Body Dressing) sock-aid;reacher  -AR     Position (Lower Body Dressing) unsupported sitting  -AR     Comment, (Lower Body Dressing) Reviewed modified anterior hip precautions, incorporation into ADL retraining, AE use as needed and jc-dressing technique.  -AR       Row Name 06/05/23 0918          Self-Feeding Assessment/Training    [place] Level (Feeding) independent;liquids to mouth  -AR     Position (Self-Feeding) supported sitting  -AR       Row Name 06/05/23 0918          Toileting Assessment/Training    [place] Level (Toileting) contact guard assist;verbal cues  -AR     Assistive Devices (Toileting) grab bar/safety frame;raised toilet seat  -AR     Position (Toileting) supported standing;supported sitting  -AR               User Key  (r) = Recorded By, (t) = Taken By, (c) = Cosigned By      Initials Name Provider Type    Raina Arrington OT Occupational Therapist                   Obj/Interventions       Row Name 06/05/23 0927          Balance    Balance Assessment sitting static balance;sitting dynamic balance;standing static balance;standing dynamic balance  -AR     Static Sitting Balance independent  -AR     Dynamic Sitting Balance supervision  -AR     Position, Sitting Balance sitting in chair  -AR     Static Standing Balance contact guard;verbal cues  -AR     Dynamic Standing  Balance contact guard;verbal cues  -AR     Position/Device Used, Standing Balance supported;walker, front-wheeled  -AR               User Key  (r) = Recorded By, (t) = Taken By, (c) = Cosigned By      Initials Name Provider Type    Raina Arrington OT Occupational Therapist                   Goals/Plan       Row Name 06/05/23 0932          Transfer Goal 1 (OT)    Progress/Outcome (Transfer Goal 1, OT) goal met  -AR       Row Name 06/05/23 0932          Dressing Goal 1 (OT)    Progress/Outcome (Dressing Goal 1, OT) goal ongoing  -AR               User Key  (r) = Recorded By, (t) = Taken By, (c) = Cosigned By      Initials Name Provider Type    Raina Arrington OT Occupational Therapist                   Clinical Impression       Row Name 06/05/23 0928          Pain Assessment    Pretreatment Pain Rating 7/10  -AR     Posttreatment Pain Rating 8/10  -AR     Pain Location - Side/Orientation Right  -AR     Pain Location generalized  -AR     Pain Location - hip  -AR     Pain Intervention(s) Medication (See MAR);Cold applied;Repositioned;Ambulation/increased activity  -AR       Row Name 06/05/23 0928          Plan of Care Review    Plan of Care Reviewed With patient  -AR     Progress improving  -AR     Outcome Evaluation OT educated pt on ADL retraining and transfer training including AE use and incorporation of jc-dressing technique. She completed toileting with CGA using RW and LB dressing wiht CGA using AE. Pt limited with pain, decreased balance, decreased safety awareness and is performing below baseline status, recommend IPR.  -AR       Row Name 06/05/23 0928          Therapy Plan Review/Discharge Plan (OT)    Anticipated Discharge Disposition (OT) inpatient rehabilitation facility  -AR       Row Name 06/05/23 0928          Vital Signs    Pre SpO2 (%) 94  -AR     O2 Delivery Pre Treatment supplemental O2  -AR     O2 Delivery Intra Treatment supplemental O2  -AR     Post SpO2 (%) 94  -AR     O2  Delivery Post Treatment supplemental O2  -AR     Pre Patient Position Sitting  -AR     Intra Patient Position Standing  -AR     Post Patient Position Sitting  -AR       Row Name 06/05/23 0928          Positioning and Restraints    Pre-Treatment Position sitting in chair/recliner  -AR     Post Treatment Position chair  -AR     In Chair notified nsg;reclined;call light within reach;encouraged to call for assist;exit alarm on;compression device;waffle cushion;legs elevated  -AR               User Key  (r) = Recorded By, (t) = Taken By, (c) = Cosigned By      Initials Name Provider Type    Raina Arrington, OT Occupational Therapist                   Outcome Measures       Row Name 06/05/23 0932          How much help from another is currently needed...    Putting on and taking off regular lower body clothing? 3  -AR     Bathing (including washing, rinsing, and drying) 2  -AR     Toileting (which includes using toilet bed pan or urinal) 3  -AR     Putting on and taking off regular upper body clothing 3  -AR     Taking care of personal grooming (such as brushing teeth) 3  -AR     Eating meals 3  -AR     AM-PAC 6 Clicks Score (OT) 17  -AR       Row Name 06/05/23 0844 06/05/23 0800       How much help from another person do you currently need...    Turning from your back to your side while in flat bed without using bedrails? 3  -AB 3  -SM    Moving from lying on back to sitting on the side of a flat bed without bedrails? 3  -AB 3  -SM    Moving to and from a bed to a chair (including a wheelchair)? 3  -AB 3  -SM    Standing up from a chair using your arms (e.g., wheelchair, bedside chair)? 3  -AB 3  -SM    Climbing 3-5 steps with a railing? 2  -AB 2  -SM    To walk in hospital room? 3  -AB 3  -SM    AM-PAC 6 Clicks Score (PT) 17  -AB 17  -SM    Highest level of mobility 5 --> Static standing  -AB 5 --> Static standing  -SM      Row Name 06/05/23 0932 06/05/23 0844       Functional Assessment    Outcome Measure  Options AM-PAC 6 Clicks Daily Activity (OT)  -AR AM-PAC 6 Clicks Basic Mobility (PT)  -AB              User Key  (r) = Recorded By, (t) = Taken By, (c) = Cosigned By      Initials Name Provider Type    AR Raina Trujillo, OT Occupational Therapist    Dari Spencer, RN Registered Nurse    Ignacia Núñez, PT Physical Therapist                    Occupational Therapy Education       Title: PT OT SLP Therapies (In Progress)       Topic: Occupational Therapy (In Progress)       Point: ADL training (Done)       Description:   Instruct learner(s) on proper safety adaptation and remediation techniques during self care or transfers.   Instruct in proper use of assistive devices.                  Learning Progress Summary             Patient Eager, E,TB,D, VU,NR by AR at 6/5/2023 0933    Acceptance, E,D,TB, VU,DU,NR by TB at 6/2/2023 0954                         Point: Home exercise program (Not Started)       Description:   Instruct learner(s) on appropriate technique for monitoring, assisting and/or progressing therapeutic exercises/activities.                  Learner Progress:  Not documented in this visit.              Point: Precautions (Done)       Description:   Instruct learner(s) on prescribed precautions during self-care and functional transfers.                  Learning Progress Summary             Patient Eager, E,TB,D, VU,NR by AR at 6/5/2023 0933    Acceptance, E,D,TB, VU,DU,NR by TB at 6/2/2023 0954                         Point: Body mechanics (Done)       Description:   Instruct learner(s) on proper positioning and spine alignment during self-care, functional mobility activities and/or exercises.                  Learning Progress Summary             Patient Eager, E,TB,D, VU,NR by AR at 6/5/2023 0933                                         User Key       Initials Effective Dates Name Provider Type Discipline    TB 05/31/23 -  Audrey Montes OT Occupational Therapist OT    AR 05/31/23 -   Raina Trujillo, VIOLET Occupational Therapist OT                  OT Recommendation and Plan     Plan of Care Review  Plan of Care Reviewed With: patient  Progress: improving  Outcome Evaluation: OT educated pt on ADL retraining and transfer training including AE use and incorporation of jc-dressing technique. She completed toileting with CGA using RW and LB dressing wiht CGA using AE. Pt limited with pain, decreased balance, decreased safety awareness and is performing below baseline status, recommend IPR.     Time Calculation:    Time Calculation- OT       Row Name 06/05/23 0933 06/05/23 0845          Time Calculation- OT    OT Start Time 0831  -AR --     OT Received On 06/05/23  -AR --     OT Goal Re-Cert Due Date 06/12/23  -AR --        Timed Charges    57995 - Gait Training Minutes  -- 15  -AB     48325 - OT Self Care/Mgmt Minutes 40  -AR --        Total Minutes    Timed Charges Total Minutes 40  -AR 15  -AB      Total Minutes 40  -AR 15  -AB               User Key  (r) = Recorded By, (t) = Taken By, (c) = Cosigned By      Initials Name Provider Type    AR Raina Trujillo, OT Occupational Therapist    AB Ignacia Hernandez, PT Physical Therapist                  Therapy Charges for Today       Code Description Service Date Service Provider Modifiers Qty    [id number] HC OT SELF CARE/MGMT/TRAIN EA 15 MIN 6/5/2023 Raina Trujillo OT GO 3                 Raina Trujillo OT  6/5/2023

## 2023-06-05 NOTE — PLAN OF CARE
Goal Outcome Evaluation:  Plan of Care Reviewed With: patient        Progress: improving  Outcome Evaluation: vss, medicated for pain as charted with good response. up with assist of 1 to toilet and tolerated well. limited by fatigue

## 2023-06-05 NOTE — PROGRESS NOTES
"IM progress note      Loreta Platt States  [id number]  1970     LOS: 0 days     Attending: Adrián Leyva MD    Primary Care Provider: Carolina Gutierrez APRN      Chief Complaint/Reason for visit:  No chief complaint on file.      Subjective   Feels ok. No f/c/n/vom. Fair pain control.  Has [place] oxygen on when I saw her.  No BM for a few days ( usual for pt)    Objective        Visit Vitals  /50 (BP Location: Right arm, Patient Position: Lying)   Pulse 63   Temp 97.8 °F (36.6 °C) (Oral)   Resp 18   Ht 165.1 cm (65\")   Wt 112 kg (246 lb)   SpO2 91%   BMI 40.94 kg/m²     Temp (24hrs), Av.9 °F (36.6 °C), Min:97.8 °F (36.6 °C), Max:98.1 °F (36.7 °C)      Intake/Output:    Intake/Output Summary (Last 24 hours) at 2023 1245  Last data filed at 2023 0100  Gross per 24 hour   Intake 1190 ml   Output 450 ml   Net 740 ml          Physical Therapy:        Signed         Goal Outcome Evaluation:  Plan of Care Reviewed With: patient  Progress: improving  Outcome Evaluation: OT educated pt on ADL retraining and transfer training including AE use and incorporation of jc-dressing technique. She completed toileting with CGA using RW and LB dressing wiht CGA using AE. Pt limited with pain, decreased balance, decreased safety awareness and is performing below baseline status, recommend IPR.                    Physical Exam:     General Appearance:    Alert, cooperative, in no acute distress   Head:    Normocephalic, without obvious abnormality, atraumatic    Lungs:     Normal effort, symmetric chest rise,  clear to      auscultation bilaterally              Heart:    Regular rhythm and normal rate, normal S1 and S2    Abdomen:     Normal bowel sounds, no masses, no organomegaly, soft        non-tender, non-distended, no guarding, no rebound                tenderness   Extremities:   CDI dressing.   Flexion and dorsiflexion intact bilateral feet.    No clubbing, cyanosis or edema.  No deformities.    Pulses:   " Pulses palpable and equal bilaterally   Skin:   No bleeding, bruising or rash          Results Review:     I reviewed the patient's new clinical results.   Results from last 7 days   Lab Units 06/04/23  0627 06/03/23  0426 06/02/23  0403   WBC 10*3/mm3 6.35 5.73 7.90   HEMOGLOBIN g/dL 11.9* 12.7 13.5   HEMATOCRIT % 37.5 39.1 42.1   PLATELETS 10*3/mm3 139* 131* 148             Invalid input(s): ALYSSA CLAUDIO  I reviewed the patient's new imaging including images and reports.    All medications reviewed.   aspirin, 325 mg, Oral, Daily  atorvastatin, 10 mg, Oral, Nightly  budesonide-formoterol, 2 puff, Inhalation, BID  buPROPion XL, 150 mg, Oral, QAM  Diclofenac Sodium, 4 g, Topical, 4x Daily  divalproex, 500 mg, Oral, Nightly  meloxicam, 15 mg, Oral, Daily  primidone, 50 mg, Oral, BID  QUEtiapine, 300 mg, Oral, Nightly  senna-docusate sodium, 2 tablet, Oral, BID  sodium chloride, 10 mL, Intravenous, Q12H  terazosin, 5 mg, Oral, Nightly      albuterol sulfate HFA, 2 puff, Q4H PRN  HYDROmorphone, 0.5 mg, Q2H PRN   And  naloxone, 0.1 mg, Q5 Min PRN  Morphine, 4 mg, Q2H PRN   And  naloxone, 0.4 mg, Q5 Min PRN  ondansetron, 4 mg, Q6H PRN   Or  ondansetron, 4 mg, Q6H PRN  oxyCODONE, 5 mg, Q4H PRN  polyethylene glycol, 17 g, Daily PRN  sodium chloride, 1-10 mL, PRN  sodium chloride, 40 mL, PRN  tiZANidine, 4 mg, Q8H PRN        Assessment & Plan       Status post right hip replacement    Bipolar 1 disorder, manic, mild    HTN (hypertension)    Chronic obstructive pulmonary disease    Avascular necrosis of bone of right hip    Obesity         Plan   1. PT/OT,  Weight bearing as tolerated right LE.  Total hip precautions  2. Pain control-prns  3. IS-encourage  4. DVT proph- Mechanicals and aspirin  5. Bowel regimen  6. Resume home medications as appropriate  7. Monitor post-op labs  8. DC planning. IPR, await bed offer, approval.         - Hypertension:  Resumed home medications as appropriate, formulary substitution when  indicated.  Holding parameters.  Prn medications for elevated blood pressure.     -Bipolar disorder: Maintained on home regimen.     -Obesity: Complicates all aspects of care.    -COPD: maintain on home regimen. BDs prn.stable.       Ra Solitario MD  06/05/23  12:45 EDT

## 2023-06-06 VITALS
WEIGHT: 246 LBS | RESPIRATION RATE: 18 BRPM | SYSTOLIC BLOOD PRESSURE: 139 MMHG | OXYGEN SATURATION: 94 % | DIASTOLIC BLOOD PRESSURE: 52 MMHG | HEIGHT: 65 IN | TEMPERATURE: 98.2 F | BODY MASS INDEX: 40.98 KG/M2 | HEART RATE: 75 BPM

## 2023-06-06 PROCEDURE — 63710000001 MELOXICAM 15 MG TABLET: Performed by: ORTHOPAEDIC SURGERY

## 2023-06-06 PROCEDURE — 63710000001 OXYCODONE 5 MG TABLET: Performed by: ORTHOPAEDIC SURGERY

## 2023-06-06 PROCEDURE — A9270 NON-COVERED ITEM OR SERVICE: HCPCS | Performed by: ORTHOPAEDIC SURGERY

## 2023-06-06 PROCEDURE — 97110 THERAPEUTIC EXERCISES: CPT

## 2023-06-06 PROCEDURE — 63710000001 PRIMIDONE 50 MG TABLET: Performed by: INTERNAL MEDICINE

## 2023-06-06 PROCEDURE — A9270 NON-COVERED ITEM OR SERVICE: HCPCS | Performed by: INTERNAL MEDICINE

## 2023-06-06 PROCEDURE — 63710000001 SENNOSIDES-DOCUSATE 8.6-50 MG TABLET: Performed by: INTERNAL MEDICINE

## 2023-06-06 PROCEDURE — 63710000001 ASPIRIN 325 MG TABLET DELAYED-RELEASE: Performed by: ORTHOPAEDIC SURGERY

## 2023-06-06 PROCEDURE — 63710000001 BUPROPION XL 150 MG TABLET SUSTAINED-RELEASE 24 HOUR: Performed by: INTERNAL MEDICINE

## 2023-06-06 PROCEDURE — 99024 POSTOP FOLLOW-UP VISIT: CPT | Performed by: ORTHOPAEDIC SURGERY

## 2023-06-06 PROCEDURE — 97116 GAIT TRAINING THERAPY: CPT

## 2023-06-06 PROCEDURE — 63710000001 POLYETHYLENE GLYCOL 17 G PACK: Performed by: INTERNAL MEDICINE

## 2023-06-06 RX ORDER — ASPIRIN 325 MG
325 TABLET, DELAYED RELEASE (ENTERIC COATED) ORAL DAILY
Qty: 30 TABLET | Refills: 0 | Status: SHIPPED | OUTPATIENT
Start: 2023-06-06 | End: 2023-07-06

## 2023-06-06 RX ORDER — MELOXICAM 15 MG/1
15 TABLET ORAL DAILY
Qty: 15 TABLET | Refills: 0 | Status: SHIPPED | OUTPATIENT
Start: 2023-06-06 | End: 2023-06-21

## 2023-06-06 RX ORDER — ACETAMINOPHEN 500 MG
1000 TABLET ORAL EVERY 8 HOURS
Qty: 42 TABLET | Refills: 0
Start: 2023-06-06 | End: 2023-06-13

## 2023-06-06 RX ORDER — OXYCODONE HYDROCHLORIDE 5 MG/1
5 TABLET ORAL EVERY 4 HOURS PRN
Qty: 40 TABLET | Refills: 0 | Status: SHIPPED | OUTPATIENT
Start: 2023-06-06

## 2023-06-06 RX ORDER — DOCUSATE SODIUM 100 MG/1
100 CAPSULE, LIQUID FILLED ORAL 2 TIMES DAILY
Qty: 30 CAPSULE | Refills: 0 | Status: SHIPPED | OUTPATIENT
Start: 2023-06-06 | End: 2023-06-21

## 2023-06-06 RX ADMIN — OXYCODONE HYDROCHLORIDE 5 MG: 5 TABLET ORAL at 11:17

## 2023-06-06 RX ADMIN — DICLOFENAC SODIUM 4 G: 10 GEL TOPICAL at 08:06

## 2023-06-06 RX ADMIN — MELOXICAM 15 MG: 15 TABLET ORAL at 08:05

## 2023-06-06 RX ADMIN — PRIMIDONE 50 MG: 50 TABLET ORAL at 08:05

## 2023-06-06 RX ADMIN — POLYETHYLENE GLYCOL 3350 17 G: 17 POWDER, FOR SOLUTION ORAL at 16:11

## 2023-06-06 RX ADMIN — DICLOFENAC SODIUM 4 G: 10 GEL TOPICAL at 11:17

## 2023-06-06 RX ADMIN — BUPROPION HYDROCHLORIDE 150 MG: 150 TABLET, FILM COATED, EXTENDED RELEASE ORAL at 06:40

## 2023-06-06 RX ADMIN — ASPIRIN 325 MG: 325 TABLET, COATED ORAL at 08:05

## 2023-06-06 RX ADMIN — OXYCODONE HYDROCHLORIDE 5 MG: 5 TABLET ORAL at 06:43

## 2023-06-06 RX ADMIN — OXYCODONE HYDROCHLORIDE 5 MG: 5 TABLET ORAL at 16:16

## 2023-06-06 RX ADMIN — SENNOSIDES AND DOCUSATE SODIUM 2 TABLET: 50; 8.6 TABLET ORAL at 08:05

## 2023-06-06 NOTE — PLAN OF CARE
Goal Outcome Evaluation:  Plan of Care Reviewed With: patient        Progress: improving  Outcome Evaluation: Pt. presents below baseline function w/RLE weakness and acute pain affecting her ability to safely participate in functional mobility. She performed bed mobility, transfers and ambulated 100' w/front wheeled walker, contact guard assist. Activity limited by fatigue and stiffness. Pt. requires increased time/effort for functional mobility. Pt. tolerated progression to standing ther-ex well. Will continue IPPT to address stated deficits. Recommend IPR upon dischage.

## 2023-06-06 NOTE — PROGRESS NOTES
"          Orthopaedic Surgery Progress Note      LOS: 0 days   Patient Care Team:  Carolina Gutierrez APRN as PCP - General (Nurse Practitioner)  Joel Murillo Jr., MD (Psychiatry)  Zackery Reid MD (Pain Medicine)  Gary Leonard MBBS (Psychiatry)  Yogesh Geiger MD (Psychiatry)  Richmond Adams MD as Consulting Physician (Psychiatry)  Angelo Ramirez MD (Physical Medicine and Rehabilitation)    POD 5    Subjective     Interval History:   Patient doing well this morning.  Denies chest pain or shortness of breath.  No bowel movement but she says that this is normal for her.  At home, she sometimes goes daily but it is not uncommon for her to go a week without a BM.  Burning in her thigh has improved.    Objective     Vital Signs:  Temp (24hrs), Av.8 °F (36.6 °C), Min:97.4 °F (36.3 °C), Max:98.2 °F (36.8 °C)    /53 (BP Location: Right arm, Patient Position: Lying)   Pulse 87   Temp 98.1 °F (36.7 °C) (Oral)   Resp 18   Ht 165.1 cm (65\")   Wt 112 kg (246 lb)   SpO2 97%   BMI 40.94 kg/m²     Labs:  Lab Results (last 24 hours)       ** No results found for the last 24 hours. **            Physical Exam:  New dressing in place  Calf soft and nontender    Assessment & Plan   Postop day #5 status post right ABDIAS by Dr. Leyva  -- Continue bowel protocol  -- Await word from [facility] on insurance approval and bed availability  -- Upon discharge, patient will need follow-up with Dr. Leyva in approximately 3 weeks.      Mateo Dubose MD  23  07:49 EDT        "

## 2023-06-06 NOTE — DISCHARGE SUMMARY
Patient Name: Loreta Jennings  MRN: [id number]  : 1970  DOS: 2023    Attending: Adrián Leyva MD    Primary Care Provider: Carolina Gutierrez APRN    Date of Admission:.2023  8:01 AM    Date of Discharge:  2023    Discharge Diagnosis:   Status post right hip replacement    Bipolar 1 disorder, manic, mild    HTN (hypertension)    Chronic obstructive pulmonary disease    Avascular necrosis of bone of right hip    Obesity      Hospital Course  At admit:  Patient is a pleasant 53 y.o. female presented for scheduled surgery by .     Per his note (The patient is a 53 y.o. female with a history of debilitating right hip pain secondary to avascular necrosis, that failed to improve in spite of conservative treatment. The patient opted for a right total hip arthroplasty at this time and consented for the procedure. Please see my office notes for details with regard to preoperative counseling and operative rationale.).     She underwent right total hip arthroplasty, modified anterior, under spinal anesthesia, tolerated surgery well.     Seen in PACU, doing well, no complains of nausea, vomiting, or shortness of breath.     She has no history of DVT or PE.     Noting her past medical history of hyperlipidemia, bipolar disorder, medication induced Parkinson's syndrome, tobacco abuse and COPD.     After admit  Patient was provided pain medications as needed for pain control.    Adjustments were made to pain medications to optimize postop pain management when indicated. Risks and benefits of opiate medications discussed with patient. JOIEWhitCHAPARRITA report was reviewed.    She was seen by PT and OT and has progressed well over her stay.    She used an IS for atelectasis prophylaxis and aspirin along with mechanicals for DVT prophylaxis.    Home medications were resumed as appropriate, and labs were monitored and remained fairly stable.     With the progress she has made, Ms. jennings is ready for [place] home  "today.  She was initially recommended for inpatient rehab, she continued to work with PT and OT and at the time of discharge she has been denied inpatient rehab by her insurance on grounds of current performance.  Despite that PT and OT feel she would benefit from inpatient rehab, in light of the denial patient is agreeable to be discharged home with home health PT.  She has secured and the family help at time of discharge.  Equipment have been ordered through case management.    Discussed with patient regarding plan and she shows understanding and agreement.    Patient will have HHPT following discharge.        Procedures Performed  Procedure(s):  MODIFIED ANTERIOR TOTAL HIP ARTHROPLASTY - RIGHT       Pertinent Test Results:    I reviewed the patient's new clinical results.   Results from last 7 days   Lab Units 06/04/23  0627 06/03/23  0426 06/02/23  0403   WBC 10*3/mm3 6.35 5.73 7.90   HEMOGLOBIN g/dL 11.9* 12.7 13.5   HEMATOCRIT % 37.5 39.1 42.1   PLATELETS 10*3/mm3 139* 131* 148           Invalid input(s): ALYSSA CLAUDIO  I reviewed the patient's new imaging including images and reports.          Physical therapy    Goal Outcome Evaluation:  Plan of Care Reviewed With: patient  Progress: improving  Outcome Evaluation: Pt. presents below baseline function w/RLE weakness and acute pain affecting her ability to safely participate in functional mobility. She performed bed mobility, transfers and ambulated 100' w/front wheeled walker, contact guard assist. Activity limited by fatigue and stiffness. Pt. requires increased time/effort for functional mobility. Pt. tolerated progression to standing ther-ex well. Will continue IPPT to address stated deficits. Recommend IPR upon dischage.          Discharge Assessment:      Visit Vitals  /53 (BP Location: Right arm, Patient Position: Lying)   Pulse 87   Temp 98.1 °F (36.7 °C) (Oral)   Resp 18   Ht 165.1 cm (65\")   Wt 112 kg (246 lb)   SpO2 97%   BMI 40.94 kg/m² "     Temp (24hrs), Av.8 °F (36.6 °C), Min:97.4 °F (36.3 °C), Max:98.2 °F (36.8 °C)      General Appearance:    Alert, cooperative, in no acute distress   Lungs:     Clear to auscultation,respirations regular, even and                   unlabored    Heart:    Regular rhythm and normal rate, normal S1 and S2   Abdomen:     Normal bowel sounds, no masses, no organomegaly, soft        non-tender, non-distended, no guarding, no rebound                 tenderness   Extremities:   CDI dressing over right hip   Pulses:   Pulses palpable and equal bilaterally   Skin:   No bleeding, bruising or rash   Neurologic:   Cranial nerves 2 - 12 grossly intact, sensation intact, Flexion and dorsiflexion intact bilateral feet.         Discharge Disposition: Home         Discharge Medications        New Medications        Instructions Start Date   acetaminophen 500 MG tablet  Commonly known as: TYLENOL   1,000 mg, Oral, Every 8 Hours, Take every 8 hours  as needed after 1 week      aspirin 325 MG EC tablet   325 mg, Oral, Daily      docusate sodium 100 MG capsule  Commonly known as: COLACE   100 mg, Oral, 2 Times Daily      meloxicam 15 MG tablet  Commonly known as: MOBIC   15 mg, Oral, Daily      oxyCODONE 5 MG immediate release tablet  Commonly known as: Roxicodone   5 mg, Oral, Every 4 Hours PRN             Continue These Medications        Instructions Start Date   albuterol sulfate  (90 Base) MCG/ACT inhaler  Commonly known as: PROVENTIL HFA;VENTOLIN HFA;PROAIR HFA   2 puffs, Inhalation, Every 4 Hours PRN      atorvastatin 40 MG tablet  Commonly known as: LIPITOR   No dose, route, or frequency recorded.      buPROPion  MG 24 hr tablet  Commonly known as: WELLBUTRIN XL   150 mg, Oral, Every Morning      divalproex 500 MG 24 hr tablet  Commonly known as: DEPAKOTE ER   500 mg, Oral, Nightly      hydrOXYzine 10 MG tablet  Commonly known as: ATARAX   10 mg, Oral, Nightly PRN      prazosin 5 MG capsule  Commonly known as:  MINIPRESS   5 mg, Oral, Nightly      primidone 50 MG tablet  Commonly known as: MYSOLINE   50 mg, Oral, 2 Times Daily      promethazine 12.5 MG tablet  Commonly known as: PHENERGAN   12.5 mg, Oral, Every 6 Hours PRN      QUEtiapine 200 MG tablet  Commonly known as: SEROquel   300 mg, Oral, Every Night at Bedtime      Symbicort 160-4.5 MCG/ACT inhaler  Generic drug: budesonide-formoterol   INHALE 2 PUFFS BY MOUTH TWICE DAILY      tiZANidine 4 MG tablet  Commonly known as: ZANAFLEX   TAKE 1 TABLET BY MOUTH EVERY 8 HOURS AS NEEDED FOR MUSCLE SPASMS             Stop These Medications      HYDROcodone-acetaminophen 7.5-325 MG per tablet  Commonly known as: NORCO              Discharge Diet: Resume prior    Activity at Discharge: Weightbearing as tolerated right lower extremity with total hip precautions       Future Appointments   Date Time Provider Department Center   6/26/2023  1:40 PM Shilpa Fraga PA-C [facility]   6/29/2023  3:00 PM [facility] (SCREENING ROOM)  [facility]   6/30/2023  2:30 PM [facility]  [facility]        Ashley disclaimer:  Part of this encounter note is an electronic transcription/translation of spoken language to printed text. The electronic translation of spoken language may permit erroneous, or at times, nonsensical words or phrases to be inadvertently transcribed; Although I have reviewed the note for such errors, some may still exist.       Ra Solitario MD  06/06/23  14:50 EDT

## 2023-06-06 NOTE — PLAN OF CARE
Goal Outcome Evaluation:   A&OX4 Hypotensive. Slept well. No BM and no PRN bowels meds available. Plan to DC to rehab.

## 2023-06-06 NOTE — THERAPY TREATMENT NOTE
Patient Name: Loreta Platt States  : 1970    MRN: [id number]                              Today's Date: 2023       Admit Date: 2023    Visit Dx:     ICD-10-CM ICD-9-CM   1. Status post right hip replacement  Z96.641 V43.64   2. Avascular necrosis of bone of right hip  M87.051 733.42     Patient Active Problem List   Diagnosis    Tobacco use    Bipolar 1 disorder, manic, mild    Morbid obesity with BMI of 50.0-59.9, adult    Other hyperlipidemia    HTN (hypertension)    Chronic obstructive pulmonary disease    Impaired glucose tolerance    Chronic right shoulder pain    Avascular necrosis of bone of right hip    Status post right hip replacement    Obesity     Past Medical History:   Diagnosis Date    Anxiety     Bipolar 1 disorder     Cataract     Chronic bronchitis     Chronic constipation     Colon polyp     COPD (chronic obstructive pulmonary disease)     Depression     Hip arthrosis     Hyperlipidemia     Knee swelling     Obesity     Parkinsonism     DRUG INDUCED    Primary generalized (osteo)arthritis     PTSD (post-traumatic stress disorder)     RLS (restless legs syndrome)      Past Surgical History:   Procedure Laterality Date    ABDOMINOPLASTY      ANTERIOR AND POSTERIOR VAGINAL REPAIR      BREAST BIOPSY Right     US BIOPSY     SECTION      X 2    CHOLECYSTECTOMY      COLONOSCOPY N/A 2019    Procedure: COLONOSCOPY;  Surgeon: Jonathan Pablo MD;  Location: [facility] ENDOSCOPY;  Service: Gastroenterology    HYSTERECTOMY      AGE 33    LIPOSUCTION ABDOMINAL  1998      General Information       Row Name 23 1312          Physical Therapy Time and Intention    Document Type therapy note (daily note)  -SS     Mode of Treatment physical therapy  -SS       Row Name 23 1312          General Information    Patient Profile Reviewed yes  -SS     Existing Precautions/Restrictions fall;right;hip, anterior  -SS     Barriers to Rehab previous functional deficit  -SS       Row  Name 06/06/23 1312          Cognition    Orientation Status (Cognition) oriented x 4  -SS       Row Name 06/06/23 1312          Safety Issues, Functional Mobility    Safety Issues Affecting Function (Mobility) awareness of need for assistance;insight into deficits/self-awareness;judgment;positioning of assistive device;problem-solving;safety precaution awareness;safety precautions follow-through/compliance;sequencing abilities  -     Impairments Affecting Function (Mobility) balance;endurance/activity tolerance;pain;strength;range of motion (ROM);postural/trunk control  -               User Key  (r) = Recorded By, (t) = Taken By, (c) = Cosigned By      Initials Name Provider Type     Shabana Meyer, PT Physical Therapist                   Mobility       Row Name 06/06/23 1314          Bed Mobility    Bed Mobility supine-sit;scooting/bridging  -     Scooting/Bridging [place] (Bed Mobility) contact guard;verbal cues  -     Supine-Sit [place] (Bed Mobility) contact guard;verbal cues  -     Assistive Device (Bed Mobility) head of bed elevated;bed rails  -     Comment, (Bed Mobility) VC for sequencing; pt. requires increased time/effort  -       Row Name 06/06/23 1314          Sit-Stand Transfer    Sit-Stand [place] (Transfers) contact guard;verbal cues  -     Assistive Device (Sit-Stand Transfers) walker, front-wheeled  -SS     Comment, (Sit-Stand Transfer) VC for hand placement, stepping out RLE, appropriate alignment, lowering with eccentric control  -       Row Name 06/06/23 1314          Gait/Stairs (Locomotion)    [place] Level (Gait) verbal cues;contact guard  -     Assistive Device (Gait) walker, front-wheeled  -SS     Distance in Feet (Gait) 90  10+80  -SS     Deviations/Abnormal Patterns (Gait) bilateral deviations;meredith decreased;gait speed decreased;stride length decreased;right sided deviations;antalgic  -     Bilateral Gait Deviations forward flexed posture;heel  strike decreased  -     Comment, (Gait/Stairs) Pt. ambulated with a step through gait pattern. Antalgic pattern noted w/increasing distance. VC for forward gaze, continuous forward progression of AD. Activity limited by fatigue and stiffness.  -       Row Name 06/06/23 1314          Mobility    Extremity Weight-bearing Status right lower extremity  -     Right Lower Extremity (Weight-bearing Status) weight-bearing as tolerated (WBAT)  -               User Key  (r) = Recorded By, (t) = Taken By, (c) = Cosigned By      Initials Name Provider Type     Shabana Meyer, PT Physical Therapist                   Obj/Interventions       Row Name 06/06/23 1320          Motor Skills    Therapeutic Exercise hip;knee;ankle  -[facility] Name 06/06/23 1320          Hip (Therapeutic Exercise)    Hip (Therapeutic Exercise) strengthening exercise;isometric exercises  -     Hip Isometrics (Therapeutic Exercise) bilateral;gluteal sets;10 repetitions;5 repetitions  -     Hip Strengthening (Therapeutic Exercise) right;aBduction;heel slides;15 repititions;marching while standing;mini squats;10 repetitions  -[facility] Name 06/06/23 1320          Knee (Therapeutic Exercise)    Knee (Therapeutic Exercise) strengthening exercise;isometric exercises  -     Knee Isometrics (Therapeutic Exercise) right;quad sets;10 repetitions;5 repetitions  -     Knee Strengthening (Therapeutic Exercise) right;LAQ (long arc quad);15 repititions  -[facility] Name 06/06/23 1320          Ankle (Therapeutic Exercise)    Ankle (Therapeutic Exercise) AROM (active range of motion);strengthening exercise  -     Ankle AROM (Therapeutic Exercise) bilateral;dorsiflexion;plantarflexion;15 repititions  -     Ankle Strengthening (Therapeutic Exercise) bilateral;plantarflexion;standing;10 repetitions  -       Row Name 06/06/23 1320          Balance    Balance Assessment sitting static balance;sitting dynamic balance;sit to stand dynamic  balance;standing static balance;standing dynamic balance  -     Static Sitting Balance independent  -     Dynamic Sitting Balance supervision  -     Position, Sitting Balance unsupported;sitting in chair  -     Sit to Stand Dynamic Balance contact guard  -     Static Standing Balance contact guard  -     Dynamic Standing Balance contact guard  -     Position/Device Used, Standing Balance supported;walker, front-wheeled  -     Balance Interventions sitting;standing;sit to stand;supported;static;dynamic  -               User Key  (r) = Recorded By, (t) = Taken By, (c) = Cosigned By      Initials Name Provider Type     Shabana Meyer, PT Physical Therapist                   Goals/Plan    No documentation.                  Clinical Impression       Row Name 06/06/23 1321          Pain    Pretreatment Pain Rating 5/10  -     Posttreatment Pain Rating 7/10  -     Pain Location - Side/Orientation Right  -     Pain Location --  anterolateral  -     Pain Location - hip  -     Pain Intervention(s) Cold applied;Repositioned;Ambulation/increased activity;Elevated  -     Additional Documentation Pain Scale: Numbers Pre/Post-Treatment (Group)  -       Row Name 06/06/23 2309          Plan of Care Review    Plan of Care Reviewed With patient  -     Progress improving  -     Outcome Evaluation Pt. presents below baseline function w/RLE weakness and acute pain affecting her ability to safely participate in functional mobility. She performed bed mobility, transfers and ambulated 90' w/front wheeled walker, contact guard assist. Activity limited by fatigue and stiffness. Pt. requires increased time/effort for functional mobility. Pt. tolerated progression to standing ther-ex well. Will continue IPPT to address stated deficits. Recommend IPR upon dischage.  -       Row Name 06/06/23 0415          Therapy Assessment/Plan (PT)    Rehab Potential (PT) good, to achieve stated therapy goals  -      Criteria for Skilled Interventions Met (PT) yes;meets criteria;skilled treatment is necessary  -     Therapy Frequency (PT) 2 times/day  -       Row Name 06/06/23 1323          Vital Signs    Pre Systolic BP Rehab --  VSS, RN cleared  -       Row Name 06/06/23 1323          Positioning and Restraints    Pre-Treatment Position in bed  -     Post Treatment Position chair  -SS     In Chair notified nsg;reclined;call light within reach;encouraged to call for assist;exit alarm on;waffle cushion;legs elevated  -               User Key  (r) = Recorded By, (t) = Taken By, (c) = Cosigned By      Initials Name Provider Type    Shabana Valdez PT Physical Therapist                   Outcome Measures       Row Name 06/06/23 1327 06/06/23 0745       How much help from another person do you currently need...    Turning from your back to your side while in flat bed without using bedrails? 3  -SS 3  -SM    Moving from lying on back to sitting on the side of a flat bed without bedrails? 3  -SS 3  -SM    Moving to and from a bed to a chair (including a wheelchair)? 3  -SS 3  -SM    Standing up from a chair using your arms (e.g., wheelchair, bedside chair)? 3  -SS 3  -SM    Climbing 3-5 steps with a railing? 2  -SS 2  -SM    To walk in hospital room? 3  -SS 3  -SM    AM-PAC 6 Clicks Score (PT) 17  -SS 17  -SM    Highest level of mobility 5 --> Static standing  - 5 --> Static standing  -SM      Row Name 06/06/23 1327          Functional Assessment    Outcome Measure Options AM-PAC 6 Clicks Basic Mobility (PT)  -               User Key  (r) = Recorded By, (t) = Taken By, (c) = Cosigned By      Initials Name Provider Type    Dari Spencer RN Registered Nurse    Shbaana Valdez PT Physical Therapist                                 Physical Therapy Education       Title: PT OT SLP Therapies (In Progress)       Topic: Physical Therapy (Done)       Point: Mobility training (Done)       Learning Progress Summary              Patient Naveener, E,H, VU,DU,NR by SS at 6/6/2023 1327    Comment: Reviewed safety/technique w/bed mobility, transfers, ambulation, HEP, positioning of RLE in bed, PT POC    Acceptance, E,D, VU,NR by AB at 6/5/2023 0845    Acceptance, E,D, VU,NR by LR at 6/4/2023 1426    Comment: Educated on anterior hip precautions, weight bearing status, LE HEP, safety with mobility, correct sit<->stand t/f technique, correct gait mechanics, and progression of POC.    Acceptance, E,D, VU,NR by LR at 6/4/2023 0955    Comment: Educated on anterior hip precautions, weight bearing status, LE HEP, safety with mobility, correct supine to sit t/f technique, correct sit<->stand t/f technique, correct gait mechanics, and progression of POC.    Acceptance, E,D, VU,NR by LR at 6/3/2023 1419    Comment: Educated on precautions, weight bearing status, safety with mobility, correct supine to sit t/f technique, correct sit<->stand t/f technique, correct gait mechanics, LE HEP, and progression of POC.    Acceptance, E,D, VU,NR by LR at 6/3/2023 0851    Comment: Educated on anterior hip precautions, weight bearing status, safety with mobility, correct sit<->stand t/f technique, correct gait mechanics, LE ther ex,  and progression of POC.    Acceptance, E,D, VU,NR by AB at 6/2/2023 1549    Acceptance, E,D, VU,NR by AB at 6/2/2023 1143                         Point: Home exercise program (Done)       Learning Progress Summary             Patient Ward, E,H, VU,DU,NR by SS at 6/6/2023 1327    Comment: Reviewed safety/technique w/bed mobility, transfers, ambulation, HEP, positioning of RLE in bed, PT POC    Acceptance, E,D, VU,NR by AB at 6/5/2023 0845    Acceptance, E,D, VU,NR by LR at 6/4/2023 1426    Comment: Educated on anterior hip precautions, weight bearing status, LE HEP, safety with mobility, correct sit<->stand t/f technique, correct gait mechanics, and progression of POC.    Acceptance, E,D, VU,NR by LR at 6/4/2023 0955    Comment:  Educated on anterior hip precautions, weight bearing status, LE HEP, safety with mobility, correct supine to sit t/f technique, correct sit<->stand t/f technique, correct gait mechanics, and progression of POC.    Acceptance, E,D, VU,NR by LR at 6/3/2023 1419    Comment: Educated on precautions, weight bearing status, safety with mobility, correct supine to sit t/f technique, correct sit<->stand t/f technique, correct gait mechanics, LE HEP, and progression of POC.    Acceptance, E,D, VU,NR by LR at 6/3/2023 0851    Comment: Educated on anterior hip precautions, weight bearing status, safety with mobility, correct sit<->stand t/f technique, correct gait mechanics, LE ther ex,  and progression of POC.    Acceptance, E,D, VU,NR by AB at 6/2/2023 1549    Acceptance, E,D, VU,NR by AB at 6/2/2023 1143                         Point: Body mechanics (Done)       Learning Progress Summary             Patient Ward, E,H, VU,DU,NR by SS at 6/6/2023 1327    Comment: Reviewed safety/technique w/bed mobility, transfers, ambulation, HEP, positioning of RLE in bed, PT POC    Acceptance, E,D, VU,NR by AB at 6/5/2023 0845    Acceptance, E,D, VU,NR by LR at 6/4/2023 1426    Comment: Educated on anterior hip precautions, weight bearing status, LE HEP, safety with mobility, correct sit<->stand t/f technique, correct gait mechanics, and progression of POC.    Acceptance, E,D, VU,NR by LR at 6/4/2023 0955    Comment: Educated on anterior hip precautions, weight bearing status, LE HEP, safety with mobility, correct supine to sit t/f technique, correct sit<->stand t/f technique, correct gait mechanics, and progression of POC.    Acceptance, E,D, VU,NR by LR at 6/3/2023 1419    Comment: Educated on precautions, weight bearing status, safety with mobility, correct supine to sit t/f technique, correct sit<->stand t/f technique, correct gait mechanics, LE HEP, and progression of POC.    Acceptance, E,D, VU,NR by LR at 6/3/2023 0851    Comment:  Educated on anterior hip precautions, weight bearing status, safety with mobility, correct sit<->stand t/f technique, correct gait mechanics, LE ther ex,  and progression of POC.    Acceptance, E,D, VU,NR by AB at 6/2/2023 1549    Acceptance, E,D, VU,NR by AB at 6/2/2023 1143                         Point: Precautions (Done)       Learning Progress Summary             Patient Eager, E,H, VU,DU,NR by SS at 6/6/2023 1327    Comment: Reviewed safety/technique w/bed mobility, transfers, ambulation, HEP, positioning of RLE in bed, PT POC    Acceptance, E,D, VU,NR by AB at 6/5/2023 0845    Acceptance, E,D, VU,NR by LR at 6/4/2023 1426    Comment: Educated on anterior hip precautions, weight bearing status, LE HEP, safety with mobility, correct sit<->stand t/f technique, correct gait mechanics, and progression of POC.    Acceptance, E,D, VU,NR by LR at 6/4/2023 0955    Comment: Educated on anterior hip precautions, weight bearing status, LE HEP, safety with mobility, correct supine to sit t/f technique, correct sit<->stand t/f technique, correct gait mechanics, and progression of POC.    Acceptance, E,D, VU,NR by LR at 6/3/2023 1419    Comment: Educated on precautions, weight bearing status, safety with mobility, correct supine to sit t/f technique, correct sit<->stand t/f technique, correct gait mechanics, LE HEP, and progression of POC.    Acceptance, E,D, VU,NR by LR at 6/3/2023 0851    Comment: Educated on anterior hip precautions, weight bearing status, safety with mobility, correct sit<->stand t/f technique, correct gait mechanics, LE ther ex,  and progression of POC.    Acceptance, E,D, VU,NR by AB at 6/2/2023 1549    Acceptance, E,D, VU,NR by AB at 6/2/2023 1143                                         User Key       Initials Effective Dates Name Provider Type Discipline    LR 02/03/23 -  Krystyna Nassar, PT Physical Therapist PT    SS 06/01/21 -  Shabana Meyer PT Physical Therapist PT    AB 09/22/22 -   Ignacia Hernandez, PT Physical Therapist PT                  PT Recommendation and Plan     Plan of Care Reviewed With: patient  Progress: improving  Outcome Evaluation: Pt. presents below baseline function w/RLE weakness and acute pain affecting her ability to safely participate in functional mobility. She performed bed mobility, transfers and ambulated 90' w/front wheeled walker, contact guard assist. Activity limited by fatigue and stiffness. Pt. requires increased time/effort for functional mobility. Pt. tolerated progression to standing ther-ex well. Will continue IPPT to address stated deficits. Recommend IPR upon dischage.     Time Calculation:    PT Charges       Row Name 06/06/23 1329             Time Calculation    Start Time 1039  -SS      Stop Time 1103  -SS      Time Calculation (min) 24 min  -SS      PT Received On 06/06/23  -SS         Time Calculation- PT    Total Timed Code Minutes- PT 24 minute(s)  -SS         Timed Charges    94771 - PT Therapeutic Exercise Minutes 10  -SS      99980 - Gait Training Minutes  10  -SS      32365 - PT Therapeutic Activity Minutes 4  -SS         Total Minutes    Timed Charges Total Minutes 24  -SS       Total Minutes 24  -SS                User Key  (r) = Recorded By, (t) = Taken By, (c) = Cosigned By      Initials Name Provider Type    SS Shabana Meyer, PT Physical Therapist                  Therapy Charges for Today       Code Description Service Date Service Provider Modifiers Qty    71188775981 HC PT THER PROC EA 15 MIN 6/6/2023 Shabana Meyer, PT GP 1    11707709800 HC GAIT TRAINING EA 15 MIN 6/6/2023 Shabana Meyer PT GP 1            PT G-Codes  Outcome Measure Options: AM-PAC 6 Clicks Basic Mobility (PT)  AM-PAC 6 Clicks Score (PT): 17  AM-PAC 6 Clicks Score (OT): 17  PT Discharge Summary  Anticipated Discharge Disposition (PT): inpatient rehabilitation facility    Shabana Meyer PT  6/6/2023

## 2023-06-06 NOTE — CASE MANAGEMENT/SOCIAL WORK
"Discharge Planning Assessment  [facility]     Patient Name: Loreta Robertson  MRN: [id number]  Today's Date: 6/6/2023    Admit Date: 6/1/2023    Plan: Home with son/friend's assistance, [facility] [employer] and a RW from [employer]                   Discharge Plan       Row Name 06/06/23 1445       Plan    Plan Home with son/friend's assistance, [employer] and a RW from [employer]    Patient/Family in Agreement with Plan yes    Plan Comments Followed up with Ms. Robertson at the bedside for discharge planning.    CM received a call from April at [facility] stating that the patient was declined for skilled rehab by her [employer].   Ms. Robertson was agreeable to returning home.  She states that her son can transport her home this evening.  She does have friends and her son that can check on her at home.    She was seen by therapy today and per notes, \"Pt. presents below baseline function w/RLE weakness and acute pain affecting her ability to safely participate in functional mobility. She performed bed mobility, transfers and ambulated 100' w/front wheeled walker, contact guard assist. Activity limited by fatigue and stiffness. Pt. requires increased time/effort for functional mobility. Pt. tolerated progression to standing ther-ex well.\"    A rolling walker was delivered to the patient's room last weekend and she has had the walker sent home.    [facility] has been arranged and [facility] plans to see the patient at home tomorrow.    Final Discharge Disposition Code 01 - home or self-care                  Continued Care and Services - Admitted Since 6/1/2023                    Durable Medical Equipment       Service Provider Request Status Selected Services Address Phone Fax Patient Preferred    [facility]  Selected Durable Medical Equipment Andrea LEDESMA [id number] [phone] --                  Expected Discharge Date and Time       Expected Discharge Date Expected " Discharge Time    Jun 6, 2023                        Savita Alvarez RN

## 2023-06-06 NOTE — PLAN OF CARE
Goal Outcome Evaluation:  Plan of Care Reviewed With: patient        Progress: improving  Outcome Evaluation: Pt. presents below baseline function w/RLE weakness and acute pain affecting her ability to safely participate in functional mobility. She performed bed mobility, transfers and ambulated 90' w/front wheeled walker, contact guard assist. Activity limited by fatigue and stiffness. Pt. requires increased time/effort for functional mobility. Pt. tolerated progression to standing ther-ex well. Will continue IPPT to address stated deficits. Recommend IPR upon dischage.

## 2023-06-06 NOTE — THERAPY TREATMENT NOTE
Patient Name: Loreta Platt States  : 1970    MRN: [id number]                              Today's Date: 2023       Admit Date: 2023    Visit Dx:     ICD-10-CM ICD-9-CM   1. Status post right hip replacement  Z96.641 V43.64   2. Avascular necrosis of bone of right hip  M87.051 733.42     Patient Active Problem List   Diagnosis    Tobacco use    Bipolar 1 disorder, manic, mild    Morbid obesity with BMI of 50.0-59.9, adult    Other hyperlipidemia    HTN (hypertension)    Chronic obstructive pulmonary disease    Impaired glucose tolerance    Chronic right shoulder pain    Avascular necrosis of bone of right hip    Status post right hip replacement    Obesity     Past Medical History:   Diagnosis Date    Anxiety     Bipolar 1 disorder     Cataract     Chronic bronchitis     Chronic constipation     Colon polyp     COPD (chronic obstructive pulmonary disease)     Depression     Hip arthrosis     Hyperlipidemia     Knee swelling     Obesity     Parkinsonism     DRUG INDUCED    Primary generalized (osteo)arthritis     PTSD (post-traumatic stress disorder)     RLS (restless legs syndrome)      Past Surgical History:   Procedure Laterality Date    ABDOMINOPLASTY      ANTERIOR AND POSTERIOR VAGINAL REPAIR      BREAST BIOPSY Right     US BIOPSY     SECTION      X 2    CHOLECYSTECTOMY      COLONOSCOPY N/A 2019    Procedure: COLONOSCOPY;  Surgeon: Jonathan Pablo MD;  Location: [facility] ENDOSCOPY;  Service: Gastroenterology    HYSTERECTOMY      AGE 33    LIPOSUCTION ABDOMINAL  1998      General Information       Row Name 23 1434 23 1312       Physical Therapy Time and Intention    Document Type therapy note (daily note)  -SS therapy note (daily note)  -SS    Mode of Treatment physical therapy  -SS physical therapy  -SS      Row Name 23 1434 23 1312       General Information    Patient Profile Reviewed yes  -SS yes  -SS    Existing Precautions/Restrictions fall;right;hip,  anterior  -SS fall;right;hip, anterior  -SS    Barriers to Rehab previous functional deficit  - previous functional deficit  -SS      Row Name 06/06/23 1434 06/06/23 1312       Cognition    Orientation Status (Cognition) oriented x 4  -SS oriented x 4  -SS      Row Name 06/06/23 1434 06/06/23 1312       Safety Issues, Functional Mobility    Safety Issues Affecting Function (Mobility) awareness of need for assistance;insight into deficits/self-awareness;positioning of assistive device;safety precaution awareness;safety precautions follow-through/compliance;sequencing abilities  -SS awareness of need for assistance;insight into deficits/self-awareness;judgment;positioning of assistive device;problem-solving;safety precaution awareness;safety precautions follow-through/compliance;sequencing abilities  -    Impairments Affecting Function (Mobility) balance;endurance/activity tolerance;pain;strength;range of motion (ROM);postural/trunk control  - balance;endurance/activity tolerance;pain;strength;range of motion (ROM);postural/trunk control  -              User Key  (r) = Recorded By, (t) = Taken By, (c) = Cosigned By      Initials Name Provider Type     Shabana Meyer, PT Physical Therapist                   Mobility       Row Name 06/06/23 1435 06/06/23 1314       Bed Mobility    Bed Mobility -- supine-sit;scooting/bridging  -    Scooting/Bridging [place] (Bed Mobility) -- contact guard;verbal cues  -    Supine-Sit [place] (Bed Mobility) -- contact guard;verbal cues  -    Assistive Device (Bed Mobility) -- head of bed elevated;bed rails  -    Comment, (Bed Mobility) up in chair  - VC for sequencing; pt. requires increased time/effort  -      Row Name 06/06/23 1435 06/06/23 1314       Sit-Stand Transfer    Sit-Stand [place] (Transfers) contact guard;verbal cues  - contact guard;verbal cues  -    Assistive Device (Sit-Stand Transfers) walker, front-wheeled  - walker,  front-wheeled  -SS    Comment, (Sit-Stand Transfer) VC for hand placement, stepping out RLE, appropriate alignment, lowering with eccentric control  - VC for hand placement, stepping out RLE, appropriate alignment, lowering with eccentric control  -      Row Name 06/06/23 1435 06/06/23 1314       Gait/Stairs (Locomotion)    [place] Level (Gait) verbal cues;contact guard  -SS verbal cues;contact guard  -SS    Assistive Device (Gait) walker, front-wheeled  -SS walker, front-wheeled  -SS    Distance in Feet (Gait) 100  -SS 90  10+80  -SS    Deviations/Abnormal Patterns (Gait) bilateral deviations;meredith decreased;gait speed decreased;stride length decreased;right sided deviations;antalgic  -SS bilateral deviations;meredith decreased;gait speed decreased;stride length decreased;right sided deviations;antalgic  -SS    Bilateral Gait Deviations forward flexed posture;heel strike decreased  -SS forward flexed posture;heel strike decreased  -SS    Comment, (Gait/Stairs) Pt. ambulated with a step through gait pattern. Antalgic pattern noted w/increasing distance. VC for forward gaze, continuous forward progression of AD. Activity limited by fatigue and stiffness.  -SS Pt. ambulated with a step through gait pattern. Antalgic pattern noted w/increasing distance. VC for forward gaze, continuous forward progression of AD. Activity limited by fatigue and stiffness.  -      Row Name 06/06/23 1435 06/06/23 1314       Mobility    Extremity Weight-bearing Status right lower extremity  -SS right lower extremity  -SS    Right Lower Extremity (Weight-bearing Status) weight-bearing as tolerated (WBAT)  - weight-bearing as tolerated (WBAT)  -              User Key  (r) = Recorded By, (t) = Taken By, (c) = Cosigned By      Initials Name Provider Type     Shabana Meyer, PT Physical Therapist                   Obj/Interventions       Row Name 06/06/23 1439 06/06/23 1320       Motor Skills    Therapeutic Exercise  hip;knee;ankle  - hip;knee;ankle  -      Row Name 06/06/23 1439 06/06/23 1320       Hip (Therapeutic Exercise)    Hip (Therapeutic Exercise) isometric exercises;strengthening exercise  - strengthening exercise;isometric exercises  -    Hip Isometrics (Therapeutic Exercise) bilateral;gluteal sets;10 repetitions;5 repetitions  -SS bilateral;gluteal sets;10 repetitions;5 repetitions  -SS    Hip Strengthening (Therapeutic Exercise) right;aBduction;aDduction;heel slides;15 repititions;mini squats;10 repetitions;marching while standing;5 repetitions  -SS right;aBduction;heel slides;15 repititions;marching while standing;mini squats;10 repetitions  -SS      Row Name 06/06/23 1439 06/06/23 1320       Knee (Therapeutic Exercise)    Knee (Therapeutic Exercise) isometric exercises;strengthening exercise  - strengthening exercise;isometric exercises  -    Knee Isometrics (Therapeutic Exercise) bilateral;quad sets;10 repetitions;5 repetitions  -SS right;quad sets;10 repetitions;5 repetitions  -    Knee Strengthening (Therapeutic Exercise) right;LAQ (long arc quad);15 repititions  - right;LAQ (long arc quad);15 repititions  -SS      Row Name 06/06/23 1439 06/06/23 1320       Ankle (Therapeutic Exercise)    Ankle (Therapeutic Exercise) AROM (active range of motion);strengthening exercise  - AROM (active range of motion);strengthening exercise  -    Ankle AROM (Therapeutic Exercise) bilateral;dorsiflexion;plantarflexion;15 repititions  - bilateral;dorsiflexion;plantarflexion;15 repititions  -    Ankle Strengthening (Therapeutic Exercise) bilateral;standing;plantarflexion;10 repetitions  - bilateral;plantarflexion;standing;10 repetitions  -      Row Name 06/06/23 1439 06/06/23 1320       Balance    Balance Assessment sitting static balance;sitting dynamic balance;sit to stand dynamic balance;standing static balance;standing dynamic balance  -SS sitting static balance;sitting dynamic balance;sit to stand  dynamic balance;standing static balance;standing dynamic balance  -SS    Static Sitting Balance independent  -SS independent  -SS    Dynamic Sitting Balance standby assist  -SS supervision  -SS    Position, Sitting Balance unsupported;sitting in chair  -SS unsupported;sitting in chair  -SS    Sit to Stand Dynamic Balance contact guard  -SS contact guard  -SS    Static Standing Balance contact guard  -SS contact guard  -SS    Dynamic Standing Balance contact guard  -SS contact guard  -SS    Position/Device Used, Standing Balance supported;walker, front-wheeled  -SS supported;walker, front-wheeled  -SS    Balance Interventions sitting;standing;sit to stand;supported;static;dynamic  -SS sitting;standing;sit to stand;supported;static;dynamic  -SS              User Key  (r) = Recorded By, (t) = Taken By, (c) = Cosigned By      Initials Name Provider Type    SS Shabana Meyer, PT Physical Therapist                   Goals/Plan    No documentation.                  Clinical Impression       Row Name 06/06/23 1440 06/06/23 1323       Pain    Pretreatment Pain Rating 6/10  -SS 5/10  -SS    Posttreatment Pain Rating 8/10  -SS 7/10  -SS    Pain Location - Side/Orientation Right  -SS Right  -SS    Pain Location other (see comments)  anterolateral  -SS --  anterolateral  -SS    Pain Location - hip  -SS hip  -SS    Pain Intervention(s) Cold applied;Repositioned;Ambulation/increased activity;Elevated  -SS Cold applied;Repositioned;Ambulation/increased activity;Elevated  -SS    Additional Documentation Pain Scale: Numbers Pre/Post-Treatment (Group)  -SS Pain Scale: Numbers Pre/Post-Treatment (Group)  -SS      Row Name 06/06/23 1440 06/06/23 1323       Plan of Care Review    Plan of Care Reviewed With patient  -SS patient  -SS    Progress improving  -SS improving  -SS    Outcome Evaluation Pt. presents below baseline function w/RLE weakness and acute pain affecting her ability to safely participate in functional mobility. She  performed bed mobility, transfers and ambulated 100' w/front wheeled walker, contact guard assist. Activity limited by fatigue and stiffness. Pt. requires increased time/effort for functional mobility. Pt. tolerated progression to standing ther-ex well. Will continue IPPT to address stated deficits. Recommend IPR upon dischage.  -SS Pt. presents below baseline function w/RLE weakness and acute pain affecting her ability to safely participate in functional mobility. She performed bed mobility, transfers and ambulated 90' w/front wheeled walker, contact guard assist. Activity limited by fatigue and stiffness. Pt. requires increased time/effort for functional mobility. Pt. tolerated progression to standing ther-ex well. Will continue IPPT to address stated deficits. Recommend IPR upon dischage.  -      Row Name 06/06/23 1440 06/06/23 1323       Therapy Assessment/Plan (PT)    Rehab Potential (PT) good, to achieve stated therapy goals  -SS good, to achieve stated therapy goals  -SS    Criteria for Skilled Interventions Met (PT) yes;meets criteria;skilled treatment is necessary  -SS yes;meets criteria;skilled treatment is necessary  -    Therapy Frequency (PT) 2 times/day  -SS 2 times/day  -      Row Name 06/06/23 1440 06/06/23 1323       Vital Signs    Pre Systolic BP Rehab --  VSS  -SS --  VSS, RN cleared  -      Row Name 06/06/23 1440 06/06/23 1323       Positioning and Restraints    Pre-Treatment Position sitting in chair/recliner  -SS in bed  -SS    Post Treatment Position chair  -SS chair  -SS    In Chair notified nsg;reclined;call light within reach;encouraged to call for assist;exit alarm on;legs elevated  - notified nsg;reclined;call light within reach;encouraged to call for assist;exit alarm on;waffle cushion;legs elevated  -              User Key  (r) = Recorded By, (t) = Taken By, (c) = Cosigned By      Initials Name Provider Type    SS Shabana Meyer, PT Physical Therapist                    Outcome Measures       Row Name 06/06/23 1442 06/06/23 1327       How much help from another person do you currently need...    Turning from your back to your side while in flat bed without using bedrails? 3  -SS 3  -SS    Moving from lying on back to sitting on the side of a flat bed without bedrails? 3  -SS 3  -SS    Moving to and from a bed to a chair (including a wheelchair)? 3  -SS 3  -SS    Standing up from a chair using your arms (e.g., wheelchair, bedside chair)? 3  -SS 3  -SS    Climbing 3-5 steps with a railing? 2  -SS 2  -SS    To walk in hospital room? 3  -SS 3  -SS    AM-PAC 6 Clicks Score (PT) 17  -SS 17  -SS    Highest level of mobility 5 --> Static standing  -SS 5 --> Static standing  -SS      Row Name 06/06/23 0745          How much help from another person do you currently need...    Turning from your back to your side while in flat bed without using bedrails? 3  -SM     Moving from lying on back to sitting on the side of a flat bed without bedrails? 3  -SM     Moving to and from a bed to a chair (including a wheelchair)? 3  -SM     Standing up from a chair using your arms (e.g., wheelchair, bedside chair)? 3  -SM     Climbing 3-5 steps with a railing? 2  -SM     To walk in hospital room? 3  -SM     AM-PAC 6 Clicks Score (PT) 17  -SM     Highest level of mobility 5 --> Static standing  -SM       Row Name 06/06/23 1442 06/06/23 1327       Functional Assessment    Outcome Measure Options AM-PAC 6 Clicks Basic Mobility (PT)  -SS AM-PAC 6 Clicks Basic Mobility (PT)  -SS              User Key  (r) = Recorded By, (t) = Taken By, (c) = Cosigned By      Initials Name Provider Type    Dari Spencer RN Registered Nurse    Shabana Valdez PT Physical Therapist                                 Physical Therapy Education       Title: PT OT SLP Therapies (In Progress)       Topic: Physical Therapy (Done)       Point: Mobility training (Done)       Learning Progress Summary             Patient JOIE Hopper  VU,DU,NR by SS at 6/6/2023 1442    Comment: Reviewed safety/technique w/transfers, ambulation, HEP, PT POC    Eager, E,H, VU,DU,NR by SS at 6/6/2023 1327    Comment: Reviewed safety/technique w/bed mobility, transfers, ambulation, HEP, positioning of RLE in bed, PT POC    Acceptance, E,D, VU,NR by AB at 6/5/2023 0845    Acceptance, E,D, VU,NR by LR at 6/4/2023 1426    Comment: Educated on anterior hip precautions, weight bearing status, LE HEP, safety with mobility, correct sit<->stand t/f technique, correct gait mechanics, and progression of POC.    Acceptance, E,D, VU,NR by LR at 6/4/2023 0955    Comment: Educated on anterior hip precautions, weight bearing status, LE HEP, safety with mobility, correct supine to sit t/f technique, correct sit<->stand t/f technique, correct gait mechanics, and progression of POC.    Acceptance, E,D, VU,NR by LR at 6/3/2023 1419    Comment: Educated on precautions, weight bearing status, safety with mobility, correct supine to sit t/f technique, correct sit<->stand t/f technique, correct gait mechanics, LE HEP, and progression of POC.    Acceptance, E,D, VU,NR by LR at 6/3/2023 0851    Comment: Educated on anterior hip precautions, weight bearing status, safety with mobility, correct sit<->stand t/f technique, correct gait mechanics, LE ther ex,  and progression of POC.    Acceptance, E,D, VU,NR by AB at 6/2/2023 1549    Acceptance, E,D, VU,NR by AB at 6/2/2023 1143                         Point: Home exercise program (Done)       Learning Progress Summary             Patient Naveener, E, VU,DU,NR by SS at 6/6/2023 1442    Comment: Reviewed safety/technique w/transfers, ambulation, HEP, PT POC    Eager, E,H, VU,DU,NR by SS at 6/6/2023 1327    Comment: Reviewed safety/technique w/bed mobility, transfers, ambulation, HEP, positioning of RLE in bed, PT POC    Acceptance, E,D, VU,NR by AB at 6/5/2023 0845    Acceptance, LEONA SALTER VU,NR by LR at 6/4/2023 1426    Comment: Educated on anterior hip  precautions, weight bearing status, LE HEP, safety with mobility, correct sit<->stand t/f technique, correct gait mechanics, and progression of POC.    Acceptance, E,D, VU,NR by LR at 6/4/2023 0955    Comment: Educated on anterior hip precautions, weight bearing status, LE HEP, safety with mobility, correct supine to sit t/f technique, correct sit<->stand t/f technique, correct gait mechanics, and progression of POC.    Acceptance, E,D, VU,NR by LR at 6/3/2023 1419    Comment: Educated on precautions, weight bearing status, safety with mobility, correct supine to sit t/f technique, correct sit<->stand t/f technique, correct gait mechanics, LE HEP, and progression of POC.    Acceptance, E,D, VU,NR by LR at 6/3/2023 0851    Comment: Educated on anterior hip precautions, weight bearing status, safety with mobility, correct sit<->stand t/f technique, correct gait mechanics, LE ther ex,  and progression of POC.    Acceptance, E,D, VU,NR by AB at 6/2/2023 1549    Acceptance, E,D, VU,NR by AB at 6/2/2023 1143                         Point: Body mechanics (Done)       Learning Progress Summary             Patient Ward, E, VU,DU,NR by SS at 6/6/2023 1442    Comment: Reviewed safety/technique w/transfers, ambulation, HEP, PT POC    JOIE Hopper,H, VU,DU,NR by SS at 6/6/2023 1327    Comment: Reviewed safety/technique w/bed mobility, transfers, ambulation, HEP, positioning of RLE in bed, PT POC    Acceptance, E,D, VU,NR by AB at 6/5/2023 0845    Acceptance, E,D, VU,NR by LR at 6/4/2023 1426    Comment: Educated on anterior hip precautions, weight bearing status, LE HEP, safety with mobility, correct sit<->stand t/f technique, correct gait mechanics, and progression of POC.    Acceptance, E,D, VU,NR by LR at 6/4/2023 0955    Comment: Educated on anterior hip precautions, weight bearing status, LE HEP, safety with mobility, correct supine to sit t/f technique, correct sit<->stand t/f technique, correct gait mechanics, and  progression of POC.    Acceptance, E,D, VU,NR by LR at 6/3/2023 1419    Comment: Educated on precautions, weight bearing status, safety with mobility, correct supine to sit t/f technique, correct sit<->stand t/f technique, correct gait mechanics, LE HEP, and progression of POC.    Acceptance, E,D, VU,NR by LR at 6/3/2023 0851    Comment: Educated on anterior hip precautions, weight bearing status, safety with mobility, correct sit<->stand t/f technique, correct gait mechanics, LE ther ex,  and progression of POC.    Acceptance, E,D, VU,NR by AB at 6/2/2023 1549    Acceptance, E,D, VU,NR by AB at 6/2/2023 1143                         Point: Precautions (Done)       Learning Progress Summary             Patient Eager, E, VU,DU,NR by SS at 6/6/2023 1442    Comment: Reviewed safety/technique w/transfers, ambulation, HEP, PT POC    Ward, JOIE,H, VU,DU,NR by SS at 6/6/2023 1327    Comment: Reviewed safety/technique w/bed mobility, transfers, ambulation, HEP, positioning of RLE in bed, PT POC    Acceptance, E,D, VU,NR by AB at 6/5/2023 0845    Acceptance, E,D, VU,NR by LR at 6/4/2023 1426    Comment: Educated on anterior hip precautions, weight bearing status, LE HEP, safety with mobility, correct sit<->stand t/f technique, correct gait mechanics, and progression of POC.    Acceptance, E,D, VU,NR by LR at 6/4/2023 0955    Comment: Educated on anterior hip precautions, weight bearing status, LE HEP, safety with mobility, correct supine to sit t/f technique, correct sit<->stand t/f technique, correct gait mechanics, and progression of POC.    Acceptance, E,D, VU,NR by LR at 6/3/2023 1419    Comment: Educated on precautions, weight bearing status, safety with mobility, correct supine to sit t/f technique, correct sit<->stand t/f technique, correct gait mechanics, LE HEP, and progression of POC.    Acceptance, E,D, VU,NR by LR at 6/3/2023 0851    Comment: Educated on anterior hip precautions, weight bearing status, safety with  mobility, correct sit<->stand t/f technique, correct gait mechanics, LE ther ex,  and progression of POC.    Acceptance, E,D, VU,NR by AB at 6/2/2023 1549    Acceptance, E,D, VU,NR by AB at 6/2/2023 1143                                         User Key       Initials Effective Dates Name Provider Type Discipline    LR 02/03/23 -  Krystyna Nassar, PT Physical Therapist PT     06/01/21 -  Shabana Meyer, FLORIN Physical Therapist PT    AB 09/22/22 -  Ignacia Hernandez, PT Physical Therapist PT                  PT Recommendation and Plan     Plan of Care Reviewed With: patient  Progress: improving  Outcome Evaluation: Pt. presents below baseline function w/RLE weakness and acute pain affecting her ability to safely participate in functional mobility. She performed bed mobility, transfers and ambulated 100' w/front wheeled walker, contact guard assist. Activity limited by fatigue and stiffness. Pt. requires increased time/effort for functional mobility. Pt. tolerated progression to standing ther-ex well. Will continue IPPT to address stated deficits. Recommend IPR upon dischage.     Time Calculation:    PT Charges       Row Name 06/06/23 1443 06/06/23 1329          Time Calculation    Start Time 1409  -SS 1039  -SS     Stop Time 1432  -SS 1103  -SS     Time Calculation (min) 23 min  -SS 24 min  -SS     PT Received On 06/06/23  - 06/06/23  -SS        Time Calculation- PT    Total Timed Code Minutes- PT 23 minute(s)  -SS 24 minute(s)  -SS        Timed Charges    70405 - PT Therapeutic Exercise Minutes 10  -SS 10  -SS     86336 - Gait Training Minutes  10  -SS 10  -SS     13009 - PT Therapeutic Activity Minutes 3  -SS 4  -SS        Total Minutes    Timed Charges Total Minutes 23  -SS 24  -SS      Total Minutes 23  -SS 24  -SS               User Key  (r) = Recorded By, (t) = Taken By, (c) = Cosigned By      Initials Name Provider Type     Shabana Meyer, PT Physical Therapist                  Therapy Charges for  Today       Code Description Service Date Service Provider Modifiers Qty    10260865492 HC PT THER PROC EA 15 MIN 6/6/2023 Shabana Meyer, PT GP 1    62693332582 HC GAIT TRAINING EA 15 MIN 6/6/2023 Shabana Meyer, PT GP 1    28972863430 HC PT THER PROC EA 15 MIN 6/6/2023 Shabana Meyer, PT GP 1    42745264590 HC GAIT TRAINING EA 15 MIN 6/6/2023 Shabana Meyer, PT GP 1            PT G-Codes  Outcome Measure Options: AM-PAC 6 Clicks Basic Mobility (PT)  AM-PAC 6 Clicks Score (PT): 17  AM-PAC 6 Clicks Score (OT): 17  PT Discharge Summary  Anticipated Discharge Disposition (PT): inpatient rehabilitation facility    Shabana Meyer, FLORIN  6/6/2023

## 2023-06-07 DIAGNOSIS — J44.9 CHRONIC OBSTRUCTIVE PULMONARY DISEASE, UNSPECIFIED COPD TYPE: ICD-10-CM

## 2023-06-07 RX ORDER — BUDESONIDE AND FORMOTEROL FUMARATE DIHYDRATE 160; 4.5 UG/1; UG/1
AEROSOL RESPIRATORY (INHALATION)
Qty: 30.6 G | Refills: 0 | Status: SHIPPED | OUTPATIENT
Start: 2023-06-07

## 2023-06-15 DIAGNOSIS — E55.9 VITAMIN D DEFICIENCY: ICD-10-CM

## 2023-06-16 RX ORDER — ERGOCALCIFEROL 1.25 MG/1
CAPSULE ORAL
Qty: 5 CAPSULE | Refills: 2 | Status: SHIPPED | OUTPATIENT
Start: 2023-06-16

## 2023-07-24 DIAGNOSIS — R11.0 NAUSEA: ICD-10-CM

## 2023-07-24 RX ORDER — PROMETHAZINE HYDROCHLORIDE 12.5 MG/1
12.5 TABLET ORAL EVERY 6 HOURS PRN
Qty: 20 TABLET | Refills: 0 | OUTPATIENT
Start: 2023-07-24

## 2023-07-24 NOTE — TELEPHONE ENCOUNTER
Caller: SHIRA    Relationship: PHARMACY     Best call back number: [phone]    Requested Prescriptions:   Requested Prescriptions     Pending Prescriptions Disp Refills    promethazine (PHENERGAN) 12.5 MG tablet 20 tablet 0     Sig: Take 1 tablet by mouth Every 6 (Six) Hours As Needed for Nausea or Vomiting.        [id number] [address] [place] [address] AT [facility] & [facility] [phone] [facility] [phone]      Last office visit with prescribing clinician: 4/14/2023   Last telemedicine visit with prescribing clinician: 6/27/2023   Next office visit with prescribing clinician: Visit date not found     Additional details provided by patient: THE PHARMACY STATES THAT THE PATIENT NEEDS A PRESCRIPTION FOR THE 25MG TABLET THE PHARMACY STATES THAT THE PATIENT IS OUT OF MEDICATION     Does the patient have less than a 3 day supply:  [x] Yes  [] No    Would you like a call back once the refill request has been completed: [] Yes [x] No    If the office needs to give you a call back, can they leave a voicemail: [] Yes [x] No    Freddy Bryan   07/24/23 16:23 EDT

## 2023-07-24 NOTE — TELEPHONE ENCOUNTER
Previous message refused in error    LOV 4/14/2023  NOV not scheduled.     Please see previous encounter for details

## 2023-07-25 RX ORDER — PROMETHAZINE HYDROCHLORIDE 25 MG/1
25 TABLET ORAL EVERY 6 HOURS PRN
Qty: 20 TABLET | Refills: 0 | Status: SHIPPED | OUTPATIENT
Start: 2023-07-25

## 2023-08-07 ENCOUNTER — OFFICE VISIT (OUTPATIENT)
Dept: ORTHOPEDIC SURGERY | Facility: CLINIC | Age: 53
End: 2023-08-07
Payer: MEDICARE

## 2023-08-07 DIAGNOSIS — Z96.641 S/P TOTAL RIGHT HIP ARTHROPLASTY: Primary | ICD-10-CM

## 2023-08-07 DIAGNOSIS — Z47.89 ORTHOPEDIC AFTERCARE: ICD-10-CM

## 2023-08-07 PROCEDURE — 1160F RVW MEDS BY RX/DR IN RCRD: CPT | Performed by: ORTHOPAEDIC SURGERY

## 2023-08-07 PROCEDURE — 99024 POSTOP FOLLOW-UP VISIT: CPT | Performed by: ORTHOPAEDIC SURGERY

## 2023-08-07 PROCEDURE — 1159F MED LIST DOCD IN RCRD: CPT | Performed by: ORTHOPAEDIC SURGERY

## 2023-08-07 NOTE — PROGRESS NOTES
[facility] [place]    Subjective     Chief Complaint   Patient presents with    Post-op     6 week f/u, 9 weeks s/p Right modified anterior total hip arthroplasty 23        HPI    It has been 6  week(s) since Ms. Robertson's last visit. She returns to clinic today for postoperative follow-up of right hip arthroplasty. The issue has been ongoing for 9 week(s). She rates her pain a 3/10 on the pain scale. Previous/current treatments: physical therapy. Current symptoms: pain. The pain is worse with walking; resting and sitting improve the pain. Overall, she is doing better.  100% improvement compared to preoperative symptoms.  Ambulatory without external aids.    I have reviewed the following portions of the patient's history and agree with: History of Present Illness and Review of Systems    Patient Active Problem List   Diagnosis    Tobacco use    Bipolar 1 disorder, manic, mild    Morbid obesity with BMI of 50.0-59.9, adult    Other hyperlipidemia    HTN (hypertension)    Chronic obstructive pulmonary disease    Impaired glucose tolerance    Chronic right shoulder pain    Avascular necrosis of bone of right hip    Status post right hip replacement    Obesity     Past Medical History:   Diagnosis Date    Anxiety     Bipolar 1 disorder     Cataract     Chronic bronchitis     Chronic constipation     Colon polyp     COPD (chronic obstructive pulmonary disease)     Depression     Hip arthrosis     Hyperlipidemia     Knee swelling     Obesity     Parkinsonism     DRUG INDUCED    Primary generalized (osteo)arthritis     PTSD (post-traumatic stress disorder)     RLS (restless legs syndrome)       Past Surgical History:   Procedure Laterality Date    ABDOMINOPLASTY      ANTERIOR AND POSTERIOR VAGINAL REPAIR      BREAST BIOPSY Right     US BIOPSY     SECTION      X 2    CHOLECYSTECTOMY      COLONOSCOPY N/A 2019    Procedure: COLONOSCOPY;  Surgeon: Jonathan Pablo MD;  Location:   [facility];  Service: Gastroenterology    HYSTERECTOMY      AGE 33    LIPOSUCTION ABDOMINAL  1998    TOTAL HIP ARTHROPLASTY Right 6/1/2023    Procedure: MODIFIED ANTERIOR TOTAL HIP ARTHROPLASTY - RIGHT;  Surgeon: Adrián Leyva MD;  Location: [facility] OR;  Service: Orthopedics;  Laterality: Right;      Family History   Problem Relation Age of Onset    Breast cancer Mother 50    Diabetes Mother     Cancer Mother     Diabetes Father     Hypertension Father     Heart attack Father     Obesity Father     Breast cancer Maternal Grandmother 40    Ovarian cancer Maternal Grandmother 40    Cervical cancer Maternal Grandmother     Cancer Maternal Grandfather      Social History     Socioeconomic History    Marital status: Single   Tobacco Use    Smoking status: Every Day     Packs/day: 0.50     Years: 30.00     Pack years: 15.00     Types: Cigarettes     Start date: 1/1/1980    Smokeless tobacco: Never   Vaping Use    Vaping Use: Never used   Substance and Sexual Activity    Alcohol use: No    Drug use: Yes     Types: Marijuana    Sexual activity: Not Currently      Current Outpatient Medications on File Prior to Visit   Medication Sig Dispense Refill    albuterol sulfate  (90 Base) MCG/ACT inhaler Inhale 2 puffs Every 4 (Four) Hours As Needed for Wheezing. 18 g 2    atorvastatin (LIPITOR) 40 MG tablet       buPROPion XL (WELLBUTRIN XL) 150 MG 24 hr tablet Take 1 tablet by mouth Every Morning.      divalproex (DEPAKOTE ER) 500 MG 24 hr tablet Take 1 tablet by mouth Every Night.      hydrOXYzine (ATARAX) 10 MG tablet Take 1 tablet by mouth At Night As Needed.      Nutritional Supplements (MENOPAUSE FORMULA PO) Take  by mouth.      prazosin (MINIPRESS) 5 MG capsule Take 1 capsule by mouth Every Night.  1    primidone (MYSOLINE) 50 MG tablet Take 1 tablet by mouth 2 (Two) Times a Day.      promethazine (PHENERGAN) 25 MG tablet Take 1 tablet by mouth Every 6 (Six) Hours As Needed for Nausea or Vomiting. 20 tablet 0     QUEtiapine (SEROquel) 300 MG tablet Take 1 tablet by mouth every night at bedtime.      Symbicort 160-4.5 MCG/ACT inhaler INHALE 2 PUFFS BY MOUTH TWICE DAILY 30.6 g 0    tiZANidine (ZANAFLEX) 4 MG tablet TAKE 1 TABLET BY MOUTH EVERY 8 HOURS AS NEEDED FOR MUSCLE SPASMS 60 tablet 1    vitamin D (ERGOCALCIFEROL) 1.25 MG (53543 UT) capsule capsule TAKE 1 CAPSULE BY MOUTH EVERY 7 DAYS 5 capsule 2     No current facility-administered medications on file prior to visit.      Allergies   Allergen Reactions    Morphine And Related GI Intolerance        Review of Systems   Constitutional:  Negative for activity change, appetite change, chills, diaphoresis, fatigue, fever and unexpected weight change.   HENT:  Negative for congestion, dental problem, drooling, ear discharge, ear pain, facial swelling, hearing loss, mouth sores, nosebleeds, postnasal drip, rhinorrhea, sinus pressure, sneezing, sore throat, tinnitus, trouble swallowing and voice change.    Eyes:  Negative for photophobia, pain, discharge, redness, itching and visual disturbance.   Respiratory:  Negative for apnea, cough, choking, chest tightness, shortness of breath, wheezing and stridor.    Cardiovascular:  Negative for chest pain, palpitations and leg swelling.   Gastrointestinal:  Negative for abdominal distention, abdominal pain, anal bleeding, blood in stool, constipation, diarrhea, nausea, rectal pain and vomiting.   Endocrine: Negative for cold intolerance, heat intolerance, polydipsia, polyphagia and polyuria.   Genitourinary:  Negative for decreased urine volume, difficulty urinating, dysuria, enuresis, flank pain, frequency, genital sores, hematuria and urgency.   Musculoskeletal:  Positive for arthralgias. Negative for back pain, gait problem, joint swelling, myalgias, neck pain and neck stiffness.   Skin:  Negative for color change, pallor, rash and wound.   Allergic/Immunologic: Negative for environmental allergies, food allergies and  immunocompromised state.   Neurological:  Negative for dizziness, tremors, seizures, syncope, facial asymmetry, speech difficulty, weakness, light-headedness, numbness and headaches.   Hematological:  Negative for adenopathy. Does not bruise/bleed easily.   Psychiatric/Behavioral:  Negative for agitation, behavioral problems, confusion, decreased concentration, dysphoric mood, hallucinations, self-injury, sleep disturbance and suicidal ideas. The patient is not nervous/anxious and is not hyperactive.       Objective      Physical Exam  There were no vitals taken for this visit.    There is no height or weight on file to calculate BMI.    General:   Mental Status:  Alert   Appearance: Cooperative, in no acute distress   Build and Nutrition: Obese by BMI female   Orientation: Alert and oriented to person, place and time   Posture: Normal   Gait: Mild limp on the right    Integument:   Right hip: Wound is well-healed with no signs of infection    Lower Extremity:   Right Hip:    Tenderness:  None    Swelling:  None    Crepitus:  None    Range of motion:  External Rotation: 30ø       Internal Rotation: 30ø       Flexion:  100ø       Extension:  0ø    Deformities:  None  Functional testing: Negative [facility]    No leg length discrepancy      Imaging/Studies  Imaging Results (Last 24 Hours)       ** No results found for the last 24 hours. **          No new imaging today.    Assessment and Plan     Diagnoses and all orders for this visit:    1. S/P total right hip arthroplasty (Primary)    2. S/P TJR - Global        1. S/P total right hip arthroplasty    2. S/P TJR - Global        Reviewed my findings with the patient.  Her right total hip arthroplasty appears to be functioning well, and she is pleased with results.  I will see her back in 10 months, which will be a 1 year checkup with x-rays.  I will see her back sooner for any problems.    Return in about 10 months (around 6/7/2024) for Recheck with  X-Rays.      Adrián Leyva MD  08/07/23  15:09 EDT

## 2023-08-13 NOTE — TELEPHONE ENCOUNTER
----- Message from Najma Crabtree sent at 3/1/2018 12:54 PM EST -----  Patient is wanting to know about MRI results. She had it done at 8 a.m. This morning.     If you could please call the patient at: [phone]    Thank you.  
Explain to patient that there is a moderate to large joint effusion without significant tear. MRI shows moderate damage to cartilage under the kneecap. Okay to not work until seeing ortho on Monday.  
Informed patient of MRI results.  Informed patient that we could give her an excuse from work until she sees ortho on Monday.  Patient requested an excuse for tomorrow.  She wants to try to work this weekend.  Informed patient that we could give her an excuse for Saturday and Sunday when she is in the office on Monday if she is unable to work.  Patient verbalized understanding and appreciation.  Work excuse placed up front for .  
Results are in the chart now.  Patient states she is supposed to go to work tomorrow and work all weekend.  She wanted to see if she could have an excuse for work through Monday until she can follow up with you and the orthopedist.  She said her leg is more swollen today.  
handoff received from BRITTANEY Decker

## 2023-08-18 ENCOUNTER — OFFICE VISIT (OUTPATIENT)
Dept: INTERNAL MEDICINE | Facility: CLINIC | Age: 53
End: 2023-08-18
Payer: MEDICARE

## 2023-08-18 VITALS
HEART RATE: 80 BPM | HEIGHT: 65 IN | BODY MASS INDEX: 40.08 KG/M2 | TEMPERATURE: 97.4 F | SYSTOLIC BLOOD PRESSURE: 134 MMHG | WEIGHT: 240.6 LBS | DIASTOLIC BLOOD PRESSURE: 92 MMHG | RESPIRATION RATE: 16 BRPM

## 2023-08-18 DIAGNOSIS — E78.49 OTHER HYPERLIPIDEMIA: ICD-10-CM

## 2023-08-18 DIAGNOSIS — R61 NIGHT SWEATS: ICD-10-CM

## 2023-08-18 DIAGNOSIS — M53.3 COCCYX PAIN: Primary | ICD-10-CM

## 2023-08-18 PROCEDURE — 99213 OFFICE O/P EST LOW 20 MIN: CPT | Performed by: NURSE PRACTITIONER

## 2023-08-18 PROCEDURE — 3075F SYST BP GE 130 - 139MM HG: CPT | Performed by: NURSE PRACTITIONER

## 2023-08-18 PROCEDURE — 3080F DIAST BP >= 90 MM HG: CPT | Performed by: NURSE PRACTITIONER

## 2023-08-18 NOTE — PROGRESS NOTES
"Patient Name: Loreta Robertson  : 1970   MRN: [id number]     Chief Complaint:    Chief Complaint   Patient presents with    Mass     On lower back        History of Present Illness: Loreta Robertson is a 53 y.o. female presents to clinic today for evaluation of mass on her lower back.     Ms. Robertson is \"struggling\" today. She received some bad news that has brought up a lot of bad memories. She had previously reported a recurring pain in her low back. Her back does not hurt during the day, but when she is sleeping it will bring her out of a dead sleep. She can feel a \"knot\" to the side of her coccyx. The area is not tender to palpation. She notes having a scar in the vicinity of the pain but she underwent that surgery years ago when she was between 24 and 28 years old. She sleeps in a recliner  and has for 10 years. After having her hip replacement in 2023 she readjusted the chair. She has padded the seat with blankets to make her sit higher and she has also pulled the blankets up the back of the chair for padding in her lumbar region.     She is doing very well after her hip surgery. She stopped using the walker sooner than expected and she stopped utilizing the cane to ambulate. She lost a lot of weight before her surgery and has lost 6 more pounds since her surgery. She weighs 240 pounds today.     She thinks she is menopausal. She had a hysterectomy when she was in her 30s but she still has her ovaries. She started taking over-the-counter Estroven for menopause symptoms, especially night sweats. Her sleep has improved, although the night sweats and back pain are currently waking her up during the night.     She queries about stopping her cholesterol medication of atorvastatin temporarily just to see what her true cholesterol levels are. She is hoping her cholesterol levels have improved since losing weight. She would like to eliminate some of her medications. She had a chest x-ray and an " "echocardiogram in 09/2022 with no significant cardiopulmonary findings. Her lipid panel in 02/2023 showed her LDL cholesterol was within normal range and her triglycerides were elevated.         She is a smoker.     Subjective     Review of System: Review of Systems   Musculoskeletal:         Coccyx pain        Medications:     Current Outpatient Medications:     atorvastatin (LIPITOR) 40 MG tablet, , Disp: , Rfl:     buPROPion XL (WELLBUTRIN XL) 150 MG 24 hr tablet, Take 1 tablet by mouth Every Morning., Disp: , Rfl:     divalproex (DEPAKOTE ER) 500 MG 24 hr tablet, Take 1 tablet by mouth Every Night., Disp: , Rfl:     hydrOXYzine (ATARAX) 10 MG tablet, Take 1 tablet by mouth At Night As Needed., Disp: , Rfl:     Nutritional Supplements (MENOPAUSE FORMULA PO), Take  by mouth., Disp: , Rfl:     prazosin (MINIPRESS) 5 MG capsule, Take 1 capsule by mouth Every Night., Disp: , Rfl: 1    primidone (MYSOLINE) 50 MG tablet, Take 1 tablet by mouth 2 (Two) Times a Day., Disp: , Rfl:     promethazine (PHENERGAN) 25 MG tablet, Take 1 tablet by mouth Every 6 (Six) Hours As Needed for Nausea or Vomiting., Disp: 20 tablet, Rfl: 0    QUEtiapine (SEROquel) 300 MG tablet, Take 1 tablet by mouth every night at bedtime., Disp: , Rfl:     tiZANidine (ZANAFLEX) 4 MG tablet, TAKE 1 TABLET BY MOUTH EVERY 8 HOURS AS NEEDED FOR MUSCLE SPASMS, Disp: 60 tablet, Rfl: 1    Allergies:   Allergies   Allergen Reactions    Morphine And Related GI Intolerance       Objective     Physical Exam:   Vital Signs:   Vitals:    08/18/23 1512   BP: 134/92   BP Location: Right arm   Patient Position: Sitting   Cuff Size: Adult   Pulse: 80   Resp: 16   Temp: 97.4 øF (36.3 øC)   TempSrc: Infrared   Weight: 109 kg (240 lb 9.6 oz)   Height: 165.1 cm (65\")   PainSc: 0-No pain           Physical Exam  Constitutional:       General: She is not in acute distress.     Appearance: She is not ill-appearing.   HENT:      Head: Normocephalic.   Cardiovascular:      Rate " and Rhythm: Normal rate and regular rhythm.      Heart sounds: Normal heart sounds. No murmur heard.  Pulmonary:      Breath sounds: Normal breath sounds.   Musculoskeletal:      Comments: No coccyx tenderness, redness or drainage   Neurological:      General: No focal deficit present.      Mental Status: She is oriented to person, place, and time.   Psychiatric:         Mood and Affect: Mood normal.       Assessment / Plan      Assessment/Plan:   Diagnoses and all orders for this visit:    1. Coccyx pain (Primary)  -     Miscellaneous DME    2. Night sweats  -     Follicle Stimulating Hormone; Future  -     Estradiol; Future  -     Luteinizing Hormone; Future  -     Luteinizing Hormone  -     Estradiol  -     Follicle Stimulating Hormone       1. Coccyx pain  A prescription was given for a donut pillow to help alleviate coccyx pain.    2. Night sweats  She will complete lab work to include luteinizing hormone, estradiol, and follicle stimulating hormone.     3. Hyperlipidemia  Her most recent lab work showed her total cholesterol was 138 mg/dL, triglycerides were 161 mg/dL, and LDL cholesterol was normal at 67 mg/dL. She will continue atorvastatin 40 mg daily.        Follow Up:   Patient will follow up in 6 months.    ROSALES White  [facility]    Transcribed from ambient dictation for ROSALES White by Rachele Goldman.  08/18/23   17:24 EDT    Patient or patient representative verbalized consent to the visit recording.  I have personally performed the services described in this document as transcribed by the above individual, and it is both accurate and complete.

## 2023-08-19 LAB
ESTRADIOL SERPL-MCNC: 26.9 PG/ML
FSH SERPL-ACNC: 53.9 MIU/ML
LH SERPL-ACNC: 23.6 MIU/ML

## 2023-08-29 DIAGNOSIS — G89.29 CHRONIC LEFT-SIDED LOW BACK PAIN WITH LEFT-SIDED SCIATICA: ICD-10-CM

## 2023-08-29 DIAGNOSIS — M54.42 CHRONIC LEFT-SIDED LOW BACK PAIN WITH LEFT-SIDED SCIATICA: ICD-10-CM

## 2023-08-30 RX ORDER — TIZANIDINE 4 MG/1
TABLET ORAL
Qty: 60 TABLET | Refills: 1 | Status: SHIPPED | OUTPATIENT
Start: 2023-08-30

## 2023-09-11 ENCOUNTER — TELEPHONE (OUTPATIENT)
Dept: INTERNAL MEDICINE | Facility: CLINIC | Age: 53
End: 2023-09-11
Payer: MEDICARE

## 2023-09-11 RX ORDER — ATORVASTATIN CALCIUM 40 MG/1
40 TABLET, FILM COATED ORAL DAILY
Qty: 90 TABLET | Refills: 0 | Status: SHIPPED | OUTPATIENT
Start: 2023-09-11

## 2023-09-11 NOTE — TELEPHONE ENCOUNTER
Caller: Loreta Robertson    Relationship: Self    Best call back number:[phone]     Requested Prescriptions:   Requested Prescriptions     Pending Prescriptions Disp Refills    atorvastatin (LIPITOR) 40 MG tablet 90 tablet         Pharmacy where request should be sent: [id number] - [place], [place] - [address] [facility] [address] [facility] [phone] [facility] [phone] FX     Last office visit with prescribing clinician: 8/18/2023   Last telemedicine visit with prescribing clinician: 6/27/2023   Next office visit with prescribing clinician: Visit date not found     Additional details provided by patient: THE PATIENT IS OUT    Does the patient have less than a 3 day supply:  [x] Yes  [] No    Would you like a call back once the refill request has been completed: [] Yes [x] No    If the office needs to give you a call back, can they leave a voicemail: [] Yes [x] No    Freddy Magana   09/11/23 10:00 EDT

## 2023-09-11 NOTE — TELEPHONE ENCOUNTER
THE PATIENT HAD AN EPISODE OF BLEEDING VAGINALLY.  09/09/23.  SHE PEE'ED 3 TIMES AND EACH TIME THERE WAS BLOOD.  THE PATIENT HAD A HYSTERECTOMY  ALMOST 20 YEARS AGO.  SHE HAS NOT HAD A PEP IN ABOUT 10 YEARS.    SHE IS NO LONGER BLEEDING AFTER THAT ONE DAY.  PLEASE CALL TO DISCUSS.  [phone] A MESSAGE CAN BE LEFT.

## 2023-09-12 ENCOUNTER — OFFICE VISIT (OUTPATIENT)
Dept: INTERNAL MEDICINE | Facility: CLINIC | Age: 53
End: 2023-09-12
Payer: MEDICARE

## 2023-09-12 VITALS
HEIGHT: 65 IN | HEART RATE: 72 BPM | TEMPERATURE: 97.3 F | BODY MASS INDEX: 40.19 KG/M2 | SYSTOLIC BLOOD PRESSURE: 126 MMHG | RESPIRATION RATE: 16 BRPM | WEIGHT: 241.2 LBS | DIASTOLIC BLOOD PRESSURE: 86 MMHG

## 2023-09-12 DIAGNOSIS — Z86.010 HISTORY OF COLON POLYPS: ICD-10-CM

## 2023-09-12 DIAGNOSIS — R23.2 HOT FLASHES: ICD-10-CM

## 2023-09-12 DIAGNOSIS — N93.9 VAGINAL BLEEDING: Primary | Chronic | ICD-10-CM

## 2023-09-12 DIAGNOSIS — Z90.710 HISTORY OF HYSTERECTOMY: ICD-10-CM

## 2023-09-12 LAB
BILIRUB BLD-MCNC: NEGATIVE MG/DL
CLARITY, POC: CLEAR
COLOR UR: YELLOW
EXPIRATION DATE: NORMAL
GLUCOSE UR STRIP-MCNC: NEGATIVE MG/DL
KETONES UR QL: NEGATIVE
LEUKOCYTE EST, POC: NEGATIVE
Lab: NORMAL
NITRITE UR-MCNC: NEGATIVE MG/ML
PH UR: 6 [PH] (ref 5–8)
PROT UR STRIP-MCNC: NEGATIVE MG/DL
RBC # UR STRIP: NEGATIVE /UL
SP GR UR: 1.02 (ref 1–1.03)
UROBILINOGEN UR QL: NORMAL

## 2023-09-12 PROCEDURE — 3074F SYST BP LT 130 MM HG: CPT | Performed by: NURSE PRACTITIONER

## 2023-09-12 PROCEDURE — 99213 OFFICE O/P EST LOW 20 MIN: CPT | Performed by: NURSE PRACTITIONER

## 2023-09-12 PROCEDURE — 1160F RVW MEDS BY RX/DR IN RCRD: CPT | Performed by: NURSE PRACTITIONER

## 2023-09-12 PROCEDURE — 3079F DIAST BP 80-89 MM HG: CPT | Performed by: NURSE PRACTITIONER

## 2023-09-12 PROCEDURE — 81003 URINALYSIS AUTO W/O SCOPE: CPT | Performed by: NURSE PRACTITIONER

## 2023-09-12 PROCEDURE — 1159F MED LIST DOCD IN RCRD: CPT | Performed by: NURSE PRACTITIONER
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
## 2023-09-14 ENCOUNTER — TELEPHONE (OUTPATIENT)
Dept: INTERNAL MEDICINE | Facility: CLINIC | Age: 53
End: 2023-09-14
Payer: MEDICARE

## 2023-09-14 NOTE — TELEPHONE ENCOUNTER
Provider: GRISELDA URBANO     Caller: ANNALISE STATES       Phone Number: [phone]     Reason for Call: PATIENT CALLED AND SAID SHE IS ABLE TO RECEIVE A FREE GLUCOSE METER THROUGH HER INSURANCE. HER INSURANCE SHOULD BE FAXING THE OFFICE THE PAPER WORK FOR GRISELDA TO SIGN OFF ON THAT FOR HER AND FAX BACK TO THE. IF YOU HAVE ANY QUESTION FOR ANNALISE PLEASE CALL HER.

## 2023-09-18 NOTE — PROGRESS NOTES
Sleep Clinic Video Visit Consult Note    You have chosen to receive care through a telehealth visit.  Do you consent to use a video/audio connection for your medical care today? Yes     Chief Complaint  Sleep problem    Subjective     History of Present Illness:  Loreta Robertson is a 53 y.o. female with a history of COPD, hyperlipidemia, PTSD, bipolar disorder, drug-induced parkinsonism, RLS, anxiety, and depression.  The patient is referred by Carolina CABA with primary care.  She recently had hip replacement surgery and has had difficulty sleeping since that time.  She has frequent interruptions in sleeping and reports excessive daytime sleepiness and fatigue. She goes to sleep 1-2 am and wakes 930-1030. Hot flashes make sleep difficult. She has found melatonin helpful. She sleeps in a recliner. She does not take naps.    She has lost about 120 lb    Further details are as follows:    [place] Scale is (out of 24): Total score: 5     Estimated average amount of sleep per night: 5-6 hours  Number of times she wakes up at night: 2-3 times  Difficulty falling back asleep: occasionally  It usually takes 60 minutes to go to sleep.  She feels sleepy upon waking up: yes  Rotating or night shift work: no    Drowsiness/Sleepiness:  She exhibits the following:  excessive daytime sleepiness  excessive daytime fatigue    Snoring/Breathing:  She exhibits the following:  Drinks a lot of water     Head Injury:  She exhibits the following:  No    Reflux:  She describes the following:  none    Narcolepsy:  She exhibits the following:  none    RLS/PLMs:  She describes the following:  none    Insomnia:  She describes the following:  problems initiating sleep at night  frequent awakenings  bothered by pain at night    Parasomnia:  She exhibits the following:  sleep talks    Weight:  Weight change in the last year:  loss: 120 lbs    The patient's relevant past medical, surgical, family, and social history reviewed and  "updated in Epic as appropriate.    Review of Systems    Objective   Vital Signs:  Ht 167.6 cm (66\")   Wt 109 kg (241 lb)   BMI 38.90 kg/m²     Patient's (Body mass index is 38.9 kg/m².) indicates that they are morbidly obese (BMI > 40 or > 35 with obesity - related health condition) with health related conditions that include obstructive sleep apnea, hypertension, coronary heart disease, and diabetes mellitus . Weight is improving with treatment. BMI is is above average; BMI management plan is completed. We discussed low calorie, low carb based diet program and portion control.            Physical Exam  Constitutional:       General: She is not in acute distress.     Appearance: Normal appearance.   Neurological:      Mental Status: She is alert and oriented to person, place, and time.   Psychiatric:         Mood and Affect: Mood normal.         Behavior: Behavior normal.         Thought Content: Thought content normal.         Judgment: Judgment normal.           Result Review :              Assessment and Plan  Loreta Robertson is a 53 y.o. female with a past medical history of COPD, hyperlipidemia, PTSD, bipolar disorder, drug-induced parkinsonism, RLS, anxiety, and depression who presents for further evaluation of excessive daytime sleepiness and fatigue, nonrestorative sleep, and insomnia.  The patient has lost approximately 120 pounds and has had a significant amount of improvement since this has occurred.  She has not describing symptoms concerning for sleep disordered breathing at this time.  She is now following a good sleep schedule and sleep hygiene and overall feels improved.  The patient will return for follow-up as needed for now.  If she has worsening symptoms of snoring or witnessed episodes of apnea I would be able to order a sleep study.  He continues to work on her weight.    Diagnoses and all orders for this visit:    1. Psychophysiological insomnia (Primary)    2. Obesity (BMI " 30-39.9)                 I discussed the consequences of uncontrolled sleep apnea including hypertension, heart disease, diabetes, stroke, and dementia. I further discussed sleep apnea therapeutic options including CPAP, Weight loss, Oral dental appliance, and surgery.         Follow Up  Return if symptoms worsen or fail to improve.  Patient was given instructions and counseling regarding her condition or for health maintenance advice. Please see specific information pulled into the AVS if appropriate.     ROSALES Lin, ACNP-BC  Pulmonary, Critical Care Medicine, and Sleep Medicine

## 2023-09-19 ENCOUNTER — TELEPHONE (OUTPATIENT)
Dept: INTERNAL MEDICINE | Facility: CLINIC | Age: 53
End: 2023-09-19
Payer: MEDICARE

## 2023-09-19 ENCOUNTER — TELEMEDICINE (OUTPATIENT)
Dept: SLEEP MEDICINE | Facility: HOSPITAL | Age: 53
End: 2023-09-19
Payer: MEDICARE

## 2023-09-19 VITALS — BODY MASS INDEX: 38.73 KG/M2 | WEIGHT: 241 LBS | HEIGHT: 66 IN

## 2023-09-19 DIAGNOSIS — E66.9 OBESITY (BMI 30-39.9): ICD-10-CM

## 2023-09-19 DIAGNOSIS — F51.04 PSYCHOPHYSIOLOGICAL INSOMNIA: Primary | ICD-10-CM

## 2023-09-19 DIAGNOSIS — R73.03 PRE-DIABETES: Primary | ICD-10-CM

## 2023-09-19 NOTE — TELEPHONE ENCOUNTER
Caller: [facility] - [place], [place] - [address] - [phone]  - [phone] FX    Relationship: Pharmacy    Best call back number: [phone]    What medication are you requesting: TRUE METRIX LANCETS, TEST STRIPS, METER, AND CONTROLLED SOLUTION    If a prescription is needed, what is your preferred pharmacy and phone number: [facility] - [place], [place] - [address] - [phone] [facility] [phone] FX     Additional notes: PHARMACY STATES THAT THEY NEED SEPARATE PRESCRIPTIONS

## 2023-09-20 RX ORDER — LANCETS 30 GAUGE
EACH MISCELLANEOUS
Qty: 200 EACH | Refills: 5 | Status: SHIPPED | OUTPATIENT
Start: 2023-09-20

## 2023-09-20 RX ORDER — BLOOD-GLUCOSE CONTROL, LOW
1 EACH MISCELLANEOUS DAILY
Qty: 1 EACH | Refills: 0 | Status: SHIPPED | OUTPATIENT
Start: 2023-09-20

## 2023-09-20 RX ORDER — BLOOD SUGAR DIAGNOSTIC
STRIP MISCELLANEOUS
Qty: 100 EACH | Refills: 12 | Status: SHIPPED | OUTPATIENT
Start: 2023-09-20

## 2023-10-16 DIAGNOSIS — G89.29 CHRONIC LEFT-SIDED LOW BACK PAIN WITH LEFT-SIDED SCIATICA: ICD-10-CM

## 2023-10-16 DIAGNOSIS — M54.42 CHRONIC LEFT-SIDED LOW BACK PAIN WITH LEFT-SIDED SCIATICA: ICD-10-CM

## 2023-10-16 RX ORDER — TIZANIDINE 4 MG/1
TABLET ORAL
Qty: 60 TABLET | Refills: 1 | Status: SHIPPED | OUTPATIENT
Start: 2023-10-16

## 2023-11-22 RX ORDER — SODIUM PICOSULFATE, MAGNESIUM OXIDE, AND ANHYDROUS CITRIC ACID 10; 3.5; 12 MG/160ML; G/160ML; G/160ML
350 LIQUID ORAL TAKE AS DIRECTED
Qty: 350 ML | Refills: 0 | Status: SHIPPED | OUTPATIENT
Start: 2023-11-22

## 2023-12-02 DIAGNOSIS — M54.42 CHRONIC LEFT-SIDED LOW BACK PAIN WITH LEFT-SIDED SCIATICA: ICD-10-CM

## 2023-12-02 DIAGNOSIS — G89.29 CHRONIC LEFT-SIDED LOW BACK PAIN WITH LEFT-SIDED SCIATICA: ICD-10-CM

## 2023-12-04 NOTE — TELEPHONE ENCOUNTER
LOV 09/12/2023  NOV     Left message on machine for patient to call    Relay the following information:    Patient is due for a Follow up.  Please advise patient she needs to schedule and keep her appointment to continue to receive refills in the future.  If she is unable to keep her appointment we will not be able to provider further refills.  Once appointment has been scheduled please update message with date and time so we can process the request.  We will forward the message to the provider to review the refill request.

## 2023-12-12 RX ORDER — TIZANIDINE 4 MG/1
TABLET ORAL
Qty: 60 TABLET | Refills: 1 | Status: SHIPPED | OUTPATIENT
Start: 2023-12-12

## 2023-12-18 ENCOUNTER — HOSPITAL ENCOUNTER (OUTPATIENT)
Dept: MRI IMAGING | Facility: HOSPITAL | Age: 53
Discharge: HOME | End: 2023-12-18
Admitting: NURSE PRACTITIONER
Payer: MEDICARE

## 2023-12-18 DIAGNOSIS — Z91.89 AT INCREASED RISK OF BREAST CANCER: ICD-10-CM

## 2023-12-18 DIAGNOSIS — N64.89 OTHER SPECIFIED DISORDERS OF BREAST: ICD-10-CM

## 2023-12-18 PROCEDURE — A9577 INJ MULTIHANCE: HCPCS | Performed by: NURSE PRACTITIONER

## 2023-12-18 PROCEDURE — 77049 MRI BREAST C-+ W/CAD BI: CPT | Performed by: RADIOLOGY

## 2023-12-18 PROCEDURE — C8908 MRI W/O FOL W/CONT, BREAST,: HCPCS

## 2023-12-18 PROCEDURE — C8937 CAD BREAST MRI: HCPCS

## 2023-12-18 PROCEDURE — 0 GADOBENATE DIMEGLUMINE 529 MG/ML SOLUTION: Performed by: NURSE PRACTITIONER

## 2023-12-18 RX ADMIN — GADOBENATE DIMEGLUMINE 19 ML: 529 INJECTION, SOLUTION INTRAVENOUS at 15:54

## 2023-12-19 ENCOUNTER — TELEPHONE (OUTPATIENT)
Dept: INTERNAL MEDICINE | Facility: CLINIC | Age: 53
End: 2023-12-19
Payer: MEDICARE

## 2023-12-19 DIAGNOSIS — F41.9 ANXIETY: Primary | ICD-10-CM

## 2023-12-19 RX ORDER — LORAZEPAM 0.5 MG/1
TABLET ORAL
Qty: 2 TABLET | Refills: 0 | Status: SHIPPED | OUTPATIENT
Start: 2023-12-19

## 2023-12-19 NOTE — TELEPHONE ENCOUNTER
Caller: Loreta Robertson    Relationship: Self    Best call back number: [phone]    What is the best time to reach you: ANYTIME     Who are you requesting to speak with (clinical staff, provider,  specific staff member): CLINICAL STAFF    What was the call regarding: PATIENT IS CALLING TO SPEAK WITH THE CLINICAL STAFF. SHE SAYS THAT SHE WAS ADVISED TO SPEAK WITH HER PCP AFTER HAVING A MAMMOGRAM YESTERDAY. SHE SAYS THAT SHE FIDGETED A LOT AND IT DID NOT COME OUT WELL. SHE IS NEEDING TO SEE ABOUT HER OPTIONS ON WHAT SHE CAN TAKE THAT CAN HELP CALM HER DOWN FOR THE PROCEDURE.     Is it okay if the provider responds through MyChart: NO

## 2023-12-19 NOTE — TELEPHONE ENCOUNTER
Patient states she would like to try the Ativan. She states she has tried Valium before and it didn't work.

## 2024-01-16 ENCOUNTER — OFFICE VISIT (OUTPATIENT)
Dept: INTERNAL MEDICINE | Facility: CLINIC | Age: 54
End: 2024-01-16
Payer: MEDICARE

## 2024-01-16 ENCOUNTER — TELEPHONE (OUTPATIENT)
Dept: INTERNAL MEDICINE | Facility: CLINIC | Age: 54
End: 2024-01-16

## 2024-01-16 VITALS
HEIGHT: 66 IN | SYSTOLIC BLOOD PRESSURE: 134 MMHG | DIASTOLIC BLOOD PRESSURE: 94 MMHG | BODY MASS INDEX: 42.04 KG/M2 | TEMPERATURE: 96.9 F | HEART RATE: 84 BPM | RESPIRATION RATE: 18 BRPM | WEIGHT: 261.6 LBS

## 2024-01-16 DIAGNOSIS — Z23 ENCOUNTER FOR IMMUNIZATION: ICD-10-CM

## 2024-01-16 DIAGNOSIS — R82.90 ABNORMAL URINE ODOR: ICD-10-CM

## 2024-01-16 DIAGNOSIS — F17.200 SMOKER: ICD-10-CM

## 2024-01-16 DIAGNOSIS — R06.2 WHEEZING: ICD-10-CM

## 2024-01-16 DIAGNOSIS — R39.198 DIFFICULTY URINATING: ICD-10-CM

## 2024-01-16 DIAGNOSIS — R05.3 CHRONIC COUGH: Primary | ICD-10-CM

## 2024-01-16 DIAGNOSIS — I10 PRIMARY HYPERTENSION: ICD-10-CM

## 2024-01-16 LAB
BILIRUB BLD-MCNC: NEGATIVE MG/DL
CLARITY, POC: CLEAR
COLOR UR: YELLOW
EXPIRATION DATE: NORMAL
GLUCOSE UR STRIP-MCNC: NEGATIVE MG/DL
KETONES UR QL: NEGATIVE
LEUKOCYTE EST, POC: NEGATIVE
Lab: NORMAL
NITRITE UR-MCNC: NEGATIVE MG/ML
PH UR: 6 [PH] (ref 5–8)
PROT UR STRIP-MCNC: NEGATIVE MG/DL
RBC # UR STRIP: NEGATIVE /UL
SP GR UR: 1.01 (ref 1–1.03)
UROBILINOGEN UR QL: NORMAL

## 2024-01-16 PROCEDURE — 81003 URINALYSIS AUTO W/O SCOPE: CPT | Performed by: NURSE PRACTITIONER

## 2024-01-16 PROCEDURE — 1160F RVW MEDS BY RX/DR IN RCRD: CPT | Performed by: NURSE PRACTITIONER

## 2024-01-16 PROCEDURE — 3075F SYST BP GE 130 - 139MM HG: CPT | Performed by: NURSE PRACTITIONER

## 2024-01-16 PROCEDURE — 99214 OFFICE O/P EST MOD 30 MIN: CPT | Performed by: NURSE PRACTITIONER

## 2024-01-16 PROCEDURE — 1159F MED LIST DOCD IN RCRD: CPT | Performed by: NURSE PRACTITIONER

## 2024-01-16 PROCEDURE — 3080F DIAST BP >= 90 MM HG: CPT | Performed by: NURSE PRACTITIONER

## 2024-01-16 PROCEDURE — 90686 IIV4 VACC NO PRSV 0.5 ML IM: CPT | Performed by: NURSE PRACTITIONER

## 2024-01-16 PROCEDURE — G0008 ADMIN INFLUENZA VIRUS VAC: HCPCS | Performed by: NURSE PRACTITIONER

## 2024-01-16 RX ORDER — CYCLOBENZAPRINE HCL 10 MG
10 TABLET ORAL 3 TIMES DAILY PRN
COMMUNITY

## 2024-01-16 RX ORDER — LOSARTAN POTASSIUM 25 MG/1
25 TABLET ORAL DAILY
Qty: 90 TABLET | Refills: 0 | Status: SHIPPED | OUTPATIENT
Start: 2024-01-16

## 2024-01-16 RX ORDER — CARIPRAZINE 3 MG/1
CAPSULE, GELATIN COATED ORAL
COMMUNITY
Start: 2023-10-19

## 2024-01-16 RX ORDER — LIDOCAINE 50 MG/G
PATCH TOPICAL
COMMUNITY
Start: 2023-09-29

## 2024-01-16 NOTE — TELEPHONE ENCOUNTER
Can you check with billing/insurance  and see if there is supposed to be a charge?  The last MR I showed extensive motion artifact which degrades both spatial and  temporal resolution. They recommended:      RECOMMENDATIONS: Technical repeat breast MRI with left motion. Lead  technologist will contact patient to see if repositioning the patient or  having the patient's physician prescribed medication would be helpful  for further evaluation.  BI-RADS CATEGORY: 0, technical recall

## 2024-01-16 NOTE — TELEPHONE ENCOUNTER
Caller: SAI    Relationship to patient: Other    Best call back number: [phone]     Patient is needing: SAI, WITH THE [facility] STATED THAT THE PATIENT'S INSURANCE IS DENYING COVERAGE OF THE ORDER FOR AN MRI OF HER BREAST.    SHOULD THE ORDER BE CANCELLED?    PLEASE ADVISE TO CLARIFY

## 2024-01-16 NOTE — PROGRESS NOTES
Patient Name: Loreta Robertson  : 1970   MRN: [id number]     Chief Complaint:    Chief Complaint   Patient presents with    Cough     Cough for a month.     Difficulty Urinating     Urine has strong smell to it.        History of Present Illness: Loreta Robertson is a 53 y.o. female presents to clinic for difficulty urinating and a cough.    The patient has noticed a strong urine smell for 2 weeks. She denies urgency, burning, or hematuria. She has no flank pain or fevers. She is drinking plenty of fluids.    She has a chronic cough. She has intermittent wheezing and chronic sputum production. She has never had any lung testing. She does not endorse any shortness of breath. She is drinking plenty of fluids.    The patient is scheduled for repeat colonoscopy in 2024. She had 9 polyps in 2019. She also has a follow-up with Jennifer [facility] for vaginal bleeding. She is interested in joining a gym to try to lose weight. She has recently gained 20 pounds. She is going to try intermittent fasting.    She has been a one pack a day smoker for 30 years. She also smokes marijuana and vapes.      Subjective     Review of System: Review of Systems   Respiratory:  Positive for cough.         Medications:     Current Outpatient Medications:     atorvastatin (LIPITOR) 40 MG tablet, Take 1 tablet by mouth Daily., Disp: 90 tablet, Rfl: 0    buPROPion XL (WELLBUTRIN XL) 150 MG 24 hr tablet, Take 1 tablet by mouth Every Morning., Disp: , Rfl:     cyclobenzaprine (FLEXERIL) 10 MG tablet, Take 1 tablet by mouth 3 (Three) Times a Day As Needed., Disp: , Rfl:     divalproex (DEPAKOTE ER) 500 MG 24 hr tablet, Take 1 tablet by mouth Every Night., Disp: , Rfl:     hydrOXYzine (ATARAX) 10 MG tablet, Take 1 tablet by mouth At Night As Needed., Disp: , Rfl:     lidocaine (LIDODERM) 5 %, PLACE 1 PATCH ONTO THE SKIN DAILY FOR 30 DAYS. REMOVE AND DISCARD PATCH WITHIN 12 HOURS OR AS DIRECTED BY MD, Disp: , Rfl:     LORazepam (Ativan) 0.5  MG tablet, Take one tablet 30 minutes prior to procedure; may repeat x 1, Disp: 2 tablet, Rfl: 0    Nutritional Supplements (MENOPAUSE FORMULA PO), Take  by mouth., Disp: , Rfl:     Pediatric Multivitamins-Iron (multivitamin pediatric chewable) chewable tablet, Chew Daily., Disp: , Rfl:     prazosin (MINIPRESS) 5 MG capsule, Take 1 capsule by mouth Every Night., Disp: , Rfl: 1    primidone (MYSOLINE) 50 MG tablet, Take 1 tablet by mouth 2 (Two) Times a Day., Disp: , Rfl:     promethazine (PHENERGAN) 25 MG tablet, Take 1 tablet by mouth Every 6 (Six) Hours As Needed for Nausea or Vomiting., Disp: 20 tablet, Rfl: 0    QUEtiapine (SEROquel) 300 MG tablet, Take 1 tablet by mouth every night at bedtime., Disp: , Rfl:     tiZANidine (ZANAFLEX) 4 MG tablet, TAKE 1 TABLET BY MOUTH EVERY 8 HOURS AS NEEDED FOR MUSCLE SPASMS, Disp: 60 tablet, Rfl: 1    Vraylar 3 MG capsule capsule, Take  by mouth., Disp: , Rfl:     Blood Glucose Calibration (True Metrix Level 1) Low solution, 1 each by In Vitro route Daily. Test once daily for prediabetes R73.03, Disp: 1 each, Rfl: 0    Blood Glucose Calibration (True Metrix Level 2) Normal solution, 1 each by In Vitro route Daily. Test once daily for prediabetes R73.03, Disp: 1 each, Rfl: 0    Blood Glucose Calibration (True Metrix Level 3) High solution, 1 each by In Vitro route Daily. Test once daily for prediabetes R73.03, Disp: 1 each, Rfl: 0    glucose blood (True Metrix Pro Blood Glucose) test strip, Test once daily for prediabetes R73.03, Disp: 100 each, Rfl: 12    Lancets misc, Test once daily for prediabetes R73.03, Disp: 200 each, Rfl: 5    losartan (Cozaar) 25 MG tablet, Take 1 tablet by mouth Daily., Disp: 90 tablet, Rfl: 0    Sod Picosulfate-Mag Ox-Cit Acd (Clenpiq) 10-3.5-12 MG-GM -GM/160ML solution, Take 350 mL by mouth Take As Directed., Disp: 350 mL, Rfl: 0    Allergies:   Allergies   Allergen Reactions    Morphine And Related GI Intolerance       Objective     Physical Exam:  "  Vital Signs:   Vitals:    01/16/24 1348   BP: 134/94   BP Location: Right arm   Patient Position: Sitting   Cuff Size: Adult   Pulse: 84   Resp: 18   Temp: 96.9 °F (36.1 °C)   TempSrc: Infrared   Weight: 119 kg (261 lb 9.6 oz)   Height: 167.6 cm (66\")   PainSc: 0-No pain     Body mass index is 42.22 kg/m².  We discussed portion control and increasing exercise.      Physical Exam  Constitutional:       General: She is not in acute distress.     Appearance: She is not ill-appearing.   HENT:      Head: Normocephalic.   Cardiovascular:      Rate and Rhythm: Normal rate and regular rhythm.      Heart sounds: Normal heart sounds. No murmur heard.  Pulmonary:      Breath sounds: Normal breath sounds.   Abdominal:      General: Bowel sounds are normal.      Tenderness: There is no abdominal tenderness.   Neurological:      General: No focal deficit present.      Mental Status: She is oriented to person, place, and time.   Psychiatric:         Mood and Affect: Mood normal.       Assessment / Plan      Assessment/Plan:   Diagnoses and all orders for this visit:    1. Chronic cough (Primary)  -     Complete PFT - Pre & Post Bronchodilator; Future    2. Abnormal urine odor    3. Difficulty urinating  -     POC Urinalysis Dipstick, Automated    4. Smoker  -     Complete PFT - Pre & Post Bronchodilator; Future    5. Wheezing  -     Complete PFT - Pre & Post Bronchodilator; Future    6. Encounter for immunization  -     Fluzone (or Fluarix & Flulaval for VFC) >6mos    7. Primary hypertension  -     losartan (Cozaar) 25 MG tablet; Take 1 tablet by mouth Daily.  Dispense: 90 tablet; Refill: 0     Loreta Platt States  reports that she has been smoking cigarettes. She started smoking about 44 years ago. She has a 15.00 pack-year smoking history. She has never used smokeless tobacco.. I have educated her on the risk of diseases from using tobacco products such as cancer, COPD, and heart disease.     I advised her to quit and she is " not willing to quit.    I spent 3  minutes counseling the patient.    1. Difficulty urinating and a cough.  Urinalysis was done and it was normal. She will monitor her symptoms.    2. Hypertension.  Her blood pressure was elevated today. It has been elevated at several doctor visits. She will start on losartan 25 mg daily.    3. Smoking and chronic cough.  I have also ordered PFTs for her smoking and chronic cough, wheezing symptoms. She will have a flu shot today.      Follow Up:   Return in about 4 weeks (around 2/13/2024) for Recheck.    ROSALES White  [facility]    Transcribed from ambient dictation for ROSALES White by Loreta Henderson.  01/16/24   15:28 EST    Patient or patient representative verbalized consent to the visit recording.  I have personally performed the services described in this document as transcribed by the above individual, and it is both accurate and complete.

## 2024-01-19 NOTE — TELEPHONE ENCOUNTER
Per chat with Carolina, I asked her if she called for a p2p  I did . I am faxing an appeal . They would not do a P2P.

## 2024-01-20 DIAGNOSIS — M54.42 CHRONIC LEFT-SIDED LOW BACK PAIN WITH LEFT-SIDED SCIATICA: ICD-10-CM

## 2024-01-20 DIAGNOSIS — G89.29 CHRONIC LEFT-SIDED LOW BACK PAIN WITH LEFT-SIDED SCIATICA: ICD-10-CM

## 2024-01-22 RX ORDER — TIZANIDINE 4 MG/1
TABLET ORAL
Qty: 60 TABLET | Refills: 1 | Status: SHIPPED | OUTPATIENT
Start: 2024-01-22

## 2024-01-24 ENCOUNTER — TELEPHONE (OUTPATIENT)
Dept: INTERNAL MEDICINE | Facility: CLINIC | Age: 54
End: 2024-01-24
Payer: MEDICARE

## 2024-01-24 NOTE — TELEPHONE ENCOUNTER
Patient went to [facility] ER this morning with throbbing back pain. They advised her to follow up with her PCP. Since she was at the ER, throbbing pain has traveled to her right leg. Patient had hip replacement on 6/1/2023.The pain is so bad that she can hardly walk. Lidocaine patches are not helping with the pain. I spoke to Stephanie and she advised that the patient go to  ER. She can also call the surgeon who did her hip replacement. This information was given to the patient and she was okay with the advise. She is not able to drive herself but her son will be able to take her to ER later today. Call: [phone]

## 2024-01-25 ENCOUNTER — TELEPHONE (OUTPATIENT)
Dept: INTERNAL MEDICINE | Facility: CLINIC | Age: 54
End: 2024-01-25
Payer: MEDICARE

## 2024-01-25 ENCOUNTER — TELEPHONE (OUTPATIENT)
Dept: ORTHOPEDIC SURGERY | Facility: CLINIC | Age: 54
End: 2024-01-25
Payer: MEDICARE

## 2024-01-25 NOTE — TELEPHONE ENCOUNTER
I spoke to the patient . She is having a new problem of right knee pain . She did go to the ER and has been elevating and using heat along with lidocaine patches and anti-inflammatories . I told her since all that didn't help we would need to schedule her to see Dr. Leyva . Unfortunately he is out of the office till the 5th . So I did offer her to see Ofelia our PA and she happily accepted . She is scheduled for Friday 1/26/24 .     Lori TURNER

## 2024-01-25 NOTE — TELEPHONE ENCOUNTER
Caller: Loreta Robertson    Relationship: Self    Best call back number: [phone]    What is the best time to reach you: ANYTIME     Who are you requesting to speak with (clinical staff, provider,  specific staff member): GRISELDA URBANO       What was the call regarding: THE PATIENT STATES THAT SHE WENT TO THE HOSPITAL 01/24/2024 AND WAS DIAGNOSED WITH SCIATICA THE PATIENT STATES THAT HER KNEE  AND THIGH ARE VERY PAINFUL THE PATIENT WANTS TO KNOW IF THAT IS NORMAL AND WHAT SHE SHOULD DO

## 2024-01-25 NOTE — TELEPHONE ENCOUNTER
Caller: Loreta Robertson     Relationship: PATIENT    Best call back number:[phone]    What is your medical concern? INTENSE PAIN IN RT KNEE AND PATIENT IS WORRIED IT MAY BE RADIATING FROM THE HIP    How long has this issue been going on? STARTED 1.24.24    Is your provider already aware of this issue? NO    Have you been treated for this issue? WENT TO E/R BUT THERE HAS BEEN NO CHANGE IN THE PAIN  PATIENT TRIED IBUPROFEN, ELEVATING KNEE, LIDOCAINE PATCHES AND HEAT; STILL RATES PAIN 10/10    PATIENT WAS LAST SEEN 8.7.23

## 2024-01-26 ENCOUNTER — OFFICE VISIT (OUTPATIENT)
Dept: ORTHOPEDIC SURGERY | Facility: CLINIC | Age: 54
End: 2024-01-26
Payer: MEDICARE

## 2024-01-26 ENCOUNTER — LAB (OUTPATIENT)
Dept: LAB | Facility: HOSPITAL | Age: 54
End: 2024-01-26
Payer: MEDICARE

## 2024-01-26 ENCOUNTER — TELEPHONE (OUTPATIENT)
Dept: ORTHOPEDIC SURGERY | Facility: CLINIC | Age: 54
End: 2024-01-26

## 2024-01-26 VITALS
DIASTOLIC BLOOD PRESSURE: 86 MMHG | BODY MASS INDEX: 41.46 KG/M2 | WEIGHT: 258 LBS | HEIGHT: 66 IN | SYSTOLIC BLOOD PRESSURE: 152 MMHG

## 2024-01-26 DIAGNOSIS — M17.11 PRIMARY OSTEOARTHRITIS OF RIGHT KNEE: ICD-10-CM

## 2024-01-26 DIAGNOSIS — M25.461 EFFUSION OF RIGHT KNEE: ICD-10-CM

## 2024-01-26 DIAGNOSIS — M25.561 RIGHT KNEE PAIN, UNSPECIFIED CHRONICITY: ICD-10-CM

## 2024-01-26 DIAGNOSIS — M25.561 RIGHT KNEE PAIN, UNSPECIFIED CHRONICITY: Primary | ICD-10-CM

## 2024-01-26 DIAGNOSIS — M17.11 PRIMARY OSTEOARTHRITIS OF RIGHT KNEE: Primary | ICD-10-CM

## 2024-01-26 LAB
APPEARANCE FLD: ABNORMAL
COLOR FLD: ABNORMAL
LYMPHOCYTES NFR FLD MANUAL: 21 %
MACROPHAGE FLUID: 34 %
NEUTROPHILS NFR FLD MANUAL: 12 %
RBC # FLD AUTO: 7000 /MM3
UNCLASSIFIED CELLS, FLUID: 33 %
WBC # FLD AUTO: 141 /MM3

## 2024-01-26 PROCEDURE — 89051 BODY FLUID CELL COUNT: CPT | Performed by: PHYSICIAN ASSISTANT

## 2024-01-26 RX ORDER — MELOXICAM 15 MG/1
TABLET ORAL
COMMUNITY
Start: 2024-01-24

## 2024-01-26 RX ORDER — METHOCARBAMOL 750 MG/1
TABLET, FILM COATED ORAL
COMMUNITY
Start: 2024-01-24

## 2024-01-26 RX ORDER — METHYLPREDNISOLONE 4 MG/1
TABLET ORAL SEE ADMIN INSTRUCTIONS
COMMUNITY
Start: 2024-01-24 | End: 2024-01-31

## 2024-01-26 RX ORDER — LIDOCAINE 50 MG/G
1 PATCH TOPICAL EVERY 24 HOURS
Qty: 30 PATCH | Refills: 0 | Status: SHIPPED | OUTPATIENT
Start: 2024-01-26

## 2024-01-26 NOTE — TELEPHONE ENCOUNTER
Lab called and spoke with Yann. She was asking about the order for the labs which stated Right shoulder but were labeled as right knee. I had Shilpa Fraga place another order for the right knee for [id number]. She also asked about the other labs that were ordered which we were informed that they only had enough for the cell count only. I called and left message for Yann at the labs to give us a call back to let her know.     - [place] RT(R)

## 2024-01-26 NOTE — PROGRESS NOTES
[facility] Office Visit - Ofelia Wells PA-C    Office Visit       Patient Name: Loreta Robertson    Chief Complaint:   Chief Complaint   Patient presents with    Right Knee - Pain       Referring Physician: No ref. provider found    History of Present Illness:   Loreta Robertson is a 53 y.o. female who presents with right knee pain.  Ongoing for the last 3 days.  Denies injury or trauma.  Rates pain a 10/10.  Describes aching, burning, throbbing, stabbing, shooting pain.  Associated with swelling.  Worsens with walking or standing.    She went to the [facility] emergency department in [facility] on 1/24/24 due to pain radiating down entire right leg.  She was told the pain was due to her sciatic symptoms.  She was prescribed a Medrol Dosepak and anti-inflammatories.  She has been taking these along with Flexeril.  She reports improvement in her back pain however the knee has continued to persist with pain.  She is using a walker today with extreme pain in the knee.    History of right total hip arthroplasty with Dr. Leyva on 6/1/2023.      Subjective     Review of Systems   Constitutional: Negative.  Negative for chills, fatigue and fever.   HENT: Negative.  Negative for congestion and dental problem.    Eyes: Negative.  Negative for blurred vision.   Respiratory: Negative.  Negative for shortness of breath.    Cardiovascular: Negative.  Negative for leg swelling.   Gastrointestinal: Negative.  Negative for abdominal pain.   Endocrine: Negative.  Negative for polyuria.   Genitourinary: Negative.  Negative for difficulty urinating.   Musculoskeletal:  Positive for arthralgias.   Skin: Negative.    Allergic/Immunologic: Negative.    Neurological: Negative.    Hematological: Negative.  Negative for adenopathy.   Psychiatric/Behavioral: Negative.  Negative for behavioral problems.         Past Medical History:   Past  Medical History:   Diagnosis Date    Anxiety     Bipolar 1 disorder     Cataract     Chronic bronchitis     Chronic constipation     Colon polyp     COPD (chronic obstructive pulmonary disease)     Depression     Hip arthrosis     Hyperlipidemia     Knee swelling     Low back strain 24    Obesity     Parkinsonism     DRUG INDUCED    Primary generalized (osteo)arthritis     PTSD (post-traumatic stress disorder)     RLS (restless legs syndrome)        Past Surgical History:   Past Surgical History:   Procedure Laterality Date    ABDOMINOPLASTY      ANTERIOR AND POSTERIOR VAGINAL REPAIR      BREAST BIOPSY Right     US BIOPSY     SECTION      X 2    CHOLECYSTECTOMY      COLONOSCOPY N/A 2019    Procedure: COLONOSCOPY;  Surgeon: Jonathan Pablo MD;  Location: [facility];  Service: Gastroenterology    HIP SURGERY  23    HYSTERECTOMY      AGE 33    LIPOSUCTION ABDOMINAL  1998    TOTAL HIP ARTHROPLASTY Right 2023    Procedure: MODIFIED ANTERIOR TOTAL HIP ARTHROPLASTY - RIGHT;  Surgeon: Adrián Leyva MD;  Location: [facility] OR;  Service: Orthopedics;  Laterality: Right;       Family History:   Family History   Problem Relation Age of Onset    Breast cancer Mother 50    Diabetes Mother     Cancer Mother     Diabetes Father     Hypertension Father     Heart attack Father     Obesity Father     Breast cancer Maternal Grandmother 40    Ovarian cancer Maternal Grandmother 40    Cervical cancer Maternal Grandmother     Cancer Maternal Grandfather        Social History:   Social History     Socioeconomic History    Marital status: Single   Tobacco Use    Smoking status: Every Day     Packs/day: 1.00     Years: 30.00     Additional pack years: 0.00     Total pack years: 30.00     Types: Cigarettes     Start date: 1980    Smokeless tobacco: Never   Vaping Use    Vaping Use: Never used   Substance and Sexual Activity    Alcohol use: No    Drug use: Yes     Types: Marijuana    Sexual  activity: Not Currently       Medications:   Current Outpatient Medications:     lidocaine (LIDODERM) 5 %, Place 1 patch on the skin as directed by provider Daily. Remove & Discard patch within 12 hours or as directed by MD, Disp: 30 patch, Rfl: 0    meloxicam (MOBIC) 15 MG tablet, , Disp: , Rfl:     methocarbamol (ROBAXIN) 750 MG tablet, TAKE 1 TABLET BY MOUTH FOUR TIMES DAILY FOR 10 DAYS, Disp: , Rfl:     methylPREDNISolone (MEDROL) 4 MG tablet, See Admin Instructions., Disp: , Rfl:     atorvastatin (LIPITOR) 40 MG tablet, Take 1 tablet by mouth Daily., Disp: 90 tablet, Rfl: 0    buPROPion XL (WELLBUTRIN XL) 150 MG 24 hr tablet, Take 1 tablet by mouth Every Morning., Disp: , Rfl:     cyclobenzaprine (FLEXERIL) 10 MG tablet, Take 1 tablet by mouth 3 (Three) Times a Day As Needed., Disp: , Rfl:     divalproex (DEPAKOTE ER) 500 MG 24 hr tablet, Take 1 tablet by mouth Every Night., Disp: , Rfl:     hydrOXYzine (ATARAX) 10 MG tablet, Take 1 tablet by mouth At Night As Needed., Disp: , Rfl:     losartan (Cozaar) 25 MG tablet, Take 1 tablet by mouth Daily., Disp: 90 tablet, Rfl: 0    Nutritional Supplements (MENOPAUSE FORMULA PO), Take  by mouth., Disp: , Rfl:     Pediatric Multivitamins-Iron (multivitamin pediatric chewable) chewable tablet, Chew Daily., Disp: , Rfl:     prazosin (MINIPRESS) 5 MG capsule, Take 1 capsule by mouth Every Night., Disp: , Rfl: 1    primidone (MYSOLINE) 50 MG tablet, Take 1 tablet by mouth 2 (Two) Times a Day., Disp: , Rfl:     promethazine (PHENERGAN) 25 MG tablet, Take 1 tablet by mouth Every 6 (Six) Hours As Needed for Nausea or Vomiting., Disp: 20 tablet, Rfl: 0    QUEtiapine (SEROquel) 300 MG tablet, Take 1 tablet by mouth every night at bedtime., Disp: , Rfl:     Sod Picosulfate-Mag Ox-Cit Acd (Clenpiq) 10-3.5-12 MG-GM -GM/160ML solution, Take 350 mL by mouth Take As Directed., Disp: 350 mL, Rfl: 0    tiZANidine (ZANAFLEX) 4 MG tablet, TAKE 1 TABLET BY MOUTH EVERY 8 HOURS AS NEEDED FOR  "MUSCLE SPASMS, Disp: 60 tablet, Rfl: 1    Vraylar 3 MG capsule capsule, Take  by mouth., Disp: , Rfl:     Allergies:   Allergies   Allergen Reactions    Morphine And Related GI Intolerance       I have reviewed and updated the following portions of the patient's history and review of systems: allergies, current medications, past family history, past medical history, past social history, past surgical history and problem list.    Objective      Vital Signs:   Vitals:    01/26/24 1001   BP: 152/86   Weight: 117 kg (258 lb)   Height: 167.6 cm (66\")       Ortho Exam:  Knee Exam: RIGHT KNEE  ----------  ALIGNMENT: varus    RANGE OF MOTION:  5-110; limited secondary to pain  LIGAMENTOUS STABILITY:   stable to varus and valgus stress at terminal extension and 30 degrees without any evidence of laxity     STRENGTH:  Weakened knee flexion, extension secondary to pain. 5/5 ankle dorsiflexion and plantarflexion.     PAIN WITH PALPATION: diffusely tender  KNEE EFFUSION: mild  PAIN WITH KNEE ROM: yes with active range of motion    SENSATION TO LIGHT TOUCH:  DEEP PERONEAL/SUPERFICIAL PERONEAL/SURAL/SAPHENOUS/TIBIAL:   intact    EDEMA:  no  ERYTHEMA:  no  WOUNDS/INCISIONS: no overlying skin problems.      Results Review:   XR Knee 4+ View Right  Imaging: knee x-rays 4 views    Side: RIGHT KNEE    Indication for x-rays: knee pain    Comparison: none    Findings:   RIGHT knee arthritic findings noted with varus alignment and joint space   absence on the medial side.  No acute bony findings noted.    I personally reviewed the above x-rays.         Assessment / Plan      Assessment:  Diagnoses and all orders for this visit:    1. Primary osteoarthritis of right knee (Primary)  -     MRI Knee Right Without Contrast; Future  -     Synovial Fluid, Cell Count - Synovial Fluid,; Future  -     lidocaine (LIDODERM) 5 %; Place 1 patch on the skin as directed by provider Daily. Remove & Discard patch within 12 hours or as directed by MD  " Dispense: 30 patch; Refill: 0    2. Right knee pain, unspecified chronicity  -     Cancel: XR Knee 4+ View Right  -     MRI Knee Right Without Contrast; Future  -     Synovial Fluid, Cell Count - Synovial Fluid,; Future  -     lidocaine (LIDODERM) 5 %; Place 1 patch on the skin as directed by provider Daily. Remove & Discard patch within 12 hours or as directed by MD  Dispense: 30 patch; Refill: 0    3. Effusion of right knee        Quality Metrics:   BMI:   Class 3 Severe Obesity (BMI >=40). Obesity-related health conditions include the following: osteoarthritis.     Tobacco:   Loreta Platt States  reports that she has been smoking cigarettes. She started smoking about 44 years ago. She has a 30.00 pack-year smoking history. She has never used smokeless tobacco..           Plan:  X-ray films reviewed today with Dr. Jesus.  Evidence of degenerative changes with medial compartment narrowing.  I have recommended a right knee aspiration for diagnostic and therapeutic purposes. We will hold on steroid injection.   About 3cc synovial fluid obtained. Sent for cell count. I do not have high suspicion for septic joint at this point. No erythema or large effusion.  Ordered MRI of right knee due to intense pain to evaluate for fracture or underlying cause.  May continue with anti-inflammatories and muscle relaxer for the pain.  I have also sent a prescription for lidocaine patches per her request.      Follow Up:   Pending right knee MRI results        Ofelia Wells PA-C  [facility]       Dictated using Dragon Speech Recognition.  Answers submitted by the patient for this visit:  Other (Submitted on 1/26/2024)  Please describe your symptoms.: Intense pain in right knee. Very hard to walk or bend or extend. Swelling is visible and some redness. If kept elevated with heating pad pain is controlled. Unable to shower or make it to bathroom when needed.  Have you had these symptoms before?: No  How long have you  been having these symptoms?: 1-4 days  Please list any medications you are currently taking for this condition.: Methylprednisolone 4mg, Flexirill, Methocarbamol 750mg/4 a day, Meloxicam 15mg 1 a day, Ibuprofen 200mg otc take 4 at a time  Please describe any probable cause for these symptoms. : They began with a little irritation in my lower back right side. Pain traveled down back to thigh to my knee. Back and thigh pain seems better, but knee os tremendously painful.  Primary Reason for Visit (Submitted on 1/26/2024)  What is the primary reason for your visit?: Other

## 2024-01-27 ENCOUNTER — HOSPITAL ENCOUNTER (EMERGENCY)
Facility: HOSPITAL | Age: 54
Discharge: HOME | End: 2024-01-27
Attending: EMERGENCY MEDICINE
Payer: MEDICARE

## 2024-01-27 ENCOUNTER — APPOINTMENT (OUTPATIENT)
Dept: CARDIOLOGY | Facility: HOSPITAL | Age: 54
End: 2024-01-27
Payer: MEDICARE

## 2024-01-27 VITALS
TEMPERATURE: 98.1 F | DIASTOLIC BLOOD PRESSURE: 80 MMHG | HEIGHT: 65 IN | SYSTOLIC BLOOD PRESSURE: 156 MMHG | OXYGEN SATURATION: 96 % | HEART RATE: 76 BPM | BODY MASS INDEX: 43.49 KG/M2 | RESPIRATION RATE: 18 BRPM | WEIGHT: 261 LBS

## 2024-01-27 DIAGNOSIS — M25.561 ACUTE PAIN OF RIGHT KNEE: Primary | ICD-10-CM

## 2024-01-27 DIAGNOSIS — M25.461 EFFUSION OF RIGHT KNEE: ICD-10-CM

## 2024-01-27 PROCEDURE — 93971 EXTREMITY STUDY: CPT

## 2024-01-27 PROCEDURE — 99284 EMERGENCY DEPT VISIT MOD MDM: CPT

## 2024-01-27 PROCEDURE — 93971 EXTREMITY STUDY: CPT | Performed by: INTERNAL MEDICINE

## 2024-01-27 RX ORDER — OXYCODONE HYDROCHLORIDE AND ACETAMINOPHEN 5; 325 MG/1; MG/1
1 TABLET ORAL EVERY 6 HOURS PRN
Qty: 8 TABLET | Refills: 0 | Status: SHIPPED | OUTPATIENT
Start: 2024-01-27

## 2024-01-27 NOTE — ED PROVIDER NOTES
[place]    EMERGENCY DEPARTMENT ENCOUNTER      Pt Name: Loreta Robertson  MRN: [id number]  YOB: 1970  Date of evaluation: 2024  Provider: Vinh Solorzano MD    CHIEF COMPLAINT       Chief Complaint   Patient presents with    Knee Pain         HISTORY OF PRESENT ILLNESS   Loreta Robertson is a 53 y.o. female who presents to the emergency department with complaint of ongoing knee pain.  She has had pain in her right knee over the course of the past week along with some swelling.  She was previously evaluated as an outpatient and had x-ray of the knee as well as arthrocentesis.  X-ray showed some arthritis but no evidence of fracture or dislocation.  Arthrocentesis was negative for infection.  She is currently scheduled for MRI.  She denies any trauma to the area as well as any recent falls.  Denies any distal paresthesias, weakness, or numbness.      Nursing notes were reviewed.    REVIEW OF SYSTEMS     ROS:  A chief complaint appropriate review of systems was completed and is negative except as noted in the HPI.      PAST MEDICAL HISTORY     Past Medical History:   Diagnosis Date    Anxiety     Bipolar 1 disorder     Cataract     Chronic bronchitis     Chronic constipation     Colon polyp     COPD (chronic obstructive pulmonary disease)     Depression     Hip arthrosis     Hyperlipidemia     Knee swelling     Low back strain 22    Obesity     Parkinsonism     DRUG INDUCED    Primary generalized (osteo)arthritis     PTSD (post-traumatic stress disorder)     RLS (restless legs syndrome)          SURGICAL HISTORY       Past Surgical History:   Procedure Laterality Date    ABDOMINOPLASTY      ANTERIOR AND POSTERIOR VAGINAL REPAIR      BREAST BIOPSY Right     US BIOPSY     SECTION      X 2    CHOLECYSTECTOMY      COLONOSCOPY N/A 2019    Procedure: COLONOSCOPY;  Surgeon: Jonathan Pablo MD;  Location: [facility];  Service: Gastroenterology    HIP SURGERY  23     Add 59618 Cpt? (Important Note: In 2017 The Use Of 62181 Is Being Tracked By Cms To Determine Future Global Period Reimbursement For Global Periods): no Detail Level: Detailed HYSTERECTOMY      AGE 33    LIPOSUCTION ABDOMINAL  1998    TOTAL HIP ARTHROPLASTY Right 06/01/2023    Procedure: MODIFIED ANTERIOR TOTAL HIP ARTHROPLASTY - RIGHT;  Surgeon: Adrián Leyva MD;  Location: [facility];  Service: Orthopedics;  Laterality: Right;         CURRENT MEDICATIONS     No current facility-administered medications for this encounter.    Current Outpatient Medications:     atorvastatin (LIPITOR) 40 MG tablet, Take 1 tablet by mouth Daily., Disp: 90 tablet, Rfl: 0    buPROPion XL (WELLBUTRIN XL) 150 MG 24 hr tablet, Take 1 tablet by mouth Every Morning., Disp: , Rfl:     cyclobenzaprine (FLEXERIL) 10 MG tablet, Take 1 tablet by mouth 3 (Three) Times a Day As Needed., Disp: , Rfl:     divalproex (DEPAKOTE ER) 500 MG 24 hr tablet, Take 1 tablet by mouth Every Night., Disp: , Rfl:     hydrOXYzine (ATARAX) 10 MG tablet, Take 1 tablet by mouth At Night As Needed., Disp: , Rfl:     lidocaine (LIDODERM) 5 %, Place 1 patch on the skin as directed by provider Daily. Remove & Discard patch within 12 hours or as directed by MD, Disp: 30 patch, Rfl: 0    losartan (Cozaar) 25 MG tablet, Take 1 tablet by mouth Daily., Disp: 90 tablet, Rfl: 0    meloxicam (MOBIC) 15 MG tablet, , Disp: , Rfl:     methocarbamol (ROBAXIN) 750 MG tablet, TAKE 1 TABLET BY MOUTH FOUR TIMES DAILY FOR 10 DAYS, Disp: , Rfl:     methylPREDNISolone (MEDROL) 4 MG tablet, See Admin Instructions., Disp: , Rfl:     Nutritional Supplements (MENOPAUSE FORMULA PO), Take  by mouth., Disp: , Rfl:     oxyCODONE-acetaminophen (PERCOCET) 5-325 MG per tablet, Take 1 tablet by mouth Every 6 (Six) Hours As Needed for Moderate Pain., Disp: 8 tablet, Rfl: 0    Pediatric Multivitamins-Iron (multivitamin pediatric chewable) chewable tablet, Chew Daily., Disp: , Rfl:     prazosin (MINIPRESS) 5 MG capsule, Take 1 capsule by mouth Every Night., Disp: , Rfl: 1    primidone (MYSOLINE) 50 MG tablet, Take 1 tablet by mouth 2 (Two) Times a Day., Disp: , Rfl:      "promethazine (PHENERGAN) 25 MG tablet, Take 1 tablet by mouth Every 6 (Six) Hours As Needed for Nausea or Vomiting., Disp: 20 tablet, Rfl: 0    QUEtiapine (SEROquel) 300 MG tablet, Take 1 tablet by mouth every night at bedtime., Disp: , Rfl:     Sod Picosulfate-Mag Ox-Cit Acd (Clenpiq) 10-3.5-12 MG-GM -GM/160ML solution, Take 350 mL by mouth Take As Directed., Disp: 350 mL, Rfl: 0    tiZANidine (ZANAFLEX) 4 MG tablet, TAKE 1 TABLET BY MOUTH EVERY 8 HOURS AS NEEDED FOR MUSCLE SPASMS, Disp: 60 tablet, Rfl: 1    Vraylar 3 MG capsule capsule, Take  by mouth., Disp: , Rfl:     ALLERGIES     Morphine and related    FAMILY HISTORY       Family History   Problem Relation Age of Onset    Breast cancer Mother 50    Diabetes Mother     Cancer Mother     Diabetes Father     Hypertension Father     Heart attack Father     Obesity Father     Breast cancer Maternal Grandmother 40    Ovarian cancer Maternal Grandmother 40    Cervical cancer Maternal Grandmother     Cancer Maternal Grandfather           SOCIAL HISTORY       Social History     Socioeconomic History    Marital status: Single   Tobacco Use    Smoking status: Every Day     Packs/day: 1.00     Years: 30.00     Additional pack years: 0.00     Total pack years: 30.00     Types: Cigarettes     Start date: 1/1/1980    Smokeless tobacco: Never   Vaping Use    Vaping Use: Never used   Substance and Sexual Activity    Alcohol use: No    Drug use: Yes     Types: Marijuana    Sexual activity: Not Currently         PHYSICAL EXAM    (up to 7 for level 4, 8 or more for level 5)     Vitals:    01/27/24 1348   BP: 156/80   BP Location: Right arm   Patient Position: Sitting   Pulse: 76   Resp: 18   Temp: 98.1 °F (36.7 °C)   TempSrc: Oral   SpO2: 96%   Weight: 118 kg (261 lb)   Height: 165.1 cm (65\")       General: Awake, alert, no acute distress.  HEENT: Conjunctivae normal.  Neck: Trachea midline.  Cardiac: Heart regular rate  Lungs: Normal effort  Chest wall: No deformity  Abdomen: " Non-distended  Musculoskeletal: Moderate swelling and tenderness about the right knee without any overlying erythema, warmth.  I am able to passively range the knee without much discomfort.  Distally, DP and PT pulse 1+ and comparable to the opposite side with motor and sensory function intact.  Neuro: Alert and oriented x 4.  Dermatology: Skin is warm and dry  Psych: Mentation is grossly normal, cognition is grossly normal. Affect is appropriate.        DIAGNOSTIC RESULTS     EKG: All EKGs are interpreted by the Emergency Department Physician who either signs or Co-signs this chart in the absence of a cardiologist.    No orders to display         RADIOLOGY:   [x] Radiologist's Report Reviewed:  No orders to display       I ordered and independently reviewed the above noted radiographic studies.        LABS:    I have reviewed and interpreted all of the currently available lab results from this visit (if applicable):  Results for orders placed or performed during the hospital encounter of 01/27/24   Duplex Venous Lower Extremity - Right CAR   Result Value Ref Range    Right Common Femoral Spont Y     Right Common Femoral Phasic Y     Right Common Femoral Compress C     Right Common Femoral Augment Y     Right Saphenofemoral Junction Compress C     Right Proximal Femoral Compress C     Right Mid Femoral Spont Y     Right Mid Femoral Phasic Y     Right Mid Femoral Compress C     Right Mid Femoral Augment Y     Right Distal Femoral Compress C     Right Popliteal Spont Y     Right Popliteal Phasic Y     Right Popliteal Compress C     Right Popliteal Augment Y     Right Posterior Tibial Compress C     Right Peroneal Compress C     Right Gastronemius Compress C     Right Greater Saph AK Compress C     Right Greater Saph BK Compress C     Right Lesser Saph Compress C     Left Common Femoral Spont Y     Left Common Femoral Phasic Y     Left Common Femoral Augment Y     BH CV VAS PRELIMINARY FINDINGS SCRIPTING 1.0         If  labs were ordered, I independently reviewed the results and considered them in treating the patient.      EMERGENCY DEPARTMENT COURSE and DIFFERENTIAL DIAGNOSIS/MDM:   Vitals:  AS OF 21:51 EST    BP - 156/80  HR - 76  TEMP - 98.1 °F (36.7 °C) (Oral)  O2 SATS - 96%        Discussion below represents my analysis of pertinent findings related to patient's condition, differential diagnosis, treatment plan and final disposition.      Differential diagnosis:  The differential diagnosis associated with the patient's presentation includes: Arthritis, joint effusion, DVT, ligamentous injury      Independent interpretations (ECG/rhythm strip/X-ray/US/CT scan): I independently interpreted the patient's right knee x-ray images they are performed yesterday which were without evidence of fracture or dislocation.      Additional sources:  Discussed/obtained information from independent historians:   [] Spouse:   [] Parent:   [] Friend:   [] EMS:   [] Other:  External (non-ED) record review:   [] Inpatient record:   [] Office record:   [] Outpatient record:   [x] Prior Outpatient labs: I reviewed patient's labs from arthrocentesis with normal white blood cell count.   [] Prior Outpatient radiology:   [] Primary Care record:   [] Outside ED record:   [] Other:       Patient's care impacted by:   [] Diabetes   [] Hypertension   [] Coronary Artery Disease   [] Cancer   [x] Other: Obesity    Care significantly affected by Social Determinants of Health (housing and economic circumstances, unemployment)    [] Yes     [x] No   If yes, Patient's care significantly limited by  Social Determinants of Health including:    [] Inadequate housing    [] Low income    [] Alcoholism and drug addiction in family    [] Problems related to primary support group    [] Unemployment    [] Problems related to employment    [] Other Social Determinants of Health:     I considered prescription management with:    [x] Pain medication: Patient prescribed  Percocet for   [] Antiviral:   [] Antibiotic:   [] Other:    ED Course:    ED Course as of 01/27/24 2151   Sat Jan 27, 2024   1615 On reexamination, patient jayesh very well-appearing and nontoxic.  She just had right knee x-ray which did show some arthritis but no evidence of fracture.  She already had arthrocentesis performed which did not demonstrate any evidence of inflammation or infection.  Ultrasound performed today showed no evidence of DVT.  She is currently scheduled for an MRI as an outpatient. [NS]      ED Course User Index  [NS] Vinh Solorzano MD             I had a discussion with the patient/family regarding diagnosis, diagnostic results, treatment plan, and medications.  The patient/family indicated understanding of these instructions.  I spent adequate time at the bedside preceding discharge necessary to personally discuss the aftercare instructions, giving patient education, providing explanations of the results of our evaluations/findings, and my decision making to assure that the patient/family understand the plan of care.  Time was allotted to answer questions at that time and throughout the ED course.  Emphasis was placed on timely follow-up after discharge.  I also discussed the potential for the development of an acute emergent condition requiring further evaluation, admission, or even surgical intervention. I discussed that we found nothing during the visit today indicating the need for further workup, admission, or the presence of an unstable medical condition.  I encouraged the patient to return to the emergency department immediately for ANY concerns, worsening, new complaints, or if symptoms persist and unable to seek follow-up in a timely fashion.  The patient/family expressed understanding and agreement with this plan.  The patient will follow-up with their PCP in 1-2 days for reevaluation.           PROCEDURES:  Procedures    CRITICAL CARE TIME        FINAL IMPRESSION      1. Acute  pain of right knee    2. Effusion of right knee    3. BMI 40.0-44.9, adult          DISPOSITION/PLAN     ED Disposition       ED Disposition   Discharge    Condition   Stable    Comment   --                 Comment: Please note this report has been produced using speech recognition software.      Vinh Solorzano MD  Attending Emergency Physician             Vinh Solorzano MD  01/27/24 7953

## 2024-01-28 LAB
BH CV LOWER VASCULAR LEFT COMMON FEMORAL AUGMENT: NORMAL
BH CV LOWER VASCULAR LEFT COMMON FEMORAL PHASIC: NORMAL
BH CV LOWER VASCULAR LEFT COMMON FEMORAL SPONT: NORMAL
BH CV LOWER VASCULAR RIGHT COMMON FEMORAL AUGMENT: NORMAL
BH CV LOWER VASCULAR RIGHT COMMON FEMORAL COMPRESS: NORMAL
BH CV LOWER VASCULAR RIGHT COMMON FEMORAL PHASIC: NORMAL
BH CV LOWER VASCULAR RIGHT COMMON FEMORAL SPONT: NORMAL
BH CV LOWER VASCULAR RIGHT DISTAL FEMORAL COMPRESS: NORMAL
BH CV LOWER VASCULAR RIGHT GASTRONEMIUS COMPRESS: NORMAL
BH CV LOWER VASCULAR RIGHT GREATER SAPH AK COMPRESS: NORMAL
BH CV LOWER VASCULAR RIGHT GREATER SAPH BK COMPRESS: NORMAL
BH CV LOWER VASCULAR RIGHT LESSER SAPH COMPRESS: NORMAL
BH CV LOWER VASCULAR RIGHT MID FEMORAL AUGMENT: NORMAL
BH CV LOWER VASCULAR RIGHT MID FEMORAL COMPRESS: NORMAL
BH CV LOWER VASCULAR RIGHT MID FEMORAL PHASIC: NORMAL
BH CV LOWER VASCULAR RIGHT MID FEMORAL SPONT: NORMAL
BH CV LOWER VASCULAR RIGHT PERONEAL COMPRESS: NORMAL
BH CV LOWER VASCULAR RIGHT POPLITEAL AUGMENT: NORMAL
BH CV LOWER VASCULAR RIGHT POPLITEAL COMPRESS: NORMAL
BH CV LOWER VASCULAR RIGHT POPLITEAL PHASIC: NORMAL
BH CV LOWER VASCULAR RIGHT POPLITEAL SPONT: NORMAL
BH CV LOWER VASCULAR RIGHT POSTERIOR TIBIAL COMPRESS: NORMAL
BH CV LOWER VASCULAR RIGHT PROXIMAL FEMORAL COMPRESS: NORMAL
BH CV LOWER VASCULAR RIGHT SAPHENOFEMORAL JUNCTION COMPRESS: NORMAL
BH CV VAS PRELIMINARY FINDINGS SCRIPTING: 1

## 2024-01-29 ENCOUNTER — TELEPHONE (OUTPATIENT)
Dept: ORTHOPEDIC SURGERY | Facility: CLINIC | Age: 54
End: 2024-01-29
Payer: MEDICARE

## 2024-01-29 NOTE — TELEPHONE ENCOUNTER
PATIENT IS CALLING BECAUSE SHE SAW BHAVESH ON FRIDAY AND SHE CALLED HER IN SOME LIDOCAINE PATCHES FOR HER KNEE. SHE IS STATING THAT HER INSURANCE IS REQUESTING A PRIOR AUTHORIZATION FOR THEM.     SHE ALSO STATED SHE WAS BACK IN THE ED ON SATURDAY BECAUSE OF HER KNEE. SHE STATED THE CHECKED FOR BLOOD CLOTS AND SHE CLEAR. SHE SAID SHE ALSO SPOKE WITH DR. SNELL AND HE ADVISED HER TO TAKE TYLENOL WITH THE IBUPROFEN SHE WAS TAKING.     PHARMACY: [facility] [place]     CALL BACK # [phone]

## 2024-01-29 NOTE — TELEPHONE ENCOUNTER
After review of the chart, since the call, it appears she has seen orthopedics. She had an xray and arthrocentesis. Xray shows degenerative changes with compartment narrowing. They have ordered a MRI to further evaluate pain of the knee. She  was also seen in at the ED 1/27/24 and DVT was ruled out.  Continue with plan discussed with orthopedics: MRI of knee and anti-inflammatories/muscle relaxer for pain as well as lidocaine patches. Follow-up with orthopedics  if worsening.

## 2024-01-29 NOTE — TELEPHONE ENCOUNTER
Pt returned call.  Advised pt that we have not received the PA request and that she can initiate the process by contacting her pharmacy and having them fax the request.  She stated they have already sent one request but she will contact them to resend. I advised that when we receive the request that we will submit that for her.    Pt stated that she is not doing well.  She is taking the medication that Ofelia prescribed (Meloxicam and Robaxin) but she is not getting any relief.  She went to the ER on 1/27/24 and they checked for a blood clot.  She did not have any blood clots.

## 2024-01-31 ENCOUNTER — PATIENT OUTREACH (OUTPATIENT)
Dept: CASE MANAGEMENT | Facility: OTHER | Age: 54
End: 2024-01-31
Payer: MEDICARE

## 2024-01-31 NOTE — OUTREACH NOTE
AMBULATORY CASE MANAGEMENT NOTE    Name and Relationship of Patient/Support Person:  Lortea Colliern - Self    Patient Outreach  RN-ACM outreach with patient. Discussed 1/27/24 ED visit regarding acute pain right knee. Patient treated and discharged home. Patient states to be compliant with ED recommendations; states to continue with right knee pain and states to have 2/2/24 ortho appointment for follow up. Patient voiced intent to contact ortho office regarding scheduling of MRI. Patient states to have episodes of burning pain to right knee; pain to right groin area and using walker for ambulation. Patient states no difficulty with falls; able to bear weight to right knee and no difficulty with chest pain; SOB; appetite or sleeping. Patient states to be compliant with medications and medical appointment. Reviewed with patient ED AVS recommendations; education; role of RN-ACM and HRCM case management services. Patient verbalized understanding. Patient states to appreciate outreach and declines needs for further outreach at this time. No further questions voiced at this time.   Adult Patient Profile  Questions/Answers      Flowsheet Row Most Recent Value   Symptoms/Conditions Managed at Home musculoskeletal, cardiovascular   Cardiovascular Symptoms/Conditions hypertension   Cardiovascular Management Strategies medication therapy, other (see comments)  [Physician follow up]   Musculoskeletal Symptoms/Conditions joint pain, mobility limited, other (see comments)  [Physician follow up]   Musculoskeletal Management Strategies other (see comments)  [Physician follow up]   Barriers to Taking Medication as Prescribed none   Primary Source of Support/Comfort child(baldo)   Name of Support/Comfort Primary Source Patient states to have assistance from son as needed.   People in Home alone        Social Work Assessment  Questions/Answers      Flowsheet Row Most Recent Value   People in Home alone   Functional Status Comments  Patient states to be independent with ADL's,  transportation,  ambulating with walker as needed        Send Education  Questions/Answers      Flowsheet Row Most Recent Value   Annual Wellness Visit:  Patient Has Completed   Other Patient Education/Resources  24/7 [facility] Nurse Call Line, Leon   24/7 Nurse Call Line Education Method Verbal   Leon Education Method Verbal        SDOH updated and reviewed with the patient during this program:  --     Food Insecurity: No Food Insecurity (1/31/2024)    Hunger Vital Sign     Worried About Running Out of Food in the Last Year: Never true     Ran Out of Food in the Last Year: Never true      --     Transportation Needs: No Transportation Needs (1/31/2024)    PRAPARE - Transportation     Lack of Transportation (Medical): No     Lack of Transportation (Non-Medical): No       Education Documentation  Provider Follow-Up, taught by Lucy Marina, RN at 1/31/2024  1:13 PM.  Learner: Patient  Readiness: Acceptance  Method: Explanation  Response: Verbalizes Understanding    Unresolved/Worsening Symptoms, taught by Lucy Marina RN at 1/31/2024  1:13 PM.  Learner: Patient  Readiness: Acceptance  Method: Explanation  Response: Verbalizes Understanding    Home Safety, taught by Lucy Marina RN at 1/31/2024  1:13 PM.  Learner: Patient  Readiness: Acceptance  Method: Explanation  Response: Verbalizes Understanding    Energy Conservation, taught by Lucy Marina, RN at 1/31/2024  1:13 PM.  Learner: Patient  Readiness: Acceptance  Method: Explanation  Response: Verbalizes Understanding    Assistive/Adaptive Devices, taught by Lucy Marina, RN at 1/31/2024  1:13 PM.  Learner: Patient  Readiness: Acceptance  Method: Explanation  Response: Verbalizes Understanding          Lucy HERRERA  Ambulatory Case Management    1/31/2024, 13:13 EST

## 2024-01-31 NOTE — TELEPHONE ENCOUNTER
Returned phone call to pt and advised that we have not received the PA request.  Pt states she used [employer] in [place] ([phone]).  I advised that I would call and see if they would fax to me.    I called [facility] in [place].  They stated that they are faxing it to the number Ofelia provided .

## 2024-02-01 NOTE — TELEPHONE ENCOUNTER
Returned phone call to pt.  Advised that we could do the appeal, however, the insurance would more than likely deny the lidocaine patches again.  Advised that they sell them OTC and pt stated she would buy them OTC and not to worry about trying to do an appeal.  Pt. Also had questions regarding the MRI that was ordered.  She stated she has not heard anything.  I sent a message to the referral coordinators to look into this.

## 2024-02-01 NOTE — TELEPHONE ENCOUNTER
Caller: Loreta Robertson    Relationship: Self    Best call back number: [phone] (home)       Who are you requesting to speak with (clinical staff, provider,  specific staff member): ANNA     Do you know the name of the person who called: the patient    What was the call regarding: PATIENT CALLED IN [facility] INSURANCE DENIED HER LIDOCAINE PATCHES AND WANTED TO KNOW IF WE COULD DO AN  APPEAL FOR HER  LIDOCANE PATCHES .   EDU TO PATIENT ON  W/IN 24 BUS HRS

## 2024-02-02 ENCOUNTER — TELEPHONE (OUTPATIENT)
Dept: ORTHOPEDIC SURGERY | Facility: CLINIC | Age: 54
End: 2024-02-02
Payer: MEDICARE

## 2024-02-02 NOTE — TELEPHONE ENCOUNTER
I spoke with the patient to see where her pain is she states it is her inner knee up to her thigh into her groin. The patient states the knee feels like it is burning and is throbbing. The patient has tried icing, heat, and a bunch of medications that aren't helping her pain. She states she may go back to the ER if she keeps experiencing the pain she is right now. I let her know it sounds like she is doing everything wee told her to do and if nothing is helping and she feels she needs to go to the ER then she can. I let her know at this point she is doing everything right and we are just waiting on the MRI to be done to see what we can do from here. I let her know I will get a message to our referral coordinators to see where we are in the process in getting the MRI approved and scheduled. The patient verbalized understanding and will continue with the ice, elevation, and taking tylenol/ibuprofen and wait on us to get the MRI scheduled for her.     Ignacia Montilla

## 2024-02-02 NOTE — TELEPHONE ENCOUNTER
Caller: ANNALISE   Relationship to Patient: SELF  Phone Number: [phone] (home)     Reason for Call:   PATIENT CALLED TO MOVE HER APPT UP BECAUSE SHE STATES THAT HER KNEE IS STILL HURTING HER. SHE IS WONDERING IF ANYONE HAD ANY SUGGESTIONS ON WHAT SHE COULD DO BEFORE HER APPT ON TUESDAY, STATED SHE MIGHT GO TO THE ED BECAUSE NOTHING IS HELPING HER

## 2024-02-05 ENCOUNTER — TELEPHONE (OUTPATIENT)
Dept: ORTHOPEDIC SURGERY | Facility: CLINIC | Age: 54
End: 2024-02-05

## 2024-02-05 NOTE — TELEPHONE ENCOUNTER
BRITNEY for your visit tomorrow with the patient:     Spoke with patient. She states that she is having severe pain and swelling in her right knee. She states she is also having pain and a pulling feeling from her thigh/groin area into the knee. Patient states she is having difficulty weight bearing as well. Advised patient to continue with ice, elevation, an alternating with ibuprofen and tylenol. Advised patient to go to Mri appointment tomorrow because we need further imaging to determine a cause of her pain. Patient has history of right ABDIAS with Dr. Leyva. Advised patient we can evaluate her hip tomorrow if further physical evaluation determines the hip may be contributing to her pain. Advised patient that if pain becomes too severe, to go to the Emergency Room for further treatment. Patient verbalized understanding and appreciation.

## 2024-02-05 NOTE — TELEPHONE ENCOUNTER
Caller: States Loretabronson Platt    Relationship: Self    Best call back number: [phone]    What is the best time to reach you: ANY     Who are you requesting to speak with (clinical staff, provider,  specific staff member): PROVIDER     Do you know the name of the person who called: YES     What was the call regarding: PATIENT WOULD LIKE TO DISCUSS THE PAIN SHE EXPERIENCING IN THE RIGHT HIP- SHE STATES THAT SHE IS HAVING TROUBLE WALKING, STANDING LAYING- FEELING LIKE THERE IS PULLING FROM RIGHT KNEE TO THE HIP IN THE INNER PART OF THE THIGH, PATIENT STATES THAT THE KNEE IS VERY SWOLLEN , HAS DONE THE R.I.C.E AND TYLENOL- THERE HAS BEEN NO RELIEF

## 2024-02-06 ENCOUNTER — HOSPITAL ENCOUNTER (OUTPATIENT)
Dept: MRI IMAGING | Facility: HOSPITAL | Age: 54
Discharge: HOME | End: 2024-02-06
Payer: MEDICARE

## 2024-02-06 ENCOUNTER — OFFICE VISIT (OUTPATIENT)
Dept: ORTHOPEDIC SURGERY | Facility: CLINIC | Age: 54
End: 2024-02-06
Payer: MEDICARE

## 2024-02-06 VITALS — HEIGHT: 65 IN | BODY MASS INDEX: 43.34 KG/M2 | WEIGHT: 260.14 LBS

## 2024-02-06 DIAGNOSIS — M17.11 PRIMARY OSTEOARTHRITIS OF RIGHT KNEE: Primary | ICD-10-CM

## 2024-02-06 DIAGNOSIS — M25.561 RIGHT KNEE PAIN, UNSPECIFIED CHRONICITY: ICD-10-CM

## 2024-02-06 DIAGNOSIS — M17.11 PRIMARY OSTEOARTHRITIS OF RIGHT KNEE: ICD-10-CM

## 2024-02-06 DIAGNOSIS — Z96.641 S/P TOTAL RIGHT HIP ARTHROPLASTY: ICD-10-CM

## 2024-02-06 PROCEDURE — 73721 MRI JNT OF LWR EXTRE W/O DYE: CPT

## 2024-02-06 RX ADMIN — TRIAMCINOLONE ACETONIDE 40 MG: 40 INJECTION, SUSPENSION INTRA-ARTICULAR; INTRAMUSCULAR at 12:32

## 2024-02-06 RX ADMIN — LIDOCAINE HYDROCHLORIDE 4 ML: 10 INJECTION, SOLUTION EPIDURAL; INFILTRATION; INTRACAUDAL; PERINEURAL at 12:32

## 2024-02-06 NOTE — PROGRESS NOTES
Procedure   - Large Joint Arthrocentesis: R knee on 2/6/2024 12:32 PM  Indications: pain  Details: 21 G needle, anterolateral approach  Medications: 4 mL lidocaine PF 1% 1 %; 40 mg triamcinolone acetonide 40 MG/ML  Outcome: tolerated well, no immediate complications  Procedure, treatment alternatives, risks and benefits explained, specific risks discussed. Consent was given by the patient. Immediately prior to procedure a time out was called to verify the correct patient, procedure, equipment, support staff and site/side marked as required. Patient was prepped and draped in the usual sterile fashion.

## 2024-02-06 NOTE — PROGRESS NOTES
[facility]opa[facility] Office Follow Up       Office Follow Up Visit       Patient Name: Loreta Robertson    Chief Complaint:   Chief Complaint   Patient presents with    Follow-up     2 week (MRI) follow-up: Primary osteoarthritis of right knee; Right hip pain       Referring Physician: No ref. provider found    History of Present Illness:   Loreta Robertson returns to clinic today for follow-up of right knee pain.  Last visit on 1/26/2024.  Ordered MRI of right knee at that time.  MRI completed today 2/6/2024.  Here for follow-up.    She reports worsening of right knee pain since last visit.  She went to the emergency department on 1/27/2024.  Ultrasound at that time showed no evidence of DVT.  No further treatment was provided at that time.    She has continued with anti-inflammatories for the pain.    History of right total hip arthroplasty with Dr. Leyva on 6/1/2023.  She is not currently having any issues with the right hip.    Subjective     Review of Systems   Constitutional: Negative.  Negative for chills, fatigue and fever.   HENT: Negative.  Negative for congestion and dental problem.    Eyes: Negative.  Negative for blurred vision.   Respiratory: Negative.  Negative for shortness of breath.    Cardiovascular: Negative.  Negative for leg swelling.   Gastrointestinal: Negative.  Negative for abdominal pain.   Endocrine: Negative.  Negative for polyuria.   Genitourinary: Negative.  Negative for difficulty urinating.   Musculoskeletal:  Positive for arthralgias.   Skin: Negative.    Allergic/Immunologic: Negative.    Neurological: Negative.    Hematological: Negative.  Negative for adenopathy.   Psychiatric/Behavioral: Negative.  Negative for behavioral problems.         I have reviewed and updated the following portions of the patient's history and review of systems: allergies, current medications, past family history, past medical  history, past social history, past surgical history and problem list.    Medications:   Current Outpatient Medications:     atorvastatin (LIPITOR) 40 MG tablet, Take 1 tablet by mouth Daily., Disp: 90 tablet, Rfl: 0    buPROPion XL (WELLBUTRIN XL) 150 MG 24 hr tablet, Take 1 tablet by mouth Every Morning., Disp: , Rfl:     cyclobenzaprine (FLEXERIL) 10 MG tablet, Take 1 tablet by mouth 3 (Three) Times a Day As Needed., Disp: , Rfl:     divalproex (DEPAKOTE ER) 500 MG 24 hr tablet, Take 1 tablet by mouth Every Night., Disp: , Rfl:     hydrOXYzine (ATARAX) 10 MG tablet, Take 1 tablet by mouth At Night As Needed., Disp: , Rfl:     lidocaine (LIDODERM) 5 %, Place 1 patch on the skin as directed by provider Daily. Remove & Discard patch within 12 hours or as directed by MD, Disp: 30 patch, Rfl: 0    losartan (Cozaar) 25 MG tablet, Take 1 tablet by mouth Daily., Disp: 90 tablet, Rfl: 0    meloxicam (MOBIC) 15 MG tablet, , Disp: , Rfl:     methocarbamol (ROBAXIN) 750 MG tablet, TAKE 1 TABLET BY MOUTH FOUR TIMES DAILY FOR 10 DAYS, Disp: , Rfl:     Nutritional Supplements (MENOPAUSE FORMULA PO), Take  by mouth., Disp: , Rfl:     oxyCODONE-acetaminophen (PERCOCET) 5-325 MG per tablet, Take 1 tablet by mouth Every 6 (Six) Hours As Needed for Moderate Pain., Disp: 8 tablet, Rfl: 0    Pediatric Multivitamins-Iron (multivitamin pediatric chewable) chewable tablet, Chew Daily., Disp: , Rfl:     prazosin (MINIPRESS) 5 MG capsule, Take 1 capsule by mouth Every Night., Disp: , Rfl: 1    primidone (MYSOLINE) 50 MG tablet, Take 1 tablet by mouth 2 (Two) Times a Day., Disp: , Rfl:     promethazine (PHENERGAN) 25 MG tablet, Take 1 tablet by mouth Every 6 (Six) Hours As Needed for Nausea or Vomiting., Disp: 20 tablet, Rfl: 0    QUEtiapine (SEROquel) 300 MG tablet, Take 1 tablet by mouth every night at bedtime., Disp: , Rfl:     Sod Picosulfate-Mag Ox-Cit Acd (Clenpiq) 10-3.5-12 MG-GM -GM/160ML solution, Take 350 mL by mouth Take As  "Directed., Disp: 350 mL, Rfl: 0    Sodium Sulfate-Mag Sulfate-KCl (SUTAB) 0484-015-392 MG tablet, Take 24 tablets by mouth Take As Directed., Disp: 24 tablet, Rfl: 0    tiZANidine (ZANAFLEX) 4 MG tablet, TAKE 1 TABLET BY MOUTH EVERY 8 HOURS AS NEEDED FOR MUSCLE SPASMS, Disp: 60 tablet, Rfl: 1    Vraylar 3 MG capsule capsule, Take  by mouth., Disp: , Rfl:     Allergies:   Allergies   Allergen Reactions    Morphine And Related GI Intolerance         Objective      Vital Signs:   Vitals:    02/06/24 1146   Weight: 118 kg (260 lb 2.3 oz)   Height: 165 cm (64.96\")       Ortho Exam:  General: no acute distress, comfortable  Vitals reviewed in chart    Musculoskeletal Exam:    SIDE: Right knee    Tenderness: Diffuse  Effusion improving    Range of motion measurements (degrees): 3-110  Painful arc of motion: No  No skin changes  No evidence of septic joint      Results Review:  MRI Knee Right Without Contrast  Narrative: MRI KNEE RIGHT  WO CONTRAST    Date of Exam: 2/6/2024 8:21 AM EST    Indication: Knee pain, chronic, osteoarthritis suspected.     Comparison: Right knee radiographs 1/26/2024    Technique:  Routine multiplanar/multisequence images of the right knee were obtained without contrast administration.    Findings:  There is irregular macerated tearing of the body of the medial meniscus (series 6 image 14). There is some peripheral extrusion of the medial meniscal body with bowing of the medial supporting structures. The medial supporting structures are   unremarkable. There is moderate to high-grade grade diffuse chondral thinning throughout the central weightbearing aspect of the medial compartment with a couple small areas of subchondral cystic change along the posterior tibial plateau and posterior   femoral condyle. Small marginal osteophytes are noted in the medial compartment.    The lateral meniscus is unremarkable. The lateral supporting structures are normal. There is mild chondral thinning in the " central aspect of the lateral compartment without evidence of high-grade full-thickness articular cartilage loss. There is   subchondral cystic change along the posterior aspect of the lateral tibial plateau.    The anterior cruciate ligament and posterior cruciate ligament are normal.    The quadriceps tendon and patellar tendon are normal. There is mild to moderate chondral fissuring and chondral irregularity along the central aspect of the lateral patellar facet and at the periphery of the medial patellar facet. There is low to   moderate grade chondral irregularity and fissuring of the central trochlea. Small patellofemoral osteophytes are noted. No edema of the anterior knee fat pads. There is a nonthickened superior patellar plica.    There is a trace knee joint effusion. Unremarkable appearance of the posterior knee neurovasculature. No popliteal cyst. Normal appearance of the muscles and subcutaneous tissues.    The bone marrow signal intensity appears within normal limits without focal bony lesion or evidence of fracture.  Impression: Impression:  Macerated tearing of the medial meniscal body with moderate to high-grade chondral loss in the central aspect of the medial compartment.    Areas of mild to moderate articular cartilage loss and irregularity in the patellofemoral greater than lateral compartments.    Trace knee joint effusion.    Electronically Signed: Gadiel Melchor MD    2/7/2024 9:15 AM EST    Workstation ID: [id number]     Imaging: Hip x-rays 2 views  AP pelvis and lateral of the hip     Side: RIGHT HIP     Indication for x-rays: Hip pain     Comparison: 6/26/2023     Findings:   RIGHT hip AP and lateral show a well-positioned total hip arthroplasty with stable findings on the acetabular and femoral side.  No acute bony changes noted.     I personally reviewed the above x-rays.           Assessment / Plan      Assessment:   Diagnoses and all orders for this visit:    1. Primary osteoarthritis  of right knee (Primary)  -     - Large Joint Arthrocentesis: R knee    2. Right knee pain, unspecified chronicity  -     - Large Joint Arthrocentesis: R knee    3. S/P total right hip arthroplasty  -     XR Hip With or Without Pelvis 2 - 3 View Right        Quality Metrics:   BMI:   Class 3 Severe Obesity (BMI >=40). Obesity-related health conditions include the following: osteoarthritis. Obesity is unchanged.   Tobacco:   Loreta Platt States  reports that she has been smoking cigarettes. She started smoking about 44 years ago. She has a 30.00 pack-year smoking history. She has been exposed to tobacco smoke. She has never used smokeless tobacco..     Plan:  MRI right knee images reviewed today Dr. Dubose.  Again demonstrates evidence of osteoarthritis with high-grade chondral loss and significant meniscal tearing.  No acute changes.  Right hip x-rays obtained today to ensure components are stable given total hip arthroplasty last June with Dr. Leyva.  X-ray images reviewed showing sign of fracture or loosening.  Stable compared to prior.  She is experiencing acute flare of osteoarthritis pain in right knee.  I have recommended corticosteroid injection for right knee pain today.  We will proceed.  Recommend right knee brace for stability while walking.  May continue with over-the-counter anti-inflammatories as needed for pain.    I discussed with the patient the potential benefits of performing a therapeutic injection of the right knee as well as potential risks including but not limited to infection, swelling, pain, bleeding, bruising, nerve/vessel damage, skin color changes, transient elevation in blood glucose levels, and fat atrophy. After informed consent and verifying correct patient, procedure site, and type of procedure, the area was prepped with Hibiclens, ethyl chloride was used to numb the skin. Via the anterolateral approach, 4cc of 1% lidocaine and  40mg/ml of Kenalog were injected into the knee. The  patient tolerated the procedure well. There were no complications.       Follow Up:   Follow-up as needed for right knee.    She sees Dr. Leyva in June for follow-up on hip.    Ofelia Wells PA-C  [facility]    Dictated using Dragon Speech Recognition.

## 2024-02-08 RX ORDER — LIDOCAINE HYDROCHLORIDE 10 MG/ML
4 INJECTION, SOLUTION EPIDURAL; INFILTRATION; INTRACAUDAL; PERINEURAL
Status: COMPLETED | OUTPATIENT
Start: 2024-02-06 | End: 2024-02-06

## 2024-02-08 RX ORDER — TRIAMCINOLONE ACETONIDE 40 MG/ML
40 INJECTION, SUSPENSION INTRA-ARTICULAR; INTRAMUSCULAR
Status: COMPLETED | OUTPATIENT
Start: 2024-02-06 | End: 2024-02-06

## 2024-02-14 ENCOUNTER — OFFICE VISIT (OUTPATIENT)
Dept: OBSTETRICS AND GYNECOLOGY | Facility: CLINIC | Age: 54
End: 2024-02-14
Payer: MEDICARE

## 2024-02-14 VITALS
DIASTOLIC BLOOD PRESSURE: 76 MMHG | BODY MASS INDEX: 44.08 KG/M2 | HEIGHT: 65 IN | SYSTOLIC BLOOD PRESSURE: 120 MMHG | WEIGHT: 264.6 LBS

## 2024-02-14 DIAGNOSIS — Z01.411 ENCOUNTER FOR GYNECOLOGICAL EXAMINATION WITH ABNORMAL FINDING: Primary | ICD-10-CM

## 2024-02-14 DIAGNOSIS — N60.02 CYST OF LEFT BREAST: ICD-10-CM

## 2024-02-14 DIAGNOSIS — N95.2 ATROPHY OF VAGINA: ICD-10-CM

## 2024-03-06 DIAGNOSIS — Z23 ENCOUNTER FOR IMMUNIZATION: Primary | ICD-10-CM

## 2024-03-06 DIAGNOSIS — F17.200 SMOKER: ICD-10-CM

## 2024-03-06 DIAGNOSIS — G89.29 CHRONIC LEFT-SIDED LOW BACK PAIN WITH LEFT-SIDED SCIATICA: ICD-10-CM

## 2024-03-06 DIAGNOSIS — M54.42 CHRONIC LEFT-SIDED LOW BACK PAIN WITH LEFT-SIDED SCIATICA: ICD-10-CM

## 2024-03-06 DIAGNOSIS — R06.2 WHEEZING: Primary | ICD-10-CM

## 2024-03-06 RX ORDER — TIZANIDINE 4 MG/1
TABLET ORAL
Qty: 60 TABLET | Refills: 1 | Status: SHIPPED | OUTPATIENT
Start: 2024-03-06

## 2024-03-07 ENCOUNTER — TELEPHONE (OUTPATIENT)
Dept: INTERNAL MEDICINE | Facility: CLINIC | Age: 54
End: 2024-03-07

## 2024-03-07 ENCOUNTER — HOSPITAL ENCOUNTER (OUTPATIENT)
Dept: GENERAL RADIOLOGY | Facility: HOSPITAL | Age: 54
Discharge: HOME | End: 2024-03-07
Admitting: NURSE PRACTITIONER
Payer: MEDICARE

## 2024-03-07 ENCOUNTER — OFFICE VISIT (OUTPATIENT)
Dept: INTERNAL MEDICINE | Facility: CLINIC | Age: 54
End: 2024-03-07
Payer: MEDICARE

## 2024-03-07 VITALS
HEART RATE: 84 BPM | RESPIRATION RATE: 20 BRPM | BODY MASS INDEX: 45.55 KG/M2 | TEMPERATURE: 97.5 F | HEIGHT: 65 IN | WEIGHT: 273.4 LBS | SYSTOLIC BLOOD PRESSURE: 120 MMHG | DIASTOLIC BLOOD PRESSURE: 86 MMHG

## 2024-03-07 DIAGNOSIS — Z23 ENCOUNTER FOR IMMUNIZATION: ICD-10-CM

## 2024-03-07 DIAGNOSIS — M25.571 ACUTE RIGHT ANKLE PAIN: Primary | ICD-10-CM

## 2024-03-07 DIAGNOSIS — M25.571 ACUTE RIGHT ANKLE PAIN: ICD-10-CM

## 2024-03-07 DIAGNOSIS — B35.1 NAIL FUNGUS: ICD-10-CM

## 2024-03-07 PROCEDURE — 73610 X-RAY EXAM OF ANKLE: CPT

## 2024-03-07 NOTE — TELEPHONE ENCOUNTER
Caller: Loreta Robertson    Relationship: Self    Best call back number: [phone]     What orders are you requesting (i.e. lab or imaging): XRAY ON RIGHT ANKLE/FOOT    In what timeframe would the patient need to come in: AS SOON AS POSSIBLE    Where will you receive your lab/imaging services: DOWNSTAIRS    Additional notes: PATIENT ROLLED HER ANKLE ON HER PORCH YESTERDAY.     PLEASE CALL PATIENT

## 2024-03-07 NOTE — PROGRESS NOTES
Patient Name: Loreta Robertson  : 1970   MRN: [id number]     Chief Complaint:    Chief Complaint   Patient presents with   • Ankle Injury     Right ankle injury       History of Present Illness: Loreta Robertson is a 53 y.o. female presents to clinic today for evaluation of right ankle injury.    The patient stepped off a ramp yesterday and rolled her ankle outward. She heard a pop. There has been slight swelling. She has pain on the arch of her foot and the lateral ankle of the right foot. Sensation is intact. No numbness or tingling. She has been utilizing Tylenol. She has pain with flexion and extension of the ankle. She also has nail fungus on her toes      Subjective     Review of System: Review of Systems     Medications:     Current Outpatient Medications:   •  atorvastatin (LIPITOR) 40 MG tablet, Take 1 tablet by mouth Daily., Disp: 90 tablet, Rfl: 0  •  buPROPion XL (WELLBUTRIN XL) 150 MG 24 hr tablet, Take 1 tablet by mouth Every Morning., Disp: , Rfl:   •  divalproex (DEPAKOTE ER) 500 MG 24 hr tablet, Take 1 tablet by mouth Every Night., Disp: , Rfl:   •  hydrOXYzine (ATARAX) 10 MG tablet, Take 1 tablet by mouth At Night As Needed., Disp: , Rfl:   •  losartan (Cozaar) 25 MG tablet, Take 1 tablet by mouth Daily., Disp: 90 tablet, Rfl: 0  •  Pediatric Multivitamins-Iron (multivitamin pediatric chewable) chewable tablet, Chew Daily., Disp: , Rfl:   •  prazosin (MINIPRESS) 5 MG capsule, Take 1 capsule by mouth Every Night., Disp: , Rfl: 1  •  primidone (MYSOLINE) 50 MG tablet, Take 1 tablet by mouth 2 (Two) Times a Day., Disp: , Rfl:   •  promethazine (PHENERGAN) 25 MG tablet, Take 1 tablet by mouth Every 6 (Six) Hours As Needed for Nausea or Vomiting., Disp: 20 tablet, Rfl: 0  •  QUEtiapine (SEROquel) 300 MG tablet, Take 1 tablet by mouth every night at bedtime., Disp: , Rfl:   •  Rhubarb (ESTROVEN COMPLETE PO), Take  by mouth., Disp: , Rfl:   •  tiZANidine (ZANAFLEX) 4 MG tablet, TAKE 1 TABLET  "BY MOUTH EVERY 8 HOURS AS NEEDED FOR MUSCLE SPASMS, Disp: 60 tablet, Rfl: 1  •  Vraylar 3 MG capsule capsule, Take  by mouth., Disp: , Rfl:   •  Sodium Sulfate-Mag Sulfate-KCl (SUTAB) 6960-224-034 MG tablet, Take 24 tablets by mouth Take As Directed., Disp: 24 tablet, Rfl: 0    Allergies:   No Known Allergies    Objective     Physical Exam:   Vital Signs:   Vitals:    03/07/24 1443   BP: 120/86   BP Location: Right arm   Patient Position: Sitting   Cuff Size: Adult   Pulse: 84   Resp: 20   Temp: 97.5 °F (36.4 °C)   TempSrc: Infrared   Weight: 124 kg (273 lb 6.4 oz)   Height: 165.1 cm (65\")   PainSc:   5           Physical Exam  Constitutional:       Appearance: She is obese. She is not ill-appearing.   Pulmonary:      Effort: Pulmonary effort is normal.   Neurological:      General: No focal deficit present.   Psychiatric:         Mood and Affect: Mood normal.     Assessment / Plan      Assessment/Plan:   Diagnoses and all orders for this visit:    1. Acute right ankle pain (Primary)  -     XR Ankle 3+ View Right; Future    2. Nail fungus  -     Ambulatory Referral to Podiatry       [facility]  - The patient presents today for an acute visit for evaluation of a right ankle injury.    1. Right ankle injury.  - I recommended RICE. She will continue tylenol. She will have an x-ray today. Follow up if no improvement in symptoms or worsening.    2. Onychomycosis  - Fungal infection on both great toes. I will refer her to podiatry.      Follow Up:   Return if symptoms worsen or fail to improve.    ROSALES White  [facility]    Transcribed from ambient dictation for ROSALES White by Dheeraj Flores.  03/07/24   16:05 EST    Patient or patient representative verbalized consent to the visit recording.  I have personally performed the services described in this document as transcribed by the above individual, and it is both accurate and complete.    "

## 2024-03-07 NOTE — LETTER
[facility]  Vaccine Consent Form    Patient Name:  Loreta Platt States  Patient :  1970     Vaccine(s) Ordered    Hepatitis B Vaccine Adult IM  Tdap Vaccine Greater Than or Equal To 6yo IM        Screening Checklist  The following questions should be completed prior to vaccination. If you answer “yes” to any question, it does not necessarily mean you should not be vaccinated. It just means we may need to clarify or ask more questions. If a question is unclear, please ask your healthcare provider to explain it.    Yes No   Any fever or moderate to severe illness today (mild illness and/or antibiotic treatment are not contraindications)?     Do you have a history of a serious reaction to any previous vaccinations, such as anaphylaxis, encephalopathy within 7 days, Guillain-[place] syndrome within 6 weeks, seizure?     Have you received any live vaccine(s) (e.g MMR, EVANGELIST) or any other vaccines in the last month (to ensure duplicate doses aren't given)?     Do you have an anaphylactic allergy to latex (DTaP, DTaP-IPV, Hep A, Hep B, MenB, RV, Td, Tdap), baker’s yeast (Hep B, HPV), polysorbates (RSV, nirsevimab, PCV 20, Rotavirrus, Tdap, Shingrix), or gelatin (EVANGELIST, MMR)?     Do you have an anaphylactic allergy to neomycin (Rabies, EVANGELIST, MMR, IPV, Hep A), polymyxin B (IPV), or streptomycin (IPV)?      Any cancer, leukemia, AIDS, or other immune system disorder? (EVANGELIST, MMR, RV)     Do you have a parent, brother, or sister with an immune system problem (if immune competence of vaccine recipient clinically verified, can proceed)? (MMR, EVANGELIST)     Any recent steroid treatments for >2 weeks, chemotherapy, or radiation treatment? (EVANGELIST, MMR)     Have you received antibody-containing blood transfusions or IVIG in the past 11 months (recommended interval is dependent on product)? (MMR, EVANGELIST)     Have you taken antiviral drugs (acyclovir, famciclovir, valacyclovir for EVANGELIST) in the last 24 or 48 hours, respectively?      Are you  "pregnant or planning to become pregnant within 1 month? (EVANGELIST, MMR, HPV, IPV, MenB, Abrexvy; For Hep B- refer to Engerix-B; For RSV - Abrysvo is indicated for 32-36 weeks of pregnancy from September to January)     For infants, have you ever been told your child has had intussusception or a medical emergency involving obstruction of the intestine (Rotavirus)? If not for an infant, can skip this question.         *Ordering Physicians/APC should be consulted if \"yes\" is checked by the patient or guardian above.  I have received, read, and understand the Vaccine Information Statement (VIS) for each vaccine ordered.  I have considered my or my child's health status as well as the health status of my close contacts.  I have taken the opportunity to discuss my vaccine questions with my or my child's health care provider.   I have requested that the ordered vaccine(s) be given to me or my child.  I understand the benefits and risks of the vaccines.  I understand that I should remain in the clinic for 15 minutes after receiving the vaccine(s).  _________________________________________________________  Signature of Patient or Parent/Legal Guardian ____________________  Date     "

## 2024-03-10 ENCOUNTER — DOCUMENTATION (OUTPATIENT)
Dept: INTERNAL MEDICINE | Facility: CLINIC | Age: 54
End: 2024-03-10
Payer: MEDICARE

## 2024-03-10 DIAGNOSIS — S92.254A CLOSED NONDISPLACED FRACTURE OF NAVICULAR BONE OF RIGHT FOOT, INITIAL ENCOUNTER: Primary | ICD-10-CM

## 2024-03-11 ENCOUNTER — OFFICE VISIT (OUTPATIENT)
Age: 54
End: 2024-03-11
Payer: MEDICARE

## 2024-03-11 ENCOUNTER — HOSPITAL ENCOUNTER (OUTPATIENT)
Dept: GENERAL RADIOLOGY | Facility: HOSPITAL | Age: 54
Discharge: HOME | End: 2024-03-11
Admitting: STUDENT IN AN ORGANIZED HEALTH CARE EDUCATION/TRAINING PROGRAM
Payer: MEDICARE

## 2024-03-11 VITALS
SYSTOLIC BLOOD PRESSURE: 115 MMHG | HEIGHT: 66 IN | DIASTOLIC BLOOD PRESSURE: 85 MMHG | WEIGHT: 267 LBS | BODY MASS INDEX: 42.91 KG/M2

## 2024-03-11 DIAGNOSIS — M79.671 RIGHT FOOT PAIN: Primary | ICD-10-CM

## 2024-03-11 DIAGNOSIS — M79.671 RIGHT FOOT PAIN: ICD-10-CM

## 2024-03-11 DIAGNOSIS — R22.41 LOCALIZED SWELLING OF RIGHT FOOT: ICD-10-CM

## 2024-03-11 DIAGNOSIS — S92.251A CLOSED DISPLACED FRACTURE OF NAVICULAR BONE OF RIGHT FOOT, INITIAL ENCOUNTER: ICD-10-CM

## 2024-03-11 PROCEDURE — 99214 OFFICE O/P EST MOD 30 MIN: CPT | Performed by: STUDENT IN AN ORGANIZED HEALTH CARE EDUCATION/TRAINING PROGRAM

## 2024-03-11 PROCEDURE — 3079F DIAST BP 80-89 MM HG: CPT | Performed by: STUDENT IN AN ORGANIZED HEALTH CARE EDUCATION/TRAINING PROGRAM

## 2024-03-11 PROCEDURE — 3074F SYST BP LT 130 MM HG: CPT | Performed by: STUDENT IN AN ORGANIZED HEALTH CARE EDUCATION/TRAINING PROGRAM

## 2024-03-11 PROCEDURE — 73630 X-RAY EXAM OF FOOT: CPT

## 2024-03-11 PROCEDURE — 1159F MED LIST DOCD IN RCRD: CPT | Performed by: STUDENT IN AN ORGANIZED HEALTH CARE EDUCATION/TRAINING PROGRAM

## 2024-03-11 PROCEDURE — 1160F RVW MEDS BY RX/DR IN RCRD: CPT | Performed by: STUDENT IN AN ORGANIZED HEALTH CARE EDUCATION/TRAINING PROGRAM

## 2024-03-11 NOTE — PROGRESS NOTES
[facility] Office Visit     Office Visit       Date: 03/11/2024   Patient Name: Loreta Robertson  MRN: [id number]  YOB: 1970    Referring Physician: Carolina Gutierrez APRN     Chief Complaint:   Chief Complaint   Patient presents with    Right Ankle - Pain       History of Present Illness:   Loreta Robertson is a 53 y.o. female who presents with new problem of: right ankle pain.  Onset: mechanical fall. The issue has been ongoing for 5 day(s). Pain is a 3/10 on the pain scale. Pain is described as stabbing and shooting. Associated symptoms include pain and swelling. The pain is worse with walking, standing, and climbing stairs; resting, sitting, and elevating the extremity improve the pain. Previous treatments have included: nothing.    Subjective   Review of Systems: Review of Systems   Constitutional:  Negative for chills, fever, unexpected weight gain and unexpected weight loss.   HENT:  Negative for congestion, postnasal drip and rhinorrhea.    Eyes:  Negative for blurred vision.   Respiratory:  Negative for shortness of breath.    Cardiovascular:  Negative for leg swelling.   Gastrointestinal:  Negative for abdominal pain, nausea and vomiting.   Genitourinary:  Negative for difficulty urinating.   Musculoskeletal:  Positive for arthralgias. Negative for gait problem, joint swelling and myalgias.   Skin:  Negative for skin lesions and wound.   Neurological:  Negative for dizziness, weakness, light-headedness and numbness.   Hematological:  Does not bruise/bleed easily.   Psychiatric/Behavioral:  Negative for depressed mood.    All other systems reviewed and are negative.       I have reviewed the following portions of the patient's history:History of Present Illness and review of systems.    Past Medical History:   Past Medical History:   Diagnosis Date    Anxiety     Bipolar 1 disorder     Cataract     Chronic bronchitis     Chronic  constipation     Colon polyp     COPD (chronic obstructive pulmonary disease)     Depression     Hip arthrosis     Hyperlipidemia     Hypertension     Knee swelling     Low back strain 22    Obesity     Parkinsonism     DRUG INDUCED    Primary generalized (osteo)arthritis     PTSD (post-traumatic stress disorder)     RLS (restless legs syndrome)        Past Surgical History:   Past Surgical History:   Procedure Laterality Date    ABDOMINOPLASTY      ANTERIOR AND POSTERIOR VAGINAL REPAIR      BREAST BIOPSY Right     US BIOPSY     SECTION      X 2    CHOLECYSTECTOMY      COLONOSCOPY N/A 2019    Procedure: COLONOSCOPY;  Surgeon: Jonathan Pablo MD;  Location: [facility] END[facility]COPY;  Service: Gastroenterology    HIP SURGERY  23    HYSTERECTOMY      abdominal hysterectomy    LIPOSUCTION ABDOMINAL  1998    TOTAL HIP ARTHROPLASTY Right 2023    Procedure: MODIFIED ANTERIOR TOTAL HIP ARTHROPLASTY - RIGHT;  Surgeon: Adrián Leyva MD;  Location: [facility] OR;  Service: Orthopedics;  Laterality: Right;       Family History:   Family History   Problem Relation Age of Onset    Diabetes Father     Hypertension Father     Heart attack Father     Obesity Father     Breast cancer Mother 50    Diabetes Mother     Cancer Mother     Breast cancer Maternal Grandmother 40    Ovarian cancer Maternal Grandmother 40    Cervical cancer Maternal Grandmother     Cancer Maternal Grandfather     Lung cancer Maternal Aunt        Social History:   Social History     Socioeconomic History    Marital status: Single   Tobacco Use    Smoking status: Every Day     Current packs/day: 1.00     Average packs/day: 1 pack/day for 44.2 years (44.2 ttl pk-yrs)     Types: Cigarettes     Start date: 1980     Passive exposure: Current    Smokeless tobacco: Never   Vaping Use    Vaping status: Never Used   Substance and Sexual Activity    Alcohol use: No    Drug use: Yes     Types: Marijuana    Sexual activity: Not Currently      "Birth control/protection: Hysterectomy       Medications:   Current Outpatient Medications:     atorvastatin (LIPITOR) 40 MG tablet, Take 1 tablet by mouth Daily., Disp: 90 tablet, Rfl: 0    buPROPion XL (WELLBUTRIN XL) 150 MG 24 hr tablet, Take 1 tablet by mouth Every Morning., Disp: , Rfl:     divalproex (DEPAKOTE ER) 500 MG 24 hr tablet, Take 1 tablet by mouth Every Night., Disp: , Rfl:     hydrOXYzine (ATARAX) 10 MG tablet, Take 1 tablet by mouth At Night As Needed., Disp: , Rfl:     losartan (Cozaar) 25 MG tablet, Take 1 tablet by mouth Daily., Disp: 90 tablet, Rfl: 0    Pediatric Multivitamins-Iron (multivitamin pediatric chewable) chewable tablet, Chew Daily., Disp: , Rfl:     prazosin (MINIPRESS) 5 MG capsule, Take 1 capsule by mouth Every Night., Disp: , Rfl: 1    primidone (MYSOLINE) 50 MG tablet, Take 1 tablet by mouth 2 (Two) Times a Day., Disp: , Rfl:     promethazine (PHENERGAN) 25 MG tablet, Take 1 tablet by mouth Every 6 (Six) Hours As Needed for Nausea or Vomiting., Disp: 20 tablet, Rfl: 0    QUEtiapine (SEROquel) 300 MG tablet, Take 1 tablet by mouth every night at bedtime., Disp: , Rfl:     Rhubarb (ESTROVEN COMPLETE PO), Take  by mouth., Disp: , Rfl:     Sodium Sulfate-Mag Sulfate-KCl (SUTAB) 6534-990-601 MG tablet, Take 24 tablets by mouth Take As Directed., Disp: 24 tablet, Rfl: 0    tiZANidine (ZANAFLEX) 4 MG tablet, TAKE 1 TABLET BY MOUTH EVERY 8 HOURS AS NEEDED FOR MUSCLE SPASMS, Disp: 60 tablet, Rfl: 1    Vraylar 3 MG capsule capsule, Take  by mouth., Disp: , Rfl:     Allergies: No Known Allergies    I reviewed the patient's chief complaint, history of present illness, review of systems, past medical history, surgical history, family history, social history, medications and allergy list.     Objective    Vital Signs:   Vitals:    03/11/24 1553   BP: 115/85   Weight: 121 kg (267 lb)   Height: 167.6 cm (66\")     Body mass index is 43.09 kg/m².   Class 3 Severe Obesity (BMI >=40). " Obesity-related health conditions include the following: hypertension, coronary heart disease, diabetes mellitus, and dyslipidemias. Obesity is newly identified. BMI is is above average; BMI management plan is completed. We discussed portion control and increasing exercise.      In this visit the patient was advised to stop smoking and was offered tobacco cessation measures and resources, including NRT and/or medication intervention. At least 5 minutes was spent on face-to-face counseling regarding smoking cessation.     Ortho Exam:  Right foot: No erythema or ecchymosis but significant swelling over the dorsal midfoot.  Tender directly over the dorsal navicular.  Pain with  plantarflexion and dorsiflexion of the foot.  Sensation intact to light touch.  2+ dorsalis pedis pulse.    Results Review:   Imaging Results (Last 24 Hours)       ** No results found for the last 24 hours. **        I personally reviewed the radiographs of the right ankle from 3/7/2024 and the right foot from 3/11/2024.  Avulsion fracture noted off of the dorsum of the navicular.  No other acute osseous abnormalities.  No significant degenerative changes.    Procedures    Assessment / Plan    Assessment/Plan:   Diagnoses and all orders for this visit:    1. Right foot pain (Primary)  -     XR Foot 3+ View Right; Future    2. Closed displaced fracture of navicular bone of right foot, initial encounter    3. Localized swelling of right foot    Inversion mechanism injury to the right foot and ankle that occurred 5 days ago.  Radiographs of her right foot and ankle were reviewed.  They reveal an avulsion fracture of the dorsum of the navicular.  She is directly tender here on exam.  She does have pain with increased weightbearing activity but overall has good strength, range of motion of the foot and ankle today.  She has localized swelling there but no significant ecchymosis and swelling throughout the foot and ankle.  Does have a history of  inversion mechanism injuries to the right ankle.  May warrant lace up ankle brace in the future.  Today, we will place her into a rigid soled shoe.  Weight-bear as tolerated.  Continue with Tylenol for pain control.  Follow-up in 4 weeks.  If she does not make significant improvement in her pain and swelling, I would obtain additional imaging of the foot, likely MRI or CT, at the next visit.    Previous documentation reviewed: 3/7/24 - office visit- Carolina CABA.    Previous imaging studies reviewed: 3/7/24 - radiographs of the right ankle. 3/11/24 - radiographs of the right foot.    Follow Up:   Return in about 4 weeks (around 4/8/2024) for Recheck.      Rahul Tapia MD  [facility]

## 2024-03-13 RX ORDER — SODIUM PICOSULFATE, MAGNESIUM OXIDE, AND ANHYDROUS CITRIC ACID 10; 3.5; 12 MG/160ML; G/160ML; G/160ML
350 LIQUID ORAL TAKE AS DIRECTED
Qty: 350 ML | Refills: 0 | Status: SHIPPED | OUTPATIENT
Start: 2024-03-13

## 2024-03-19 ENCOUNTER — HOSPITAL ENCOUNTER (OUTPATIENT)
Dept: PULMONOLOGY | Facility: HOSPITAL | Age: 54
Discharge: HOME | End: 2024-03-19
Admitting: NURSE PRACTITIONER
Payer: MEDICARE

## 2024-03-19 DIAGNOSIS — R06.2 WHEEZING: ICD-10-CM

## 2024-03-19 DIAGNOSIS — F17.200 SMOKER: ICD-10-CM

## 2024-03-19 PROCEDURE — 94060 EVALUATION OF WHEEZING: CPT

## 2024-03-19 PROCEDURE — 94726 PLETHYSMOGRAPHY LUNG VOLUMES: CPT

## 2024-03-19 PROCEDURE — 94729 DIFFUSING CAPACITY: CPT

## 2024-03-19 RX ORDER — ALBUTEROL SULFATE 2.5 MG/3ML
2.5 SOLUTION RESPIRATORY (INHALATION) ONCE
Status: COMPLETED | OUTPATIENT
Start: 2024-03-19 | End: 2024-03-19

## 2024-03-19 RX ADMIN — ALBUTEROL SULFATE 2.5 MG: 2.5 SOLUTION RESPIRATORY (INHALATION) at 09:21

## 2024-03-22 ENCOUNTER — TELEPHONE (OUTPATIENT)
Dept: INTERNAL MEDICINE | Facility: CLINIC | Age: 54
End: 2024-03-22
Payer: MEDICARE

## 2024-03-22 NOTE — TELEPHONE ENCOUNTER
Patient notified and verbalized understanding.    Patient says thank you. She states she is not interested in quitting smoking right now but she will try and cut back some. She also states she does not want to go to the pulmonologist now. She will let us know if she changes her mind or has any questions or concerns

## 2024-03-22 NOTE — TELEPHONE ENCOUNTER
----- Message from ROSALES White sent at 3/22/2024 12:19 PM EDT -----  Pulmonary function testing does show some moderate obstruction which  improved with albuterol.  Please let me know if you need a prescription for albuterol.   I would like to refer you to pulmonary for evaluation.  I recommend complete smoking cessation.  Please let me know if you are agreeable to the pulmonary referral.

## 2024-03-26 ENCOUNTER — TELEPHONE (OUTPATIENT)
Dept: GASTROENTEROLOGY | Facility: CLINIC | Age: 54
End: 2024-03-26
Payer: MEDICARE

## 2024-03-26 NOTE — TELEPHONE ENCOUNTER
Hub staff attempted to follow warm transfer process and was unsuccessful     Caller: Loreta Robertson    Relationship to patient: Self    Best call back number: [phone]    Patient is needing: PT WOULD LIKE TO KNOW WHAT TIMES THAT SHE IS SUPPOSED TO START TAKING HER COLONOSCOPY PREP AND SHE WOULD LIKE TO KNOW IF SHE COULD HAVE ANYTHING RED OR PURPLE// PLEASE CALL PT BACK

## 2024-03-27 ENCOUNTER — OUTSIDE FACILITY SERVICE (OUTPATIENT)
Dept: GASTROENTEROLOGY | Facility: CLINIC | Age: 54
End: 2024-03-27
Payer: MEDICARE

## 2024-03-27 PROCEDURE — 45390 COLONOSCOPY W/RESECTION: CPT | Performed by: INTERNAL MEDICINE

## 2024-03-27 PROCEDURE — 45388 COLONOSCOPY W/ABLATION: CPT | Performed by: INTERNAL MEDICINE

## 2024-03-27 PROCEDURE — 45385 COLONOSCOPY W/LESION REMOVAL: CPT | Performed by: INTERNAL MEDICINE

## 2024-03-27 PROCEDURE — 88305 TISSUE EXAM BY PATHOLOGIST: CPT | Performed by: INTERNAL MEDICINE

## 2024-03-28 ENCOUNTER — LAB REQUISITION (OUTPATIENT)
Dept: LAB | Facility: HOSPITAL | Age: 54
End: 2024-03-28
Payer: MEDICARE

## 2024-03-28 DIAGNOSIS — D12.8 BENIGN NEOPLASM OF RECTUM: ICD-10-CM

## 2024-03-28 DIAGNOSIS — Z86.010 PERSONAL HISTORY OF COLONIC POLYPS: ICD-10-CM

## 2024-03-28 DIAGNOSIS — K64.8 OTHER HEMORRHOIDS: ICD-10-CM

## 2024-03-28 DIAGNOSIS — Z12.11 ENCOUNTER FOR SCREENING FOR MALIGNANT NEOPLASM OF COLON: ICD-10-CM

## 2024-03-28 DIAGNOSIS — D12.2 BENIGN NEOPLASM OF ASCENDING COLON: ICD-10-CM

## 2024-03-28 DIAGNOSIS — D12.5 BENIGN NEOPLASM OF SIGMOID COLON: ICD-10-CM

## 2024-03-29 LAB — REF LAB TEST METHOD: NORMAL

## 2024-04-01 ENCOUNTER — HOSPITAL ENCOUNTER (OUTPATIENT)
Dept: ULTRASOUND IMAGING | Facility: HOSPITAL | Age: 54
Discharge: HOME | End: 2024-04-01
Payer: MEDICARE

## 2024-04-01 ENCOUNTER — HOSPITAL ENCOUNTER (OUTPATIENT)
Dept: MAMMOGRAPHY | Facility: HOSPITAL | Age: 54
Discharge: HOME | End: 2024-04-01
Payer: MEDICARE

## 2024-04-01 DIAGNOSIS — N60.02 CYST OF LEFT BREAST: ICD-10-CM

## 2024-04-01 PROCEDURE — 76642 ULTRASOUND BREAST LIMITED: CPT

## 2024-04-01 PROCEDURE — 77065 DX MAMMO INCL CAD UNI: CPT

## 2024-04-01 PROCEDURE — G0279 TOMOSYNTHESIS, MAMMO: HCPCS | Performed by: RADIOLOGY

## 2024-04-01 PROCEDURE — 76642 ULTRASOUND BREAST LIMITED: CPT | Performed by: RADIOLOGY

## 2024-04-01 PROCEDURE — 77065 DX MAMMO INCL CAD UNI: CPT | Performed by: RADIOLOGY

## 2024-04-01 PROCEDURE — G0279 TOMOSYNTHESIS, MAMMO: HCPCS

## 2024-04-08 ENCOUNTER — PATIENT MESSAGE (OUTPATIENT)
Dept: INTERNAL MEDICINE | Facility: CLINIC | Age: 54
End: 2024-04-08
Payer: MEDICARE

## 2024-04-16 DIAGNOSIS — I10 PRIMARY HYPERTENSION: ICD-10-CM

## 2024-04-16 RX ORDER — LOSARTAN POTASSIUM 25 MG/1
25 TABLET ORAL DAILY
Qty: 90 TABLET | Refills: 0 | Status: SHIPPED | OUTPATIENT
Start: 2024-04-16

## 2024-04-16 NOTE — TELEPHONE ENCOUNTER
DELETE AFTER REVIEWING: Telephone encounter to be sent to the clinical pool    Name: [id number] - [place], [place] - [address] AT [facility] OF [facility] & [facility] [phone] [facility] [phone] FX    Relationship:     Best Callback Number: [phone]    HUB PROVIDED THE RELAY MESSAGE FROM THE OFFICE   PATIENT VOICED UNDERSTANDING AND HAS NO FURTHER QUESTIONS AT THIS TIME    ADDITIONAL INFORMATION: APPOINTMENT SCHEDULED FOR [id number]

## 2024-04-16 NOTE — TELEPHONE ENCOUNTER
LOV 01/16/2024  NOV     Tried to reach patient no answer left voicemail to return call    RELAY:  We recently received your message to refill your medication(s).  Our records indicate that you did not schedule a follow up appointment with your provider at your last visit on 01/16/2024. The medication that you are on requires a follow up appointment for additional refills. Patient was to schedule on or around 02/16/2024 for 4 week follow up    If she is unable to keep her appointment we will not be able to provider further refills.  Once appointment has been scheduled please update message with date and time so we can process the request.  We will forward the message to the provider to review the refill request.

## 2024-04-17 DIAGNOSIS — E66.01 MORBID OBESITY WITH BMI OF 50.0-59.9, ADULT: Primary | ICD-10-CM

## 2024-04-17 NOTE — TELEPHONE ENCOUNTER
From: Loreta Robertson  To: Carolina Gutierrez  Sent: 4/8/2024 12:34 PM EDT  Subject: Prescription for Ozempic    Alex Figueroa, I was wondering how I can go about getting a prescription for Ozempic? I know it’s a diabetic medication, but also helps with weight loss. I’m really struggling trying to loose these extra pounds i have gained. I’m back up to almost 280 and im so ashamed of that. Is there anyway for me to get this prescription? Is there something else I can use that will give me some help? I’m working on making changes in my eating habits now but the extra would sure help.     Thank you  Loreta Robertson  [phone]   Note written by attending, see above.  Time spent: 50min. More than 50% of the visit was spent counseling the patient and pt's daughter on medical condition - right-sided superior and inferior pubic rami fractures, right sacral ala fracture, now with suspected right UE cellulitis, Doppler US of LEs reason for test and negative result.

## 2024-04-19 ENCOUNTER — TELEPHONE (OUTPATIENT)
Dept: INTERNAL MEDICINE | Facility: CLINIC | Age: 54
End: 2024-04-19
Payer: MEDICARE

## 2024-04-19 NOTE — TELEPHONE ENCOUNTER
Caller: Loreta Robertson    Relationship to patient: Self    Best call back number: [phone]     Chief complaint: FOLLOW UP ON NEW MEDICATION    Type of visit: OFFICE VISIT    Requested date: ANY TUESDAY OR THURSDAY IN MAY     If rescheduling, when is the original appointment: 4/23/24     Additional notes:NO APPOINTMENTS WERE AVAILABLE IN MAY. PLEASE ADVISE PATIENT IF WE ARE ABLE TO WORK HER IN THAT MONTH.

## 2024-04-24 ENCOUNTER — TELEPHONE (OUTPATIENT)
Dept: INTERNAL MEDICINE | Facility: CLINIC | Age: 54
End: 2024-04-24
Payer: MEDICARE

## 2024-04-24 NOTE — TELEPHONE ENCOUNTER
Patient was here in clinic and wanted information on her referral for weight management. I see the referral was placed on 04/17/2024. Please update patient when information is available regarding appointment.

## 2024-04-24 NOTE — TELEPHONE ENCOUNTER
Reached patient at [phone]   Told her that it went to [facility] and that they mailed her their new patient packet on 04/18/2024, also gave her Weight management phone number to call if she does not receive packet

## 2024-04-25 DIAGNOSIS — I10 PRIMARY HYPERTENSION: ICD-10-CM

## 2024-04-25 RX ORDER — LOSARTAN POTASSIUM 25 MG/1
25 TABLET ORAL DAILY
Qty: 90 TABLET | Refills: 0 | OUTPATIENT
Start: 2024-04-25

## 2024-05-09 ENCOUNTER — OFFICE VISIT (OUTPATIENT)
Dept: INTERNAL MEDICINE | Facility: CLINIC | Age: 54
End: 2024-05-09
Payer: MEDICARE

## 2024-05-09 VITALS
HEART RATE: 76 BPM | WEIGHT: 276.4 LBS | HEIGHT: 66 IN | RESPIRATION RATE: 18 BRPM | SYSTOLIC BLOOD PRESSURE: 124 MMHG | BODY MASS INDEX: 44.42 KG/M2 | DIASTOLIC BLOOD PRESSURE: 76 MMHG | TEMPERATURE: 97.3 F

## 2024-05-09 DIAGNOSIS — F41.9 ANXIETY: ICD-10-CM

## 2024-05-09 DIAGNOSIS — I10 PRIMARY HYPERTENSION: Primary | ICD-10-CM

## 2024-05-09 DIAGNOSIS — Z51.81 ENCOUNTER FOR MEDICATION MONITORING: ICD-10-CM

## 2024-05-09 DIAGNOSIS — E66.01 MORBID (SEVERE) OBESITY DUE TO EXCESS CALORIES: ICD-10-CM

## 2024-05-09 DIAGNOSIS — E55.9 VITAMIN D DEFICIENCY: ICD-10-CM

## 2024-05-09 DIAGNOSIS — R73.9 HYPERGLYCEMIA, UNSPECIFIED: ICD-10-CM

## 2024-05-09 DIAGNOSIS — E78.49 OTHER HYPERLIPIDEMIA: ICD-10-CM

## 2024-05-09 DIAGNOSIS — J44.9 CHRONIC OBSTRUCTIVE PULMONARY DISEASE, UNSPECIFIED COPD TYPE: ICD-10-CM

## 2024-05-09 DIAGNOSIS — Z87.891 HISTORY OF TOBACCO ABUSE: ICD-10-CM

## 2024-05-09 LAB
25(OH)D3 SERPL-MCNC: 35.7 NG/ML (ref 30–100)
ALBUMIN SERPL-MCNC: 4.5 G/DL (ref 3.5–5.2)
ALBUMIN/GLOB SERPL: 1.6 G/DL
ALP SERPL-CCNC: 102 U/L (ref 39–117)
ALT SERPL W P-5'-P-CCNC: 19 U/L (ref 1–33)
ANION GAP SERPL CALCULATED.3IONS-SCNC: 13 MMOL/L (ref 5–15)
AST SERPL-CCNC: 16 U/L (ref 1–32)
BASOPHILS # BLD AUTO: 0.05 10*3/MM3 (ref 0–0.2)
BASOPHILS NFR BLD AUTO: 0.7 % (ref 0–1.5)
BILIRUB SERPL-MCNC: 0.2 MG/DL (ref 0–1.2)
BUN SERPL-MCNC: 13 MG/DL (ref 6–20)
BUN/CREAT SERPL: 15.9 (ref 7–25)
CALCIUM SPEC-SCNC: 9.6 MG/DL (ref 8.6–10.5)
CHLORIDE SERPL-SCNC: 105 MMOL/L (ref 98–107)
CHOLEST SERPL-MCNC: 141 MG/DL (ref 0–200)
CO2 SERPL-SCNC: 26 MMOL/L (ref 22–29)
CREAT SERPL-MCNC: 0.82 MG/DL (ref 0.57–1)
DEPRECATED RDW RBC AUTO: 45.1 FL (ref 37–54)
EGFRCR SERPLBLD CKD-EPI 2021: 84.1 ML/MIN/1.73
EOSINOPHIL # BLD AUTO: 0.13 10*3/MM3 (ref 0–0.4)
EOSINOPHIL NFR BLD AUTO: 1.9 % (ref 0.3–6.2)
ERYTHROCYTE [DISTWIDTH] IN BLOOD BY AUTOMATED COUNT: 13.4 % (ref 12.3–15.4)
EXPIRATION DATE: NORMAL
GLOBULIN UR ELPH-MCNC: 2.8 GM/DL
GLUCOSE SERPL-MCNC: 102 MG/DL (ref 65–99)
HBA1C MFR BLD: 5.6 % (ref 4.5–5.7)
HCT VFR BLD AUTO: 41.2 % (ref 34–46.6)
HDLC SERPL-MCNC: 67 MG/DL (ref 40–60)
HGB BLD-MCNC: 13.8 G/DL (ref 12–15.9)
IMM GRANULOCYTES # BLD AUTO: 0.02 10*3/MM3 (ref 0–0.05)
IMM GRANULOCYTES NFR BLD AUTO: 0.3 % (ref 0–0.5)
LDLC SERPL CALC-MCNC: 55 MG/DL (ref 0–100)
LDLC/HDLC SERPL: 0.8 {RATIO}
LYMPHOCYTES # BLD AUTO: 2.35 10*3/MM3 (ref 0.7–3.1)
LYMPHOCYTES NFR BLD AUTO: 34 % (ref 19.6–45.3)
Lab: NORMAL
MCH RBC QN AUTO: 31 PG (ref 26.6–33)
MCHC RBC AUTO-ENTMCNC: 33.5 G/DL (ref 31.5–35.7)
MCV RBC AUTO: 92.6 FL (ref 79–97)
MONOCYTES # BLD AUTO: 0.46 10*3/MM3 (ref 0.1–0.9)
MONOCYTES NFR BLD AUTO: 6.6 % (ref 5–12)
NEUTROPHILS NFR BLD AUTO: 3.91 10*3/MM3 (ref 1.7–7)
NEUTROPHILS NFR BLD AUTO: 56.5 % (ref 42.7–76)
NRBC BLD AUTO-RTO: 0 /100 WBC (ref 0–0.2)
PLATELET # BLD AUTO: 290 10*3/MM3 (ref 140–450)
PMV BLD AUTO: 11.3 FL (ref 6–12)
POTASSIUM SERPL-SCNC: 5 MMOL/L (ref 3.5–5.2)
PROT SERPL-MCNC: 7.3 G/DL (ref 6–8.5)
RBC # BLD AUTO: 4.45 10*6/MM3 (ref 3.77–5.28)
SODIUM SERPL-SCNC: 144 MMOL/L (ref 136–145)
TRIGL SERPL-MCNC: 103 MG/DL (ref 0–150)
TSH SERPL DL<=0.05 MIU/L-ACNC: 2.55 UIU/ML (ref 0.27–4.2)
VALPROATE SERPL-MCNC: 36 MCG/ML (ref 50–125)
VLDLC SERPL-MCNC: 19 MG/DL (ref 5–40)
WBC NRBC COR # BLD AUTO: 6.92 10*3/MM3 (ref 3.4–10.8)

## 2024-05-09 PROCEDURE — 1159F MED LIST DOCD IN RCRD: CPT | Performed by: NURSE PRACTITIONER

## 2024-05-09 PROCEDURE — 80053 COMPREHEN METABOLIC PANEL: CPT | Performed by: NURSE PRACTITIONER

## 2024-05-09 PROCEDURE — 99214 OFFICE O/P EST MOD 30 MIN: CPT | Performed by: NURSE PRACTITIONER

## 2024-05-09 PROCEDURE — 1126F AMNT PAIN NOTED NONE PRSNT: CPT | Performed by: NURSE PRACTITIONER

## 2024-05-09 PROCEDURE — 80061 LIPID PANEL: CPT | Performed by: NURSE PRACTITIONER

## 2024-05-09 PROCEDURE — 82306 VITAMIN D 25 HYDROXY: CPT | Performed by: NURSE PRACTITIONER

## 2024-05-09 PROCEDURE — 83036 HEMOGLOBIN GLYCOSYLATED A1C: CPT | Performed by: NURSE PRACTITIONER

## 2024-05-09 PROCEDURE — 1160F RVW MEDS BY RX/DR IN RCRD: CPT | Performed by: NURSE PRACTITIONER

## 2024-05-09 PROCEDURE — 3074F SYST BP LT 130 MM HG: CPT | Performed by: NURSE PRACTITIONER

## 2024-05-09 PROCEDURE — 84443 ASSAY THYROID STIM HORMONE: CPT | Performed by: NURSE PRACTITIONER

## 2024-05-09 PROCEDURE — 85025 COMPLETE CBC W/AUTO DIFF WBC: CPT | Performed by: NURSE PRACTITIONER

## 2024-05-09 PROCEDURE — 3078F DIAST BP <80 MM HG: CPT | Performed by: NURSE PRACTITIONER

## 2024-05-09 PROCEDURE — 80164 ASSAY DIPROPYLACETIC ACD TOT: CPT | Performed by: NURSE PRACTITIONER

## 2024-05-09 PROCEDURE — 3044F HG A1C LEVEL LT 7.0%: CPT | Performed by: NURSE PRACTITIONER

## 2024-05-09 RX ORDER — QUETIAPINE FUMARATE 100 MG/1
100 TABLET, FILM COATED ORAL
COMMUNITY
Start: 2024-04-25

## 2024-05-09 RX ORDER — ALBUTEROL SULFATE 2.5 MG/3ML
2.5 SOLUTION RESPIRATORY (INHALATION) EVERY 4 HOURS PRN
Qty: 90 ML | Refills: 12 | Status: SHIPPED | OUTPATIENT
Start: 2024-05-09

## 2024-05-09 RX ORDER — AMMONIUM LACTATE 12 G/100G
CREAM TOPICAL
COMMUNITY
Start: 2024-03-31

## 2024-05-09 RX ORDER — PRAZOSIN HYDROCHLORIDE 1 MG/1
1 CAPSULE ORAL
COMMUNITY

## 2024-05-09 RX ORDER — LORAZEPAM 0.5 MG/1
TABLET ORAL
Qty: 2 TABLET | Refills: 0 | Status: SHIPPED | OUTPATIENT
Start: 2024-05-09

## 2024-05-10 ENCOUNTER — TRANSCRIBE ORDERS (OUTPATIENT)
Dept: PHYSICAL THERAPY | Facility: CLINIC | Age: 54
End: 2024-05-10
Payer: MEDICARE
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
## 2024-05-10 DIAGNOSIS — R26.81 GAIT INSTABILITY: Primary | ICD-10-CM

## 2024-05-13 ENCOUNTER — TELEPHONE (OUTPATIENT)
Dept: INTERNAL MEDICINE | Facility: CLINIC | Age: 54
End: 2024-05-13
Payer: MEDICARE

## 2024-05-13 NOTE — TELEPHONE ENCOUNTER
Tried to reach patient no answer left voicemail to return call    RELAY:  Carolina Gutierrez APRN P [facility]  [facility] Clinical Pool  Valproic acid level is a little low; follow-up with your psychiatrist. The test results show that your labs are in the normal range, stable, or mild abnormalities which do not require any further work-up at this time.  We will continue to monitor labs over time.  Continue plan of care discussed at your appointment and follow-up if worsening or new concerns.

## 2024-06-13 ENCOUNTER — OFFICE VISIT (OUTPATIENT)
Dept: BARIATRICS/WEIGHT MGMT | Facility: CLINIC | Age: 54
End: 2024-06-13
Payer: MEDICARE

## 2024-06-13 VITALS
SYSTOLIC BLOOD PRESSURE: 108 MMHG | HEART RATE: 69 BPM | WEIGHT: 280.4 LBS | DIASTOLIC BLOOD PRESSURE: 68 MMHG | HEIGHT: 66 IN | BODY MASS INDEX: 45.06 KG/M2

## 2024-06-13 DIAGNOSIS — Z51.81 ENCOUNTER FOR THERAPEUTIC DRUG LEVEL MONITORING: ICD-10-CM

## 2024-06-13 DIAGNOSIS — E66.01 CLASS 3 SEVERE OBESITY DUE TO EXCESS CALORIES WITH SERIOUS COMORBIDITY AND BODY MASS INDEX (BMI) OF 45.0 TO 49.9 IN ADULT: Primary | ICD-10-CM

## 2024-06-13 PROBLEM — E66.813 CLASS 3 SEVERE OBESITY WITH SERIOUS COMORBIDITY AND BODY MASS INDEX (BMI) OF 45.0 TO 49.9 IN ADULT: Status: ACTIVE | Noted: 2018-06-01

## 2024-06-13 NOTE — PROGRESS NOTES
[place]ak  [facility]  [address]  [place] [address]       Date: 2024  Patient Name: Loreta Robertson  MRN: [id number]  : 1970    Subjective     Chief Complaint  Obesity Management consult, nutrition counseling       History of Present Illness:  Loreta Robertson presents to [facility] for obesity management.    Three years ago she was at her highest weight of 356 pounds and lost weight down to 240 pounds prior to hip surgery by cutting all carbs.  She kept this weight off for some time but admits that in 2023 started eating everything again and has regained weight that she is finding difficult to lose.  She is an emotional eater. Refined sugars like hard candy are a weakness of hers.  She drinks only water.    Weight history:  Highest lifetime weight: 356 pounds. Today's weight is 127 kg (280 lb 6.4 oz) pounds.   Weight 5 years ago: 356    Current lifestyle:   The following seem to sabotage weight loss efforts:Lack of time for planning & self, comfort/stress eating, eating late, waking up eating, specific cravings like carbohydrates, eating too fast, and boredom eating    Past diets: no carbs  Past weight loss medications: no    Last 24 hour Diet recall:  Breakfast: skipped  Lunch: [employer] cheesburger and fries  Dinner: Pizza  Snacks: none  Beverages: water    Pertinent medical history:  Pancreatitis: no  Glaucoma: no  Headaches: no  HTN: yes - controlled with meds  Heart palpitations: no  Thyroid C cell cancer personal or family hx: no  MEN syndrome personal or family hx: no  Nephrolithiasis: no    PHQ-9 Total Score: 20 - under care of mental health specialist              Review of Systems   Constitutional:  Positive for fatigue and unexpected weight gain.        Positive for weight gain   HENT:  Negative for trouble swallowing.         Negative for throat swelling   Respiratory:  Negative for  "shortness of breath and wheezing.         Negative for snoring   Cardiovascular:  Negative for chest pain, palpitations and leg swelling.   Gastrointestinal:  Positive for constipation. Negative for abdominal pain, diarrhea, GERD and indigestion.   Endocrine: Positive for polydipsia, polyphagia and polyuria. Negative for cold intolerance and heat intolerance.        Negative for loss of hair  Negative for hirsutism     Genitourinary:         Denies menstrual irregularities   Musculoskeletal:  Positive for arthralgias.        Denies exercise limitations  Denies chronic pain   Skin:  Negative for dry skin.        Negative for acne   Neurological:  Negative for headache and memory problem.        Negative for paresthesias   Psychiatric/Behavioral:  Positive for sleep disturbance and depressed mood. Negative for self-injury and suicidal ideas. The patient is nervous/anxious.    All other systems reviewed and are negative.          Objective     Body mass index is 45.95 kg/m².   Body composition analysis completed and showed:   Body Fat %: 54.5     Measurements (in inches)  Measurements (in inches) Waist Circumference: 54.5   Neck: 14  Chest: 51.5  Hips: 56.5  Thighs: 45.5    Vital Signs:   /68 (BP Location: Left arm, Patient Position: Sitting)   Pulse 69   Ht 166.4 cm (65.5\")   Wt 127 kg (280 lb 6.4 oz)   BMI 45.95 kg/m²     Physical Exam  Constitutional:       Appearance: Normal appearance.   HENT:      Head: Normocephalic and atraumatic.   Pulmonary:      Effort: Pulmonary effort is normal.   Neurological:      Mental Status: She is alert and oriented to person, place, and time.   Psychiatric:         Mood and Affect: Mood normal.         Thought Content: Thought content normal.          Result Review :     Common labs          5/9/2024    11:40 5/9/2024    11:44   Common Labs   Glucose 102     BUN 13     Creatinine 0.82     Sodium 144     Potassium 5.0     Chloride 105     Calcium 9.6     Albumin 4.5   "   Total Bilirubin 0.2     Alkaline Phosphatase 102     AST (SGOT) 16     ALT (SGPT) 19     WBC 6.92     Hemoglobin 13.8     Hematocrit 41.2     Platelets 290     Total Cholesterol 141     Triglycerides 103     HDL Cholesterol 67     LDL Cholesterol  55     Hemoglobin A1C  5.6                  Assessment / Plan       Diagnoses and all orders for this visit:    1. Class 3 severe obesity due to excess calories with serious comorbidity and body mass index (BMI) of 45.0 to 49.9 in adult (Primary)  Assessment & Plan:  Patient's (Body mass index is 45.95 kg/m².) indicates that they are morbidly/severely obese (BMI > 40 or > 35 with obesity - related health condition) with health conditions that include hypertension and dyslipidemias . Weight is newly identified. BMI  is above average; BMI management plan is completed. We discussed low calorie, low carb based diet program, portion control, increasing exercise, and an bridgette-based approach such as Clippership Intl Pal or Lose It.       -- This is an initial visit. Topics of discussion included obesity as a disease, nutritional education on food groups, exercise, and medications.Patient's past medical history was reviewed in detail and barriers to weight loss were identified and discussed. Past efforts at weight reduction on their own as well as under physician supervision were documented and discussed.  I advised patient to continue routine care with their Primary Care Provider.  --Body composition analysis was reviewed. Short and long-term weight loss goals set up based on this information.  --Patient was instructed on adequate protein, controlled carb and controlled fat intake. Baritastic food journal set up with calorie and macro goals set: Calories 1500, protein 100 g, net carbs  <75 g.  Patient encouraged to start journaling daily.  Encouraged that we are not necessarily looking for perfection but starting to learn where calories and macros are staying.  Patient advised that were  looking to stay at or below calorie and carbohydrate levels and at or above protein and fiber levels. Asked patient to bring in food journal to next office visit for brief review  --Patient is to try nutritonal/behavioral changes only first.  --Recent blood work from 5/9/2024 reviewed  --Fasting labs ordered        Orders:  -     Insulin, Total  -     Urine Drug Screen - Urine, Clean Catch  -     Basic Metabolic Panel    2. Encounter for therapeutic drug level monitoring  -     Urine Drug Screen - Urine, Clean Catch        I spent 47 minutes caring for Loreta on this date of service. This time includes time spent by me in the following activities:preparing for the visit, reviewing tests, obtaining and/or reviewing a separately obtained history, performing a medically appropriate examination and/or evaluation , counseling and educating the patient/family/caregiver, and documenting information in the medical record  Follow Up   Return in about 2 weeks (around 6/27/2024).  Patient was given instructions and counseling regarding her condition or for health maintenance advice. Please see specific information pulled into the AVS if appropriate.     Giovanni Delgado, ROSALES  06/13/2024

## 2024-06-14 LAB
AMPHETAMINES UR QL SCN: NEGATIVE NG/ML
BARBITURATES UR QL SCN: POSITIVE NG/ML
BENZODIAZ UR QL SCN: NEGATIVE NG/ML
BUN SERPL-MCNC: 14 MG/DL (ref 6–24)
BUN/CREAT SERPL: 19 (ref 9–23)
BZE UR QL SCN: NEGATIVE NG/ML
CALCIUM SERPL-MCNC: 10.3 MG/DL (ref 8.7–10.2)
CANNABINOIDS UR QL SCN: POSITIVE NG/ML
CHLORIDE SERPL-SCNC: 100 MMOL/L (ref 96–106)
CO2 SERPL-SCNC: 26 MMOL/L (ref 20–29)
CREAT SERPL-MCNC: 0.75 MG/DL (ref 0.57–1)
CREAT UR-MCNC: 138.2 MG/DL (ref 20–300)
EGFRCR SERPLBLD CKD-EPI 2021: 93 ML/MIN/1.73
GLUCOSE SERPL-MCNC: 114 MG/DL (ref 70–99)
INSULIN SERPL-ACNC: 18.3 UIU/ML (ref 2.6–24.9)
LABORATORY COMMENT REPORT: ABNORMAL
METHADONE UR QL SCN: NEGATIVE NG/ML
OPIATES UR QL SCN: NEGATIVE NG/ML
OXYCODONE+OXYMORPHONE UR QL SCN: NEGATIVE NG/ML
PCP UR QL: NEGATIVE NG/ML
PH UR: 5.5 [PH] (ref 4.5–8.9)
POTASSIUM SERPL-SCNC: 4.9 MMOL/L (ref 3.5–5.2)
PROPOXYPH UR QL SCN: NEGATIVE NG/ML
SODIUM SERPL-SCNC: 140 MMOL/L (ref 134–144)

## 2024-06-18 ENCOUNTER — TREATMENT (OUTPATIENT)
Dept: PHYSICAL THERAPY | Facility: CLINIC | Age: 54
End: 2024-06-18
Payer: MEDICARE

## 2024-06-18 DIAGNOSIS — R26.89 BALANCE DISORDER: Primary | ICD-10-CM

## 2024-06-18 NOTE — PROGRESS NOTES
Physical Therapy Initial Evaluation and Plan of Care     [facility]          [address]     Patient: Loreta Platt States   : 1970  Diagnosis/ICD-10 Code:  Balance disorder [R26.89]  Referring practitioner: MAINOR Osei  Date of Initial Visit: 2024  Today's Date: 2024  Patient seen for 1 sessions    Subjective:     TU.81 sec  10 M: reg 11.61 sec 9.34 sec fast  FGA:   PEREIRA/56       Subjective Evaluation    History of Present Illness  Mechanism of injury: Patient stated her MD wanted her to seek balance therapy. She has a history of a L hip replacement and moderately severe bilateral knee OA and currently in process of weight loss to be able to get R hip replaced.  Pt states she has a tendency to have LOB leaning usually to the right. She last fell a few weeks ago when she fell over a car seperator in a parking lot and she feels clumsy/unsteady. Reports she usually has LOB during reaching outside VICKIE and tripping. R hand tremors. 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 Pain  Current pain ratin (B knee pain)    Social Support  Lives in: apartment (has fallen getting in/out tub, no stairs to enter.)  Lives with: alone    Treatments  Previous treatment: physical therapy  Patient Goals  Patient goals for therapy: improved balance             Objective          Neurological Testing     Sensation     Hip   Left Hip   Intact: light touch    Right Hip   Intact: light touch    Knee   Left Knee   Intact: Light touch    Right Knee   Intact: light touch     Ankle/Foot   Left Ankle/Foot   Intact: light touch and proprioception    Right Ankle/Foot   Intact: light touch and proprioception     Additional Neurological Details  Dec coordination during rapid alternating supination/pronation,     Passive Range of Motion     Additional Passive Range  "of Motion Details  L hip flexor tightness    Strength/Myotome Testing     Left Hip   Planes of Motion   Flexion: 4  Abduction: 3-  Adduction: 2-    Right Hip   Planes of Motion   Flexion: 3+  Abduction: 3+  Adduction: 2-    Left Knee   Flexion: 4  Extension: 5    Right Knee   Flexion: 4-  Extension: 5    Left Ankle/Foot   Dorsiflexion: 4-    Right Ankle/Foot   Dorsiflexion: 3+    Additional Strength Details  Bilateral hamstring tightness    Tests     Additional Tests Details  Leg length discrepancy, trialed 1 layer heel lift.- decreased R pelvic drop and decreased \"waddling\" during gait    Ambulation     Ambulation: Level Surfaces     Additional Level Surfaces Ambulation Details  Increases lateral weight shifting vs forward propulsion, decreased overall B knee ROM in stance and swing, decreased hip extension L>R, strikes at foot flat mando on R    Ambulation: Stairs   Ascend stairs: contact guard assist  Pattern: reciprocal  Railings: two rails  Descend stairs: contact guard assist  Pattern: reciprocal  Railings: two rails        PT Neuro       Exercise 1  Exercise Name 1: (P) modified tandem in corner, with chair support in front, static holds of 1 min ea LE  Exercise 1 Completed: (P) Yes  Exercise 1 Home Program: (P) Yes         Functional Testing  Functional Tests Options: (P) Myles Balance, Timed Up and Go (TUG), 10 Meter Walk, FGA (Functional Gait Assessment)     Assessment & Plan       Assessment  Impairments: abnormal coordination, abnormal gait, abnormal or restricted ROM, impaired balance, impaired physical strength, lacks appropriate home exercise program and pain with function   Functional limitations: carrying objects, lifting, walking, pulling, pushing, uncomfortable because of pain, moving in bed, standing and unable to perform repetitive tasks   Assessment details: Pt referred by MD for history of falls and balance deficits. She has a history of a L hip replacement and moderately severe bilateral knee OA " and currently in process of weight loss to be able to get R hip replaced. Pt states she has a tendency to have LOB leaning usually to the right, but lost balance to either side during activities w/NBOS. She lives alone in a zero-entry apartment and is a part time caregiver M/W/F. LLD found during objective exam, successful trial of 1/8 in heel lift with dec R pelvic drop during gait. Orthopedic issues suggested to be main contributor of balance deficits evidenced by strength differences in ea LE, sudden LOB to either side, improved gait with heel lift trial, subjective history, and wide VICKIE. Skilled PT services required to address deficits in anticipatory balance, gait, strength, and overall functional mobility.  Prognosis: fair    Goals  Plan Goals: STG (4 visits)  1. Patient to improve PEREIRA balance score to >/= 42 /56 to decrease client's risk of falls.  2. Patient to perform TUG within 10 sec without LOB for improved functional mobility.  3. Patient to ambulate 10 meters without AD within 9 sec without LOB for improved gait meredith and functional mobility.  4. Patient to improve FGA score to >/= 15 /30 to decrease client's risk of falls.  5. Patient to ascend/descend flight of stairs using only 1 HR and step-to patterns, improve strength and functional mobility.    LTG (4 visits)  1. Patient to improve PEREIRA balance score to >/= 45 /56 to decrease client's risk of falls.  2. Patient to perform TUG within 9 sec without LOB for improved functional mobility.  3. Patient to ambulate 10 meters without AD within 8 sec without LOB for improved gait meredith and functional mobility.  4. Patient to improve FGA score to >/= 20 /30 to decrease client's risk of falls.  5. Patient to ascend/descend flight of stairs using only 1 HR and reciprocal patterns, to improve strength and functional mobility.  6. Patient to be I with HEP.     Plan  Therapy options: will be seen for skilled therapy services  Planned modality  interventions: cryotherapy  Planned therapy interventions: abdominal trunk stabilization, balance/weight-bearing training, body mechanics training, fine motor coordination training, flexibility, functional ROM exercises, gait training, home exercise program, motor coordination training, neuromuscular re-education, soft tissue mobilization, strengthening, stretching and therapeutic activities  Frequency: 1x week  Duration in visits: 8  Treatment plan discussed with: patient  Plan details: Pt to benefit from skilled PT services to improve gait, balance, strength, and overall functional mobility.         Timed:  Manual Therapy:    0     mins  52049;  Therapeutic Exercise:    0     mins  72916;     Neuromuscular Ciaran:    8    mins  89750;    Therapeutic Activity:     0     mins  93600;     Gait Trainin     mins  53463;     Electrical Stimulation:    0     mins  91630 ( );    Untimed:  Canalith Repositioning    0     mins 35140    Timed Treatment:   8   mins   Total Treatment:     45   mins    Gardenia Iglesias Physical Therapist Student completed treatment under my direct supervision.     2024    PT SIGNATURE: Enid Rudd, PT, DPT, MSCS, CDP, CSRS  [place] License #[id number]  DATE TREATMENT INITIATED: 2024      Initial Certification Certification Period: 2024thru9/15/2024  I certify that the therapy services are furnished while this patient is under my care.  The services outlined above are required by this patient, and will be reviewed every 90 days.     PHYSICIAN: Gary Leonard MBBS  NPI: [id number]                                         DATE:     Please sign and return via fax to [phone].   Thank you,   [facility].

## 2024-06-21 ENCOUNTER — TELEPHONE (OUTPATIENT)
Dept: BARIATRICS/WEIGHT MGMT | Facility: CLINIC | Age: 54
End: 2024-06-21
Payer: MEDICARE

## 2024-06-21 NOTE — TELEPHONE ENCOUNTER
Patient called stating that she received the results of her UDS and noticed that she was positive for barbiturates.  She wanted to make sure that we knew she was taking primidone and that it could cause a positive result.

## 2024-06-25 ENCOUNTER — TELEPHONE (OUTPATIENT)
Dept: ORTHOPEDICS | Facility: OTHER | Age: 54
End: 2024-06-25
Payer: MEDICARE

## 2024-06-25 NOTE — TELEPHONE ENCOUNTER
PATIENT HAS DECIDED SHE DOES NOT WANT TO COME TO PHYSICAL THERAPY ANY MORE. CANCELLED ALL APPOINTMENTS AT THIS TIME.

## 2024-06-26 PROBLEM — R73.02 IMPAIRED GLUCOSE TOLERANCE: Status: RESOLVED | Noted: 2020-08-13 | Resolved: 2024-06-26

## 2024-06-26 PROBLEM — E66.9 OBESITY: Status: RESOLVED | Noted: 2023-06-01 | Resolved: 2024-06-26

## 2024-06-26 PROBLEM — E88.819 INSULIN RESISTANCE: Status: ACTIVE | Noted: 2024-06-26

## 2024-06-26 NOTE — PROGRESS NOTES
[facility]  [address]  [place] [address]     Office Note      Date: 2024  Patient Name: Loreta Robertson  MRN: [id number]  : 1970    Subjective     Chief Complaint  Obesity Management follow-up          Loreta Robertson presents to [facility] for obesity management.   Patient is unsatisfied with weight loss progress. Appetite is poorly controlled.  The patient is taking multivitamin and is not taking fish oil.  The patient is not using a food journal.  This is a 2 week follow up visit and patient has seen Giovanni Delgado at her initial intake visit.  Has been eating nonstop since last visit, has gained 8 lbs in last 2 weeks. She has been admittedly been eating late . Under lots of emotional stress.     Diet recall:    Breakfast: none  Snack: none  Lunch: none  Snack: none  Dinner: zaxbys fried chicken murphy salad (9 pm)  Snack: chips  Typical eating pattern for the last several months     Beverages: water    The patient is exercising, has started walking about 1/2 mile.  with a FITT score of:    Frequency Intensity Time Strength Training   []   0, none []   0 []   0 [x]   0   [x]   1 (1-2x/week) [x]   1 (light) []   1 (<10 min) []   1 (1x/week)   []   2 (3-5x/week) []   2 (moderate) []   2 (10-20 min) []   2 (2x/week)   []   3 (daily) []   3 (moderately hard)  []   4 (very hard) [x]   3 (20-30 min)  []   4 (>30 min) []   3 (3-4x/week)       Review of Systems   Constitutional:  Positive for appetite change, fatigue and unexpected weight gain.   Eyes:  Negative for visual disturbance.   Cardiovascular:  Negative for chest pain and palpitations.   Gastrointestinal:  Positive for constipation and diarrhea. Negative for abdominal pain and indigestion.   Neurological:  Negative for light-headedness.   Psychiatric/Behavioral:  Positive for sleep disturbance and depressed mood.          Current Outpatient Medications:      albuterol sulfate  (90 Base) MCG/ACT inhaler, Inhale 2 puffs Every 4 (Four) Hours As Needed for Wheezing., Disp: 8 g, Rfl: 0    atorvastatin (LIPITOR) 40 MG tablet, Take 1 tablet by mouth Daily., Disp: 90 tablet, Rfl: 0    Budeson-Glycopyrrol-Formoterol (BREZTRI) 160-9-4.8 MCG/ACT aerosol inhaler, Inhale 2 puffs 2 (Two) Times a Day., Disp: 32.1 each, Rfl: 1    buPROPion XL (WELLBUTRIN XL) 150 MG 24 hr tablet, Take 1 tablet by mouth Every Morning., Disp: , Rfl:     busPIRone (BUSPAR) 10 MG tablet, Take 1 tablet by mouth 2 (Two) Times a Day., Disp: , Rfl:     divalproex (DEPAKOTE ER) 500 MG 24 hr tablet, Take 1 tablet by mouth Every Night., Disp: , Rfl:     hyoscyamine (LEVSIN) 0.125 MG SL tablet, Take 1 tablet by mouth Every 4 (Four) Hours As Needed (abdominal pain)., Disp: 20 tablet, Rfl: 0    losartan (COZAAR) 25 MG tablet, TAKE 1 TABLET BY MOUTH DAILY, Disp: 90 tablet, Rfl: 0    Pediatric Multivitamins-Iron (multivitamin pediatric chewable) chewable tablet, Chew Daily., Disp: , Rfl:     prazosin (MINIPRESS) 1 MG capsule, Take 1 capsule by mouth., Disp: , Rfl:     prazosin (MINIPRESS) 5 MG capsule, Take 1 capsule by mouth Every Night., Disp: , Rfl: 1    primidone (MYSOLINE) 50 MG tablet, Take 1 tablet by mouth 3 (Three) Times a Day., Disp: , Rfl:     promethazine (PHENERGAN) 25 MG tablet, Take 1 tablet by mouth Every 6 (Six) Hours As Needed for Nausea or Vomiting., Disp: 20 tablet, Rfl: 0    QUEtiapine (SEROquel) 100 MG tablet, 1 tablet., Disp: , Rfl:     tiZANidine (ZANAFLEX) 4 MG tablet, TAKE 1 TABLET BY MOUTH EVERY 8 HOURS AS NEEDED FOR MUSCLE SPASMS, Disp: 60 tablet, Rfl: 1    Vraylar 3 MG capsule capsule, Take  by mouth., Disp: , Rfl:     albuterol (PROVENTIL) (2.5 MG/3ML) 0.083% nebulizer solution, Take 2.5 mg by nebulization Every 4 (Four) Hours As Needed for Wheezing. (Patient not taking: Reported on 6/27/2024), Disp: 90 mL, Rfl: 12    ammonium lactate (AMLACTIN) 12 % cream, , Disp: , Rfl:      "LORazepam (Ativan) 0.5 MG tablet, Take one tablet by mouth once prior; may repeat in 30 minutes., Disp: 2 tablet, Rfl: 0    metFORMIN ER (GLUCOPHAGE-XR) 500 MG 24 hr tablet, Take 1 tablet by mouth Daily With Dinner for 7 days, THEN 2 tablets Daily With Dinner for 23 days., Disp: 53 tablet, Rfl: 0    phentermine (ADIPEX-P) 37.5 MG tablet, Take 1 tablet by mouth Daily Before Lunch. Decongestants should be avoided while taking this medication., Disp: 30 tablet, Rfl: 0    Objective   Start weight: 280.4 pounds.    Total Loss lb/%Loss of beginning body weight (BBW): +7.4lb/+2.6%  Change in weight since last visit: +7.4    Recent Weight History:   Wt Readings from Last 6 Encounters:   06/27/24 131 kg (287 lb 12.8 oz)   06/13/24 127 kg (280 lb 6.4 oz)   05/30/24 125 kg (275 lb 9.2 oz)   05/09/24 125 kg (276 lb 6.4 oz)   03/11/24 121 kg (267 lb)   03/07/24 124 kg (273 lb 6.4 oz)       Body mass index is 47.16 kg/m².   Body composition analysis completed and showed:   Body Fat %: 54.0    Measurements (in inches)  Waist Circumference: 57      Vital Signs:   /78 (BP Location: Right arm, Patient Position: Sitting)   Pulse 74   Ht 166.4 cm (65.5\")   Wt 131 kg (287 lb 12.8 oz)   BMI 47.16 kg/m²     Physical Exam   General appears stated age and normal appearance   HEENT PERRLA, EOM intact, and conjunctivae normal   Chest/lungs Normal rate, Regular rhythm, Breathing is unlabored, and Clear to auscultation bilaterally   Extremities nonpitting edema   Neuro Good historian and No focal deficit   Skin Warm, dry, intact   Psych normal behavior, normal thought content, and normal concentration       Common labs          5/9/2024    11:40 5/9/2024    11:44 6/13/2024    11:00   Common Labs   Glucose 102   114    BUN 13   14    Creatinine 0.82   0.75    Sodium 144   140    Potassium 5.0   4.9    Chloride 105   100    Calcium 9.6   10.3    Albumin 4.5      Total Bilirubin 0.2      Alkaline Phosphatase 102      AST (SGOT) 16    "   ALT (SGPT) 19      WBC 6.92      Hemoglobin 13.8      Hematocrit 41.2      Platelets 290      Total Cholesterol 141      Triglycerides 103      HDL Cholesterol 67      LDL Cholesterol  55      Hemoglobin A1C  5.6       TSH          5/9/2024    11:40   TSH   TSH 2.550               Assessment / Plan        Diagnoses and all orders for this visit:    1. Class 3 severe obesity with serious comorbidity and body mass index (BMI) of 45.0 to 49.9 in adult, unspecified obesity type (Primary)  Overview:  Start weight: (280 lb): 1st goal (10% weight loss): 252 lb  Target Goal: 165-192 lb    Current medication prescribed by [facility]: Metformin, P37  Current meds: Wellbutrin 150 qd  Rx Options: AOM excluded; Metformin, compound, + Naltrexone, P37 (success historically)/Top  Rx Caution:     Comorbid conditions: HTN, HLD, Insulin resistance  Food: emotional eater, late night eating  Exercise:     Pancreatitis: no  Glaucoma: no  Headaches: no  HTN: yes - controlled with meds  Heart palpitations: no  Thyroid C cell cancer personal or family hx: no  MEN syndrome personal or family hx: no  Nephrolithiasis: no    Calories 0414-9760  Protein    Water 100 oz      Assessment & Plan:  Patient's (Body mass index is 47.16 kg/m².) indicates that they are morbidly/severely obese (BMI > 40 or > 35 with obesity - related health condition) with health conditions that include hypertension and dyslipidemias . Weight is worsening. BMI  is above average; BMI management plan is completed. We discussed portion control, increasing exercise, and an bridgette-based approach such as MobiTX Pal or Lose It.     I have instructed the patient to continue with pursuit of medical weight loss as a part of this program. Patient does meet criteria for use of anorectics at this time as BMI >30 , body fat percentage >30%, hyperlipidemia, and hypertension.     The current plan for this month includes:   - 2 week f/u visit and she has gained 7 lbs since last visit  about 2 weeks ago with Giovanni CABA  - Labs reviewed revealing insulin resistance, elevated Ca level  - Adjust exercise as discussed  - Continue to work on lifestyle behavioral changes  - Adjust macronutrients as discussed. Bring food journal to next visit for review  - Start Phentermine 37.5 mg daily take 30-60 minutes prior to lunch. I/R/B/SE discussed and per AVS, monitor Hr and BP. UDS/Bashir reviewed  - Start Metformin 500 mg bid for insulin resistance, I/R/B/SE discussed  - Treatment goal 165-192 lb    Orders:  -     metFORMIN ER (GLUCOPHAGE-XR) 500 MG 24 hr tablet; Take 1 tablet by mouth Daily With Dinner for 7 days, THEN 2 tablets Daily With Dinner for 23 days.  Dispense: 53 tablet; Refill: 0  -     phentermine (ADIPEX-P) 37.5 MG tablet; Take 1 tablet by mouth Daily Before Lunch. Decongestants should be avoided while taking this medication.  Dispense: 30 tablet; Refill: 0    2. Primary hypertension  Assessment & Plan:  Hypertension is stable and controlled  Continue current treatment regimen.  Weight loss.  Regular aerobic exercise.  Blood pressure will be reassessed  at next scheduled visit .      3. Other hyperlipidemia  Assessment & Plan:   Lipid abnormalities are stable    Plan:  Continue same medication/s without change.      Advised patient to exercise for 150 minutes weekly. (30 minute brisk walk, 5 days a week for example)  Weight Loss encouraged    Patient Treatment Goals:   LDL goal is under 100    Followup in 1 year.      4. Insulin resistance  Overview:  DERIK IR 5.2    Assessment & Plan:  Low carb diet, regular physical activity, and weight reduction of 10-15%. Start Metformin 500 mg bid, start with 1 tablet at lunch for first week and increase to 2 tablets after. Report any unwarranted SE.       Orders:  -     metFORMIN ER (GLUCOPHAGE-XR) 500 MG 24 hr tablet; Take 1 tablet by mouth Daily With Dinner for 7 days, THEN 2 tablets Daily With Dinner for 23 days.  Dispense: 53 tablet; Refill:  0    5. Bipolar 1 disorder, manic, mild  Assessment & Plan:  PHQ-9 score 20 last visit, she is seeing therapist on medications            Follow Up   Return in about 1 month (around 7/27/2024).  Patient was given instructions and counseling regarding her condition or for health maintenance advice. Please see specific information pulled into the AVS if appropriate.     Edith Solo, APRN  06/27/2024

## 2024-06-27 ENCOUNTER — OFFICE VISIT (OUTPATIENT)
Dept: BARIATRICS/WEIGHT MGMT | Facility: CLINIC | Age: 54
End: 2024-06-27
Payer: MEDICARE

## 2024-06-27 VITALS
HEIGHT: 66 IN | WEIGHT: 287.8 LBS | HEART RATE: 74 BPM | BODY MASS INDEX: 46.25 KG/M2 | SYSTOLIC BLOOD PRESSURE: 116 MMHG | DIASTOLIC BLOOD PRESSURE: 78 MMHG

## 2024-06-27 DIAGNOSIS — I10 PRIMARY HYPERTENSION: ICD-10-CM

## 2024-06-27 DIAGNOSIS — F31.11 BIPOLAR 1 DISORDER, MANIC, MILD: ICD-10-CM

## 2024-06-27 DIAGNOSIS — E88.819 INSULIN RESISTANCE: ICD-10-CM

## 2024-06-27 DIAGNOSIS — E66.01 CLASS 3 SEVERE OBESITY WITH SERIOUS COMORBIDITY AND BODY MASS INDEX (BMI) OF 45.0 TO 49.9 IN ADULT, UNSPECIFIED OBESITY TYPE: Primary | ICD-10-CM

## 2024-06-27 DIAGNOSIS — E78.49 OTHER HYPERLIPIDEMIA: ICD-10-CM

## 2024-06-27 PROCEDURE — 1160F RVW MEDS BY RX/DR IN RCRD: CPT

## 2024-06-27 PROCEDURE — 3074F SYST BP LT 130 MM HG: CPT

## 2024-06-27 PROCEDURE — 1159F MED LIST DOCD IN RCRD: CPT

## 2024-06-27 PROCEDURE — 99214 OFFICE O/P EST MOD 30 MIN: CPT

## 2024-06-27 PROCEDURE — 3078F DIAST BP <80 MM HG: CPT

## 2024-06-27 RX ORDER — METFORMIN HYDROCHLORIDE 500 MG/1
TABLET, EXTENDED RELEASE ORAL
Qty: 53 TABLET | Refills: 0 | Status: SHIPPED | OUTPATIENT
Start: 2024-06-27 | End: 2024-07-27

## 2024-06-27 RX ORDER — PHENTERMINE HYDROCHLORIDE 37.5 MG/1
37.5 TABLET ORAL
Qty: 30 TABLET | Refills: 0 | Status: SHIPPED | OUTPATIENT
Start: 2024-06-27

## 2024-06-27 NOTE — PATIENT INSTRUCTIONS
Phentermine Capsules or Tablets  What is this medication?  PHENTERMINE (FEN ter meen) promotes weight loss. It works by decreasing appetite. It is often used for a short period of time. Changes to diet and exercise are often combined with this medication.  This medicine may be used for other purposes; ask your health care provider or pharmacist if you have questions.  COMMON BRAND NAME(S): Adipex-P, Atti-Plex P, Atti-Plex P Spansule, Fastin, Lomaira, Pro-Fast, Pro-Fast HS, Pro-Fast SA, Stephanie-8  What should I tell my care team before I take this medication?  They need to know if you have any of these conditions:  Agitation or nervousness  Diabetes  Glaucoma  Heart disease  High blood pressure  History of substance use disorder  History of stroke  Kidney disease  Lung disease called Primary Pulmonary Hypertension (PPH)  Taken an MAOI, such as Carbex, Eldepryl, Marplan, Nardil, or Parnate in last 14 days  Taking stimulant medications for attention disorders, weight loss, or to stay awake  Thyroid disease  An unusual or allergic reaction to phentermine, other medications, foods, dyes, or preservatives  Pregnant or trying to get pregnant  Breastfeeding  How should I use this medication?  Take this medication by mouth with a glass of water. Follow the directions on the prescription label. Take your medication at regular intervals. Do not take it more often than directed. Do not stop taking except on your care team's advice.  Talk to your care team about the use of this medication in children. While this medication may be prescribed for children 17 years or older for selected conditions, precautions do apply.  Overdosage: If you think you have taken too much of this medicine contact a poison control center or emergency room at once.  NOTE: This medicine is only for you. Do not share this medicine with others.  What if I miss a dose?  If you miss a dose, take it as soon as you can. If it is almost time for your next dose,  take only that dose. Do not take double or extra doses.  What may interact with this medication?  Do not take this medication with any of the following:  MAOIs, such as Carbex, Eldepryl, Marplan, Nardil, and Parnate  This medication may also interact with the following:  Alcohol  Certain medications for depression, anxiety, or other mental health conditions  Certain medications for blood pressure  Linezolid  Medications for colds or breathing difficulties, such as pseudoephedrine or phenylephrine  Medications for diabetes  Sibutramine  Stimulant medications for ADHD, weight loss, or staying awake  This list may not describe all possible interactions. Give your health care provider a list of all the medicines, herbs, non-prescription drugs, or dietary supplements you use. Also tell them if you smoke, drink alcohol, or use illegal drugs. Some items may interact with your medicine.  What should I watch for while using this medication?  Visit your care team for regular checks on your progress.  Do not stop taking except on your care team's advice. You may develop a severe reaction. Your care team will tell you how much medication to take.  Do not take this medication close to bedtime. It may prevent you from sleeping.  This medication may affect your coordination, reaction time, or judgment. Do not drive or operate machinery until you know how this medication affects you. Sit up or stand slowly to reduce the risk of dizzy or fainting spells. Drinking alcohol with this medication can increase the risk of these side effects.  This medication may affect blood sugar levels. Ask your care team if changes in diet or medications are needed if you have diabetes.  Inform your care team if you wish to become pregnant or think you might be pregnant. Losing weight while pregnant is not advised and may cause harm to the unborn child. Talk to your care team for more information.  What side effects may I notice from receiving this  medication?  Side effects that you should report to your care team as soon as possible:  Allergic reactions--skin rash, itching, hives, swelling of the face, lips, tongue, or throat  Heart valve disease--shortness of breath, chest pain, unusual weakness or fatigue, dizziness, feeling faint or lightheaded, fever, sudden weight gain, fast or irregular heartbeat  Pulmonary hypertension--shortness of breath, chest pain, fast or irregular heartbeat, feeling faint or lightheaded, fatigue, swelling of the ankles or feet  Side effects that usually do not require medical attention (report to your care team if they continue or are bothersome):  Change in taste  Diarrhea  Dizziness  Dry mouth  Restlessness  Trouble sleeping  This list may not describe all possible side effects. Call your doctor for medical advice about side effects. You may report side effects to FDA at 8-393-GXH-2545.  Where should I keep my medication?  Keep out of the reach of children. This medication can be abused. Keep your medication in a safe place to protect it from theft. Do not share this medication with anyone. Selling or giving away this medication is dangerous and against the law.  This medication may cause harm and death if it is taken by other adults, children, or pets. Return medication that has not been used to an official disposal site. Contact the [facility] at [phone] or your [facility] government to find a site. If you cannot return the medication, mix any unused medication with a substance like cat litter or coffee grounds. Then throw the medication away in a sealed container like a sealed bag or coffee can with a lid. Do not use the medication after the expiration date.  Store at room temperature between 20 and 25 degrees C (68 and 77 degrees F). Keep container tightly closed.  NOTE: This sheet is a summary. It may not cover all possible information. If you have questions about this medicine, talk to your doctor, pharmacist, or health  care provider. Metformin Extended-Release Tablets  What is this medication?  METFORMIN (met FOR min) treats type 2 diabetes. It controls blood sugar (glucose) and helps your body use insulin effectively. This medication is often combined with changes to diet and exercise.  This medicine may be used for other purposes; ask your health care provider or pharmacist if you have questions.  COMMON BRAND NAME(S): Fortamet, Glucophage XR, Glumetza  What should I tell my care team before I take this medication?  They need to know if you have any of these conditions:  Anemia  Dehydration  Frequently drink alcohol  Heart disease  Kidney disease  Liver disease  Polycystic ovary syndrome  Serious infection or injury  Vomiting  An unusual or allergic reaction to metformin, other medications, foods, dyes, or preservatives  Pregnant or trying to get pregnant  Breastfeeding  How should I use this medication?  Take this medication by mouth with water. Take it as directed on the prescription label at the same time every day. Take it with food at the start of a meal. Do not cut, crush, or chew this medication. Swallow the tablets whole. Keep taking it unless your care team tells you to stop.  Talk to your care team about the use of this medication in children. Special care may be needed.  Overdosage: If you think you have taken too much of this medicine contact a poison control center or emergency room at once.  NOTE: This medicine is only for you. Do not share this medicine with others.  What if I miss a dose?  If you miss a dose, take it as soon as you can. If it is almost time for your next dose, take only that dose. Do not take double or extra doses.  What may interact with this medication?  Do not take this medication with any of the following:  Certain contrast medications given before X-rays, CT scans, MRI, or other procedures  Dofetilide  This medication may also interact with the following:  Acetazolamide  Alcohol  Certain  antivirals for HIV or hepatitis  Certain medications for blood pressure, heart disease, irregular heart beat  Cimetidine  Dichlorphenamide  Digoxin  Diuretics  Estrogen or progestin hormones  Glycopyrrolate  Isoniazid  Lamotrigine  Memantine  Methazolamide  Metoclopramide  Midodrine  Niacin  Phenothiazines, such as chlorpromazine, mesoridazine, prochlorperazine, thioridazine  Phenytoin  Ranolazine  Steroid medications, such as prednisone or cortisone  Stimulant medications for attention disorders, weight loss, or to stay awake  Thyroid medications  Topiramate  Trospium  Vandetanib  Zonisamide  This list may not describe all possible interactions. Give your health care provider a list of all the medicines, herbs, non-prescription drugs, or dietary supplements you use. Also tell them if you smoke, drink alcohol, or use illegal drugs. Some items may interact with your medicine.  What should I watch for while using this medication?  Visit your care team for regular checks on your progress.  A test called the HbA1C (A1C) will be monitored. This is a simple blood test. It measures your blood sugar control over the last 2 to 3 months. You will receive this test every 3 to 6 months.  Using this medication with insulin or a sulfonylurea may increase your risk of hypoglycemia. Learn how to check your blood sugar. Learn the symptoms of low and high blood sugar and how to manage them.  Always carry a quick-source of sugar with you in case you have symptoms of low blood sugar. Examples include hard sugar candy or glucose tablets. Make sure others know that you can choke if you eat or drink when you develop serious symptoms of low blood sugar, such as seizures or unconsciousness. They must get medical help at once.  Tell your care team if you have high blood sugar. You might need to change the dose of your medication. If you are sick or exercising more than usual, you might need to change the dose of your medication.  Do not skip  meals. Ask your care team if you should avoid alcohol. Many nonprescription cough and cold products contain sugar or alcohol. These can affect blood sugar.  This medication may cause you to ovulate, which may increase your chances of becoming pregnant. Talk with your care team about contraception while you are taking this medication. Contact your care team if you think you might be pregnant.  The tablet shell for some brands of this medication does not dissolve. This is normal. The tablet shell may appear whole in the stool. This is not a cause for concern.  If you are going to need surgery, an MRI, CT scan, or other procedure, tell your health care provider that you are taking this medication. You may need to stop taking this medication before the procedure.  Wear a medical ID bracelet or chain. Carry a card that describes your condition. List the medications and doses you take on the card.  This medication may cause a decrease in folic acid and vitamin B12. You should make sure that you get enough vitamins while you are taking this medication. Discuss the foods you eat and the vitamins you take with your care team.  What side effects may I notice from receiving this medication?  Side effects that you should report to your care team as soon as possible:  Allergic reactions--skin rash, itching, hives, swelling of the face, lips, tongue, or throat  High lactic acid level--muscle pain or cramps, stomach pain, trouble breathing, general discomfort or fatigue  Low vitamin B12 level--pain, tingling, or numbness in the hands or feet, muscle weakness, dizziness, confusion, difficulty concentrating  Side effects that usually do not require medical attention (report to your care team if they continue or are bothersome):  Diarrhea  Gas  Headache  Metallic taste in mouth  Nausea  This list may not describe all possible side effects. Call your doctor for medical advice about side effects. You may report side effects to FDA at  1-800-FDA-1088.  Where should I keep my medication?  Keep out of the reach of children and pets.  Store between 15 and 30 degrees C (59 and 86 degrees F). Protect from light. Get rid of any unused medication after the expiration date.  To get rid of medications that are no longer needed or :  Take the medication to a medication take-back program. Check with your pharmacy or law enforcement to find a location.  If you cannot return the medication, check the label or package insert to see if the medication should be thrown out in the garbage or flushed down the toilet. If you are not sure, ask your care team. If it is safe to put in the trash, empty the medication out of the container. Mix the medication with cat litter, dirt, coffee grounds, or other unwanted substance. Seal the mixture in a bag or container. Put it in the trash.  NOTE: This sheet is a summary. It may not cover all possible information. If you have questions about this medicine, talk to your doctor, pharmacist, or health care provider.

## 2024-06-27 NOTE — ASSESSMENT & PLAN NOTE
Low carb diet, regular physical activity, and weight reduction of 10-15%. Start Metformin 500 mg bid, start with 1 tablet at lunch for first week and increase to 2 tablets after. Report any unwarranted SE.

## 2024-06-27 NOTE — ASSESSMENT & PLAN NOTE
Hypertension is stable and controlled  Continue current treatment regimen.  Weight loss.  Regular aerobic exercise.  Blood pressure will be reassessed  at next scheduled visit .

## 2024-06-27 NOTE — ASSESSMENT & PLAN NOTE
Lipid abnormalities are stable    Plan:  Continue same medication/s without change.      Advised patient to exercise for 150 minutes weekly. (30 minute brisk walk, 5 days a week for example)  Weight Loss encouraged    Patient Treatment Goals:   LDL goal is under 100    Followup in 1 year.

## 2024-06-27 NOTE — ASSESSMENT & PLAN NOTE
Patient's (Body mass index is 47.16 kg/m².) indicates that they are morbidly/severely obese (BMI > 40 or > 35 with obesity - related health condition) with health conditions that include hypertension and dyslipidemias . Weight is worsening. BMI  is above average; BMI management plan is completed. We discussed portion control, increasing exercise, and an bridgette-based approach such as [employer] Pal or Lose It.     I have instructed the patient to continue with pursuit of medical weight loss as a part of this program. Patient does meet criteria for use of anorectics at this time as BMI >30 , body fat percentage >30%, hyperlipidemia, and hypertension.     The current plan for this month includes:   - 2 week f/u visit and she has gained 7 lbs since last visit about 2 weeks ago with Giovanni CABA  - Labs reviewed revealing insulin resistance, elevated Ca level  - Adjust exercise as discussed  - Continue to work on lifestyle behavioral changes  - Adjust macronutrients as discussed. Bring food journal to next visit for review  - Start Phentermine 37.5 mg daily take 30-60 minutes prior to lunch. I/R/B/SE discussed and per AVS, monitor Hr and BP. AUSTIN/Bashir reviewed  - Start Metformin 500 mg bid for insulin resistance, I/R/B/SE discussed  - Treatment goal 165-192 lb

## 2024-07-01 ENCOUNTER — OFFICE VISIT (OUTPATIENT)
Dept: ORTHOPEDIC SURGERY | Facility: CLINIC | Age: 54
End: 2024-07-01
Payer: MEDICARE

## 2024-07-01 VITALS
BODY MASS INDEX: 45.96 KG/M2 | HEIGHT: 66 IN | SYSTOLIC BLOOD PRESSURE: 130 MMHG | WEIGHT: 286 LBS | DIASTOLIC BLOOD PRESSURE: 85 MMHG

## 2024-07-01 DIAGNOSIS — Z09 POSTOPERATIVE EXAMINATION: ICD-10-CM

## 2024-07-01 DIAGNOSIS — E66.01 CLASS 3 SEVERE OBESITY DUE TO EXCESS CALORIES WITHOUT SERIOUS COMORBIDITY WITH BODY MASS INDEX (BMI) OF 45.0 TO 49.9 IN ADULT: ICD-10-CM

## 2024-07-01 DIAGNOSIS — Z96.641 S/P HIP REPLACEMENT, RIGHT: Primary | ICD-10-CM

## 2024-07-01 PROCEDURE — 1159F MED LIST DOCD IN RCRD: CPT | Performed by: ORTHOPAEDIC SURGERY

## 2024-07-01 PROCEDURE — 1160F RVW MEDS BY RX/DR IN RCRD: CPT | Performed by: ORTHOPAEDIC SURGERY

## 2024-07-01 PROCEDURE — 3075F SYST BP GE 130 - 139MM HG: CPT | Performed by: ORTHOPAEDIC SURGERY

## 2024-07-01 PROCEDURE — 99212 OFFICE O/P EST SF 10 MIN: CPT | Performed by: ORTHOPAEDIC SURGERY

## 2024-07-01 PROCEDURE — 3079F DIAST BP 80-89 MM HG: CPT | Performed by: ORTHOPAEDIC SURGERY

## 2024-07-01 NOTE — PROGRESS NOTES
[facility]    Subjective     Chief Complaint   Patient presents with    Follow-up     9 months follow up- 1 year 1 month s/p Right modified anterior total hip arthroplasty (DOS 23).             HPI    It has been 9  month(s) since Ms. Robertson's last visit. She returns to clinic today for follow-up of right hip arthroplasty. The issue has been ongoing for 1 year(s). She rates her pain a 0/10 on the pain scale. Previous/current treatments: physical therapy.  100% improvement compared to preoperative symptoms.  Fully ambulatory without external aids.  Pleased with the results.    I have reviewed the following portions of the patient's history and agree with: History of Present Illness and Review of Systems    Patient Active Problem List   Diagnosis    Tobacco use    Bipolar 1 disorder, manic, mild    Class 3 severe obesity with serious comorbidity and body mass index (BMI) of 45.0 to 49.9 in adult    Other hyperlipidemia    HTN (hypertension)    Chronic obstructive pulmonary disease    Chronic right shoulder pain    Avascular necrosis of bone of right hip    Status post right hip replacement    Insulin resistance     Past Medical History:   Diagnosis Date    Anxiety     Bipolar 1 disorder     Cataract     Chronic bronchitis     Chronic constipation     Chronic pain     Colon polyp     COPD (chronic obstructive pulmonary disease)     Depression     Hip arthrosis     Hyperlipidemia     Hypertension     Knee swelling     Low back strain 24    Low vitamin D level     Obesity     Parkinsonism     DRUG INDUCED    Primary generalized (osteo)arthritis     PTSD (post-traumatic stress disorder)     RLS (restless legs syndrome)       Past Surgical History:   Procedure Laterality Date    ABDOMINOPLASTY      ANTERIOR AND POSTERIOR VAGINAL REPAIR      BREAST BIOPSY Right     US BIOPSY     SECTION      X 2    CHOLECYSTECTOMY      COLONOSCOPY N/A 2019    Procedure: COLONOSCOPY;   Surgeon: Jonathan Pablo MD;  Location:  [facility];  Service: Gastroenterology    HIP SURGERY  6/1/23    HYSTERECTOMY      abdominal hysterectomy    LIPOSUCTION ABDOMINAL  1998    TOTAL HIP ARTHROPLASTY Right 06/01/2023    Procedure: MODIFIED ANTERIOR TOTAL HIP ARTHROPLASTY - RIGHT;  Surgeon: Adrián Leyva MD;  Location:  [facility];  Service: Orthopedics;  Laterality: Right;      Family History   Problem Relation Age of Onset    Diabetes Father     Hypertension Father     Heart attack Father     Obesity Father     Breast cancer Mother 50    Diabetes Mother     Cancer Mother     Breast cancer Maternal Grandmother 40    Ovarian cancer Maternal Grandmother 40    Cervical cancer Maternal Grandmother     Cancer Maternal Grandfather     Lung cancer Maternal Aunt      Social History     Socioeconomic History    Marital status: Single   Tobacco Use    Smoking status: Every Day     Current packs/day: 1.00     Average packs/day: 1 pack/day for 46.5 years (46.5 ttl pk-yrs)     Types: Cigarettes     Start date: 1/1/1978     Passive exposure: Current    Smokeless tobacco: Never   Vaping Use    Vaping status: Never Used   Substance and Sexual Activity    Alcohol use: No    Drug use: Yes     Frequency: 4.0 times per week     Types: Marijuana     Comment: I dont consider my marijuana use as an abuse.    Sexual activity: Not Currently     Partners: Male     Birth control/protection: Hysterectomy      Current Outpatient Medications on File Prior to Visit   Medication Sig Dispense Refill    albuterol (PROVENTIL) (2.5 MG/3ML) 0.083% nebulizer solution Take 2.5 mg by nebulization Every 4 (Four) Hours As Needed for Wheezing. 90 mL 12    albuterol sulfate  (90 Base) MCG/ACT inhaler Inhale 2 puffs Every 4 (Four) Hours As Needed for Wheezing. 8 g 0    ammonium lactate (AMLACTIN) 12 % cream       atorvastatin (LIPITOR) 40 MG tablet Take 1 tablet by mouth Daily. 90 tablet 0    Budeson-Glycopyrrol-Formoterol (BREZTRI)  160-9-4.8 MCG/ACT aerosol inhaler Inhale 2 puffs 2 (Two) Times a Day. 32.1 each 1    buPROPion XL (WELLBUTRIN XL) 150 MG 24 hr tablet Take 1 tablet by mouth Every Morning.      busPIRone (BUSPAR) 10 MG tablet Take 1 tablet by mouth 2 (Two) Times a Day.      divalproex (DEPAKOTE ER) 500 MG 24 hr tablet Take 1 tablet by mouth Every Night.      hyoscyamine (LEVSIN) 0.125 MG SL tablet Take 1 tablet by mouth Every 4 (Four) Hours As Needed (abdominal pain). 20 tablet 0    LORazepam (Ativan) 0.5 MG tablet Take one tablet by mouth once prior; may repeat in 30 minutes. 2 tablet 0    losartan (COZAAR) 25 MG tablet TAKE 1 TABLET BY MOUTH DAILY 90 tablet 0    metFORMIN ER (GLUCOPHAGE-XR) 500 MG 24 hr tablet Take 1 tablet by mouth Daily With Dinner for 7 days, THEN 2 tablets Daily With Dinner for 23 days. 53 tablet 0    Pediatric Multivitamins-Iron (multivitamin pediatric chewable) chewable tablet Chew Daily.      phentermine (ADIPEX-P) 37.5 MG tablet Take 1 tablet by mouth Daily Before Lunch. Decongestants should be avoided while taking this medication. 30 tablet 0    prazosin (MINIPRESS) 1 MG capsule Take 1 capsule by mouth.      prazosin (MINIPRESS) 5 MG capsule Take 1 capsule by mouth Every Night.  1    primidone (MYSOLINE) 50 MG tablet Take 1 tablet by mouth 3 (Three) Times a Day.      promethazine (PHENERGAN) 25 MG tablet Take 1 tablet by mouth Every 6 (Six) Hours As Needed for Nausea or Vomiting. 20 tablet 0    QUEtiapine (SEROquel) 100 MG tablet 1 tablet.      tiZANidine (ZANAFLEX) 4 MG tablet TAKE 1 TABLET BY MOUTH EVERY 8 HOURS AS NEEDED FOR MUSCLE SPASMS 60 tablet 1    Vraylar 3 MG capsule capsule Take  by mouth.       No current facility-administered medications on file prior to visit.      No Known Allergies     Review of Systems   Constitutional:  Negative for activity change, appetite change, chills, diaphoresis, fatigue, fever and unexpected weight change.   HENT:  Negative for congestion, dental problem,  "drooling, ear discharge, ear pain, facial swelling, hearing loss, mouth sores, nosebleeds, postnasal drip, rhinorrhea, sinus pressure, sneezing, sore throat, tinnitus, trouble swallowing and voice change.    Eyes:  Negative for photophobia, pain, discharge, redness, itching and visual disturbance.   Respiratory:  Negative for apnea, cough, choking, chest tightness, shortness of breath, wheezing and stridor.    Cardiovascular:  Negative for chest pain, palpitations and leg swelling.   Gastrointestinal:  Negative for abdominal distention, abdominal pain, anal bleeding, blood in stool, constipation, diarrhea, nausea, rectal pain and vomiting.   Endocrine: Negative for cold intolerance, heat intolerance, polydipsia, polyphagia and polyuria.   Genitourinary:  Negative for decreased urine volume, difficulty urinating, dysuria, enuresis, flank pain, frequency, genital sores, hematuria and urgency.   Musculoskeletal:  Positive for arthralgias. Negative for back pain, gait problem, joint swelling, myalgias, neck pain and neck stiffness.   Skin:  Negative for color change, pallor, rash and wound.   Allergic/Immunologic: Negative for environmental allergies, food allergies and immunocompromised state.   Neurological:  Negative for dizziness, tremors, seizures, syncope, facial asymmetry, speech difficulty, weakness, light-headedness, numbness and headaches.   Hematological:  Negative for adenopathy. Does not bruise/bleed easily.   Psychiatric/Behavioral:  Negative for agitation, behavioral problems, confusion, decreased concentration, dysphoric mood, hallucinations, self-injury, sleep disturbance and suicidal ideas. The patient is not nervous/anxious and is not hyperactive.         Objective      Physical Exam  /85   Ht 166.4 cm (65.5\")   Wt 130 kg (286 lb)   BMI 46.87 kg/m²     Body mass index is 46.87 kg/m².         General:   Mental Status:  Alert   Appearance: Cooperative, in no acute distress   Build and " Nutrition: Obese by BMI female   Orientation: Alert and oriented to person, place and time   Posture: Normal   Gait: Nonantalgic/normal    Integument:              Right hip: Wound is well-healed with no signs of infection     Lower Extremity:              Right Hip:                          Tenderness:    None                          Swelling:          None                          Crepitus:          None                          Range of motion:        External Rotation:       30°                                                              Internal Rotation:        30°                                                              Flexion:                       100°                                                              Extension:                   0°                       Deformities:     None  Functional testing: Negative Stinchfield                          No leg length discrepancy    Imaging/Studies  Imaging Results (Last 24 Hours)       Procedure Component Value Units Date/Time    XR Hip With or Without Pelvis 2 - 3 View Right [[id number]] Resulted: 07/01/24 1619     Updated: 07/01/24 1620    Narrative:      Right Hip Radiographs  Indication: status-post right total hip arthroplasty  Views: low AP pelvis and lateral of the right hip    Comparison: no change compared to prior study, 6/26/2023    Findings:   The components are well aligned, with no signs of loosening or failure.                 Assessment and Plan     Diagnoses and all orders for this visit:    1. S/P hip replacement, right (Primary)  -     XR Hip With or Without Pelvis 2 - 3 View Right    2. Postoperative examination    3. Class 3 severe obesity due to excess calories without serious comorbidity with body mass index (BMI) of 45.0 to 49.9 in adult        1. S/P hip replacement, right    2. Postoperative examination    3. Class 3 severe obesity due to excess calories without serious comorbidity with body mass index (BMI) of 45.0 to 49.9 in  adult        I reviewed my findings with the patient.  Her right total hip arthroplasty is functioning well and she is pleased with results.  I will see her back in 4 years, which will be a 5-year checkup with x-rays.  I will see her back sooner for any problems.  Weight loss was encouraged.  She may also want to see me in the future for her right knee, with known history of arthritis.  I be happy to see her at any point along the way if she has any worsening or problems.    Return in about 4 years (around 7/1/2028) for recheck with x-rays.      Adrián Leyva MD  07/01/24  16:30 EDT    Dictated Utilizing Dragon Dictation

## 2024-07-03 ENCOUNTER — HOSPITAL ENCOUNTER (OUTPATIENT)
Dept: CT IMAGING | Facility: HOSPITAL | Age: 54
Discharge: HOME | End: 2024-07-03
Admitting: NURSE PRACTITIONER
Payer: MEDICARE

## 2024-07-03 DIAGNOSIS — Z87.891 HISTORY OF TOBACCO ABUSE: ICD-10-CM

## 2024-07-03 PROCEDURE — 71271 CT THORAX LUNG CANCER SCR C-: CPT

## 2024-07-05 ENCOUNTER — TELEPHONE (OUTPATIENT)
Dept: INTERNAL MEDICINE | Facility: CLINIC | Age: 54
End: 2024-07-05
Payer: MEDICARE

## 2024-07-05 NOTE — TELEPHONE ENCOUNTER
HUB RELAY    Let patient know the results are not available yet. I will reach out to her when I review them.

## 2024-07-05 NOTE — TELEPHONE ENCOUNTER
PATIENT HAS CALLED REQUESTING A CALL BACK WITH RESULTS OF CT SCAN OF THE LUNGS THAT WAS DONE ON 07/03/24.    CALL BACK NUMBER [phone]

## 2024-07-25 DIAGNOSIS — E66.01 CLASS 3 SEVERE OBESITY WITH SERIOUS COMORBIDITY AND BODY MASS INDEX (BMI) OF 45.0 TO 49.9 IN ADULT, UNSPECIFIED OBESITY TYPE: ICD-10-CM

## 2024-07-25 DIAGNOSIS — E88.819 INSULIN RESISTANCE: ICD-10-CM

## 2024-07-25 RX ORDER — METFORMIN HYDROCHLORIDE 500 MG/1
TABLET, EXTENDED RELEASE ORAL
Qty: 53 TABLET | Refills: 0 | Status: CANCELLED | OUTPATIENT
Start: 2024-07-25 | End: 2024-08-23

## 2024-07-25 NOTE — TELEPHONE ENCOUNTER
Rx Refill Note  Requested Prescriptions     Pending Prescriptions Disp Refills    metFORMIN ER (GLUCOPHAGE-XR) 500 MG 24 hr tablet 53 tablet 0     Sig: Take 1 tablet by mouth Daily With Dinner for 7 days, THEN 2 tablets Daily With Dinner for 23 days.      Last office visit with prescribing clinician: 6/27/2024   Last telemedicine visit with prescribing clinician: Visit date not found   Next office visit with prescribing clinician: 7/30/2024                         Would you like a call back once the refill request has been completed: [] Yes [x] No    If the office needs to give you a call back, can they leave a voicemail: [] Yes [x] No    Shabana Condon MA  07/25/24, 11:18 EDT

## 2024-07-26 NOTE — PROGRESS NOTES
[facility]  [address]  [place] [address]     Office Note      Date: 2024  Patient Name: Loreta Robertson  MRN: [id number]  : 1970    Subjective     Chief Complaint  Obesity Management follow-up          Loreta Robertson presents to [facility] for obesity management.   Patient is unsure with weight loss progress. Appetite is stayed the same. On Phentermine and Metformin. Reports loose stools from metformin.The patient is taking multivitamin and is not taking fish oil.  The patient is not using a food journal.  Stopped taking metformin due to loose stool. Her aunt just passed and she has been under emotional stress. Has had some restrictive eating and hardly eating. She will see  her psychiatrist to help process emotions.       The patient is exercising, walking more, stairs with a FITT score of:    Frequency Intensity Time Strength Training   []   0, none []   0 []   0 [x]   0   [x]   1 (1-2x/week) [x]   1 (light) []   1 (<10 min) []   1 (1x/week)   []   2 (3-5x/week) []   2 (moderate) [x]   2 (10-20 min) []   2 (2x/week)   []   3 (daily) []   3 (moderately hard)  []   4 (very hard) []   3 (20-30 min)  []   4 (>30 min) []   3 (3-4x/week)       Review of Systems   Constitutional:  Negative for appetite change and fatigue.   Eyes:  Negative for visual disturbance.   Cardiovascular:  Negative for chest pain and palpitations.   Gastrointestinal:  Negative for constipation and indigestion.   Neurological:  Negative for light-headedness.         Current Outpatient Medications:     albuterol (PROVENTIL) (2.5 MG/3ML) 0.083% nebulizer solution, Take 2.5 mg by nebulization Every 4 (Four) Hours As Needed for Wheezing., Disp: 90 mL, Rfl: 12    albuterol sulfate  (90 Base) MCG/ACT inhaler, Inhale 2 puffs Every 4 (Four) Hours As Needed for Wheezing., Disp: 8 g, Rfl: 0    ammonium lactate (AMLACTIN) 12 % cream, , Disp: ,  Rfl:     atorvastatin (LIPITOR) 40 MG tablet, Take 1 tablet by mouth Daily., Disp: 90 tablet, Rfl: 0    Budeson-Glycopyrrol-Formoterol (BREZTRI) 160-9-4.8 MCG/ACT aerosol inhaler, Inhale 2 puffs 2 (Two) Times a Day., Disp: 32.1 each, Rfl: 1    buPROPion XL (WELLBUTRIN XL) 150 MG 24 hr tablet, Take 1 tablet by mouth Every Morning., Disp: , Rfl:     busPIRone (BUSPAR) 10 MG tablet, Take 1 tablet by mouth 2 (Two) Times a Day., Disp: , Rfl:     divalproex (DEPAKOTE ER) 500 MG 24 hr tablet, Take 1 tablet by mouth Every Night., Disp: , Rfl:     hyoscyamine (LEVSIN) 0.125 MG SL tablet, Take 1 tablet by mouth Every 4 (Four) Hours As Needed (abdominal pain)., Disp: 20 tablet, Rfl: 0    losartan (COZAAR) 25 MG tablet, TAKE 1 TABLET BY MOUTH DAILY, Disp: 90 tablet, Rfl: 0    Pediatric Multivitamins-Iron (multivitamin pediatric chewable) chewable tablet, Chew Daily., Disp: , Rfl:     phentermine (ADIPEX-P) 37.5 MG tablet, Take 1 tablet by mouth Daily Before Lunch. Decongestants should be avoided while taking this medication., Disp: 28 tablet, Rfl: 0    prazosin (MINIPRESS) 1 MG capsule, Take 1 capsule by mouth., Disp: , Rfl:     prazosin (MINIPRESS) 5 MG capsule, Take 1 capsule by mouth Every Night., Disp: , Rfl: 1    primidone (MYSOLINE) 50 MG tablet, Take 1 tablet by mouth 3 (Three) Times a Day., Disp: , Rfl:     promethazine (PHENERGAN) 25 MG tablet, Take 1 tablet by mouth Every 6 (Six) Hours As Needed for Nausea or Vomiting., Disp: 20 tablet, Rfl: 0    QUEtiapine (SEROquel) 100 MG tablet, 1 tablet., Disp: , Rfl:     tiZANidine (ZANAFLEX) 4 MG tablet, TAKE 1 TABLET BY MOUTH EVERY 8 HOURS AS NEEDED FOR MUSCLE SPASMS, Disp: 60 tablet, Rfl: 1    Vraylar 3 MG capsule capsule, Take  by mouth., Disp: , Rfl:     Objective   Start weight: 280.4 pounds.    Total Loss lb/%Loss of beginning body weight (BBW): -0.2lb/-0.07%  Change in weight since last visit: -7.2    Recent Weight History:   Wt Readings from Last 6 Encounters:  "  07/30/24 127 kg (280 lb 9.6 oz)   07/16/24 128 kg (283 lb)   07/01/24 130 kg (286 lb)   06/27/24 131 kg (287 lb 12.8 oz)   06/13/24 127 kg (280 lb 6.4 oz)   05/30/24 125 kg (275 lb 9.2 oz)       Body mass index is 45.29 kg/m².   Body composition analysis completed and showed:   Body Fat %: 50.7%    Measurements (in inches)  Waist Circumference: 54      Vital Signs:   /86   Pulse 83   Resp 18   Ht 167.6 cm (66\")   Wt 127 kg (280 lb 9.6 oz)   SpO2 99%   BMI 45.29 kg/m²     Physical Exam   General appears stated age, tearful, and normal appearance   HEENT conjunctivae normal   Chest/lungs Normal rate and Breathing is unlabored   Extremities without edema   Neuro Good historian and No focal deficit   Skin Warm, dry, intact   Psych normal behavior, normal thought content, and normal concentration       Common labs          5/9/2024    11:40 5/9/2024    11:44 6/13/2024    11:00   Common Labs   Glucose 102   114    BUN 13   14    Creatinine 0.82   0.75    Sodium 144   140    Potassium 5.0   4.9    Chloride 105   100    Calcium 9.6   10.3    Albumin 4.5      Total Bilirubin 0.2      Alkaline Phosphatase 102      AST (SGOT) 16      ALT (SGPT) 19      WBC 6.92      Hemoglobin 13.8      Hematocrit 41.2      Platelets 290      Total Cholesterol 141      Triglycerides 103      HDL Cholesterol 67      LDL Cholesterol  55      Hemoglobin A1C  5.6       TSH          5/9/2024    11:40   TSH   TSH 2.550               Assessment / Plan        Diagnoses and all orders for this visit:    1. Class 3 severe obesity with serious comorbidity and body mass index (BMI) of 45.0 to 49.9 in adult, unspecified obesity type (Primary)  Overview:  Start weight: (280 lb): 1st goal (10% weight loss): 252 lb  Target Goal: 165-192 lb    Current medication prescribed by [facility]: Metformin, P37  Current meds: Wellbutrin 150 qd  Rx Options: AOM excluded; Metformin, compound, + Naltrexone, P37 (success historically)/Top  Rx Caution:     Comorbid " conditions: HTN, HLD, Insulin resistance  Food: emotional eater, late night eating  Exercise:     Pancreatitis: no  Glaucoma: no  Headaches: no  HTN: yes - controlled with meds  Heart palpitations: no  Thyroid C cell cancer personal or family hx: no  MEN syndrome personal or family hx: no  Nephrolithiasis: no    Calories 3030-2957  Protein    Water 100 oz      Assessment & Plan:  Patient's (Body mass index is 45.29 kg/m².) indicates that they are morbidly/severely obese (BMI > 40 or > 35 with obesity - related health condition) with health conditions that include hypertension and dyslipidemias . Weight is improving with treatment. BMI  is above average; BMI management plan is completed. We discussed portion control, increasing exercise, pharmacologic options including Metformin and phentermine, and an bridgette-based approach such as [email or web address] Pal or Lose It.     I have instructed the patient to continue with pursuit of medical weight loss as a part of this program. Patient does meet criteria for use of anorectics at this time as BMI >30 , body fat percentage >30%, hypercholesterolemia, and hypertension.     The current plan for this month includes:   - 1 month f/u visit and she is steven about 7 lbs since last being seen on Phentermine and Metformin  - It is appropriate to continue anorectic medications as prescribed at this time  - Continue to prioritize protein, fiber, and hydration.   - Continue Phentermine 37.5 for appetite suppression, AUSTIN/Bashir reviewed  - Continue Metformin 500 bid for insulin resistance  - Treatment goal 192 lb    Orders:  -     phentermine (ADIPEX-P) 37.5 MG tablet; Take 1 tablet by mouth Daily Before Lunch. Decongestants should be avoided while taking this medication.  Dispense: 28 tablet; Refill: 0    2. Primary hypertension  Assessment & Plan:  Hypertension is stable and controlled  Continue current treatment regimen.  Weight loss.  Regular aerobic exercise.  Blood pressure will be  reassessed  at next scheduled follow up visit .      3. Other hyperlipidemia    4. Insulin resistance  Overview:  DERIK IR 5.2            Follow Up   Return in about 1 month (around 8/30/2024).  Patient was given instructions and counseling regarding her condition or for health maintenance advice. Please see specific information pulled into the AVS if appropriate.     Edith Solo, APRN  07/30/2024

## 2024-07-30 ENCOUNTER — OFFICE VISIT (OUTPATIENT)
Dept: BARIATRICS/WEIGHT MGMT | Facility: CLINIC | Age: 54
End: 2024-07-30
Payer: MEDICARE

## 2024-07-30 VITALS
SYSTOLIC BLOOD PRESSURE: 128 MMHG | HEART RATE: 83 BPM | OXYGEN SATURATION: 99 % | DIASTOLIC BLOOD PRESSURE: 86 MMHG | RESPIRATION RATE: 18 BRPM | WEIGHT: 280.6 LBS | HEIGHT: 66 IN | BODY MASS INDEX: 45.1 KG/M2

## 2024-07-30 DIAGNOSIS — I10 PRIMARY HYPERTENSION: ICD-10-CM

## 2024-07-30 DIAGNOSIS — E78.49 OTHER HYPERLIPIDEMIA: ICD-10-CM

## 2024-07-30 DIAGNOSIS — E66.01 CLASS 3 SEVERE OBESITY WITH SERIOUS COMORBIDITY AND BODY MASS INDEX (BMI) OF 45.0 TO 49.9 IN ADULT, UNSPECIFIED OBESITY TYPE: Primary | ICD-10-CM

## 2024-07-30 DIAGNOSIS — E88.819 INSULIN RESISTANCE: ICD-10-CM

## 2024-07-30 PROCEDURE — 3074F SYST BP LT 130 MM HG: CPT

## 2024-07-30 PROCEDURE — 3079F DIAST BP 80-89 MM HG: CPT

## 2024-07-30 PROCEDURE — 99214 OFFICE O/P EST MOD 30 MIN: CPT

## 2024-07-30 PROCEDURE — 1160F RVW MEDS BY RX/DR IN RCRD: CPT

## 2024-07-30 PROCEDURE — 1159F MED LIST DOCD IN RCRD: CPT

## 2024-07-30 RX ORDER — PHENTERMINE HYDROCHLORIDE 37.5 MG/1
37.5 TABLET ORAL
Qty: 28 TABLET | Refills: 0 | Status: SHIPPED | OUTPATIENT
Start: 2024-07-30

## 2024-07-30 NOTE — ASSESSMENT & PLAN NOTE
Hypertension is stable and controlled  Continue current treatment regimen.  Weight loss.  Regular aerobic exercise.  Blood pressure will be reassessed  at next scheduled follow up visit .

## 2024-07-30 NOTE — ASSESSMENT & PLAN NOTE
Patient's (Body mass index is 45.29 kg/m².) indicates that they are morbidly/severely obese (BMI > 40 or > 35 with obesity - related health condition) with health conditions that include hypertension and dyslipidemias . Weight is improving with treatment. BMI  is above average; BMI management plan is completed. We discussed portion control, increasing exercise, pharmacologic options including Metformin and phentermine, and an bridgette-based approach such as [employer] Pal or Lose It.     I have instructed the patient to continue with pursuit of medical weight loss as a part of this program. Patient does meet criteria for use of anorectics at this time as BMI >30 , body fat percentage >30%, hypercholesterolemia, and hypertension.     The current plan for this month includes:   - 1 month f/u visit and she is steven about 7 lbs since last being seen on Phentermine and Metformin  - It is appropriate to continue anorectic medications as prescribed at this time  - Continue to prioritize protein, fiber, and hydration.   - Continue Phentermine 37.5 for appetite suppression, AUSTIN/aBshir reviewed  - Continue Metformin 500 bid for insulin resistance  - Treatment goal 192 lb

## 2024-08-01 ENCOUNTER — TELEPHONE (OUTPATIENT)
Dept: INTERNAL MEDICINE | Facility: CLINIC | Age: 54
End: 2024-08-01

## 2024-08-01 NOTE — TELEPHONE ENCOUNTER
Patient called and states that she has a  rash. She went to [facility] 4 weeks ago for this and was told it was dermatitis. She has completed her prescribed steroids and it is starting to come back. It has been increasing for the past 3 days. She denies any other symptoms. She states that she does not want to be seen at ER or [facility] again. She has been scheduled for an appointment with [place] for tomorrow.

## 2024-08-02 ENCOUNTER — OFFICE VISIT (OUTPATIENT)
Dept: INTERNAL MEDICINE | Facility: CLINIC | Age: 54
End: 2024-08-02
Payer: MEDICARE

## 2024-08-02 VITALS
RESPIRATION RATE: 16 BRPM | BODY MASS INDEX: 45.51 KG/M2 | HEIGHT: 66 IN | SYSTOLIC BLOOD PRESSURE: 118 MMHG | DIASTOLIC BLOOD PRESSURE: 78 MMHG | WEIGHT: 283.2 LBS | HEART RATE: 76 BPM | TEMPERATURE: 97.5 F

## 2024-08-02 DIAGNOSIS — L30.9 DERMATITIS: Primary | ICD-10-CM

## 2024-08-02 PROCEDURE — 1160F RVW MEDS BY RX/DR IN RCRD: CPT | Performed by: NURSE PRACTITIONER

## 2024-08-02 PROCEDURE — 3078F DIAST BP <80 MM HG: CPT | Performed by: NURSE PRACTITIONER

## 2024-08-02 PROCEDURE — 3074F SYST BP LT 130 MM HG: CPT | Performed by: NURSE PRACTITIONER

## 2024-08-02 PROCEDURE — 1126F AMNT PAIN NOTED NONE PRSNT: CPT | Performed by: NURSE PRACTITIONER

## 2024-08-02 PROCEDURE — 1159F MED LIST DOCD IN RCRD: CPT | Performed by: NURSE PRACTITIONER

## 2024-08-02 PROCEDURE — 99213 OFFICE O/P EST LOW 20 MIN: CPT | Performed by: NURSE PRACTITIONER

## 2024-08-02 RX ORDER — TRIAMCINOLONE ACETONIDE 1 MG/G
1 OINTMENT TOPICAL 2 TIMES DAILY
Qty: 60 G | Refills: 0 | Status: SHIPPED | OUTPATIENT
Start: 2024-08-02

## 2024-08-02 NOTE — PROGRESS NOTES
Patient Name: Loreta Robertson  : 1970   MRN: [id number]     Chief Complaint:    Chief Complaint   Patient presents with    Rash     Rash on legs and arms very itchy       History of Present Illness: Loreta Robertson is a 54 y.o. female.  History of Present Illness  The patient is a 54-year-old female who presents for evaluation of a rash.    The patient presents with a pruritic rash located on the anterior aspect of her legs, extending from the lower arm to the upper leg. The rash was initially characterized by small, clear-colored bumps. The onset of the rash coincided with her recent vacation, where she spent time in a pool with a friend who did not exhibit any similar symptoms. The rash appeared a day or two prior to her departure. Despite not spending time in sand, the rash has since improved. However, she experienced a significant breakout on her elbow, which was associated with severe itching. Today, she reports no visible bumps. The itching was particularly bothersome yesterday. She denies shaving her arms or legs. Approximately one to two months prior, she developed similar bumps on her arm, which she initially attributed to poison ivy exposure. She denies experiencing cough, wheezing, or difficulty breathing. She also denies having a fever. She was evaluated at [facility] due to the severity of the rash, where she was prescribed prednisone, to be taken twice daily for a duration of 5 days. The diagnosis was dermatitis, and she was advised to follow up with her primary care physician. She has been residing in her home for the past 3 to 4 days. She denies any new sexual partners, changes in lotions or detergents, or use of sunscreen.         Subjective     Review of System: Review of Systems     Medications:     Current Outpatient Medications:     albuterol (PROVENTIL) (2.5 MG/3ML) 0.083% nebulizer solution, Take 2.5 mg by nebulization Every 4 (Four) Hours As Needed for Wheezing., Disp: 90 mL, Rfl:  12    albuterol sulfate  (90 Base) MCG/ACT inhaler, Inhale 2 puffs Every 4 (Four) Hours As Needed for Wheezing., Disp: 8 g, Rfl: 0    ammonium lactate (AMLACTIN) 12 % cream, , Disp: , Rfl:     atorvastatin (LIPITOR) 40 MG tablet, Take 1 tablet by mouth Daily., Disp: 90 tablet, Rfl: 0    Budeson-Glycopyrrol-Formoterol (BREZTRI) 160-9-4.8 MCG/ACT aerosol inhaler, Inhale 2 puffs 2 (Two) Times a Day., Disp: 32.1 each, Rfl: 1    buPROPion XL (WELLBUTRIN XL) 150 MG 24 hr tablet, Take 1 tablet by mouth Every Morning., Disp: , Rfl:     busPIRone (BUSPAR) 10 MG tablet, Take 1 tablet by mouth 2 (Two) Times a Day., Disp: , Rfl:     divalproex (DEPAKOTE ER) 500 MG 24 hr tablet, Take 1 tablet by mouth Every Night., Disp: , Rfl:     hyoscyamine (LEVSIN) 0.125 MG SL tablet, Take 1 tablet by mouth Every 4 (Four) Hours As Needed (abdominal pain)., Disp: 20 tablet, Rfl: 0    Pediatric Multivitamins-Iron (multivitamin pediatric chewable) chewable tablet, Chew Daily., Disp: , Rfl:     phentermine (ADIPEX-P) 37.5 MG tablet, Take 1 tablet by mouth Daily Before Lunch. Decongestants should be avoided while taking this medication., Disp: 28 tablet, Rfl: 0    prazosin (MINIPRESS) 1 MG capsule, Take 1 capsule by mouth., Disp: , Rfl:     prazosin (MINIPRESS) 5 MG capsule, Take 1 capsule by mouth Every Night., Disp: , Rfl: 1    primidone (MYSOLINE) 50 MG tablet, Take 1 tablet by mouth 3 (Three) Times a Day., Disp: , Rfl:     promethazine (PHENERGAN) 25 MG tablet, Take 1 tablet by mouth Every 6 (Six) Hours As Needed for Nausea or Vomiting., Disp: 20 tablet, Rfl: 0    QUEtiapine (SEROquel) 100 MG tablet, 1 tablet., Disp: , Rfl:     tiZANidine (ZANAFLEX) 4 MG tablet, TAKE 1 TABLET BY MOUTH EVERY 8 HOURS AS NEEDED FOR MUSCLE SPASMS, Disp: 60 tablet, Rfl: 1    Vraylar 3 MG capsule capsule, Take  by mouth., Disp: , Rfl:     losartan (COZAAR) 25 MG tablet, TAKE 1 TABLET BY MOUTH DAILY, Disp: 90 tablet, Rfl: 0    triamcinolone (KENALOG) 0.1 %  "ointment, Apply 1 Application topically to the appropriate area as directed 2 (Two) Times a Day., Disp: 60 g, Rfl: 0    Allergies:   No Known Allergies    Objective     Physical Exam:   Vital Signs:   Vitals:    08/02/24 1139   BP: 118/78   BP Location: Right arm   Patient Position: Sitting   Cuff Size: Large Adult   Pulse: 76   Resp: 16   Temp: 97.5 °F (36.4 °C)   TempSrc: Infrared   Weight: 128 kg (283 lb 3.2 oz)   Height: 167.6 cm (66\")   PainSc: 0-No pain           Physical Exam  Constitutional:       General: She is not in acute distress.     Appearance: She is not ill-appearing.   HENT:      Head: Normocephalic.   Cardiovascular:      Rate and Rhythm: Normal rate and regular rhythm.      Heart sounds: Normal heart sounds. No murmur heard.  Pulmonary:      Breath sounds: Normal breath sounds.   Abdominal:      General: Bowel sounds are normal.      Tenderness: There is no abdominal tenderness.   Skin:     Comments:   mildly erythematous pruritic plaques with dryness    Neurological:      General: No focal deficit present.      Mental Status: She is oriented to person, place, and time.   Psychiatric:         Mood and Affect: Mood normal.       Physical Exam         Results       Assessment / Plan      Assessment/Plan:   Diagnoses and all orders for this visit:    1. Dermatitis (Primary)  -     triamcinolone (KENALOG) 0.1 % ointment; Apply 1 Application topically to the appropriate area as directed 2 (Two) Times a Day.  Dispense: 60 g; Refill: 0       Assessment & Plan  1. Rash.  The rash appears to be showing signs of improvement. Triamcinolone ointment has been prescribed. The patient has been advised to avoid perfume-free products and to use clean lotions and soaps.         Follow Up:   Return if symptoms worsen or fail to improve.  Patient or patient representative verbalized consent for the use of Ambient Listening during the visit with  ROSALES White for chart documentation. 8/5/2024  20:07 " EMILIANO Gutierrez, ROSALES  [facility]

## 2024-08-03 DIAGNOSIS — I10 PRIMARY HYPERTENSION: ICD-10-CM

## 2024-08-05 RX ORDER — LOSARTAN POTASSIUM 25 MG/1
25 TABLET ORAL DAILY
Qty: 90 TABLET | Refills: 0 | Status: SHIPPED | OUTPATIENT
Start: 2024-08-05

## 2024-08-06 DIAGNOSIS — J44.9 CHRONIC OBSTRUCTIVE PULMONARY DISEASE, UNSPECIFIED COPD TYPE: ICD-10-CM

## 2024-08-06 NOTE — TELEPHONE ENCOUNTER
LOV 08/02/2024  NOV Not scheduled yet    Follow Up:   Return if symptoms worsen or fail to improve.

## 2024-08-07 RX ORDER — BUDESONIDE, GLYCOPYRROLATE, AND FORMOTEROL FUMARATE 160; 9; 4.8 UG/1; UG/1; UG/1
AEROSOL, METERED RESPIRATORY (INHALATION)
Qty: 10.7 G | Refills: 3 | Status: SHIPPED | OUTPATIENT
Start: 2024-08-07

## 2024-08-08 DIAGNOSIS — I10 PRIMARY HYPERTENSION: ICD-10-CM

## 2024-08-08 RX ORDER — LOSARTAN POTASSIUM 25 MG/1
25 TABLET ORAL DAILY
Qty: 90 TABLET | Refills: 0 | OUTPATIENT
Start: 2024-08-08

## 2024-08-20 NOTE — PROGRESS NOTES
[facility]  [address]  [place] [address]     Office Note      Date: 2024  Patient Name: Loreta Robertson  MRN: [id number]  : 1970    Subjective     Chief Complaint  Obesity Management follow-up          Loreta Robertson presents to [facility] for obesity management.   Patient is unsure with weight loss progress. Appetite is well controlled. On Phentermine. Reports no side effects of prescribed medications today. The patient is taking multivitamin and is taking fish oil.  The patient is not using a food journal.  She has not eaten in the last 24 hrs due to budget. She had not been logging her food consistently. She is trying to make some changes in her diet, using keto bread and not using bun with hamburgers. She has let some of the sweet stuff come back in diet such as sugary candies. Has not been eating out.         The patient is exercising, she is active with walking, constant motion throughout the day with a FITT score of:    Frequency Intensity Time Strength Training   []   0, none []   0 []   0 []   0   []   1 (1-2x/week) []   1 (light) []   1 (<10 min) []   1 (1x/week)   []   2 (3-5x/week) []   2 (moderate) []   2 (10-20 min) []   2 (2x/week)   []   3 (daily) []   3 (moderately hard)  []   4 (very hard) []   3 (20-30 min)  []   4 (>30 min) []   3 (3-4x/week)       Review of Systems   Constitutional:  Negative for appetite change and fatigue.   Eyes:  Negative for visual disturbance.   Cardiovascular:  Negative for chest pain and palpitations.   Gastrointestinal:  Negative for constipation and indigestion.   Neurological:  Negative for light-headedness.         Current Outpatient Medications:     albuterol (PROVENTIL) (2.5 MG/3ML) 0.083% nebulizer solution, Take 2.5 mg by nebulization Every 4 (Four) Hours As Needed for Wheezing., Disp: 90 mL, Rfl: 12    albuterol sulfate  (90 Base) MCG/ACT inhaler,  Inhale 2 puffs Every 4 (Four) Hours As Needed for Wheezing., Disp: 8 g, Rfl: 0    ammonium lactate (AMLACTIN) 12 % cream, , Disp: , Rfl:     atorvastatin (LIPITOR) 40 MG tablet, Take 1 tablet by mouth Daily., Disp: 90 tablet, Rfl: 0    Budeson-Glycopyrrol-Formoterol (Breztri Aerosphere) 160-9-4.8 MCG/ACT aerosol inhaler, INHALE 2 PUFFS EVERY 2 TIMES A DAY, Disp: 10.7 g, Rfl: 3    buPROPion XL (WELLBUTRIN XL) 150 MG 24 hr tablet, Take 1 tablet by mouth Every Morning., Disp: , Rfl:     busPIRone (BUSPAR) 10 MG tablet, Take 1 tablet by mouth 2 (Two) Times a Day., Disp: , Rfl:     divalproex (DEPAKOTE ER) 500 MG 24 hr tablet, Take 3 tablets by mouth Every Night. 1 qam and 2 q pm, Disp: , Rfl:     hyoscyamine (LEVSIN) 0.125 MG SL tablet, Take 1 tablet by mouth Every 4 (Four) Hours As Needed (abdominal pain)., Disp: 20 tablet, Rfl: 0    losartan (COZAAR) 25 MG tablet, TAKE 1 TABLET BY MOUTH DAILY, Disp: 90 tablet, Rfl: 0    Pediatric Multivitamins-Iron (multivitamin pediatric chewable) chewable tablet, Chew Daily., Disp: , Rfl:     phentermine (ADIPEX-P) 37.5 MG tablet, Take 1 tablet by mouth Daily Before Lunch. Decongestants should be avoided while taking this medication., Disp: 28 tablet, Rfl: 0    prazosin (MINIPRESS) 1 MG capsule, Take 1 capsule by mouth., Disp: , Rfl:     prazosin (MINIPRESS) 5 MG capsule, Take 1 capsule by mouth Every Night., Disp: , Rfl: 1    primidone (MYSOLINE) 50 MG tablet, Take 1 tablet by mouth 3 (Three) Times a Day., Disp: , Rfl:     promethazine (PHENERGAN) 25 MG tablet, Take 1 tablet by mouth Every 6 (Six) Hours As Needed for Nausea or Vomiting., Disp: 20 tablet, Rfl: 0    QUEtiapine (SEROquel) 100 MG tablet, 1 tablet., Disp: , Rfl:     tiZANidine (ZANAFLEX) 4 MG tablet, TAKE 1 TABLET BY MOUTH EVERY 8 HOURS AS NEEDED FOR MUSCLE SPASMS, Disp: 60 tablet, Rfl: 1    triamcinolone (KENALOG) 0.1 % ointment, Apply 1 Application topically to the appropriate area as directed 2 (Two) Times a Day.,  "Disp: 60 g, Rfl: 0    Vraylar 3 MG capsule capsule, Take  by mouth., Disp: , Rfl:     metFORMIN ER (GLUCOPHAGE-XR) 500 MG 24 hr tablet, Take 1 tablet by mouth Daily With Breakfast., Disp: 30 tablet, Rfl: 1    Objective   Start weight: 280 pounds.    Total Loss lb/%Loss of beginning body weight (BBW): -0.8lb/-0.28%  Change in weight since last visit: -1.7    Recent Weight History:   Wt Readings from Last 6 Encounters:   08/28/24 127 kg (279 lb 3.2 oz)   08/02/24 128 kg (283 lb 3.2 oz)   07/30/24 127 kg (280 lb 9.6 oz)   07/16/24 128 kg (283 lb)   07/01/24 130 kg (286 lb)   06/27/24 131 kg (287 lb 12.8 oz)       Body mass index is 45.06 kg/m².   Body composition analysis completed and showed:   Body Fat %: 53.6    Measurements (in inches)  Waist Circumference: 54.5      Vital Signs:   /72 (BP Location: Right arm, Patient Position: Sitting)   Pulse 75   Ht 167.6 cm (66\")   Wt 127 kg (279 lb 3.2 oz)   BMI 45.06 kg/m²     Physical Exam   General appears stated age and normal appearance   HEENT conjunctivae normal   Chest/lungs Normal rate, Regular rhythm, and Breathing is unlabored   Extremities without edema   Neuro Good historian and No focal deficit   Skin Warm, dry, intact   Psych normal behavior, normal thought content, and normal concentration       Common labs          5/9/2024    11:40 5/9/2024    11:44 6/13/2024    11:00   Common Labs   Glucose 102   114    BUN 13   14    Creatinine 0.82   0.75    Sodium 144   140    Potassium 5.0   4.9    Chloride 105   100    Calcium 9.6   10.3    Albumin 4.5      Total Bilirubin 0.2      Alkaline Phosphatase 102      AST (SGOT) 16      ALT (SGPT) 19      WBC 6.92      Hemoglobin 13.8      Hematocrit 41.2      Platelets 290      Total Cholesterol 141      Triglycerides 103      HDL Cholesterol 67      LDL Cholesterol  55      Hemoglobin A1C  5.6       TSH          5/9/2024    11:40   TSH   TSH 2.550               Assessment / Plan        Diagnoses and all orders for " this visit:    1. Class 3 severe obesity with serious comorbidity and body mass index (BMI) of 45.0 to 49.9 in adult, unspecified obesity type (Primary)  Overview:  Start weight: (280 lb): 1st goal (10% weight loss): 252 lb  Target Goal: 165-192 lb    Current medication prescribed by [facility]:  P37  Current meds: Wellbutrin 150 qd  Rx Options: AOM excluded; compound, + Naltrexone, P37 (success historically)/Top  Rx Caution: Metformin (diarrhea)    Comorbid conditions: HTN, HLD, Insulin resistance  Food: emotional eater, late night eating  Exercise:     Pancreatitis: no  Glaucoma: no  Headaches: no  HTN: yes - controlled with meds  Heart palpitations: no  Thyroid C cell cancer personal or family hx: no  MEN syndrome personal or family hx: no  Nephrolithiasis: no    Calories 5537-5372  Protein    Water 100 oz      Assessment & Plan:  Patient's (Body mass index is 45.06 kg/m².) indicates that they are morbidly/severely obese (BMI > 40 or > 35 with obesity - related health condition) with health conditions that include hypertension and dyslipidemias . Weight is improving with treatment. BMI  is above average; BMI management plan is completed. We discussed portion control, increasing exercise, pharmacologic options including phentermine, and an bridgette-based approach such as [email or web address] Pal or Lose It.     I have instructed the patient to continue with pursuit of medical weight loss as a part of this program. Patient does meet criteria for use of anorectics at this time as BMI >30 , hyperlipidemia, hypertension, impaired fasting glucose, and is not at treatment goal.     The current plan for this month includes:   - 1 month f/u visit and she is down 1 lb on phentermine  - Adjust exercise as discussed  - Weight loss goal 4-6lbs this month  - Adjust macronutrients as discussed. Bring food journal to next visit for review  - Continue phentermine for appetite suppression and weight reduction, advised to continue we will need to  see 4% weight reduction. AUSTIN/Bashir reviewed  - Start Metformin 500 XR for IR, she was intolerant to higher shorter acting doses  - Consider addition of Topamax with Neurology clearance given she is on depakote and this can interfere with this  - Treatment goal 192 lb    Orders:  -     phentermine (ADIPEX-P) 37.5 MG tablet; Take 1 tablet by mouth Daily Before Lunch. Decongestants should be avoided while taking this medication.  Dispense: 28 tablet; Refill: 0    2. Primary hypertension  Assessment & Plan:  Hypertension is stable and controlled  Continue current treatment regimen.  Weight loss.  Regular aerobic exercise.  Blood pressure will be reassessed  at next scheduled visit .      3. Other hyperlipidemia    4. Insulin resistance  Overview:  DERIK IR 5.2    Assessment & Plan:  Low carb diet, regular physical activity, and weight reduction of 10-15%.       Orders:  -     metFORMIN ER (GLUCOPHAGE-XR) 500 MG 24 hr tablet; Take 1 tablet by mouth Daily With Breakfast.  Dispense: 30 tablet; Refill: 1          Follow Up   Return in about 1 month (around 9/28/2024).  Patient was given instructions and counseling regarding her condition or for health maintenance advice. Please see specific information pulled into the AVS if appropriate.     Edith Solo, APRN  08/28/2024

## 2024-08-28 ENCOUNTER — TELEPHONE (OUTPATIENT)
Dept: BARIATRICS/WEIGHT MGMT | Facility: CLINIC | Age: 54
End: 2024-08-28
Payer: MEDICARE

## 2024-08-28 ENCOUNTER — OFFICE VISIT (OUTPATIENT)
Dept: BARIATRICS/WEIGHT MGMT | Facility: CLINIC | Age: 54
End: 2024-08-28
Payer: MEDICARE

## 2024-08-28 VITALS
HEIGHT: 66 IN | SYSTOLIC BLOOD PRESSURE: 128 MMHG | DIASTOLIC BLOOD PRESSURE: 72 MMHG | BODY MASS INDEX: 44.87 KG/M2 | HEART RATE: 75 BPM | WEIGHT: 279.2 LBS

## 2024-08-28 DIAGNOSIS — E88.819 INSULIN RESISTANCE: ICD-10-CM

## 2024-08-28 DIAGNOSIS — R53.83 OTHER FATIGUE: ICD-10-CM

## 2024-08-28 DIAGNOSIS — E66.01 CLASS 3 SEVERE OBESITY WITH SERIOUS COMORBIDITY AND BODY MASS INDEX (BMI) OF 45.0 TO 49.9 IN ADULT, UNSPECIFIED OBESITY TYPE: Primary | ICD-10-CM

## 2024-08-28 DIAGNOSIS — I10 PRIMARY HYPERTENSION: ICD-10-CM

## 2024-08-28 DIAGNOSIS — E78.49 OTHER HYPERLIPIDEMIA: ICD-10-CM

## 2024-08-28 PROCEDURE — 1160F RVW MEDS BY RX/DR IN RCRD: CPT

## 2024-08-28 PROCEDURE — 1159F MED LIST DOCD IN RCRD: CPT

## 2024-08-28 PROCEDURE — 3078F DIAST BP <80 MM HG: CPT

## 2024-08-28 PROCEDURE — 3074F SYST BP LT 130 MM HG: CPT

## 2024-08-28 RX ORDER — CYANOCOBALAMIN 1000 UG/ML
1000 INJECTION, SOLUTION INTRAMUSCULAR; SUBCUTANEOUS ONCE
Status: COMPLETED | OUTPATIENT
Start: 2024-08-28 | End: 2024-08-28

## 2024-08-28 RX ORDER — METFORMIN HCL 500 MG
500 TABLET, EXTENDED RELEASE 24 HR ORAL
Qty: 90 TABLET | Refills: 0 | Status: SHIPPED | OUTPATIENT
Start: 2024-08-28

## 2024-08-28 RX ORDER — PHENTERMINE HYDROCHLORIDE 37.5 MG/1
37.5 TABLET ORAL
Qty: 28 TABLET | Refills: 0 | Status: SHIPPED | OUTPATIENT
Start: 2024-08-28

## 2024-08-28 RX ORDER — METFORMIN HCL 500 MG
500 TABLET, EXTENDED RELEASE 24 HR ORAL
Qty: 30 TABLET | Refills: 1 | Status: SHIPPED | OUTPATIENT
Start: 2024-08-28 | End: 2024-08-28

## 2024-08-28 RX ADMIN — CYANOCOBALAMIN 1000 MCG: 1000 INJECTION, SOLUTION INTRAMUSCULAR; SUBCUTANEOUS at 15:00

## 2024-08-28 NOTE — ASSESSMENT & PLAN NOTE
Patient's (Body mass index is 45.06 kg/m².) indicates that they are morbidly/severely obese (BMI > 40 or > 35 with obesity - related health condition) with health conditions that include hypertension and dyslipidemias . Weight is improving with treatment. BMI  is above average; BMI management plan is completed. We discussed portion control, increasing exercise, pharmacologic options including phentermine, and an bridgette-based approach such as [employer] Pal or Lose It.     I have instructed the patient to continue with pursuit of medical weight loss as a part of this program. Patient does meet criteria for use of anorectics at this time as BMI >30 , hyperlipidemia, hypertension, impaired fasting glucose, and is not at treatment goal.     The current plan for this month includes:   - 1 month f/u visit and she is down 1 lb on phentermine  - Adjust exercise as discussed  - Weight loss goal 4-6lbs this month  - Adjust macronutrients as discussed. Bring food journal to next visit for review  - Continue phentermine for appetite suppression and weight reduction, advised to continue we will need to see 4% weight reduction. AUSTIN/Bashir reviewed  - Start Metformin 500 XR for IR, she was intolerant to higher shorter acting doses  - Consider addition of Topamax with Neurology clearance given she is on depakote and this can interfere with this  - Treatment goal 192 lb

## 2024-09-20 DIAGNOSIS — E66.01 CLASS 3 SEVERE OBESITY WITH SERIOUS COMORBIDITY AND BODY MASS INDEX (BMI) OF 45.0 TO 49.9 IN ADULT, UNSPECIFIED OBESITY TYPE: ICD-10-CM

## 2024-09-23 RX ORDER — PHENTERMINE HYDROCHLORIDE 37.5 MG/1
37.5 TABLET ORAL
Qty: 20 TABLET | Refills: 0 | Status: SHIPPED | OUTPATIENT
Start: 2024-09-25

## 2024-09-25 DIAGNOSIS — I10 PRIMARY HYPERTENSION: ICD-10-CM

## 2024-09-25 RX ORDER — LOSARTAN POTASSIUM 25 MG/1
25 TABLET ORAL DAILY
Qty: 90 TABLET | Refills: 0 | Status: SHIPPED | OUTPATIENT
Start: 2024-09-25

## 2024-10-08 NOTE — PROGRESS NOTES
[facility]  [address]  [place] [address]     Office Note      Date: 10/15/2024  Patient Name: Loreta Robertson  MRN: [id number]  : 1970    Subjective     Chief Complaint  Obesity Management follow-up          Loreta Robertson presents to [facility] for obesity management.   Patient is unsatisfied with weight loss progress. Appetite is moderately controlled. On Metformin. Reports no side effects of prescribed medications today. The patient is taking multivitamin and is taking fish oil.  The patient is using a food journal.  Sometimes she is eating maybe once daily, she will be busy sometimes and forget to eat. Lots of grilling, she feels she is cutting back on sweet intake over the last month. She lost a lot of weight last year, was motivated because she needed a hip replacement. Stops eating when full. Per review of food log, inconsistent with logging food, very low calories, low fiber and low protein. She is eating out but eating higher fat options     Diet recall:    Breakfast: hollis , egg and cheese bagel, mcdonalds;.  1/2 bagel  Snack: none  Lunch: none  Snack: none  Dinner: hardees, sourdough cheesesburger, FF(didn't eat the full thing)  Snack: none    Beverages: water only, meeting goal     The patient is exercising with a FITT score of:    Frequency Intensity Time Strength Training   []   0, none []   0 []   0 [x]   0   [x]   1 (1-2x/week) [x]   1 (light) []   1 (<10 min) []   1 (1x/week)   []   2 (3-5x/week) []   2 (moderate) []   2 (10-20 min) []   2 (2x/week)   []   3 (daily) []   3 (moderately hard)  []   4 (very hard) []   3 (20-30 min)  [x]   4 (>30 min) []   3 (3-4x/week)       Review of Systems   Constitutional:  Positive for appetite change. Negative for fatigue.   Eyes:  Negative for visual disturbance.   Cardiovascular:  Negative for chest pain and palpitations.   Gastrointestinal:  Positive for  diarrhea. Negative for constipation and indigestion.   Neurological:  Negative for light-headedness.         Current Outpatient Medications:     albuterol (PROVENTIL) (2.5 MG/3ML) 0.083% nebulizer solution, Take 2.5 mg by nebulization Every 4 (Four) Hours As Needed for Wheezing., Disp: 90 mL, Rfl: 12    albuterol sulfate  (90 Base) MCG/ACT inhaler, Inhale 2 puffs Every 4 (Four) Hours As Needed for Wheezing., Disp: 8 g, Rfl: 0    ammonium lactate (AMLACTIN) 12 % cream, , Disp: , Rfl:     atorvastatin (LIPITOR) 40 MG tablet, Take 1 tablet by mouth Daily., Disp: 90 tablet, Rfl: 0    Budeson-Glycopyrrol-Formoterol (Breztri Aerosphere) 160-9-4.8 MCG/ACT aerosol inhaler, INHALE 2 PUFFS EVERY 2 TIMES A DAY, Disp: 10.7 g, Rfl: 3    buPROPion XL (WELLBUTRIN XL) 150 MG 24 hr tablet, Take 1 tablet by mouth Every Morning., Disp: , Rfl:     busPIRone (BUSPAR) 15 MG tablet, , Disp: , Rfl:     divalproex (DEPAKOTE ER) 500 MG 24 hr tablet, Take 3 tablets by mouth Every Night. 1 qam and 2 q pm, Disp: , Rfl:     doxycycline (MONODOX) 100 MG capsule, Take 1 capsule by mouth 2 (Two) Times a Day for 7 days., Disp: 14 capsule, Rfl: 0    hyoscyamine (LEVSIN) 0.125 MG SL tablet, Take 1 tablet by mouth Every 4 (Four) Hours As Needed (abdominal pain)., Disp: 20 tablet, Rfl: 0    losartan (COZAAR) 25 MG tablet, TAKE 1 TABLET BY MOUTH DAILY, Disp: 90 tablet, Rfl: 0    metFORMIN ER (GLUCOPHAGE-XR) 500 MG 24 hr tablet, Take 1 tablet by mouth Daily With Breakfast., Disp: 90 tablet, Rfl: 0    Pediatric Multivitamins-Iron (multivitamin pediatric chewable) chewable tablet, Chew Daily., Disp: , Rfl:     phentermine (ADIPEX-P) 37.5 MG tablet, Take 1 tablet by mouth Daily Before Lunch. Decongestants should be avoided while taking this medication., Disp: 28 tablet, Rfl: 0    prazosin (MINIPRESS) 1 MG capsule, Take 1 capsule by mouth., Disp: , Rfl:     prazosin (MINIPRESS) 5 MG capsule, Take 1 capsule by mouth Every Night., Disp: , Rfl: 1     "predniSONE (DELTASONE) 20 MG tablet, Take 1 tablet by mouth 2 (Two) Times a Day for 5 days., Disp: 10 tablet, Rfl: 0    primidone (MYSOLINE) 50 MG tablet, Take 1 tablet by mouth 3 (Three) Times a Day., Disp: , Rfl:     promethazine (PHENERGAN) 25 MG tablet, Take 1 tablet by mouth Every 6 (Six) Hours As Needed for Nausea or Vomiting., Disp: 20 tablet, Rfl: 0    QUEtiapine (SEROquel) 100 MG tablet, 1 tablet., Disp: , Rfl:     tiZANidine (ZANAFLEX) 4 MG tablet, TAKE 1 TABLET BY MOUTH EVERY 8 HOURS AS NEEDED FOR MUSCLE SPASMS, Disp: 60 tablet, Rfl: 1    triamcinolone (KENALOG) 0.1 % ointment, Apply 1 Application topically to the appropriate area as directed 2 (Two) Times a Day., Disp: 60 g, Rfl: 0    Vraylar 3 MG capsule capsule, Take  by mouth., Disp: , Rfl:     Objective   Start weight: 280 pounds.    Total Loss lb/%Loss of beginning body weight (BBW): +1lb/+0.36%  Change in weight since last visit: +1.7    Recent Weight History:   Wt Readings from Last 6 Encounters:   10/15/24 127 kg (281 lb)   10/13/24 127 kg (280 lb)   08/28/24 127 kg (279 lb 3.2 oz)   08/02/24 128 kg (283 lb 3.2 oz)   07/30/24 127 kg (280 lb 9.6 oz)   07/16/24 128 kg (283 lb)       Body mass index is 45.35 kg/m².   Body composition analysis completed and showed:   Body Fat %: 54.9%    Measurements (in inches)  Waist Circumference: 54      Vital Signs:   /64   Pulse 67   Ht 167.6 cm (66\")   Wt 127 kg (281 lb)   BMI 45.35 kg/m²     Physical Exam   General appears stated age and normal appearance   HEENT conjunctivae normal   Chest/lungs Normal rate, Regular rhythm, and Breathing is unlabored   Extremities    Neuro Good historian and No focal deficit   Skin Warm, dry, intact   Psych normal behavior, normal thought content, and normal concentration       Common labs          5/9/2024    11:40 5/9/2024    11:44 6/13/2024    11:00   Common Labs   Glucose 102   114    BUN 13   14    Creatinine 0.82   0.75    Sodium 144   140    Potassium 5.0   " 4.9    Chloride 105   100    Calcium 9.6   10.3    Albumin 4.5      Total Bilirubin 0.2      Alkaline Phosphatase 102      AST (SGOT) 16      ALT (SGPT) 19      WBC 6.92      Hemoglobin 13.8      Hematocrit 41.2      Platelets 290      Total Cholesterol 141      Triglycerides 103      HDL Cholesterol 67      LDL Cholesterol  55      Hemoglobin A1C  5.6       TSH          5/9/2024    11:40   TSH   TSH 2.550               Assessment / Plan        Diagnoses and all orders for this visit:    1. Class 3 severe obesity with serious comorbidity and body mass index (BMI) of 45.0 to 49.9 in adult, unspecified obesity type (Primary)  Overview:  Start weight: (280 lb): 1st goal (10% weight loss): 252 lb  Target Goal: 165-192 lb    Current medication prescribed by [facility]:  P37 (start taper 10/15/24), Metformin  Current meds: Wellbutrin 150 qd  Rx Options: AOM excluded; compound, + Naltrexone, P37 (success historically)/Top  Rx Caution: Metformin (diarrhea)    Comorbid conditions: HTN, HLD, Insulin resistance  Food: emotional eater, late night eating  Exercise:     Pancreatitis: no  Glaucoma: no  Headaches: no  HTN: yes - controlled with meds  Heart palpitations: no  Thyroid C cell cancer personal or family hx: no  MEN syndrome personal or family hx: no  Nephrolithiasis: no    Calories 0543-6340  Protein    Water 100 oz      Assessment & Plan:  Patient's (Body mass index is 45.35 kg/m².) indicates that they are morbidly/severely obese (BMI > 40 or > 35 with obesity - related health condition) with health conditions that include hypertension, impaired fasting glucose, and dyslipidemias . Weight is worsening. BMI  is above average; BMI management plan is completed. We discussed portion control, increasing exercise, pharmacologic options including phentermine and metformin, and an bridgette-based approach such as Unruly Â® Pal or Lose It.     I have instructed the patient to continue with pursuit of medical weight loss as a part of  this program. Patient does meet criteria for use of anorectics at this time as BMI >30 , body fat percentage >30%, hyperlipidemia, hypertension, impaired fasting glucose, and is not at treatment goal.     The current plan for this month includes:   - 1 month f/u visit and she is up 2 lbs on phentermine and metformin  - Adjust macronutrients as discussed. Bring food journal to next visit for review  - start phentermine taper due to ineffectiveness, instructions given and included   in AVS  - Continue Metformin 500 once daily for IR  - We discussed we will withhold additional medication at this time given her recent sweating and will consider adding other options at next visit pending her side effects  - Main focus is to prioritize protein, avoid eating out often and to decrease fat intake. She has also been restrictive in eating patter at times  - Switch current meal replacement shake to a performance lower calorie, higher protein shake  - Treatment goal 192 lb    Orders:  -     phentermine (ADIPEX-P) 37.5 MG tablet; Take 1 tablet by mouth Daily Before Lunch. Decongestants should be avoided while taking this medication.  Dispense: 28 tablet; Refill: 0    2. Primary hypertension  Assessment & Plan:  Hypertension is stable and controlled  Continue current treatment regimen.  Weight loss.  Regular aerobic exercise.  Blood pressure will be reassessed  at next scheduled visit .      3. Insulin resistance  Overview:  DERIK IR 5.2    Assessment & Plan:  Continue low carb diet, regular physical activity, and weight reduction of 10-15%.               Follow Up   Return in about 6 weeks (around 11/26/2024).  Patient was given instructions and counseling regarding her condition or for health maintenance advice. Please see specific information pulled into the AVS if appropriate.     Edith Solo, APRN  10/15/2024

## 2024-10-15 ENCOUNTER — OFFICE VISIT (OUTPATIENT)
Dept: BARIATRICS/WEIGHT MGMT | Facility: CLINIC | Age: 54
End: 2024-10-15
Payer: MEDICARE

## 2024-10-15 VITALS
HEART RATE: 67 BPM | BODY MASS INDEX: 45.16 KG/M2 | DIASTOLIC BLOOD PRESSURE: 64 MMHG | HEIGHT: 66 IN | WEIGHT: 281 LBS | SYSTOLIC BLOOD PRESSURE: 124 MMHG

## 2024-10-15 DIAGNOSIS — E66.01 CLASS 3 SEVERE OBESITY WITH SERIOUS COMORBIDITY AND BODY MASS INDEX (BMI) OF 45.0 TO 49.9 IN ADULT, UNSPECIFIED OBESITY TYPE: Primary | ICD-10-CM

## 2024-10-15 DIAGNOSIS — E66.813 CLASS 3 SEVERE OBESITY WITH SERIOUS COMORBIDITY AND BODY MASS INDEX (BMI) OF 45.0 TO 49.9 IN ADULT, UNSPECIFIED OBESITY TYPE: Primary | ICD-10-CM

## 2024-10-15 DIAGNOSIS — I10 PRIMARY HYPERTENSION: ICD-10-CM

## 2024-10-15 DIAGNOSIS — E88.819 INSULIN RESISTANCE: ICD-10-CM

## 2024-10-15 PROCEDURE — 3074F SYST BP LT 130 MM HG: CPT

## 2024-10-15 PROCEDURE — 3078F DIAST BP <80 MM HG: CPT

## 2024-10-15 PROCEDURE — 99214 OFFICE O/P EST MOD 30 MIN: CPT

## 2024-10-15 PROCEDURE — 1159F MED LIST DOCD IN RCRD: CPT

## 2024-10-15 PROCEDURE — 1160F RVW MEDS BY RX/DR IN RCRD: CPT

## 2024-10-15 RX ORDER — PHENTERMINE HYDROCHLORIDE 37.5 MG/1
37.5 TABLET ORAL
Qty: 28 TABLET | Refills: 0 | Status: SHIPPED | OUTPATIENT
Start: 2024-10-15

## 2024-10-15 NOTE — PATIENT INSTRUCTIONS
[facility]      Accelerated Wean  (Patients will receive 28 pills)    Weeks 1-2 (Take 2 days, Skip 1 day)  Weeks 3-4 (Take 1 day, Skip 1 day)  Weeks 5-6 (Take 1 day, Skip 2 days. This step will continue until all of your medication is gone.)     Sunday Monday Tuesday Wednesday Thursday Friday Saturday   Week 1 Take Take Skip Take Take Skip Take   Week 2 Take Skip Take Take Skip Take Take   Week 3 Skip Take Skip Take Skip Take Skip   Week 4 Take Skip Take Skip Take Skip Take   Week 5 Skip Skip Take Skip Skip Take Skip   Week 6 Skip Take Skip Skip Take Skip Skip       Please Note, the accelerated wean is ONLY for weight loss medications such as Phentermine, Phendimetrazine, and Diethylpropion.

## 2024-10-15 NOTE — ASSESSMENT & PLAN NOTE
Patient's (Body mass index is 45.35 kg/m².) indicates that they are morbidly/severely obese (BMI > 40 or > 35 with obesity - related health condition) with health conditions that include hypertension, impaired fasting glucose, and dyslipidemias . Weight is worsening. BMI  is above average; BMI management plan is completed. We discussed portion control, increasing exercise, pharmacologic options including phentermine and metformin, and an bridgette-based approach such as [employer]ella[employer] Pal or Lose It.     I have instructed the patient to continue with pursuit of medical weight loss as a part of this program. Patient does meet criteria for use of anorectics at this time as BMI >30 , body fat percentage >30%, hyperlipidemia, hypertension, impaired fasting glucose, and is not at treatment goal.     The current plan for this month includes:   - 1 month f/u visit and she is up 2 lbs on phentermine and metformin  - Adjust macronutrients as discussed. Bring food journal to next visit for review  - start phentermine taper due to ineffectiveness, instructions given and included   in AVS  - Continue Metformin 500 once daily for IR  - We discussed we will withhold additional medication at this time given her recent sweating and will consider adding other options at next visit pending her side effects  - Main focus is to prioritize protein, avoid eating out often and to decrease fat intake. She has also been restrictive in eating patter at times  - Switch current meal replacement shake to a performance lower calorie, higher protein shake  - Treatment goal 192 lb   Arterial line insertion

## 2024-11-06 ENCOUNTER — TELEPHONE (OUTPATIENT)
Dept: INTERNAL MEDICINE | Facility: CLINIC | Age: 54
End: 2024-11-06
Payer: MEDICARE

## 2024-11-06 NOTE — TELEPHONE ENCOUNTER
She states she has been on metformin (she stopped this morning due to diarrhea )and phentermine for 90 days. She stopped taking phentermine last week.   She is c/o extreme sweating with very little activity.  She states she goes to a weight loss clinic and has mentioned this to them but they did not think the excessive sweating was due to Phentermine.   She was seen at  on 10/13/2024 for shortness of breath   Scheduled her for an appointment with Carolina on Monday

## 2024-11-10 NOTE — PROGRESS NOTES
Patient Name: Loreta Robertson  : 1970   MRN: [id number]     Chief Complaint:    Chief Complaint   Patient presents with    Excessive Sweating    Shortness of Breath       History of Present Illness: Loreta Robertson is a 54 y.o. female.  History of Present Illness  The patient is a 62-year-old female who presents for evaluation of multiple medical concerns.    She reports feeling unwell, with fluctuating blood pressure readings. She has been experiencing profuse sweating, particularly on her face, since starting phentermine and metformin three months ago. Despite discontinuing these medications, the sweating persists. She also experiences heat sensations when consuming sugary or caffeinated foods. She does not report having fevers but mentions night sweats severe enough to require air conditioning. She has experienced hot flashes for a long time but notes that her current symptoms are different. Additionally, she reports excessive thirst.    She experiences shortness of breath with exertion, which she attributes to worsening COPD. She uses Breztri (2 puffs twice daily) and albuterol inhalers but finds them ineffective.     She has not had a sleep study.  Consultation with sleep medicine did not recommend sleep study.     She has no history of exposure to TB. She has a chronic cough, which is sometimes productive of yellowish phlegm in the mornings but becomes more foamy as the day progresses. She does not produce green, red, or yellow sputum. She experiences shortness of breath with significant walking and needs to take deep breaths after such activity.    She has been on primidone, prazosin, and Seroquel for an extended period and is currently taking Vraylar.    She has a history of hysterectomy but retains her ovaries. Sweating is different than her menopausal hot flashes.     SOCIAL HISTORY  She smokes cigarettes. She smokes weed.         Subjective     Review of System: Review of Systems      Medications:     Current Outpatient Medications:     albuterol (PROVENTIL) (2.5 MG/3ML) 0.083% nebulizer solution, Take 2.5 mg by nebulization Every 4 (Four) Hours As Needed for Wheezing., Disp: 90 mL, Rfl: 12    albuterol sulfate  (90 Base) MCG/ACT inhaler, Inhale 2 puffs Every 4 (Four) Hours As Needed for Wheezing., Disp: 8 g, Rfl: 0    ammonium lactate (AMLACTIN) 12 % cream, , Disp: , Rfl:     atorvastatin (LIPITOR) 40 MG tablet, Take 1 tablet by mouth Daily., Disp: 90 tablet, Rfl: 0    buPROPion XL (WELLBUTRIN XL) 150 MG 24 hr tablet, Take 1 tablet by mouth Every Morning., Disp: , Rfl:     busPIRone (BUSPAR) 15 MG tablet, , Disp: , Rfl:     divalproex (DEPAKOTE ER) 500 MG 24 hr tablet, Take 3 tablets by mouth Every Night. 1 qam and 2 q pm, Disp: , Rfl:     hydrOXYzine (ATARAX) 10 MG tablet, Take 1 tablet by mouth At Night As Needed., Disp: , Rfl:     losartan (COZAAR) 25 MG tablet, TAKE 1 TABLET BY MOUTH DAILY, Disp: 90 tablet, Rfl: 0    Pediatric Multivitamins-Iron (multivitamin pediatric chewable) chewable tablet, Chew Daily., Disp: , Rfl:     prazosin (MINIPRESS) 1 MG capsule, Take 1 capsule by mouth., Disp: , Rfl:     prazosin (MINIPRESS) 5 MG capsule, Take 1 capsule by mouth Every Night., Disp: , Rfl: 1    primidone (MYSOLINE) 50 MG tablet, Take 1 tablet by mouth 3 (Three) Times a Day., Disp: , Rfl:     QUEtiapine (SEROquel) 100 MG tablet, 1 tablet., Disp: , Rfl:     tiZANidine (ZANAFLEX) 4 MG tablet, Take 1 tablet by mouth Every 8 (Eight) Hours As Needed for Muscle Spasms., Disp: 60 tablet, Rfl: 1    Vraylar 3 MG capsule capsule, Take  by mouth., Disp: , Rfl:     Fluticasone-Umeclidin-Vilant (TRELEGY) 100-62.5-25 MCG/ACT inhaler, Inhale 1 puff Daily., Disp: 28 each, Rfl: 5    predniSONE (DELTASONE) 20 MG tablet, Take 1 tablet by mouth 2 (Two) Times a Day., Disp: 10 tablet, Rfl: 0    promethazine (PHENERGAN) 25 MG tablet, Take 1 tablet by mouth Every 6 (Six) Hours As Needed for Nausea or  "Vomiting., Disp: 20 tablet, Rfl: 0    Allergies:   No Known Allergies    Objective     Physical Exam:   Vital Signs:   Vitals:    11/11/24 1040   BP: 128/92   BP Location: Right arm   Patient Position: Sitting   Cuff Size: Adult   Pulse: 72   Resp: 18   Temp: 96.9 °F (36.1 °C)   TempSrc: Infrared   SpO2: 95%   Weight: 130 kg (287 lb 3.2 oz)   Height: 167.6 cm (66\")   PainSc: 0-No pain           Physical Exam  Physical Exam  Patient appears anxious.  Left upper and lower lobes with wheezing. Right lung is clear.  Heart rate is regular.       Results  Laboratory Studies  Insulin levels were high indicating insulin resistance.    Imaging  Low-dose CT showed no suspicious lung nodules.    Testing  Pulmonary function testing showed moderate obstruction, consistent with COPD.     ECG 12 Lead    Date/Time: 11/12/2024 1:03 PM  Performed by: Carolina Gutierrez APRN    Authorized by: Carolina Gutierrez APRN  Comparison: compared with previous ECG from 5/19/2023  Similar to previous ECG  Rhythm: sinus rhythm  Rate: normal  Conduction: conduction normal  QRS axis: normal    Clinical impression: normal ECG        Assessment / Plan      Assessment/Plan:   Diagnoses and all orders for this visit:    1. Excessive sweating (Primary)  -     TSH Rfx On Abnormal To Free T4; Future  -     Comprehensive Metabolic Panel; Future  -     CBC & Differential; Future  -     HIV-1 / O / 2 Ag / Antibody 4th Generation; Future  -     C-reactive protein; Future  -     POC Urinalysis Dipstick, Automated  -     QuantiFERON TB Gold; Future    2. Excessive thirst    3. COPD with exacerbation  -     Fluticasone-Umeclidin-Vilant (TRELEGY) 100-62.5-25 MCG/ACT inhaler; Inhale 1 puff Daily.  Dispense: 28 each; Refill: 5  -     predniSONE (DELTASONE) 20 MG tablet; Take 1 tablet by mouth 2 (Two) Times a Day.  Dispense: 10 tablet; Refill: 0    4. Dyspnea on exertion  -     Stress test with myocardial perfusion; Future  -     XR Chest PA & Lateral; Future  -    "  ECG 12 Lead    5. Hyperglycemia  -     POC Glycosylated Hemoglobin (Hb A1C)    6. Muscle cramps  -     tiZANidine (ZANAFLEX) 4 MG tablet; Take 1 tablet by mouth Every 8 (Eight) Hours As Needed for Muscle Spasms.  Dispense: 60 tablet; Refill: 1       Assessment & Plan  1. Excessive Sweating.  The excessive sweating began about a month after starting phentermine and metformin, which have since been discontinued. Despite stopping these medications, the sweating persists. Blood work will be conducted to investigate the cause of the excessive sweating.    2. Shortness of Breath.  The shortness of breath is associated with exertion and could be an angina equivalent. A stress test will be ordered to further evaluate this possibility.  Chest xray and EKG obtained.   Adult Transthoracic Echo Complete W/ Cont if Necessary Per Protocol (07/24/2020 10:55)     Interpretation Summary    Left ventricular systolic function is normal.  Estimated EF appears to be in the range of 56 - 60%.  Left ventricular diastolic dysfunction (grade II) consistent with pseudonormalization.  3. Chronic Obstructive Pulmonary Disease (COPD).  Pulmonary function testing showed moderate obstruction consistent with COPD. The current inhalers (albuterol and Breztri) do not seem to be effective. A trial of Trelegy inhaler, one puff daily, will be initiated to see if it provides better control.    4. Medication Management.  The patient is currently on primidone, prazosin, and Seroquel. The plan is to discontinue Seroquel. She is also using Vraylar, which she has been on for about three years.    5. Refill of tizanidine has been requested for muscle cramps.      Recommended to quit smoking.     Follow Up:   Will discuss follow-up pending results.   Patient or patient representative verbalized consent for the use of Ambient Listening during the visit with  ROSALES White for chart documentation. 11/11/2024  11:26 EST    ROSALES White  [facility]  [facility]

## 2024-11-11 ENCOUNTER — OFFICE VISIT (OUTPATIENT)
Dept: INTERNAL MEDICINE | Facility: CLINIC | Age: 54
End: 2024-11-11
Payer: MEDICARE

## 2024-11-11 ENCOUNTER — HOSPITAL ENCOUNTER (OUTPATIENT)
Dept: GENERAL RADIOLOGY | Facility: HOSPITAL | Age: 54
Discharge: HOME | End: 2024-11-11
Admitting: NURSE PRACTITIONER
Payer: MEDICARE

## 2024-11-11 VITALS
WEIGHT: 287.2 LBS | TEMPERATURE: 96.9 F | BODY MASS INDEX: 46.16 KG/M2 | RESPIRATION RATE: 18 BRPM | HEART RATE: 72 BPM | OXYGEN SATURATION: 95 % | HEIGHT: 66 IN | SYSTOLIC BLOOD PRESSURE: 128 MMHG | DIASTOLIC BLOOD PRESSURE: 92 MMHG

## 2024-11-11 DIAGNOSIS — R61 EXCESSIVE SWEATING: Primary | ICD-10-CM

## 2024-11-11 DIAGNOSIS — R63.1 EXCESSIVE THIRST: ICD-10-CM

## 2024-11-11 DIAGNOSIS — R06.09 DYSPNEA ON EXERTION: ICD-10-CM

## 2024-11-11 DIAGNOSIS — J44.1 COPD WITH EXACERBATION: ICD-10-CM

## 2024-11-11 DIAGNOSIS — R25.2 MUSCLE CRAMPS: ICD-10-CM

## 2024-11-11 DIAGNOSIS — R73.9 HYPERGLYCEMIA: ICD-10-CM

## 2024-11-11 DIAGNOSIS — Z23 NEED FOR IMMUNIZATION AGAINST INFLUENZA: ICD-10-CM

## 2024-11-11 LAB
ALBUMIN SERPL-MCNC: 3.9 G/DL (ref 3.5–5.2)
ALBUMIN/GLOB SERPL: 1.4 G/DL
ALP SERPL-CCNC: 100 U/L (ref 39–117)
ALT SERPL W P-5'-P-CCNC: 18 U/L (ref 1–33)
ANION GAP SERPL CALCULATED.3IONS-SCNC: 8.3 MMOL/L (ref 5–15)
AST SERPL-CCNC: 15 U/L (ref 1–32)
BASOPHILS # BLD AUTO: 0.05 10*3/MM3 (ref 0–0.2)
BASOPHILS NFR BLD AUTO: 0.8 % (ref 0–1.5)
BILIRUB BLD-MCNC: NEGATIVE MG/DL
BILIRUB SERPL-MCNC: 0.2 MG/DL (ref 0–1.2)
BUN SERPL-MCNC: 10 MG/DL (ref 6–20)
BUN/CREAT SERPL: 13.3 (ref 7–25)
CALCIUM SPEC-SCNC: 9.1 MG/DL (ref 8.6–10.5)
CHLORIDE SERPL-SCNC: 104 MMOL/L (ref 98–107)
CLARITY, POC: CLEAR
CO2 SERPL-SCNC: 28.7 MMOL/L (ref 22–29)
COLOR UR: YELLOW
CREAT SERPL-MCNC: 0.75 MG/DL (ref 0.57–1)
CRP SERPL-MCNC: 0.5 MG/DL (ref 0–0.5)
DEPRECATED RDW RBC AUTO: 42.8 FL (ref 37–54)
EGFRCR SERPLBLD CKD-EPI 2021: 93.6 ML/MIN/1.73
EOSINOPHIL # BLD AUTO: 0.19 10*3/MM3 (ref 0–0.4)
EOSINOPHIL NFR BLD AUTO: 3.1 % (ref 0.3–6.2)
ERYTHROCYTE [DISTWIDTH] IN BLOOD BY AUTOMATED COUNT: 13.2 % (ref 12.3–15.4)
EXPIRATION DATE: ABNORMAL
EXPIRATION DATE: NORMAL
GLOBULIN UR ELPH-MCNC: 2.7 GM/DL
GLUCOSE SERPL-MCNC: 107 MG/DL (ref 65–99)
GLUCOSE UR STRIP-MCNC: NEGATIVE MG/DL
HBA1C MFR BLD: 6.1 % (ref 4.5–5.7)
HCT VFR BLD AUTO: 40 % (ref 34–46.6)
HGB BLD-MCNC: 13.4 G/DL (ref 12–15.9)
HIV 1+2 AB+HIV1 P24 AG SERPL QL IA: NORMAL
IMM GRANULOCYTES # BLD AUTO: 0.02 10*3/MM3 (ref 0–0.05)
IMM GRANULOCYTES NFR BLD AUTO: 0.3 % (ref 0–0.5)
KETONES UR QL: NEGATIVE
LEUKOCYTE EST, POC: NEGATIVE
LYMPHOCYTES # BLD AUTO: 2.12 10*3/MM3 (ref 0.7–3.1)
LYMPHOCYTES NFR BLD AUTO: 34.2 % (ref 19.6–45.3)
Lab: ABNORMAL
Lab: NORMAL
MCH RBC QN AUTO: 30.1 PG (ref 26.6–33)
MCHC RBC AUTO-ENTMCNC: 33.5 G/DL (ref 31.5–35.7)
MCV RBC AUTO: 89.9 FL (ref 79–97)
MONOCYTES # BLD AUTO: 0.45 10*3/MM3 (ref 0.1–0.9)
MONOCYTES NFR BLD AUTO: 7.3 % (ref 5–12)
NEUTROPHILS NFR BLD AUTO: 3.36 10*3/MM3 (ref 1.7–7)
NEUTROPHILS NFR BLD AUTO: 54.3 % (ref 42.7–76)
NITRITE UR-MCNC: NEGATIVE MG/ML
NRBC BLD AUTO-RTO: 0 /100 WBC (ref 0–0.2)
NT-PROBNP SERPL-MCNC: 100 PG/ML (ref 0–900)
PH UR: 6 [PH] (ref 5–8)
PLATELET # BLD AUTO: 267 10*3/MM3 (ref 140–450)
PMV BLD AUTO: 11.6 FL (ref 6–12)
POTASSIUM SERPL-SCNC: 4.2 MMOL/L (ref 3.5–5.2)
PROT SERPL-MCNC: 6.6 G/DL (ref 6–8.5)
PROT UR STRIP-MCNC: NEGATIVE MG/DL
RBC # BLD AUTO: 4.45 10*6/MM3 (ref 3.77–5.28)
RBC # UR STRIP: NEGATIVE /UL
SODIUM SERPL-SCNC: 141 MMOL/L (ref 136–145)
SP GR UR: 1.01 (ref 1–1.03)
TSH SERPL DL<=0.05 MIU/L-ACNC: 2.49 UIU/ML (ref 0.27–4.2)
UROBILINOGEN UR QL: NORMAL
WBC NRBC COR # BLD AUTO: 6.19 10*3/MM3 (ref 3.4–10.8)

## 2024-11-11 PROCEDURE — 3074F SYST BP LT 130 MM HG: CPT | Performed by: NURSE PRACTITIONER

## 2024-11-11 PROCEDURE — 81003 URINALYSIS AUTO W/O SCOPE: CPT | Performed by: NURSE PRACTITIONER

## 2024-11-11 PROCEDURE — 85025 COMPLETE CBC W/AUTO DIFF WBC: CPT | Performed by: NURSE PRACTITIONER

## 2024-11-11 PROCEDURE — 84443 ASSAY THYROID STIM HORMONE: CPT | Performed by: NURSE PRACTITIONER

## 2024-11-11 PROCEDURE — 1126F AMNT PAIN NOTED NONE PRSNT: CPT | Performed by: NURSE PRACTITIONER

## 2024-11-11 PROCEDURE — G0432 EIA HIV-1/HIV-2 SCREEN: HCPCS | Performed by: NURSE PRACTITIONER

## 2024-11-11 PROCEDURE — 3080F DIAST BP >= 90 MM HG: CPT | Performed by: NURSE PRACTITIONER

## 2024-11-11 PROCEDURE — 83036 HEMOGLOBIN GLYCOSYLATED A1C: CPT | Performed by: NURSE PRACTITIONER

## 2024-11-11 PROCEDURE — 80053 COMPREHEN METABOLIC PANEL: CPT | Performed by: NURSE PRACTITIONER

## 2024-11-11 PROCEDURE — 36415 COLL VENOUS BLD VENIPUNCTURE: CPT | Performed by: NURSE PRACTITIONER

## 2024-11-11 PROCEDURE — 86480 TB TEST CELL IMMUN MEASURE: CPT | Performed by: NURSE PRACTITIONER

## 2024-11-11 PROCEDURE — 83880 ASSAY OF NATRIURETIC PEPTIDE: CPT | Performed by: NURSE PRACTITIONER

## 2024-11-11 PROCEDURE — 71046 X-RAY EXAM CHEST 2 VIEWS: CPT

## 2024-11-11 PROCEDURE — 99214 OFFICE O/P EST MOD 30 MIN: CPT | Performed by: NURSE PRACTITIONER

## 2024-11-11 PROCEDURE — 3044F HG A1C LEVEL LT 7.0%: CPT | Performed by: NURSE PRACTITIONER

## 2024-11-11 PROCEDURE — 86140 C-REACTIVE PROTEIN: CPT | Performed by: NURSE PRACTITIONER

## 2024-11-11 RX ORDER — PREDNISONE 20 MG/1
20 TABLET ORAL 2 TIMES DAILY
Qty: 10 TABLET | Refills: 0 | Status: SHIPPED | OUTPATIENT
Start: 2024-11-11

## 2024-11-11 RX ORDER — HYDROXYZINE HYDROCHLORIDE 10 MG/1
10 TABLET, FILM COATED ORAL NIGHTLY PRN
COMMUNITY
Start: 2024-10-24

## 2024-11-12 PROCEDURE — 93000 ELECTROCARDIOGRAM COMPLETE: CPT | Performed by: NURSE PRACTITIONER

## 2024-11-13 LAB
GAMMA INTERFERON BACKGROUND BLD IA-ACNC: 0 IU/ML
M TB IFN-G BLD-IMP: NEGATIVE
M TB IFN-G CD4+ BCKGRND COR BLD-ACNC: 0 IU/ML
M TB IFN-G CD4+CD8+ BCKGRND COR BLD-ACNC: 0 IU/ML
MITOGEN IGNF BCKGRD COR BLD-ACNC: >10 IU/ML
SERVICE CMNT-IMP: NORMAL

## 2025-02-06 NOTE — ASSESSMENT & PLAN NOTE
Patient's (Body mass index is 45.95 kg/m².) indicates that they are morbidly/severely obese (BMI > 40 or > 35 with obesity - related health condition) with health conditions that include hypertension and dyslipidemias . Weight is newly identified. BMI  is above average; BMI management plan is completed. We discussed low calorie, low carb based diet program, portion control, increasing exercise, and an bridgette-based approach such as [employer] Pal or Lose It.       -- This is an initial visit. Topics of discussion included obesity as a disease, nutritional education on food groups, exercise, and medications.Patient's past medical history was reviewed in detail and barriers to weight loss were identified and discussed. Past efforts at weight reduction on their own as well as under physician supervision were documented and discussed.  I advised patient to continue routine care with their Primary Care Provider.  --Body composition analysis was reviewed. Short and long-term weight loss goals set up based on this information.  --Patient was instructed on adequate protein, controlled carb and controlled fat intake. Baritastic food journal set up with calorie and macro goals set: Calories 1500, protein 100 g, net carbs  <75 g.  Patient encouraged to start journaling daily.  Encouraged that we are not necessarily looking for perfection but starting to learn where calories and macros are staying.  Patient advised that were looking to stay at or below calorie and carbohydrate levels and at or above protein and fiber levels. Asked patient to bring in food journal to next office visit for brief review  --Patient is to try nutritonal/behavioral changes only first.  --Recent blood work from 5/9/2024 reviewed  --Fasting labs ordered      
Normal vision: sees adequately in most situations; can see medication labels, newsprint

## (undated) DEVICE — CANNULA,ADULT,SOFT-TOUCH,7'TUBE,UC: Brand: PENDING

## (undated) DEVICE — SYS CLS SKIN PREMIERPRO EXOFINFUSION 22CM

## (undated) DEVICE — TOWEL,OR,DSP,ST,BLUE,STD,4/PK,20PK/CS: Brand: MEDLINE

## (undated) DEVICE — BNDG ELAS CO-FLEX SLF ADHR 6IN 5YD LF STRL

## (undated) DEVICE — DRAPE,REIN 53X77,STERILE: Brand: MEDLINE

## (undated) DEVICE — C-ARM: Brand: UNBRANDED

## (undated) DEVICE — DRAPE,TOP,102X53,STERILE: Brand: MEDLINE

## (undated) DEVICE — ELECTRD BLD EZ CLN STD 4IN

## (undated) DEVICE — 3M™ MEDIPORE™ H SOFT CLOTH SURGICAL TAPE, 2863, 3 IN X 10 YD, 12/CASE: Brand: 3M™ MEDIPORE™

## (undated) DEVICE — ANTIBACTERIAL UNDYED BRAIDED (POLYGLACTIN 910), SYNTHETIC ABSORBABLE SUTURE: Brand: COATED VICRYL

## (undated) DEVICE — THE SINGLE USE ETRAP – POLYP TRAP IS USED FOR SUCTION RETRIEVAL OF ENDOSCOPICALLY REMOVED POLYPS.: Brand: ETRAP

## (undated) DEVICE — BLANKT WARM UPPR/BDY ARM/OUT 57X196CM

## (undated) DEVICE — SINGLE-USE POLYPECTOMY SNARE: Brand: SENSATION SHORT THROW

## (undated) DEVICE — TRY EPID SFTY 18G 3.5IN 1T7680

## (undated) DEVICE — SINGLE-USE BIOPSY FORCEPS: Brand: RADIAL JAW 4

## (undated) DEVICE — GLV SURG PREMIERPRO MIC LTX PF SZ8.5 BRN

## (undated) DEVICE — TP SILK DURAPORE 3IN

## (undated) DEVICE — PATIENT RETURN ELECTRODE, SINGLE-USE, CONTACT QUALITY MONITORING, ADULT, WITH 9FT CORD, FOR PATIENTS WEIGING OVER 33LBS. (15KG): Brand: MEGADYNE

## (undated) DEVICE — 3M™ STERI-DRAPE™ INSTRUMENT POUCH 1018: Brand: STERI-DRAPE™

## (undated) DEVICE — STCKNT IMPERV 12IN STRL

## (undated) DEVICE — SPNG GZ WOVN 4X4IN 12PLY 10/BX STRL

## (undated) DEVICE — STRYKER PERFORMANCE SERIES SAGITTAL BLADE: Brand: STRYKER PERFORMANCE SERIES

## (undated) DEVICE — DRAPE,T,LIMB,BILATERAL,STERILE: Brand: MEDLINE

## (undated) DEVICE — C-ARM DRAPE: Brand: DEROYAL

## (undated) DEVICE — PK HIP TOTL UNIV 10

## (undated) DEVICE — TBG PENCL TELESCP MEGADYNE SMOKE EVAC 10FT

## (undated) DEVICE — GLV SURG SENSICARE W/ALOE PF LF 9 STRL

## (undated) DEVICE — TRAP FLD MINIVAC MEGADYNE 100ML